# Patient Record
Sex: FEMALE | Race: WHITE | NOT HISPANIC OR LATINO | Employment: FULL TIME | URBAN - METROPOLITAN AREA
[De-identification: names, ages, dates, MRNs, and addresses within clinical notes are randomized per-mention and may not be internally consistent; named-entity substitution may affect disease eponyms.]

---

## 2017-02-15 ENCOUNTER — OFFICE VISIT (OUTPATIENT)
Dept: INTERNAL MEDICINE | Facility: CLINIC | Age: 47
End: 2017-02-15
Payer: OTHER GOVERNMENT

## 2017-02-15 VITALS
HEIGHT: 66 IN | BODY MASS INDEX: 31.53 KG/M2 | RESPIRATION RATE: 16 BRPM | TEMPERATURE: 98 F | SYSTOLIC BLOOD PRESSURE: 108 MMHG | DIASTOLIC BLOOD PRESSURE: 78 MMHG | HEART RATE: 72 BPM | WEIGHT: 196.19 LBS

## 2017-02-15 DIAGNOSIS — R05.9 COUGH: ICD-10-CM

## 2017-02-15 DIAGNOSIS — J01.40 ACUTE NON-RECURRENT PANSINUSITIS: Primary | ICD-10-CM

## 2017-02-15 PROCEDURE — 96372 THER/PROPH/DIAG INJ SC/IM: CPT | Mod: PBBFAC,PO

## 2017-02-15 PROCEDURE — 99214 OFFICE O/P EST MOD 30 MIN: CPT | Mod: S$PBB,,, | Performed by: INTERNAL MEDICINE

## 2017-02-15 PROCEDURE — 99999 PR PBB SHADOW E&M-EST. PATIENT-LVL III: CPT | Mod: PBBFAC,,, | Performed by: INTERNAL MEDICINE

## 2017-02-15 PROCEDURE — 99213 OFFICE O/P EST LOW 20 MIN: CPT | Mod: PBBFAC,PO | Performed by: INTERNAL MEDICINE

## 2017-02-15 RX ORDER — TRIAMCINOLONE ACETONIDE 40 MG/ML
40 INJECTION, SUSPENSION INTRA-ARTICULAR; INTRAMUSCULAR
Status: COMPLETED | OUTPATIENT
Start: 2017-02-15 | End: 2017-02-15

## 2017-02-15 RX ORDER — BENZONATATE 200 MG/1
200 CAPSULE ORAL 3 TIMES DAILY PRN
Qty: 30 CAPSULE | Refills: 0 | Status: SHIPPED | OUTPATIENT
Start: 2017-02-15 | End: 2017-02-25

## 2017-02-15 RX ORDER — CODEINE PHOSPHATE AND GUAIFENESIN 10; 100 MG/5ML; MG/5ML
5 SOLUTION ORAL NIGHTLY PRN
Qty: 118 ML | Refills: 0 | Status: SHIPPED | OUTPATIENT
Start: 2017-02-15 | End: 2017-02-25

## 2017-02-15 RX ADMIN — TRIAMCINOLONE ACETONIDE 40 MG: 40 INJECTION, SUSPENSION INTRA-ARTICULAR; INTRAMUSCULAR at 04:02

## 2017-02-15 NOTE — PROGRESS NOTES
Subjective:       Patient ID: Hillary Cotton is a 46 y.o. female who presents for Nasal Congestion (thick mucus and sinus pain at saturday pm.  pain at 3 ears,sinus and throat.  wants shot and zpac)      Sinus Problem   This is a new problem. The current episode started in the past 7 days (started 4 days ago). The problem is unchanged. There has been no fever. The pain is moderate. Associated symptoms include congestion, coughing, sinus pressure and a sore throat (improving). Pertinent negatives include no chills, diaphoresis, ear pain, headaches, hoarse voice, shortness of breath, sneezing or swollen glands. Past treatments include oral decongestants. The treatment provided mild relief.   Cough   This is a new problem. The current episode started in the past 7 days (started 4 days ago). The problem has been unchanged. The problem occurs every few minutes. The cough is non-productive. Associated symptoms include ear congestion, nasal congestion, postnasal drip, rhinorrhea and a sore throat (improving). Pertinent negatives include no chest pain, chills, ear pain, fever, headaches, hemoptysis, myalgias, rash, shortness of breath or sweats. Nothing aggravates the symptoms. She has tried OTC cough suppressant for the symptoms. The treatment provided mild relief. There is no history of asthma or pneumonia.        Review of Systems   Constitutional: Positive for fatigue. Negative for chills, diaphoresis and fever.   HENT: Positive for congestion, postnasal drip, rhinorrhea, sinus pressure and sore throat (improving). Negative for ear discharge, ear pain, hoarse voice, sneezing and trouble swallowing.    Eyes: Negative for visual disturbance.   Respiratory: Positive for cough. Negative for hemoptysis, chest tightness and shortness of breath.    Cardiovascular: Negative for chest pain and palpitations.   Gastrointestinal: Negative for abdominal pain, diarrhea, nausea and vomiting.   Musculoskeletal: Negative for  arthralgias and myalgias.   Skin: Negative for rash.   Neurological: Negative for weakness, light-headedness and headaches.   Hematological: Negative for adenopathy.       Objective:      Physical Exam   Constitutional: She is oriented to person, place, and time. Vital signs are normal. She appears well-developed and well-nourished. No distress.   HENT:   Head: Normocephalic and atraumatic.   Right Ear: Hearing, tympanic membrane, external ear and ear canal normal. Tympanic membrane is not erythematous and not bulging.   Left Ear: Hearing, tympanic membrane, external ear and ear canal normal. Tympanic membrane is not erythematous and not bulging.   Nose: Mucosal edema and rhinorrhea present. Right sinus exhibits maxillary sinus tenderness and frontal sinus tenderness. Left sinus exhibits maxillary sinus tenderness and frontal sinus tenderness.   Mouth/Throat: Uvula is midline and mucous membranes are normal. Posterior oropharyngeal erythema present. No oropharyngeal exudate.   Eyes: EOM and lids are normal.   Neck: Normal range of motion. Neck supple.   Cardiovascular: Normal rate, regular rhythm, normal heart sounds and intact distal pulses.    No murmur heard.  Pulmonary/Chest: Effort normal and breath sounds normal. No tachypnea and no bradypnea. She has no decreased breath sounds. She has no wheezes. She has no rhonchi.   Abdominal: Soft. Bowel sounds are normal. She exhibits no distension.   Musculoskeletal: She exhibits no edema.   Lymphadenopathy:     She has no cervical adenopathy.        Right: No supraclavicular adenopathy present.        Left: No supraclavicular adenopathy present.   Neurological: She is alert and oriented to person, place, and time. Coordination and gait normal.   Skin: Skin is warm, dry and intact. No rash noted. She is not diaphoretic.   Psychiatric: She has a normal mood and affect.   Vitals reviewed.      Assessment:       1. Acute non-recurrent pansinusitis    2. Cough        Plan:        -- kenalog 40mg IM  -- continue astelin nasal spray daily, zyrtec daily  -- Tessalon perles 200mg three times daily as needed for cough, Tussi-Organidin at bedtime as needed for cough but will cause drowsiness  -- could be viral, symptomatic management for now  -- call within next 2-3 days and may need additional treatment    Karlee Ro MD

## 2017-02-15 NOTE — MR AVS SNAPSHOT
[facility]   [facility]  [place] [address]  Phone: [phone]  Fax: [phone]                  Hillary Cotton   2/15/2017 4:00 PM   Office Visit    Description:  Female : 1970   Provider:  Karlee Ro MD   Department:  [facility]           Reason for Visit     Nasal Congestion           Diagnoses this Visit        Comments    Acute non-recurrent pansinusitis    -  Primary     Cough                To Do List           Goals (5 Years of Data)     None       These Medications        Disp Refills Start End    benzonatate (TESSALON) 200 MG capsule 30 capsule 0 2/15/2017 2017    Take 1 capsule (200 mg total) by mouth 3 (three) times daily as needed. - Oral    Pharmacy: [id number] - [place], [place] - [address] [address] [facility] Ph #: [phone]       guaifenesin-codeine 100-10 mg/5 ml (TUSSI-ORGANIDIN NR)  mg/5 mL syrup 118 mL 0 2/15/2017 2017    Take 5 mLs by mouth nightly as needed. - Oral    Pharmacy: [id number] - [place], [place] - [address] [address] [facility], [address] Ph #: [phone]         [facility] On Call     [facility] On Call Nurse Care Line -  Assistance  Registered nurses in the [facility] provide clinical advisement, health education, appointment booking, and other advisory services.  Call for this free service at [phone].             Medications           Message regarding Medications     Verify the changes and/or additions to your medication regime listed below are the same as discussed with your clinician today.  If any of these changes or additions are incorrect, please notify your healthcare provider.        START taking these NEW medications        Refills    benzonatate (TESSALON) 200 MG capsule 0    Sig: Take 1 capsule (200 mg total) by mouth 3 (three) times daily as needed.    Class: Normal    Route: Oral    guaifenesin-codeine 100-10 mg/5 ml (TUSSI-ORGANIDIN NR)   "mg/5 mL syrup 0    Sig: Take 5 mLs by mouth nightly as needed.    Class: Print    Route: Oral      These medications were administered today        Dose Freq    triamcinolone acetonide injection 40 mg 40 mg Clinic/HOD 1 time    Sig: Inject 1 mL (40 mg total) into the muscle one time.    Class: Normal    Route: Intramuscular           Verify that the below list of medications is an accurate representation of the medications you are currently taking.  If none reported, the list may be blank. If incorrect, please contact your healthcare provider. Carry this list with you in case of emergency.           Current Medications     azelastine (ASTELIN) 137 mcg (0.1 %) nasal spray 1 spray (137 mcg total) by Nasal route 2 (two) times daily.    cetirizine (ZYRTEC) 10 MG tablet Take 1 tablet (10 mg total) by mouth once daily.    glucosamine-chondroitin 500-400 mg tablet Take 1 tablet by mouth 3 (three) times daily.    levonorgestrel-ethinyl estradiol (AVIANE,ALESSE,LESSINA) 0.1-20 mg-mcg per tablet Take 1 tablet by mouth once daily.    albuterol 90 mcg/actuation inhaler Inhale 1-2 puffs into the lungs every 6 (six) hours as needed for Wheezing.    benzonatate (TESSALON) 200 MG capsule Take 1 capsule (200 mg total) by mouth 3 (three) times daily as needed.    guaifenesin-codeine 100-10 mg/5 ml (TUSSI-ORGANIDIN NR)  mg/5 mL syrup Take 5 mLs by mouth nightly as needed.           Clinical Reference Information           Your Vitals Were     BP Pulse Temp Resp Height Weight    108/78 (BP Location: Left arm, Patient Position: Sitting, BP Method: Manual) 72 97.8 °F (36.6 °C) (Oral) 16 5' 6" (1.676 m) 89 kg (196 lb 3.4 oz)    Last Period BMI             02/01/2017 (Approximate) 31.67 kg/m2         Blood Pressure          Most Recent Value    BP  108/78      Allergies as of 2/15/2017     No Known Allergies      Immunizations Administered on Date of Encounter - 2/15/2017     None      Language Assistance Services     ATTENTION: " Language assistance services are available, free of charge. Please call [phone].      ATENCIÓN: Si habla brenden, tiene a rodriguez disposición servicios gratuitos de asistencia lingüística. Llame al [phone].     CHÚ Ý: N?u b?n nói Ti?ng Vi?t, có các d?ch v? h? tr? ngôn ng? mi?n phí dành cho b?n. G?i s? [phone].         [facility] complies with applicable Federal civil rights laws and does not discriminate on the basis of race, color, national origin, age, disability, or sex.

## 2017-02-17 ENCOUNTER — PATIENT MESSAGE (OUTPATIENT)
Dept: INTERNAL MEDICINE | Facility: CLINIC | Age: 47
End: 2017-02-17

## 2017-02-17 RX ORDER — AZITHROMYCIN 250 MG/1
TABLET, FILM COATED ORAL
Qty: 6 TABLET | Refills: 0 | Status: SHIPPED | OUTPATIENT
Start: 2017-02-17 | End: 2017-02-22

## 2017-04-24 ENCOUNTER — OFFICE VISIT (OUTPATIENT)
Dept: FAMILY MEDICINE | Facility: CLINIC | Age: 47
End: 2017-04-24
Payer: OTHER GOVERNMENT

## 2017-04-24 VITALS
DIASTOLIC BLOOD PRESSURE: 80 MMHG | SYSTOLIC BLOOD PRESSURE: 120 MMHG | HEIGHT: 66 IN | HEART RATE: 68 BPM | WEIGHT: 194 LBS | BODY MASS INDEX: 31.18 KG/M2

## 2017-04-24 DIAGNOSIS — M77.8 TENDINITIS OF FOREARM: Primary | ICD-10-CM

## 2017-04-24 PROCEDURE — 99999 PR PBB SHADOW E&M-EST. PATIENT-LVL III: CPT | Mod: PBBFAC,,, | Performed by: FAMILY MEDICINE

## 2017-04-24 PROCEDURE — 99214 OFFICE O/P EST MOD 30 MIN: CPT | Mod: S$PBB,,, | Performed by: FAMILY MEDICINE

## 2017-04-24 PROCEDURE — 99213 OFFICE O/P EST LOW 20 MIN: CPT | Mod: PBBFAC,PO | Performed by: FAMILY MEDICINE

## 2017-04-24 RX ORDER — METHYLPREDNISOLONE 4 MG/1
TABLET ORAL
Qty: 21 TABLET | Refills: 0 | Status: SHIPPED | OUTPATIENT
Start: 2017-04-24 | End: 2017-05-15

## 2017-04-24 NOTE — MR AVS SNAPSHOT
[facility]   [place]  [place] [address]  Phone: [phone]  Fax: [phone]                  Hillary Cotton   2017 4:20 PM   Office Visit    Description:  Female : 1970   Provider:  Arnulfo Barboza MD   Department:  [facility]           Reason for Visit     Spasms           Diagnoses this Visit        Comments    Tendinitis of forearm    -  Primary            To Do List           Future Appointments        Provider Department Dept Phone    2017 2:15 PM Sushma Waite NP [facility] - Orthopedics [phone]      Goals (5 Years of Data)     None       These Medications        Disp Refills Start End    methylPREDNISolone (MEDROL DOSEPACK) 4 mg tablet 21 tablet 0 2017 5/15/2017    Take as directed    Pharmacy: [id number] - [place], [place] - [address] [address] Ph #: [phone]         [facility] On Call     [facility] Nurse Care Line -  Assistance  Unless otherwise directed by your provider, please contact [facility], our nurse care line that is available for  assistance.     Registered nurses in the [facility] provide: appointment scheduling, clinical advisement, health education, and other advisory services.  Call: [phone] (toll free)               Medications           Message regarding Medications     Verify the changes and/or additions to your medication regime listed below are the same as discussed with your clinician today.  If any of these changes or additions are incorrect, please notify your healthcare provider.        START taking these NEW medications        Refills    methylPREDNISolone (MEDROL DOSEPACK) 4 mg tablet 0    Sig: Take as directed    Class: Normal           Verify that the below list of medications is an accurate representation of the medications you are currently taking.  If none reported, the list may be blank. If incorrect, please contact your  "healthcare provider. Carry this list with you in case of emergency.           Current Medications     azelastine (ASTELIN) 137 mcg (0.1 %) nasal spray 1 spray (137 mcg total) by Nasal route 2 (two) times daily.    cetirizine (ZYRTEC) 10 MG tablet Take 1 tablet (10 mg total) by mouth once daily.    glucosamine-chondroitin 500-400 mg tablet Take 1 tablet by mouth 3 (three) times daily.    levonorgestrel-ethinyl estradiol (AVIANE,ALESSE,LESSINA) 0.1-20 mg-mcg per tablet Take 1 tablet by mouth once daily.    albuterol 90 mcg/actuation inhaler Inhale 1-2 puffs into the lungs every 6 (six) hours as needed for Wheezing.    methylPREDNISolone (MEDROL DOSEPACK) 4 mg tablet Take as directed           Clinical Reference Information           Your Vitals Were     BP Pulse Height Weight BMI    120/80 (BP Location: Left arm, Patient Position: Sitting, BP Method: Manual) 68 5' 6" (1.676 m) 88 kg (194 lb 0.1 oz) 31.31 kg/m2      Blood Pressure          Most Recent Value    BP  120/80      Allergies as of 4/24/2017     No Known Allergies      Immunizations Administered on Date of Encounter - 4/24/2017     None      Orders Placed During Today's Visit      Normal Orders This Visit    Ambulatory referral to Orthopedics     Future Labs/Procedures Expected by Expires    X-Ray Forearm Right  4/24/2017 4/24/2018      Language Assistance Services     ATTENTION: Language assistance services are available, free of charge. Please call [phone].      ATENCIÓN: Si habla brenden, tiene a rodriguez disposición servicios gratuitos de asistencia lingüística. Llame al [phone].     [facility] Ý: N?u b?n nói Ti?ng Vi?t, có các d?ch v? h? tr? ngôn ng? mi?n phí dành cho b?n. G?i s? [phone].         [facility] complies with applicable Federal civil rights laws and does not discriminate on the basis of race, color, national origin, age, disability, or sex.        "

## 2017-04-24 NOTE — PROGRESS NOTES
Subjective:       Patient ID: Hillary Cotton is a 46 y.o. female.    Chief Complaint: Spasms    HPI Comments: 46 years old female who came to the clinic with right forearm pain after doing physical activity.  Patient suspected possible tendon tear.  Patient with this problem for the last 2-3 weeks.  Patient was using ibuprofen with no significant improvement.    Spasms       Review of Systems   Constitutional: Negative.    HENT: Negative.    Eyes: Negative.    Respiratory: Negative.    Cardiovascular: Negative.    Gastrointestinal: Negative.    Genitourinary: Negative.    Musculoskeletal: Negative.    Skin: Negative.    Neurological: Negative.    Psychiatric/Behavioral: Negative.        Objective:      Physical Exam   Constitutional: She is oriented to person, place, and time. She appears well-developed and well-nourished. No distress.   HENT:   Head: Normocephalic and atraumatic.   Right Ear: External ear normal.   Left Ear: External ear normal.   Nose: Nose normal.   Mouth/Throat: Oropharynx is clear and moist. No oropharyngeal exudate.   Eyes: Conjunctivae and EOM are normal. Pupils are equal, round, and reactive to light. Right eye exhibits no discharge. Left eye exhibits no discharge. No scleral icterus.   Neck: Normal range of motion. Neck supple. No JVD present. No tracheal deviation present. No thyromegaly present.   Cardiovascular: Normal rate, regular rhythm, normal heart sounds and intact distal pulses.  Exam reveals no gallop and no friction rub.    No murmur heard.  Pulmonary/Chest: Effort normal and breath sounds normal. No stridor. No respiratory distress. She has no wheezes. She has no rales. She exhibits no tenderness.   Abdominal: Soft. Bowel sounds are normal. She exhibits no distension and no mass. There is no tenderness. There is no rebound and no guarding.   Musculoskeletal: Normal range of motion. She exhibits no edema.        Right forearm: She exhibits tenderness. She exhibits no  bony tenderness, no swelling, no edema, no deformity and no laceration.   Lymphadenopathy:     She has no cervical adenopathy.   Neurological: She is alert and oriented to person, place, and time. She has normal reflexes. No cranial nerve deficit. She exhibits normal muscle tone. Coordination normal.   Skin: Skin is warm and dry. No rash noted. She is not diaphoretic. No erythema. No pallor.   Psychiatric: She has a normal mood and affect. Her behavior is normal. Judgment and thought content normal.       Assessment:       1. Tendinitis of forearm        Plan:         Hillary was seen today for spasms.    Diagnoses and all orders for this visit:    Tendinitis of forearm  -     Ambulatory referral to Orthopedics  -     methylPREDNISolone (MEDROL DOSEPACK) 4 mg tablet; Take as directed  -     X-Ray Forearm Right; Future     possible MRI and physical therapy were discussed with the patient.

## 2017-04-25 ENCOUNTER — HOSPITAL ENCOUNTER (OUTPATIENT)
Dept: RADIOLOGY | Facility: HOSPITAL | Age: 47
Discharge: HOME | End: 2017-04-25
Attending: FAMILY MEDICINE
Payer: OTHER GOVERNMENT

## 2017-04-25 DIAGNOSIS — M77.8 TENDINITIS OF FOREARM: ICD-10-CM

## 2017-04-25 PROCEDURE — 73090 X-RAY EXAM OF FOREARM: CPT | Mod: 26,RT,, | Performed by: RADIOLOGY

## 2017-04-25 PROCEDURE — 73090 X-RAY EXAM OF FOREARM: CPT | Mod: TC,PO,RT

## 2017-04-28 ENCOUNTER — OFFICE VISIT (OUTPATIENT)
Dept: ORTHOPEDICS | Facility: CLINIC | Age: 47
End: 2017-04-28
Payer: OTHER GOVERNMENT

## 2017-04-28 VITALS — BODY MASS INDEX: 30.37 KG/M2 | WEIGHT: 193.5 LBS | HEIGHT: 67 IN

## 2017-04-28 DIAGNOSIS — M25.521 RIGHT ELBOW PAIN: Primary | ICD-10-CM

## 2017-04-28 DIAGNOSIS — M77.8 FOREARM TENDONITIS: ICD-10-CM

## 2017-04-28 DIAGNOSIS — M77.11 LATERAL EPICONDYLITIS OF RIGHT ELBOW: ICD-10-CM

## 2017-04-28 DIAGNOSIS — M79.631 RIGHT FOREARM PAIN: ICD-10-CM

## 2017-04-28 PROCEDURE — 99203 OFFICE O/P NEW LOW 30 MIN: CPT | Mod: S$PBB,,, | Performed by: NURSE PRACTITIONER

## 2017-04-28 PROCEDURE — 99999 PR PBB SHADOW E&M-EST. PATIENT-LVL III: CPT | Mod: PBBFAC,,, | Performed by: NURSE PRACTITIONER

## 2017-04-28 PROCEDURE — 99213 OFFICE O/P EST LOW 20 MIN: CPT | Mod: PBBFAC | Performed by: NURSE PRACTITIONER

## 2017-04-28 RX ORDER — MELOXICAM 15 MG/1
15 TABLET ORAL DAILY PRN
Qty: 20 TABLET | Refills: 0 | Status: SHIPPED | OUTPATIENT
Start: 2017-04-28 | End: 2017-05-10

## 2017-04-28 NOTE — PROGRESS NOTES
"Chief Complaint & History of Present Illness:  Hillary Cotton is a 46 y.o. year old female presenting with intermittent right elbow pain which started 1 month ago.  She is right hand dominant. She did use to play softball and that was her throwing arm. The pain started at the lateral elbow after lifting 40 pound bags of soil. No it has spread to the forearm and up the bicep to the shoulder. The pain is described as achy and sharp. It is aggravated by lifting, elevation, supination and pronation.  Associated symptoms include a firm "knot" at the lateral elbow, which has improved. Denies redness or erythema. Reports it hurts in the joint.  Previous treatments include avoidance of offending activity and OTC analgesics which have provided poor relief. She states that her primary care doctor told her that she was going to see orthopedics for an MRI. There is not a history of previous injury or surgery to the elbow. Denies numbness or tingling.     Review of patient's allergies indicates:  No Known Allergies      Current Outpatient Prescriptions   Medication Sig Dispense Refill    azelastine (ASTELIN) 137 mcg (0.1 %) nasal spray 1 spray (137 mcg total) by Nasal route 2 (two) times daily. 30 mL 5    cetirizine (ZYRTEC) 10 MG tablet Take 1 tablet (10 mg total) by mouth once daily. 10 tablet 0    glucosamine-chondroitin 500-400 mg tablet Take 1 tablet by mouth 3 (three) times daily.      levonorgestrel-ethinyl estradiol (AVIANE,ALESSE,LESSINA) 0.1-20 mg-mcg per tablet Take 1 tablet by mouth once daily. 28 tablet 11    methylPREDNISolone (MEDROL DOSEPACK) 4 mg tablet Take as directed 21 tablet 0    albuterol 90 mcg/actuation inhaler Inhale 1-2 puffs into the lungs every 6 (six) hours as needed for Wheezing. 18 g 0     No current facility-administered medications for this visit.        Past Medical History:   Diagnosis Date    Abnormal Pap smear     Allergy     Asthma     Sinusitis, chronic        Past " "Surgical History:   Procedure Laterality Date    SALIVARY GLAND SURGERY         Review of Systems:  Review of Systems   Constitution: Negative for chills, fever and malaise/fatigue.   Eyes: Negative for visual disturbance.   Cardiovascular: Negative for chest pain.   Hematologic/Lymphatic: Negative for bleeding problem. Does not bruise/bleed easily.   Skin: Negative for color change, flushing, poor wound healing and rash.   Musculoskeletal: Positive for arthritis and joint pain. Negative for falls, gout, joint swelling, muscle cramps, muscle weakness, myalgias and stiffness.        Arm pain   Neurological: Negative for dizziness, light-headedness, loss of balance, numbness and paresthesias.   Psychiatric/Behavioral: Negative for altered mental status.         OBJECTIVE:     PHYSICAL EXAM:  Height: 5' 7" (170.2 cm) Weight: 87.8 kg (193 lb 8 oz)  General    Nursing note reviewed.  Constitutional: She is oriented to person, place, and time. She appears well-developed and well-nourished. No distress.   HENT:   Head: Atraumatic.   Nose: Nose normal.   Eyes: Conjunctivae and EOM are normal. Right eye exhibits no discharge. Left eye exhibits no discharge.   Pulmonary/Chest: Effort normal. No respiratory distress.   Neurological: She is alert and oriented to person, place, and time.   Psychiatric: She has a normal mood and affect. Her behavior is normal. Judgment and thought content normal.             Right Hand/Wrist Exam     Inspection   Scars: Wrist - absent   Effusion: Wrist - absent   Bruising: Wrist - absent   Deformity: Wrist - deformity     Range of Motion   The patient has normal right hand/wrist ROM.    Other     Neuorologic Exam    Median Distribution: normal  Ulnar Distribution: normal  Radial Distribution: normal      Right Elbow Exam     Inspection   Scars: absent  Effusion: absent  Bruising: absent  Deformity: absent  Atrophy: absent    Pain   The patient exhibits pain of the extensor musculature and " lateral epicondyle    Range of Motion   The patient has normal right elbow ROM.    Tests Tinel's Sign (cubital tunnel): negative    Other   Sensation: normal    Comments:  I do not appreciate a knot or swelling to the elbow  Non tender to palpation           Muscle Strength   Right Upper Extremity   Wrist Extension: 5/5/5   Wrist Flexion: 5/5/5   Elbow Pronation:  5/5   Elbow Supination:  5/5   Elbow Extension: 5/5  Elbow Flexion: 5/5    Vascular Exam     Right Pulses      Radial:                    2+      Capillary Refill  Right Hand: normal capillary refill    Edema  Right Forearm: absent      RADIOGRAPHS:  Xray of the right forearm shows no fracture or dislocation.     ASSESSMENT/PLAN:     Plan:   Suspect Lateral Epicondylitis and Extensor Tendonitis of the forearm   - Recommend Mobic short term  - Start Physical therapy   - Pt states she is very concerned about a tear. I explained that even if there was a tear that I do not feel this would change the treatment plan of NSAIDs, rest, ice, and PT to include ultrasound and that I recommend conservative treatment  - Pt states she was told by PCP that she needed MRI and would like to obtain one because she does feel that she needs to know if there is a tear  - She wishes to wait on PT based on MRI results    RTC PRN, will call with MRI results    Instructed pt to call office if they have any future questions/concerns or to schedule apt. Patient will return to see me if symptoms worsen or fail to improve.

## 2017-04-28 NOTE — LETTER
April 28, 2017      Arnulfo Barboza MD  [address] [address]           [facility]  [address]  [place] [address]  Phone: [phone]          Patient: Hillary Cotton   MR Number: [id number]   YOB: 1970   Date of Visit: 4/28/2017       Dear Dr. Arnulfo Barboza:    Thank you for referring Hillary Cotton to me for evaluation. Attached you will find relevant portions of my assessment and plan of care.    If you have questions, please do not hesitate to call me. I look forward to following Hillary Cotton along with you.    Sincerely,    Sushma Waite, NP    Enclosure  CC:  No Recipients    If you would like to receive this communication electronically, please contact [email or web address] or [phone] to request more information on [employer] access.    For providers and/or their staff who would like to refer a patient to [facility], please contact us through our one-stop-shop provider referral line, [facility], at [phone].    If you feel you have received this communication in error or would no longer like to receive these types of communications, please e-mail [email or web address]

## 2017-05-08 ENCOUNTER — HOSPITAL ENCOUNTER (OUTPATIENT)
Dept: RADIOLOGY | Facility: HOSPITAL | Age: 47
Discharge: HOME | End: 2017-05-08
Attending: NURSE PRACTITIONER
Payer: OTHER GOVERNMENT

## 2017-05-08 DIAGNOSIS — M79.631 RIGHT FOREARM PAIN: ICD-10-CM

## 2017-05-08 DIAGNOSIS — M77.11 LATERAL EPICONDYLITIS OF RIGHT ELBOW: ICD-10-CM

## 2017-05-08 DIAGNOSIS — M25.521 RIGHT ELBOW PAIN: ICD-10-CM

## 2017-05-08 DIAGNOSIS — M77.8 FOREARM TENDONITIS: ICD-10-CM

## 2017-05-08 PROCEDURE — 73218 MRI UPPER EXTREMITY W/O DYE: CPT | Mod: 26,RT,, | Performed by: RADIOLOGY

## 2017-05-08 PROCEDURE — 73218 MRI UPPER EXTREMITY W/O DYE: CPT | Mod: TC,RT

## 2017-05-10 ENCOUNTER — TELEPHONE (OUTPATIENT)
Dept: ORTHOPEDICS | Facility: CLINIC | Age: 47
End: 2017-05-10

## 2017-05-10 DIAGNOSIS — M25.521 RIGHT ELBOW PAIN: Primary | ICD-10-CM

## 2017-05-10 DIAGNOSIS — M77.8 ELBOW TENDONITIS: ICD-10-CM

## 2017-05-10 DIAGNOSIS — M79.631 RIGHT FOREARM PAIN: ICD-10-CM

## 2017-05-10 DIAGNOSIS — M77.8 TRICEPS TENDONITIS: ICD-10-CM

## 2017-05-10 RX ORDER — DICLOFENAC SODIUM 75 MG/1
75 TABLET, DELAYED RELEASE ORAL 2 TIMES DAILY PRN
Qty: 30 TABLET | Refills: 0 | Status: SHIPPED | OUTPATIENT
Start: 2017-05-10 | End: 2017-06-07

## 2017-05-10 NOTE — TELEPHONE ENCOUNTER
Spoke with pt re: MRI results. I recommend PT, and NSAIDs PRN. She states she tried Mobic with no relief. I will send Diclofenac for her to try instead short term- she understands she cannot take both.

## 2017-05-16 ENCOUNTER — CLINICAL SUPPORT (OUTPATIENT)
Dept: REHABILITATION | Facility: HOSPITAL | Age: 47
End: 2017-05-16
Attending: NURSE PRACTITIONER
Payer: OTHER GOVERNMENT

## 2017-05-16 DIAGNOSIS — S46.311A STRAIN OF TRICEPS MUSCLE, RIGHT, INITIAL ENCOUNTER: Primary | ICD-10-CM

## 2017-05-16 PROCEDURE — 97014 ELECTRIC STIMULATION THERAPY: CPT | Mod: PN

## 2017-05-16 PROCEDURE — 97166 OT EVAL MOD COMPLEX 45 MIN: CPT | Mod: PN

## 2017-05-16 PROCEDURE — 97110 THERAPEUTIC EXERCISES: CPT | Mod: PN

## 2017-05-16 NOTE — PLAN OF CARE
"TIME RECORD    Date:  05/16/2017    Start Time:  2:15  Stop Time:  3:15    PROCEDURES:    TIMED  Procedure Min.   Estim 15   TE 10             UNTIMED  Procedure Min.   Mod eval 35         Total Timed Minutes:  25  Total Timed Units:  2  Total Untimed Units:  1  Charges Billed/# of units:  3 (1 eval, 1estim, 1 TE)    Visit #:  1    OCCUPATIONAL THERAPY INITIAL EVALUATION & PLAN OF TREATMENT    Subjective:   Patient Name: Hillary Cotton  Physician Name:  DEVON Rush  Primary Diagnosis:  R elbow pain  Treatment Diagnosis:  R elbow tricep and extensor mass tendonosis  Onset Date:  4/7/17  Eval Date: 5/16/2017   Certification Period:  5/16/2017  to 6/26/17  Past Medical History:   Past Medical History:   Diagnosis Date    Abnormal Pap smear     Allergy     Asthma     Sinusitis, chronic      Precautions:  Universal  Prior Therapy:  None  Signs of Abuse: no  Medications: Hillary Cotton has a current medication list which includes the following prescription(s): albuterol, azelastine, cetirizine, diclofenac, glucosamine-chondroitin, and levonorgestrel-ethinyl estradiol.  Nutrition:  wdwnf  Prior Level of Function: Independent  Social History:  Pt lives with spouse. Pt works as a  for [employer]  Place of Residence (steps/adaptations/DME):  Lives in house w/no steps  Functional Deficits Leading to Referral/Nature of Injury:  Pt reports injuring her R arm when lifting 40# bag of topsoil   Patient Therapy Goals:  "For this to not hurt and be able to use it again"  Hand dominance: Right  X-Rays/Tests: No fracture dislocation bone destruction seen.  MRi:  Mild tendinosis at the origin of the common duct or tendon, mild insertional tendinosis of the triceps, subcentimeter probable ganglion cyst along the volar aspect of the radial head.  Pain: 2 /10 at rest in R 6-7/10, primarily in proximal forearm but radiating more proximally at times to shoulder. Pt also has wrist pain " "at times.  Pt states it hurts more after she uses her arm rather than during use.    Patient is calm, pleasant & cooperative during OT evaluation.  Primary complaints are pain and weakness. Pt stated that most of her pain is after she uses her R hand ("soreness") and depends on the duration of use.    Objective:     Patient is R hand dominant & R hand involved.    Observations: Pt presented Mild bruising over dorsum of forearm    Range of Motion (in degrees):   Right AROM Left AROM   Elbow E/F WNL WNL   Forearm Sup/pron WNL WNL   Wrist E/F 60/66 62/58   Wrist RD/UD 15/32 17/32   Thumb R/P Abd 35/32 40/45   Stress position (elbow extension ) R  Wrist Ext. 22  L38  R wrist Flex 46 (pain over lateral elbow) L 50  Composite fist: WNL     and Pinch Strengths (in pounds):  Setting 2   Right Left   Elbow bent     1 25 35   Elbow straight     1 10 25        Lateral 5 8   Tripod 4 6.5   Tip 4 7     Comments: 1 trial due to pain      Circumferential Edema Measurements (in cm):     Right Left   Above Elbow (5 cm from EC) 29.5 30.2   Elbow Crease  26.5 27.5   Below Elbow (4 cm from EC) 26.5 26   Wrist crease 15.2 15   MCP's 18.3 18.3     Sensation: Patient denies numbness & tingling at this time    Fine motor coordination:  9 hole peg   Right Left   Seconds 31 35   Dropped during removal 0 0   Dropped during replacement 1 0     Endurance: good w/ light/moderate/heavy resistive ADL/IADL's using R hand    ADLs/Function: FOTO impairment score of 40% (see media for details).  Pt reports difficulty with any B/IADL requiring pinch (such as holding a cup of coffee, homemaking tasks)    Special Tests: Long finger resistance test positive.  Pain w/palpation to wrist/finger extensors and self stretch in stress position    Today's Treatment:  Initial evaluation completed f/b education on condition, POC and HEP, and IFC with ice for pain    Assessment:     This 46 y.o. female referred to Outpatient Occupational Therapy with diagnosis " of   Encounter Diagnosis   Name Primary?    Strain of triceps muscle, right, initial encounter Yes    presents with limitations as described in problem list. Patient can benefit from Occupational Therapy services for Iontophoresis, Ultrasound, moist heat, PROM, AROM, Therapeutic exercises, joint mobs, home exercise program provided with written instructions, ice, Ice massage and strengthening. The following goals were discussed with the patient and she is in agreement with them as to be addressed in the treatment plan.     Problem List:   Decreased function of Right UE, Decreased function of Left UE, Increased pain, Decreased strength, Inability to perform work/tasks, Difficulty sleeping, Inability to perform leisure activities and Inability to perform self care tasks    History Examination Decision Making Complexity Score   Occupational Profile:   Pt works as an  for [employer]    Medical and Therapy History:   asthma    Mod review of medical/therapy history performed including MRI results          Mod   Performance Deficits     Physical  Pt with:   decreased AROM, strength, coordination    of  of R wrist.  Wrist, elbow and forearm pain        Cognitive  Pt with no impairments      Psychosocial:    Pt unable to participate in leisure activities, affecting emotional health and ability to perform household tasks. It is also causing difficulty at work.    Mod Pt required no modifications to activities during initial evaluation, has multiple options of therapeutic intervention, including iontophoresis, Ultrasound, moist heat, PROM, AROM, Therapeutic exercises, joint mobs, home exercise program provied with written instructions, ice, Ice massage and strengthening.     Discussed goals and Pt in agreement with goals.    Mod Mod       Patient Education/Response:     Pt educated on anatomy/pathology of lateral epicondylitis, the treatment plan, SROM, MH/cryotherapy and splint recs.  Pt verbalized  understanding    Plans and Goals:     Rehab Potential: good    Short Term Goals to be met in 4 weeks:  1) Patient to be IND with HEP and modalities for pain management  2) Increase ROM 10 degrees in L wrist flexion stress position to increase functional hand use for basic and IADL's  3) Increase  strength 10 lbs. to grasp items for basic and IADL's  4) Increase pinch 1-3 psis for Basic and IADL's      Long Term Goals to be met in 8 weeks  1) Patient to be IND with HEP and modalities for pain management  2) Increase ROM 20 degrees in wrist flexion stress position to increase functional hand use for basic and IADL's  3) Increase  strength 20 lbs. to grasp items for basic and IADL's  4) Increase pinch 1-3 psis for [place] with basic and IADL's      Recommended Treatment Plan (2 times per week for 8 weeks): Therapeutic Exercise, Functional Activities, Patient Education, Home Exercise Program, ADL Training, Paraffin, Iontophoresis (With dexamethasone), Ultrasound/Phonophoresis, Edema Control, Electrical Stimulation/TENS/Interferential, Moist Heat/Ice, Fluidotherapy and Manual Therapy      Therapist's Name: BRONWYN Hutton/RASHEED     Date: 05/16/2017    I CERTIFY THE NEED FOR THESE SERVICES FURNISHED UNDER THIS PLAN OF TREATMENT AND WHILE UNDER MY CARE    Physician's comments: ________________________________________________________________________________________________________________________________________________      Physician's Name: ___________________________________

## 2017-05-19 ENCOUNTER — CLINICAL SUPPORT (OUTPATIENT)
Dept: REHABILITATION | Facility: HOSPITAL | Age: 47
End: 2017-05-19
Attending: NURSE PRACTITIONER
Payer: OTHER GOVERNMENT

## 2017-05-19 DIAGNOSIS — M79.601 PAIN OF RIGHT ARM: ICD-10-CM

## 2017-05-19 DIAGNOSIS — M25.639 STIFFNESS OF JOINT OF FOREARM: ICD-10-CM

## 2017-05-19 PROCEDURE — 97140 MANUAL THERAPY 1/> REGIONS: CPT | Mod: PN

## 2017-05-19 PROCEDURE — 97530 THERAPEUTIC ACTIVITIES: CPT | Mod: PN

## 2017-05-19 PROCEDURE — 97110 THERAPEUTIC EXERCISES: CPT | Mod: PN

## 2017-05-19 PROCEDURE — 97035 APP MDLTY 1+ULTRASOUND EA 15: CPT | Mod: PN

## 2017-05-19 NOTE — PROGRESS NOTES
"TIME RECORD    Date:  05/19/2017    Start Time:  1:00 pm  Stop Time: 2:53    PROCEDURES:    TIMED  Procedure Min.   TE 22   TA 8   U.S. 8   MT 15         UNTIMED  Procedure Min.   MH NC         Total Timed Minutes:  53  Total Timed Units:  4  Total Untimed Units:  0  Charges Billed/# of units:  4      Progress/Current Status    Subjective:     Patient ID: Hillary Cotton is a 46 y.o. female.  Diagnosis:   1. Pain of right arm     2. Stiffness of joint of forearm       Pain: 2 /10 at rest  Pt stated "Oooh I was so sore after last time".  Pt presents with less bruising over dorsal forearm    Objective:     TE: Pt received MH to extensor mass to increase tissue extensibility while performing gentle wrist flexion self stretch x10 min.  Pt completed 10 reps of SROM for wrist extensors in position II (elbow and fingers flexed and forearm pronated).    MT: MT: provided deep STM, including MFR, CFM, lymphatic drainage technique ncluding use of IASTM tools to R extensor mass. Kinesiotaping to inhibit wrist and digit extensors.  Space correction over extensor mass to decrease pain    US: Patient receives ultrasound  for pain control and decreased inflammation @ 100%  duty cycle, 1.0 Mhz, applied to R dorsal forearm, intensity = 3.3 w/cm2 for 10 minutes.    TA: Ice massage to volar forearm x8 min to decrease pain and edema.    Assessment:     Pts pain decreased to 1/10 after tx.  Pt with good understanding of HEP and education on kinesiotaping.  Lymphatic drainage improved over dorsal forearm as evidenced by decreased buising.  Pt able to perform all exercises in the pain free range with min v/c's.    Patient Education/Response:     Precautions with kinesiotaping and HEP    Plans and Goals:     Patient could benefit from continued OT intervention to work towards established goals      "

## 2017-05-22 ENCOUNTER — TELEPHONE (OUTPATIENT)
Dept: ORTHOPEDICS | Facility: CLINIC | Age: 47
End: 2017-05-22

## 2017-05-22 NOTE — TELEPHONE ENCOUNTER
Spoke with pt regarding getting an appt made with hand clinic. Pt states [facility] is too far. Pt was offered [facility] 6/19. Pt states that's a long time to wait. Pt call was transferred to Johanny Waite Np for further advice.

## 2017-05-22 NOTE — TELEPHONE ENCOUNTER
----- Message from Dianna Garcia sent at 5/22/2017 10:25 AM CDT -----  Contact: [email or web address]  Patient has questions about making an appointment for tomorrow patient states that  physical therapy is making it worst.

## 2017-05-26 ENCOUNTER — TELEPHONE (OUTPATIENT)
Dept: REHABILITATION | Facility: HOSPITAL | Age: 47
End: 2017-05-26

## 2017-05-26 NOTE — TELEPHONE ENCOUNTER
Pt reports twinges that are reminiscent to the TENS unit she had in the previous session, throughout the day, without the TENS unit in place. Pt reports having used her R hand in a crawfish boil and had immense pain following. Pt open to returning to therapy, despite the pain and reporting therapy made her pain worse. Pt will be seen by Parveen Roe, at 4 pm 5/30/17.

## 2017-05-30 ENCOUNTER — CLINICAL SUPPORT (OUTPATIENT)
Dept: REHABILITATION | Facility: HOSPITAL | Age: 47
End: 2017-05-30
Attending: NURSE PRACTITIONER
Payer: OTHER GOVERNMENT

## 2017-05-30 DIAGNOSIS — M79.601 PAIN OF RIGHT ARM: ICD-10-CM

## 2017-05-30 DIAGNOSIS — M25.639 STIFFNESS OF JOINT OF FOREARM: ICD-10-CM

## 2017-05-30 PROCEDURE — 97022 WHIRLPOOL THERAPY: CPT | Mod: PN

## 2017-05-30 PROCEDURE — 97140 MANUAL THERAPY 1/> REGIONS: CPT | Mod: PN

## 2017-05-30 PROCEDURE — 97110 THERAPEUTIC EXERCISES: CPT | Mod: PN

## 2017-05-30 PROCEDURE — 97035 APP MDLTY 1+ULTRASOUND EA 15: CPT | Mod: PN

## 2017-05-30 NOTE — PROGRESS NOTES
TIME RECORD    Date:  05/30/2017    Start Time:  405  Stop Time:  505    Visit# 3 of 17 through 09/03/17    PROCEDURES:    TIMED  Procedure Min.   US 15   MT 25   TE 10             UNTIMED  Procedure Min.   Fluidotherapy 10         Total Timed Minutes:  50  Total Timed Units:  4  Total Untimed Units:  1  Charges Billed/# of units:  5  F, 1 US, 2 MT, 1 TE      Progress/Current Status  Physician Name:  DEVON Rush  Primary Diagnosis:  R elbow pain  Treatment Diagnosis:  R elbow tricep and extensor mass tendonosis  Onset Date:  4/7/17  Eval Date: 5/16/2017   Certification Period:  5/16/2017  to 6/26/17    Subjective:     Patient ID: Hillary Cotton is a 46 y.o. female.  Diagnosis:   1. Pain of right arm     2. Stiffness of joint of forearm       Pain:  6-7/10 (Hartley-Baker Scale) w/ certain activity  Pt reports having an increase in pain since her last 2 OT treatments.  She is concerned her condition is worsening.     Objective:     Objective measures taken today as follows: (compared to initial eval measures)  AROM Right   Elbow E / F -30 / !42   Wrist E/F 49 (-11) / 54 (-12)   Wrist RD/UD 15/32   Thumb R/P Abd 35/32     Observations:  Pt is TTP at 6-7/10 over mid forearm to elbow & lat epi to post triceps insert w/ palpation.  Minimal to moderate localized edema is noted over R mid forearm to elbow.   A firm palpable mass, that is TTP 5/10, is noted over her R radial head  Neurological:  Patient w/ positive for numbness & tingling in radial nerve pattern.  Exquisite pain at mid forearm (point of superficial radial nerve)    Patient seen by Occupational Therapy today w/ treatment as follows:  Fluidotherapy: To R elbow-hand for 10 min, continuous air, 100 deg, air speed 100 to decrease pain, edema & scar tissue and increased tissue extensibility.  U/S:  3.0 MHz, .6 W/cm2, 20% pulsed, R lat elbow-mid forearm for 15 min to decrease pain & edema, increase circulation & stimulate healing   MT: provided  superficial STM, including MFR, CFM, lymphatic drainage technique & scar management, including use of IASTM and cupping tools to R elbow-hand.   TE per log  AROM    Elbow 3 ways 10 reps each hand position   Forearm sup/pron 10 reps       Forearm stretches (elbow at 90 deg) 3 reps for 3 stretches, 10 sec hold     KT:  Jellyfish taping pattern over dorsal aspect of mid forearm for acute pain & edema w/space correction & radial nerve decompression;  Y stip applied to R lat epi w/ tails over forearm ext wad muscles.     Patient training/education: Patient was provided w/ information on potential differential diagnosis & disease pathology, based on her current & presenting symptoms. Recommendations were made for her to continue w/ limiting use of her RUE, avoiding lifting activities and provacative movement patterns. Also recommended her to attend her appt scheduled w/ Dr Hester for further diagnostics and course of treatment needed to address her condition.  Updated HEP, including forearm stretches was provided for the patient today.     Assessment:     Patient was noted to multiple points of pain in RUE (elbow-forearm).  She appears to be in an acute flare of her condition and caution is recommended in therapy.  Patient may benefit from modalities for mgmt of acute tendinosis and lat epicondylitis pain & edema.  Gentle manual therapy and elbow-forearm AROM/stretches are also recommended.  Continue OT per poc & focus on reducing pain and edema in R elbow/forearm. No strengthening or repetitive activity is recommend at this time.     Patient Education/Response:     Precautions with kinesiotaping and HEP    Plans and Goals:     Cont conservative aspect of OT poc

## 2017-05-31 ENCOUNTER — PATIENT MESSAGE (OUTPATIENT)
Dept: FAMILY MEDICINE | Facility: CLINIC | Age: 47
End: 2017-05-31

## 2017-05-31 RX ORDER — TRAMADOL HYDROCHLORIDE 50 MG/1
50 TABLET ORAL EVERY 8 HOURS PRN
Qty: 30 TABLET | Status: SHIPPED | OUTPATIENT
Start: 2017-05-31 | End: 2017-06-07 | Stop reason: SDUPTHER

## 2017-05-31 RX ORDER — MELOXICAM 15 MG/1
15 TABLET ORAL DAILY
Qty: 30 TABLET | Refills: 1 | Status: SHIPPED | OUTPATIENT
Start: 2017-05-31 | End: 2017-06-07

## 2017-06-07 ENCOUNTER — OFFICE VISIT (OUTPATIENT)
Dept: ORTHOPEDICS | Facility: CLINIC | Age: 47
End: 2017-06-07
Attending: ORTHOPAEDIC SURGERY
Payer: OTHER GOVERNMENT

## 2017-06-07 VITALS — BODY MASS INDEX: 30.29 KG/M2 | WEIGHT: 193 LBS | HEIGHT: 67 IN

## 2017-06-07 DIAGNOSIS — M79.621 PAIN OF RIGHT UPPER ARM: Primary | ICD-10-CM

## 2017-06-07 PROCEDURE — 99999 PR PBB SHADOW E&M-EST. PATIENT-LVL II: CPT | Mod: PBBFAC,,, | Performed by: ORTHOPAEDIC SURGERY

## 2017-06-07 PROCEDURE — 99212 OFFICE O/P EST SF 10 MIN: CPT | Mod: PBBFAC | Performed by: ORTHOPAEDIC SURGERY

## 2017-06-07 PROCEDURE — 99203 OFFICE O/P NEW LOW 30 MIN: CPT | Mod: S$PBB,,, | Performed by: ORTHOPAEDIC SURGERY

## 2017-06-07 RX ORDER — TRAMADOL HYDROCHLORIDE 50 MG/1
50 TABLET ORAL EVERY 8 HOURS PRN
Qty: 30 TABLET | Status: SHIPPED | OUTPATIENT
Start: 2017-06-07 | End: 2017-06-17

## 2017-06-07 RX ORDER — CELECOXIB 200 MG/1
200 CAPSULE ORAL DAILY
Qty: 30 CAPSULE | Refills: 3 | Status: SHIPPED | OUTPATIENT
Start: 2017-06-07 | End: 2018-01-04

## 2017-06-07 NOTE — PROGRESS NOTES
INITIAL VISIT HISTORY:  A 46-year-old female presents for evaluation of right   elbow and arm pain of about two months' duration.  She believes her symptoms   were brought on by doing some heavy and repetitive lifting about that same time.    No specific trauma reported.  No fall on the arm reported.    She has been in therapy for the past few weeks.  Initially, she did not feel   like it was helping, maybe even made it worse, but now she feels like something   starting to get better and she thinks it may be related to the therapy.  She has   been on Mobic without much improvement and tramadol from time to time.  There   is no numbness reported, but she does have pain, which radiates into the dorsal   aspect of the right hand and occasionally into the right index finger dorsally.    She recently had an MRI scan, which showed evidence of some tendinosis of the   extensor tendon attachment laterally over the lateral epicondylar area and some   mild triceps involvement.  An unrelated cyst was identified in the radial head.    PAST MEDICAL HISTORY:  Significant for allergies, asthma and sinusitis.    PAST SURGICAL HISTORY:  Includes salivary gland surgery.    FAMILY HISTORY:  Positive for diabetes and hypertension.    SOCIAL HISTORY:  The patient is a former smoker, not currently.  No alcohol   reported.    REVIEW OF SYSTEMS:  Negative fever, chills, rashes.    CURRENT MEDICATIONS:  Reviewed on chart.    ALLERGIES:  None.    PHYSICAL EXAMINATION:  GENERAL:  Well-developed, well-nourished female in no acute distress, alert and   oriented x3.  MUSCULOSKELETAL:  Examination of upper extremities significant for the right   elbow, demonstrating tenderness laterally over the lateral epicondylar area, but   also tenderness a little bit more distal over the radial tunnel.  No swelling   of the arm.  Full range of motion of elbow, no instability.  Tinel sign negative   over the ulnar nerve and median nerve at the wrist.  Range  of motion in wrist   and fingers full.   strength decreased.  Sensation intact.  Some pain with   resisted wrist extension.    IMAGING:  X-rays reviewed, demonstrating no abnormalities.  MRI as noted above.    IMPRESSION:  1.  Lateral epicondylitis, right elbow.  2.  Possible radial tunnel syndrome.    PLAN:  I explained the nature of problem to the patient.  She does have   migratory symptoms, which seem to radiate up and down the arm, so I have ordered   some anti-inflammatory cream that she can use topically for the areas involved.    She has a wrist brace which does not fit well, so I have given her a new wrist   brace for the right wrist, which I would like her to wear during the day.    Mobic does not seem to be helping, so we will switch her over to Celebrex.  She   can continue with tramadol as needed and I would like her to continue therapy.    Follow up in one month.      CARMENCITA  dd: 06/07/2017 16:11:46 (CDT)  td: 06/08/2017 14:44:11 (CDT)  Doc ID   #[id number]  Job ID #[id number]    CC:

## 2017-06-07 NOTE — LETTER
June 7, 2017        Arnulfo Barboza MD  [address] [facility] [address]             [facility]  [address], [address]  [facility] [address]  Phone: [phone]   Patient: Hillary Cotton   MR Number: [id number]   YOB: 1970   Date of Visit: 6/7/2017       Dear Dr. Barboza:    Thank you for referring Hillary Cotton to me for evaluation. Below are the relevant portions of my assessment and plan of care.            If you have questions, please do not hesitate to call me. I look forward to following Hillary along with you.    Sincerely,      Pavan Hester Jr., MD           CC  No Recipients

## 2017-06-08 ENCOUNTER — TELEPHONE (OUTPATIENT)
Dept: REHABILITATION | Facility: HOSPITAL | Age: 47
End: 2017-06-08

## 2017-06-08 NOTE — TELEPHONE ENCOUNTER
Called pt regarding no show to appointment yesterday at 4 pm. Left message regarding upcoming appointment 6/14/14 at 4 pm. Provided clinic call back number.

## 2017-06-14 ENCOUNTER — CLINICAL SUPPORT (OUTPATIENT)
Dept: REHABILITATION | Facility: HOSPITAL | Age: 47
End: 2017-06-14
Attending: NURSE PRACTITIONER
Payer: OTHER GOVERNMENT

## 2017-06-14 DIAGNOSIS — M79.601 PAIN OF RIGHT ARM: ICD-10-CM

## 2017-06-14 DIAGNOSIS — M25.639 STIFFNESS OF JOINT OF FOREARM: ICD-10-CM

## 2017-06-14 PROCEDURE — 97035 APP MDLTY 1+ULTRASOUND EA 15: CPT | Mod: PN

## 2017-06-14 PROCEDURE — 97140 MANUAL THERAPY 1/> REGIONS: CPT | Mod: PN

## 2017-06-14 PROCEDURE — 97110 THERAPEUTIC EXERCISES: CPT | Mod: PN

## 2017-06-14 PROCEDURE — 97022 WHIRLPOOL THERAPY: CPT | Mod: PN

## 2017-06-14 NOTE — PROGRESS NOTES
TIME RECORD    Date:  06/14/2017    Start Time:  400  Stop Time:  510    Visit# 4 of 17 through 09/03/17    PROCEDURES:    TIMED  Procedure Min.   US 15   MT 30   TE 15             UNTIMED  Procedure Min.   Fluidotherapy 15         Total Timed Minutes:  50  Total Timed Units:  4  Total Untimed Units:  1  Charges Billed/# of units:  5  F, 1 US, 2 MT, 1TE      Progress/Current Status  Physician Name:  Sushma WaiteDEVON  Primary Diagnosis:  R elbow pain  Treatment Diagnosis:  R elbow tricep and extensor mass tendonosis  Onset Date:  4/7/17  Eval Date: 5/16/2017   Certification Period:  5/16/2017  to 6/26/17    Subjective:     Pain:  3-4/10 (Hartley-Baker Scale) during therapy today  Pt reports completing a RTD appt.  She feels KT and last treatment session helped & is having less pain, overall, but symptoms have not resolved.      Objective:     Patient seen by Occupational Therapy today w/ treatment as follows:  Fluidotherapy: To R elbow-hand for 15 min, continuous air, 100 deg, air speed 100 to decrease pain, edema & scar tissue and increased tissue extensibility.  U/S:  3.0 MHz, .8 W/cm2, 50% pulsed, R lat elbow-mid forearm for 15 min to decrease pain & edema, increase circulation & stimulate healing   MT: provided extensive  STM, including MFR, CFM, lymphatic drainage technique & scar management, including use of IASTM and cupping tools to R elbow-hand.   TE per log  AROM    Elbow 3 ways 10 reps each hand position   Forearm sup/pron 10 reps       Forearm stretches (elbow at 90 deg) 3 reps for 3 stretches, 10 sec hold     KT:  Tennis elbow taping pattern w/ space correction over radial nerve for decompression;  Y stip applied for delt & I strip to biceps for inhibition taping of R upper arm.     Patient training/education: Further training was provided to patient regarding joint protection principles & energy conservation techniques in ADL/IADL's.  Patient verbalized understanding.     Assessment:     Patient tolerated  treatment fairy well today.  She is noted to have a significant amount of myofascial tightness in R upper arm, but some improvement is noted in R elbow/forearm, compared to last treatment session.  Recommend to continue OT per poc & progress, as tolerated. Patient may benefit from a custom, functional orthosis to help reduce s/s RSI/CTD's while engaging in repetitive work tasks.     Patient Education/Response:     HEP-forearm stretched, Joint protection & energy conservation in ADLIADL's    Plans and Goals:     Cont OT poc

## 2017-06-16 ENCOUNTER — CLINICAL SUPPORT (OUTPATIENT)
Dept: REHABILITATION | Facility: HOSPITAL | Age: 47
End: 2017-06-16
Attending: NURSE PRACTITIONER
Payer: OTHER GOVERNMENT

## 2017-06-16 ENCOUNTER — OFFICE VISIT (OUTPATIENT)
Dept: FAMILY MEDICINE | Facility: CLINIC | Age: 47
End: 2017-06-16
Payer: OTHER GOVERNMENT

## 2017-06-16 VITALS
HEIGHT: 66 IN | DIASTOLIC BLOOD PRESSURE: 70 MMHG | WEIGHT: 194 LBS | SYSTOLIC BLOOD PRESSURE: 120 MMHG | BODY MASS INDEX: 31.18 KG/M2 | HEART RATE: 72 BPM

## 2017-06-16 DIAGNOSIS — M25.639 STIFFNESS OF JOINT OF FOREARM: ICD-10-CM

## 2017-06-16 DIAGNOSIS — G56.31 RADIAL TUNNEL SYNDROME, RIGHT: ICD-10-CM

## 2017-06-16 DIAGNOSIS — M79.601 PAIN OF RIGHT ARM: ICD-10-CM

## 2017-06-16 DIAGNOSIS — M77.8 TENDINITIS OF FOREARM: Primary | ICD-10-CM

## 2017-06-16 PROCEDURE — 99213 OFFICE O/P EST LOW 20 MIN: CPT | Mod: PBBFAC,PO | Performed by: FAMILY MEDICINE

## 2017-06-16 PROCEDURE — 97022 WHIRLPOOL THERAPY: CPT | Mod: PN

## 2017-06-16 PROCEDURE — 97110 THERAPEUTIC EXERCISES: CPT | Mod: PN

## 2017-06-16 PROCEDURE — 99999 PR PBB SHADOW E&M-EST. PATIENT-LVL III: CPT | Mod: PBBFAC,,, | Performed by: FAMILY MEDICINE

## 2017-06-16 PROCEDURE — 99214 OFFICE O/P EST MOD 30 MIN: CPT | Mod: S$PBB,,, | Performed by: FAMILY MEDICINE

## 2017-06-16 PROCEDURE — 97140 MANUAL THERAPY 1/> REGIONS: CPT | Mod: PN

## 2017-06-16 NOTE — PROGRESS NOTES
TIME RECORD    Date:  06/16/2017    Start Time:  2:20pm  Stop Time:  3:05pm  Pt left for doctors appointment and returned for kinesiotaping.  Start Time: 5:15pm  Stop Time:  5:30pm    Visit#  5 of 17 through 09/03/17  FOTO last administered: 6/16/17    PROCEDURES:    TIMED  Procedure Min.   US 10 (NC)   MT 15   TE 15             UNTIMED  Procedure Min.   Fluidotherapy 15         Total Timed Minutes:  30  Total Timed Units:  2  Total Untimed Units:  1  Charges Billed/# of units:  3 (F, 1 MT, 1 TE)       OCCUPATIONAL THERAPY PROGRESS NOTE    Physician Name:  DEVON Rush  Primary Diagnosis:  R elbow pain  Treatment Diagnosis:  R elbow tricep and extensor mass tendonosis  Onset Date:  4/7/17  Eval Date: 5/16/2017   Certification Period:  5/16/2017  to 6/26/17    Subjective:     Pain:  4/10 during MT to triceps tendon  Pt stated: The kinesiotape helped that other time you did it. My arm back here really hurts.    Objective:     Patient seen by Occupational Therapy today w/ treatment as follows:  Fluidotherapy: To R elbow-hand for 15 min, continuous air, 100 deg, air speed 100 to decrease pain, edema & scar tissue and increased tissue extensibility.  U/S:  1.0 MHz, 1.0 W/cm2, 50% pulsed, R lat elbow-mid forearm for 10 min to decrease pain & edema, increase circulation & stimulate healing   MT: provided extensive  STM, including MFR, CFM, lymphatic drainage technique & scar management, including use of IASTM and cupping tools to R elbow-hand.   TE per log  AROM    Elbow 3 ways 10 reps each hand position   Forearm sup/pron 10 reps       Forearm stretches (elbow at 90 deg) 3 reps for 3 stretches, 10 sec hold     KT:  Tennis elbow taping pattern w/ space correction over radial nerve for decompression;  Y stip applied for delt & I strip to biceps for inhibition taping of R upper arm.     Patient training/education: Further training was provided to patient regarding joint protection principles & energy conservation  techniques in ADL/IADL's.  Patient verbalized understanding.     Assessment:     Pt with good tolerance of therapy today. Pt continues with tightness and significant pain throughout multiple sites on RUE. Pt with some improvement in tightness following fluidotherapy, US, and MT with mild cupping to triceps region. Pt motivated. Pt would benefit from continued skilled OT to address limitations.    Patient Education/Response:     HEP-forearm stretched, Joint protection & energy conservation in ADL/IADL's    Plans and Goals:     Cont OT poc

## 2017-06-16 NOTE — PROGRESS NOTES
Subjective:       Patient ID: Hillary Cotton is a 46 y.o. female.    Chief Complaint: Hand Pain (right hand  )    46 years old female came to the clinic with right arm stiffness and chronic pain associated with numbness and tingling for the last several months..  Patient with possible radical tunnel syndrome.  Patient is not able to work right now.  Patient requests referral for the specialist.      Hand Pain        Review of Systems   Musculoskeletal: Positive for arthralgias.       Objective:      Physical Exam   Constitutional: She is oriented to person, place, and time. She appears well-developed and well-nourished. No distress.   HENT:   Head: Normocephalic and atraumatic.   Right Ear: External ear normal.   Left Ear: External ear normal.   Nose: Nose normal.   Mouth/Throat: Oropharynx is clear and moist. No oropharyngeal exudate.   Eyes: Conjunctivae and EOM are normal. Pupils are equal, round, and reactive to light. Right eye exhibits no discharge. Left eye exhibits no discharge. No scleral icterus.   Neck: Normal range of motion. Neck supple. No JVD present. No tracheal deviation present. No thyromegaly present.   Cardiovascular: Normal rate, regular rhythm, normal heart sounds and intact distal pulses.  Exam reveals no gallop and no friction rub.    No murmur heard.  Pulmonary/Chest: Effort normal and breath sounds normal. No stridor. No respiratory distress. She has no wheezes. She has no rales. She exhibits no tenderness.   Abdominal: Soft. Bowel sounds are normal. She exhibits no distension and no mass. There is no tenderness. There is no rebound and no guarding.   Musculoskeletal: She exhibits no edema.        Right elbow: She exhibits decreased range of motion. Tenderness found. Lateral epicondyle tenderness noted.        Right wrist: She exhibits decreased range of motion and tenderness.   Lymphadenopathy:     She has no cervical adenopathy.   Neurological: She is alert and oriented to  person, place, and time. She has normal reflexes. No cranial nerve deficit. She exhibits normal muscle tone. Coordination normal.   Skin: Skin is warm and dry. No rash noted. She is not diaphoretic. No erythema. No pallor.   Psychiatric: She has a normal mood and affect. Her behavior is normal. Judgment and thought content normal.       Assessment:       1. Tendinitis of forearm    2. Stiffness of joint of forearm    3. Radial tunnel syndrome, right        Plan:         Hillary was seen today for hand pain.    Diagnoses and all orders for this visit:    Tendinitis of forearm    Stiffness of joint of forearm    Radial tunnel syndrome, right     hand specialist referral.  Medical certification for a couple of weeks.

## 2017-06-23 ENCOUNTER — CLINICAL SUPPORT (OUTPATIENT)
Dept: REHABILITATION | Facility: HOSPITAL | Age: 47
End: 2017-06-23
Attending: NURSE PRACTITIONER
Payer: OTHER GOVERNMENT

## 2017-06-23 DIAGNOSIS — M79.601 PAIN OF RIGHT ARM: ICD-10-CM

## 2017-06-23 DIAGNOSIS — M25.639 STIFFNESS OF JOINT OF FOREARM: ICD-10-CM

## 2017-06-23 PROCEDURE — 97035 APP MDLTY 1+ULTRASOUND EA 15: CPT | Mod: PN

## 2017-06-23 PROCEDURE — 97110 THERAPEUTIC EXERCISES: CPT | Mod: PN

## 2017-06-23 PROCEDURE — 97140 MANUAL THERAPY 1/> REGIONS: CPT | Mod: PN

## 2017-06-23 PROCEDURE — 97022 WHIRLPOOL THERAPY: CPT | Mod: PN

## 2017-06-23 NOTE — PROGRESS NOTES
TIME RECORD    Date:  06/23/2017    Start Time:  110  Stop Time:  210      Visit#  6 of 17 through 09/03/17  FOTO last administered: 6/16/17 (visit 5)    PROCEDURES:    TIMED  Procedure Min.   US 10    MT 20   TE 15             UNTIMED  Procedure Min.   Fluidotherapy 15         Total Timed Minutes:  45  Total Timed Units:  3  Total Untimed Units:  1  Charges Billed/# of units:  4   (F, 1US, 1 MT, 1 TE)       OCCUPATIONAL THERAPY PROGRESS NOTE    Physician Name:  DEVON Rush  Primary Diagnosis:  R elbow pain  Treatment Diagnosis:  R elbow tricep and extensor mass tendonosis  Onset Date:  4/7/17  Eval Date: 5/16/2017   Certification Period:  5/16/2017  to 6/26/17    Subjective:     Pain:  3-4/10  Pt feels condition has slightly improves since initial evaluation,. However, continues to have a great amount of pain, especially when engaging in any repetitive or resistive activity w/ RUE. .    Objective:     Patient seen by Occupational Therapy today w/ treatment as follows:  Fluidotherapy: To R elbow-hand for 15 min, continuous air, 100 deg, air speed 100 to decrease pain, edema & scar tissue and increased tissue extensibility.  U/S:  1.0 MHz, 1.0 W/cm2, 50% pulsed, R lat elbow-mid forearm for 10 min to decrease pain & edema, increase circulation & stimulate healing   MT: provided extensive  STM, including MFR, CFM, lymphatic drainage technique & scar management, including use of IASTM and cupping tools to R elbow-hand.   TE per log  AROM    Elbow 3 ways 10 reps each hand position   Forearm sup/pron 10 reps       Forearm stretches (elbow at 90 deg) 3 reps for 3 stretches, 10 sec hold     KT:  Tennis elbow taping pattern w/ space correction over radial nerve for decompression;  Y stip applied for delt & I strip to biceps for inhibition taping of R upper arm.     Patient training/education: Continued encouragement in incorporating joint protection principles & energy conservation techniques in ADL/IADL's.  Patient  verbalized understanding.     Assessment:     Pt with less cedrick-fascial tightness and and edema in R elbow/forearm, since initial evaluation.  Patient is making slow steady progress, however, condition may improve w/ a brief period of immobilization of R forearm/wrist w/ a custom orthosis.  Radial nerve pattern pain continues, but has improved slightly as well. Recommend to continue OT per poc & progress, as tolerated. Patient may benefit from more UE nerve gliding/decompression ex.     Patient Education/Response:     HEP-forearm stretched, Joint protection & energy conservation in ADL/IADL's    Plans and Goals:     Cont OT poc

## 2017-06-30 ENCOUNTER — CLINICAL SUPPORT (OUTPATIENT)
Dept: REHABILITATION | Facility: HOSPITAL | Age: 47
End: 2017-06-30
Attending: NURSE PRACTITIONER
Payer: OTHER GOVERNMENT

## 2017-06-30 DIAGNOSIS — M25.639 STIFFNESS OF JOINT OF FOREARM: ICD-10-CM

## 2017-06-30 DIAGNOSIS — M79.601 PAIN OF RIGHT ARM: ICD-10-CM

## 2017-06-30 PROCEDURE — 97140 MANUAL THERAPY 1/> REGIONS: CPT | Mod: PN

## 2017-06-30 PROCEDURE — 97022 WHIRLPOOL THERAPY: CPT | Mod: PN

## 2017-06-30 PROCEDURE — 97110 THERAPEUTIC EXERCISES: CPT | Mod: PN

## 2017-06-30 NOTE — PROGRESS NOTES
"TIME RECORD    Date:  06/30/2017    Transfer of care this date to MELVIN Tan, MATTHEW/L    TIME RECORD    Date:  06/30/2017    Start Time:  205  Stop Time:  305      Visit#  7 of 17 through 09/03/17  FOTO last administered: 6/16/17 (5th visit)    PROCEDURES:    TIMED  Procedure Min.   US ---   MT 30   TE 15             UNTIMED  Procedure Min.   Fluidotherapy 15         Total Timed Minutes:  45  Total Timed Units:  3  Total Untimed Units:  1  Charges Billed/# of units:  4   F, 2 MT, 1 TE      OCCUPATIONAL THERAPY PROGRESS NOTE    Physician Name:  DEVON Rush  Primary Diagnosis:  R elbow pain  Treatment Diagnosis:  R elbow tricep and extensor mass tendonosis  Onset Date:  4/7/17  Eval Date: 5/16/2017   Certification Period:  5/16/2017  to 6/26/17    Subjective:     Pain:  2-3/10 during MT to triceps tendon  Pt states "I felt something pop in my shoulder the other day. It didn't really hurt, but I was worried about it"   She reports being able to use her RUE more, but continues to have increased pain w/ repetitive activity.  Patient is scheduled for an appt for a second opinion w/ Dr Banks on 07/18/17.     Objective:     Patient seen by Occupational Therapy today w/ treatment as follows:  Fluidotherapy: To R elbow-hand for 15 min, continuous air, 100 deg, air speed 100 to decrease pain, edema & scar tissue and increased tissue extensibility.  MT: provided extensive  STM, including MFR, CFM, lymphatic drainage technique & scar management, including use of IASTM and cupping tools to R upper arm/elbowforearm/wrist.   TE per log: (No resistive activity at this time)  UE Nerve Decompression Sequences (1-13) 2 trials   Radial Nerve glides 5 reps   Nirschl's-4 positions 10 reps each       KT:  Jellyfish pattern to Radial aspect of R forearm over radial nerve. Y strip for posterior aspect of R upper arm to elbow along radial nerve path.   Further instruct was provided for HEP to include " nerve decompression & gliides, see attached for details.    Assessment:     Patient is making good progress towards decreased pain, edema and fibrotic tissue in RUE.  She continues to have areas of localized edema (over forearm intersection & triceps tendon insert), but has improved w/ each visit of OT.  Tonio-fascial tightness & spasms persist, but has improved, as well.  Patient may benefit from a custom (longer) forearm-based WHFO than what she currently has to address radial nerve symptoms. Recommend to continued skilled OT to address limitations.  Also recommend pt follow-up w/ orthopedic MD regarding symptoms in R shld    Patient Education/Response:     HEP-forearm stretched, Joint protection & energy conservation in ADL/IADL's    Plans and Goals:     Cont OT poc

## 2017-07-03 ENCOUNTER — OFFICE VISIT (OUTPATIENT)
Dept: FAMILY MEDICINE | Facility: CLINIC | Age: 47
End: 2017-07-03
Payer: OTHER GOVERNMENT

## 2017-07-03 VITALS
SYSTOLIC BLOOD PRESSURE: 100 MMHG | BODY MASS INDEX: 30.47 KG/M2 | HEIGHT: 66 IN | DIASTOLIC BLOOD PRESSURE: 60 MMHG | HEART RATE: 73 BPM | WEIGHT: 189.63 LBS

## 2017-07-03 DIAGNOSIS — M77.8 TENDINITIS OF FOREARM: Primary | ICD-10-CM

## 2017-07-03 DIAGNOSIS — Z02.79 ISSUE OF MEDICAL CERTIFICATE: ICD-10-CM

## 2017-07-03 DIAGNOSIS — G56.31 RADIAL TUNNEL SYNDROME, RIGHT: ICD-10-CM

## 2017-07-03 PROCEDURE — 99213 OFFICE O/P EST LOW 20 MIN: CPT | Mod: PBBFAC,PO | Performed by: FAMILY MEDICINE

## 2017-07-03 PROCEDURE — 99214 OFFICE O/P EST MOD 30 MIN: CPT | Mod: S$PBB,,, | Performed by: FAMILY MEDICINE

## 2017-07-03 PROCEDURE — 99999 PR PBB SHADOW E&M-EST. PATIENT-LVL III: CPT | Mod: PBBFAC,,, | Performed by: FAMILY MEDICINE

## 2017-07-03 NOTE — PROGRESS NOTES
Subjective:       Patient ID: Hillary Cotton is a 46 y.o. female.    Chief Complaint: Follow-up and Arm Pain    46 years old female came to the clinic for medical certification.  Patient with severe arm and forearm tendinosis associated with radial tunnel syndrome.  Patient with a second opinion with other specialist.  Patient currently on physical therapy with partial improvement of the symptoms.      Arm Pain        Review of Systems   Constitutional: Negative.    HENT: Negative.    Eyes: Negative.    Respiratory: Negative.    Cardiovascular: Negative.    Gastrointestinal: Negative.    Genitourinary: Negative.    Musculoskeletal: Positive for arthralgias.   Skin: Negative.    Neurological: Negative.    Psychiatric/Behavioral: Negative.        Objective:      Physical Exam   Constitutional: She is oriented to person, place, and time. She appears well-developed and well-nourished. No distress.   HENT:   Head: Normocephalic and atraumatic.   Right Ear: External ear normal.   Left Ear: External ear normal.   Nose: Nose normal.   Mouth/Throat: Oropharynx is clear and moist. No oropharyngeal exudate.   Eyes: Conjunctivae and EOM are normal. Pupils are equal, round, and reactive to light. Right eye exhibits no discharge. Left eye exhibits no discharge. No scleral icterus.   Neck: Normal range of motion. Neck supple. No JVD present. No tracheal deviation present. No thyromegaly present.   Cardiovascular: Normal rate, regular rhythm, normal heart sounds and intact distal pulses.  Exam reveals no gallop and no friction rub.    No murmur heard.  Pulmonary/Chest: Effort normal and breath sounds normal. No stridor. No respiratory distress. She has no wheezes. She has no rales. She exhibits no tenderness.   Abdominal: Soft. Bowel sounds are normal. She exhibits no distension and no mass. There is no tenderness. There is no rebound and no guarding.   Musculoskeletal: Normal range of motion. She exhibits no edema.         Right elbow: Tenderness found.        Right wrist: She exhibits tenderness.   Lymphadenopathy:     She has no cervical adenopathy.   Neurological: She is alert and oriented to person, place, and time. She has normal reflexes. No cranial nerve deficit. She exhibits normal muscle tone. Coordination normal.   Skin: Skin is warm and dry. No rash noted. She is not diaphoretic. No erythema. No pallor.   Psychiatric: She has a normal mood and affect. Her behavior is normal. Judgment and thought content normal.       Assessment:       1. Tendinitis of forearm    2. Radial tunnel syndrome, right    3. Issue of medical certificate        Plan:         Hillary was seen today for follow-up and arm pain.    Diagnoses and all orders for this visit:    Tendinitis of forearm    Radial tunnel syndrome, right    Issue of medical certificate     hand specialist referral for second opinion.  Medical certification until 7/19/17.

## 2017-07-13 ENCOUNTER — CLINICAL SUPPORT (OUTPATIENT)
Dept: REHABILITATION | Facility: HOSPITAL | Age: 47
End: 2017-07-13
Attending: NURSE PRACTITIONER
Payer: OTHER GOVERNMENT

## 2017-07-13 DIAGNOSIS — M79.601 PAIN OF RIGHT ARM: ICD-10-CM

## 2017-07-13 DIAGNOSIS — M25.639 STIFFNESS OF JOINT OF FOREARM: ICD-10-CM

## 2017-07-13 PROCEDURE — 97110 THERAPEUTIC EXERCISES: CPT | Mod: PN

## 2017-07-13 PROCEDURE — 97035 APP MDLTY 1+ULTRASOUND EA 15: CPT | Mod: PN

## 2017-07-13 PROCEDURE — 97140 MANUAL THERAPY 1/> REGIONS: CPT | Mod: PN

## 2017-07-13 PROCEDURE — 97018 PARAFFIN BATH THERAPY: CPT | Mod: PN

## 2017-07-13 NOTE — PROGRESS NOTES
"TIME RECORD    Date:  07/13/2017    Start Time:  305  Stop Time:  415      Visit#  8 of 17 through 09/03/17  FOTO last administered: 6/16/17 (5th visit)    PROCEDURES:    TIMED  Procedure Min.   US 10   MT 30   TE 20             UNTIMED  Procedure Min.   Paraffin w/ MHP 10         Total Timed Minutes:  60  Total Timed Units:  4  Total Untimed Units:  1  Charges Billed/# of units:  5  P, 1US, 2 MT, 1 TE      OCCUPATIONAL THERAPY PROGRESS NOTE    Physician Name:  Sushma DEVON Waite  Primary Diagnosis:  R elbow pain  Treatment Diagnosis:  R elbow tricep and extensor mass tendonosis  Onset Date:  4/7/17  Eval Date: 5/16/2017   Certification Period:  07/13/17  to 09/13/17    Subjective:     Pain:  2-3/10 during MT to triceps tendon  Patient continues to report pain in various areas of RUE.  She states "The pain has gotten better in some areas of my arm, but is moving to other areas"   Patient reports having an appt w/ Dr Banks for a second opinion of her current RUE condition & medical mgmt    Objective:     Patient seen by Occupational Therapy today w/ treatment as follows:  Objective measures taken today, See Updated POC for detials    Paraffin w/ MHP to R mid forearm/wrist/hand for 10 min, pre-tx to decrease pain & increase tissue extensibility  MT: provided extensive  STM, including MFR, CFM, lymphatic drainage technique & scar management, including use of IASTM and cupping tools to R upper arm/elbowforearm/wrist.   TE per log: (No resistive activity at this time)  UE Nerve Decompression Sequences (1-13) 3 trials   Radial Nerve glides 5 reps   Nirschl's-4 positions --       KT:  Jellyfish pattern to Radial aspect of R forearm over radial nerve. Y strip for delt inhibition & I strip to biceps for inhibition.   Further encouragement was provided for HEP to include nerve decompression & gliides, see attached for details.    Assessment:     See Updated POC    Patient Education/Response:     HEP-forearm stretched, " Joint protection & energy conservation in ADL/IADL's    Plans and Goals:     See Updated poc

## 2017-07-14 NOTE — PLAN OF CARE
"  OCCUPATIONAL THERAPY UPDATED PLAN OF TREATMENT    Patient name: Hillary Cotton  Physician Name:  DEVON Rush  Primary Diagnosis:  R elbow pain  Treatment Diagnosis:  R elbow tricep and extensor mass tendonosis  Onset Date:  4/7/17  Eval Date: 7/13/2017   Certification Period:  7/13/17  to 09/13/17  Precautions:  [place]  Visits from SOC:  8  Functional Level Prior to SOC:  Independent    Subjective:    Pain: R elbow/forearm:2 /10 at best;  6-7/10 at worst; Pain is described as sharp/shooting at times & aching at other times  Patient feels condition has improved slightly, but continues to have pain in various areas of RUE that has not resolved.  Her complaints continue to be pain and weakness. Pt stated that most of her pain is after she uses her R hand ("soreness") and depends on the duration of use.    Objective:     Patient is R hand dominant & RUEinvolved.    Observations:     Range of Motion (in degrees):   Right AROM   Elbow E/F WNL   Forearm Sup/pron WNL   Wrist E/F 65 (+5) / 76 (+10)   Wrist RD/UD 21 (+6) / 36 (+4)   Thumb R/P Abd 50 (+15) / 50 (+18)      and Pinch Strengths (in pounds):  Setting 2   Right Left   Elbow bent     1 28 (+3) 35   Elbow straight     1 7 (-3) 25        Lateral 5 (=) 8   Tripod 4 (=) 6.5   Tip 3 (-1) 7     Circumferential Edema Measurements (in cm):   Right Left   Elbow Crease  27.5 (+1.0) 27.5   Below Elbow (4 cm from EC) 26.7 (+.2) 26     Sensation: Patient denies numbness & tingling at this time    Fine motor coordination:  Minimal impaired using R hand in FMC tasks in ADL/IADL's    Endurance: good w/ light resistive ADL/IADL's using R hand    ADLs/Function: FOTO impairment score of 65% (see media for details).    Special Tests: Patient is TTP over R forearm Radial Tunnel.  Minimal edema is noted throughout RUE.  Positive Cozen's [employer] Mill's test for R Lat epi    Updated Assessment:  Patient has made some progress towards goals set for decreased pain, edema " and scar tissue and increased ROM, strength & coordination, since initial evaluation, however, continues to have deficits affecting ADL/IADL's.  Patient has good potential for further improvement in condition w/continued OT to address remaining deficits.  Also recommend pt to follow-up w/ MD for medical mgmt  Current status as follows:  Short Term Goals   1) Patient to be IND with HEP and modalities for pain management-part met  2) Increase ROM 10 degrees in L wrist flexion stress position to increase functional hand use for basic and IADL's-part met  3) Increase  strength 10 lbs. to grasp items for basic and IADL's-part met  4) Increase pinch 1-3 psis for Basic and IADL's-not met      Long Term Goals to be met in 8 weeks  1) Patient to be IND with HEP and modalities for pain management-ongoing  2) Increase ROM 20 degrees in wrist flexion stress position to increase functional hand use for basic and IADL's-ongoing  3) Increase  strength 20 lbs. to grasp items for basic and IADL's-ongoing  4) Increase pinch 1-3 psis for [place] with basic and IADL's-ongoing    Previous Short Term Goals Status: 3/4 STG's part met  New Short Term Goals (to be achieved by end of cert. Period):  Continue w/ above un met STG's    Long Term Goal Status: Continue per initial plan of care  Reasons for Recertification of Therapy:  Pt continues to have deficits in RUE that affect functioning in ADL/IADL's    Recommended Treatment Plan: 2-3x/wk for Therapeutic Exercise, Functional Activities, Patient Education, Home Exercise Program, ADL Training, Ultrasound/Phonophoresis, Edema Control/Fluidotherapy, Electrical Stimulation/TENS/Interferential, Moist Heat/Ice/Paraffin, Manual Therapy and Other - Orthotic, as needed    Other recommendations:  Patient may benefit from a custom orthosis for R FWHO to provide immobilization and address R :at epicondylitis    Therapist's Name: Parveen Roe, CHERELLE       Date: 07/13/2017     I CERTIFY  THE NEED FOR THESE SERVICES FURNISHED UNDER THIS PLAN OF TREATMENT AND WHILE UNDER MY CARE    Physician's comments: ________________________________________________________________________________________________________________________________________________      Physician's Name (print): ___________________________________    Physician's Signature: _______________________________________________    Date: ________________________

## 2017-07-17 ENCOUNTER — CLINICAL SUPPORT (OUTPATIENT)
Dept: REHABILITATION | Facility: HOSPITAL | Age: 47
End: 2017-07-17
Attending: NURSE PRACTITIONER
Payer: OTHER GOVERNMENT

## 2017-07-17 DIAGNOSIS — M79.601 PAIN OF RIGHT ARM: ICD-10-CM

## 2017-07-17 DIAGNOSIS — M25.639 STIFFNESS OF JOINT OF FOREARM: ICD-10-CM

## 2017-07-17 PROCEDURE — 97018 PARAFFIN BATH THERAPY: CPT | Mod: PN

## 2017-07-17 PROCEDURE — 97110 THERAPEUTIC EXERCISES: CPT | Mod: PN

## 2017-07-17 PROCEDURE — 97140 MANUAL THERAPY 1/> REGIONS: CPT | Mod: PN

## 2017-07-17 NOTE — PROGRESS NOTES
"TIME RECORD    Date:  07/17/2017    Start Time:  140  Stop Time:  240      Visit#  9 of 17 through 09/03/17  FOTO last administered: 7/13/17 (5th visit)    PROCEDURES:    TIMED  Procedure Min.   Sup US 10 N/C   MT 30   TE 10             UNTIMED  Procedure Min.   Paraffin w/ MHP 10         Total Timed Minutes:  40  Total Timed Units:  3  Total Untimed Units:  1  Charges Billed/# of units:  4  P,2 MT, 1 TE      OCCUPATIONAL THERAPY PROGRESS NOTE    Physician Name:  DEVON Rush  Primary Diagnosis:  R elbow pain  Treatment Diagnosis:  R elbow tricep and extensor mass tendonosis  Onset Date:  4/7/17  Eval Date: 5/16/2017   Certification Period:  07/13/17  to 09/13/17    Subjective:     Pain:  1/10 prior to session 4/10 during MT   "I'm still very tender at my triceps."    Objective:     Patient seen by Occupational Therapy today w/ treatment as follows:    Paraffin w/ MHP to R mid forearm/wrist/hand for 10 min, pre-tx to decrease pain & increase tissue extensibility  MT: provided extensive  STM, including MFR, CFM, lymphatic drainage technique & scar management, including use of IASTM to R upper arm/elbowforearm/wrist. U/S:  1.0 MHz, 1.0 W/cm2, 50% pulsed, R lat elbow-mid forearm for 10 min to decrease pain & edema, increase circulation & stimulate healing   TE per log: (No resistive activity at this time)  UE Nerve Decompression Sequences (1-13) 3 trials   Radial Nerve glides 5 reps   Nirschl's-4 positions 10x   Wrist flex/ext stretch 3/30"   Wrist 3 ways elbow @90 10x   dexerciser 3min   KT:  Space correction over radial tunnel. Patient instructed on purpose, wear, care, precautions to monitor and removal of KT. Patient verbalized understanding of all instructions provided.     Assessment:     Pt continues to have significant tenderness and fibrotic tissue throughout RUE, most significant at triceps. Responded well to ASTYM treatment. Able to perform therex in a pain free ROM. Continue to hold off on resistive " secondary to unable to complete pain free. Pt progressing fairly. She would continue to benefit from skilled OT to address remaining pain and strength and ROM deficits.     Patient Education/Response:     HEP-forearm stretched, Joint protection & energy conservation in ADL/IADL's    Plans and Goals:     Cont with OT POC

## 2017-07-18 ENCOUNTER — TELEPHONE (OUTPATIENT)
Dept: NEUROLOGY | Facility: CLINIC | Age: 47
End: 2017-07-18

## 2017-07-18 ENCOUNTER — OFFICE VISIT (OUTPATIENT)
Dept: ORTHOPEDICS | Facility: CLINIC | Age: 47
End: 2017-07-18
Payer: OTHER GOVERNMENT

## 2017-07-18 ENCOUNTER — PATIENT MESSAGE (OUTPATIENT)
Dept: FAMILY MEDICINE | Facility: CLINIC | Age: 47
End: 2017-07-18

## 2017-07-18 VITALS
BODY MASS INDEX: 30.47 KG/M2 | SYSTOLIC BLOOD PRESSURE: 128 MMHG | RESPIRATION RATE: 18 BRPM | DIASTOLIC BLOOD PRESSURE: 79 MMHG | WEIGHT: 189.63 LBS | HEART RATE: 86 BPM | HEIGHT: 66 IN

## 2017-07-18 DIAGNOSIS — M79.2 RADICULAR PAIN IN RIGHT ARM: Primary | ICD-10-CM

## 2017-07-18 PROCEDURE — 99213 OFFICE O/P EST LOW 20 MIN: CPT | Mod: PBBFAC | Performed by: ORTHOPAEDIC SURGERY

## 2017-07-18 PROCEDURE — 99999 PR PBB SHADOW E&M-EST. PATIENT-LVL III: CPT | Mod: PBBFAC,,, | Performed by: ORTHOPAEDIC SURGERY

## 2017-07-18 PROCEDURE — 99213 OFFICE O/P EST LOW 20 MIN: CPT | Mod: S$PBB,,, | Performed by: ORTHOPAEDIC SURGERY

## 2017-07-18 RX ORDER — CYCLOBENZAPRINE HCL 5 MG
5 TABLET ORAL 3 TIMES DAILY PRN
Qty: 20 TABLET | Refills: 0 | Status: SHIPPED | OUTPATIENT
Start: 2017-07-18 | End: 2017-07-28

## 2017-07-18 RX ORDER — CYCLOBENZAPRINE HCL 5 MG
5 TABLET ORAL 3 TIMES DAILY PRN
Qty: 20 TABLET | Refills: 0 | Status: SHIPPED | OUTPATIENT
Start: 2017-07-18 | End: 2017-07-18 | Stop reason: CLARIF

## 2017-07-18 NOTE — LETTER
July 18, 2017      Arnulfo Barboza MD  [address] [facility] [address]           [facility]  [address], [address]  [facility] [address]  Phone: [phone]          Patient: Hillary Cotton   MR Number: [id number]   YOB: 1970   Date of Visit: 7/18/2017       Dear Dr. Arnulfo Barboza:    Thank you for referring Hillary Cotton to me for evaluation. Attached you will find relevant portions of my assessment and plan of care.    If you have questions, please do not hesitate to call me. I look forward to following Hillary Cotton along with you.    Sincerely,    Yesica Banks MD    Enclosure  CC:  No Recipients    If you would like to receive this communication electronically, please contact [email or web address] or [phone] to request more information on [employer] Link access.    For providers and/or their staff who would like to refer a patient to [facility], please contact us through our one-stop-shop provider referral line, [facility], at [phone].    If you feel you have received this communication in error or would no longer like to receive these types of communications, please e-mail [email or web address]

## 2017-07-18 NOTE — TELEPHONE ENCOUNTER
I am sorry but we are taking care of  patients as we I wish I could have put her in sooner but if there is a cancellation I will let her know       Eduardo QUINTERO

## 2017-07-18 NOTE — TELEPHONE ENCOUNTER
----- Message from Sheyla Huston LPN sent at 7/18/2017 12:40 PM CDT -----  Can you get this pt in soon with Dr Dyer?  ----- Message -----  From: Sangeetha Edmond  Sent: 7/18/2017  12:26 PM  To: Sheyla Huston LPN    Please let patient know we are unable to even schedule anymore patients at this time. We are passed the release date available to schedule on Comp[employer] (November). Will add her to the wait list and when an appt. Becomes available I will be happy to schedule her.        ----- Message -----  From: Sheyla Huston LPN  Sent: 7/18/2017  12:23 PM  To: [facility] Staff    Pt would like to do her EMG soon. Please call pt to schedule.     Thanks,   Sheyla Bryson there I have 9/6/17/at 2:40 I have held it please give me a call at ext [phone] if that is a good date for this patient      Thanks Eduardo QUINTERO

## 2017-07-19 ENCOUNTER — CLINICAL SUPPORT (OUTPATIENT)
Dept: REHABILITATION | Facility: HOSPITAL | Age: 47
End: 2017-07-19
Attending: NURSE PRACTITIONER
Payer: OTHER GOVERNMENT

## 2017-07-19 DIAGNOSIS — M25.639 STIFFNESS OF JOINT OF FOREARM: ICD-10-CM

## 2017-07-19 DIAGNOSIS — M79.601 PAIN OF RIGHT ARM: ICD-10-CM

## 2017-07-19 PROCEDURE — 97140 MANUAL THERAPY 1/> REGIONS: CPT | Mod: PN

## 2017-07-19 PROCEDURE — 97022 WHIRLPOOL THERAPY: CPT | Mod: PN

## 2017-07-19 PROCEDURE — 97035 APP MDLTY 1+ULTRASOUND EA 15: CPT | Mod: PN

## 2017-07-19 PROCEDURE — 97110 THERAPEUTIC EXERCISES: CPT | Mod: PN

## 2017-07-19 PROCEDURE — 97018 PARAFFIN BATH THERAPY: CPT | Mod: PN

## 2017-07-19 NOTE — PROGRESS NOTES
SERVICE:  Orthopedics.    ATTENDING SURGEON:  Yesica Banks M.D.    CHIEF COMPLAINT:  Right elbow, forearm and arm tendinosis, also complains of   right shoulder pain.    HISTORY OF PRESENT ILLNESS:  Ms. Cotton is a 46-year-old female, right hand   dominant, who states that she has had a several months' history of this right   elbow, forearm and arm pain.  She saw another hand surgeon in the past.  She   wrote for therapy as well as Celebrex.  She has been in therapy.  She states the   pain is 4/10.  Currently, she is off for months for short-term disability.  Her   primary care wrote for this.  She states she is off work and trying to rest it,   but it is very difficult.  For work, she is on the computer a lot.  On   questioning about whether or not the therapy is helping, she states it may or   may not help.  She is not sure they are really working on the muscle bumps in   her arm and she states her muscle will jump.  She has no rheumatologic issues.    Per the patient, she was on Mobic and was switched to Celebrex by the other   surgeon.  She states not working has helped, but again she is having difficulty   using her right arm.  She does have some radiating and burning sensation that   goes into her arm as well as the pain.    PAST MEDICAL HISTORY, SOCIAL HISTORY, SURGICAL HISTORY, REVIEW OF SYSTEMS:  Per   electronic medical record.    PHYSICAL EXAMINATION:  VITAL SIGNS:  Please see computer entry.  GENERAL:  Alert and oriented x3, well developed, well nourished, in no apparent   distress, friendly individual, well groomed, appears stated age.  MUSCULOSKELETAL:  Gait is normal and nonantalgic.  EXTREMITIES:  On examination of her right arm, she has it in a brace, which she   takes off.  She has tape marks all throughout her arm.  She states that was from   her Kinesio tape it, which she keeps wrapped around her arm.  She states again   that she has difficulty with anyone touching her arm.  On  examination of the   arm, the skin is clean, dry and intact besides the spots where the sticky tape   was placed.  Median, radial, ulnar, anterior interosseous, posterior   interosseous, motor, sensation to light touch intact.  Brisk capillary refill.    She has positive tenderness to palpation over the lateral epicondyle, positive   provocative examination for lateral epicondylitis.  Negative for medial   epicondylitis.  Negative for Tinel's across the cubital tunnel.  She has got   full range of motion, but again it is very painful with most of the examination   of the hand, but more pronounced with provocative examination of the lateral   epicondylitis.  On palpating her triceps, it is not painful and palpating the   bumps in her arm, I really cannot palpate them.    MRI was reviewed and shows lateral epicondylitis as well as triceps tendinosis.    PLAN:  For this patient, at this time, I would like to get an EMG due to the   burning sensation that the patient is having.  We can start Flexeril for her   muscle spasm.  She is taking tramadol that was given to her by another doctor as   well as her Celebrex.  I would like her evaluated by the Sports Medicine for   possible PRP injections.  We did discuss surgical treatment as well and why we   do not recommend steroid injections.  The patient understands.  She will return   to clinic after the nerve test.  Again, I would like a consultation to see a PRP   as available to this patient.      LES/HU  dd: 07/18/2017 14:27:29 (CDT)  td: 07/19/2017 00:49:58 (CDT)  Doc ID   #[id number]  Job ID #[id number]    CC:

## 2017-07-21 ENCOUNTER — PATIENT MESSAGE (OUTPATIENT)
Dept: SPORTS MEDICINE | Facility: CLINIC | Age: 47
End: 2017-07-21

## 2017-07-21 ENCOUNTER — OFFICE VISIT (OUTPATIENT)
Dept: FAMILY MEDICINE | Facility: CLINIC | Age: 47
End: 2017-07-21
Payer: OTHER GOVERNMENT

## 2017-07-21 VITALS
HEIGHT: 66 IN | BODY MASS INDEX: 31.18 KG/M2 | DIASTOLIC BLOOD PRESSURE: 70 MMHG | OXYGEN SATURATION: 97 % | SYSTOLIC BLOOD PRESSURE: 102 MMHG | WEIGHT: 194 LBS | HEART RATE: 92 BPM

## 2017-07-21 DIAGNOSIS — Z12.31 ENCOUNTER FOR SCREENING MAMMOGRAM FOR MALIGNANT NEOPLASM OF BREAST: ICD-10-CM

## 2017-07-21 DIAGNOSIS — G56.31 RADIAL TUNNEL SYNDROME, RIGHT: Primary | ICD-10-CM

## 2017-07-21 DIAGNOSIS — M77.8 TENDINITIS OF FOREARM: ICD-10-CM

## 2017-07-21 PROCEDURE — 99214 OFFICE O/P EST MOD 30 MIN: CPT | Mod: S$PBB,,, | Performed by: FAMILY MEDICINE

## 2017-07-21 PROCEDURE — 99999 PR PBB SHADOW E&M-EST. PATIENT-LVL III: CPT | Mod: PBBFAC,,, | Performed by: FAMILY MEDICINE

## 2017-07-21 PROCEDURE — 99213 OFFICE O/P EST LOW 20 MIN: CPT | Mod: PBBFAC,PO | Performed by: FAMILY MEDICINE

## 2017-07-21 RX ORDER — TRAMADOL HYDROCHLORIDE 50 MG/1
50 TABLET ORAL EVERY 12 HOURS PRN
Qty: 60 TABLET | Refills: 0 | Status: SHIPPED | OUTPATIENT
Start: 2017-07-21 | End: 2017-07-31

## 2017-07-21 NOTE — PROGRESS NOTES
Subjective:       Patient ID: Hillary Cotton is a 46 y.o. female.    Chief Complaint: Follow-up and Arm Injury (right forearm)    46 years old female who came to the clinic for follow-up of radial tunnel syndrome associated with severe tendinitis over the elbow.  The pain is 6 out of 10 of intensity on and off aggravated with activity and better with rest.  Patient currently on physical therapy.  She is still not able to use properly the right arm.  Patient probably is not able to work for now.      Arm Injury        Review of Systems   Constitutional: Negative.    HENT: Negative.    Eyes: Negative.    Respiratory: Negative.    Cardiovascular: Negative.    Gastrointestinal: Negative.    Genitourinary: Negative.    Musculoskeletal: Positive for arthralgias and myalgias.   Skin: Negative.    Neurological: Negative.    Psychiatric/Behavioral: Negative.        Objective:      Physical Exam   Constitutional: She is oriented to person, place, and time. She appears well-developed and well-nourished. No distress.   HENT:   Head: Normocephalic and atraumatic.   Right Ear: External ear normal.   Left Ear: External ear normal.   Nose: Nose normal.   Mouth/Throat: Oropharynx is clear and moist. No oropharyngeal exudate.   Eyes: Conjunctivae and EOM are normal. Pupils are equal, round, and reactive to light. Right eye exhibits no discharge. Left eye exhibits no discharge. No scleral icterus.   Neck: Normal range of motion. Neck supple. No JVD present. No tracheal deviation present. No thyromegaly present.   Cardiovascular: Normal rate, regular rhythm, normal heart sounds and intact distal pulses.  Exam reveals no gallop and no friction rub.    No murmur heard.  Pulmonary/Chest: Effort normal and breath sounds normal. No stridor. No respiratory distress. She has no wheezes. She has no rales. She exhibits no tenderness.   Abdominal: Soft. Bowel sounds are normal. She exhibits no distension and no mass. There is no  tenderness. There is no rebound and no guarding.   Musculoskeletal: She exhibits no edema.        Right elbow: She exhibits decreased range of motion. Tenderness found. Lateral epicondyle tenderness noted.        Right wrist: She exhibits decreased range of motion and tenderness.   Lymphadenopathy:     She has no cervical adenopathy.   Neurological: She is alert and oriented to person, place, and time. She has normal reflexes. No cranial nerve deficit. She exhibits normal muscle tone. Coordination normal.   Skin: Skin is warm and dry. No rash noted. She is not diaphoretic. No erythema. No pallor.   Psychiatric: She has a normal mood and affect. Her behavior is normal. Judgment and thought content normal.       Assessment:       1. Radial tunnel syndrome, right    2. Tendinitis of forearm    3. Encounter for screening mammogram for malignant neoplasm of breast        Plan:         Hillary was seen today for follow-up and arm injury.    Diagnoses and all orders for this visit:    Radial tunnel syndrome, right  -     tramadol (ULTRAM) 50 mg tablet; Take 1 tablet (50 mg total) by mouth every 12 (twelve) hours as needed.    Tendinitis of forearm  -     tramadol (ULTRAM) 50 mg tablet; Take 1 tablet (50 mg total) by mouth every 12 (twelve) hours as needed.    Encounter for screening mammogram for malignant neoplasm of breast  -     Mammo Digital Screening Bilat with CAD; Future

## 2017-07-21 NOTE — PATIENT INSTRUCTIONS
Radial Nerve Palsy  The radial nerve runs down the length of the arm. It controls movement of the triceps muscle at the back of the upper arm. It also controls movement and feeling in the wrist and hand. Radial nerve palsy is caused by damage to the radial nerve. Symptoms of wrist drop include:  · Weakness of the wrist and fingers  · Inability to straighten the wrist or fingers  · Numbness or tingling of the hand  Injury to the radial nerve may be caused by:  · Direct blow to the nerve  · Cut or other wound that affects the nerve  · Fracture of the arm or elbow   · Unrelieved pressure on the radial nerve (from things such as sleeping with the arm trapped under the body)  · Injury that results in swelling around the nerve  · Use of crutches resulting in compression of on the nerve  · Long-term constriction of the wrist (for example, from a tight watch or bracelet)  · Repetitive twisting motions of the forearm  In some cases, no cause can be found.  The treatment depends on the cause of the nerve damage. In some cases, the problem will go away on its once pressure to the nerve is relieved. Splinting the wrist to limit movement may help with healing. If the problem is due to trauma or injury, physical therapy may be prescribed. Corticosteroids injections into the area may reduce swelling and pressure on the nerve. Surgery to repair the nerve may be needed for chronic symptoms that do not respond to simpler treatments.  Home care  · Rest the wrist and arm until normal feeling and strength return.  · If you were given a splint or sling, wear it as directed.  · Avoid positions leaning on your elbows.  · If medicines were prescribed for pain or nerve sensations, take them as directed.  Follow-up care  Follow up with your healthcare provider or as advised by our staff.  When to seek medical advice  Call your healthcare provider right away if you have any of the following:  · Increasing arm swelling or pain  · Numbness or  weakness of the arm  · Symptoms spreading to other parts of the body  · Slurred speech, confusion  · Trouble speaking, walking, or seeing  Date Last Reviewed: 9/25/2015  © 5355-7176 [employer]. [address] [place] [address], [place] [address]. All rights reserved. This information is not intended as a substitute for professional medical care. Always follow your healthcare professional's instructions.

## 2017-07-24 ENCOUNTER — CLINICAL SUPPORT (OUTPATIENT)
Dept: REHABILITATION | Facility: HOSPITAL | Age: 47
End: 2017-07-24
Attending: NURSE PRACTITIONER
Payer: OTHER GOVERNMENT

## 2017-07-24 DIAGNOSIS — M25.639 STIFFNESS OF JOINT OF FOREARM: ICD-10-CM

## 2017-07-24 DIAGNOSIS — M79.601 PAIN OF RIGHT ARM: ICD-10-CM

## 2017-07-24 PROCEDURE — 97022 WHIRLPOOL THERAPY: CPT | Mod: PN

## 2017-07-24 PROCEDURE — 97110 THERAPEUTIC EXERCISES: CPT | Mod: PN

## 2017-07-24 PROCEDURE — 97140 MANUAL THERAPY 1/> REGIONS: CPT | Mod: PN

## 2017-07-24 NOTE — PROGRESS NOTES
"TIME RECORD    Date:  07/24/2017    Start Time:  5:05pm  Stop Time:  6:30pm      Visit#  10 of 17 through 09/03/17  FOTO last administered: 7/13/17 (5th visit)    PROCEDURES:    TIMED  Procedure Min.   US --   MT 50   TE 20             UNTIMED  Procedure Min.   Paraffin w/ MHP --   Fluidotherapy 15     Total Timed Minutes:  70  Total Timed Units:  5  Total Untimed Units:  1  Charges Billed/# of units:  6 (F, 4 MT, 1 TE)6      OCCUPATIONAL THERAPY PROGRESS NOTE    Physician Name:  DEVON Rush  Primary Diagnosis:  R elbow pain  Treatment Diagnosis:  R elbow tricep and extensor mass tendonosis  Onset Date:  4/7/17  Eval Date: 5/16/2017   Certification Period:  07/13/17  to 09/13/17    Subjective:     Pain:  1/10 prior to session 4/10 during MT   "I have a messed up arm!"    Objective:     Patient seen by Occupational Therapy today w/ treatment as follows:    Paraffin w/ MHP to R mid forearm/wrist/hand for 10 min, pre-tx to decrease pain & increase tissue extensibility  MT: provided extensive  STM, including MFR, CFM, lymphatic drainage technique & scar management, including use of IASTM to R upper arm/elbowforearm/wrist.   U/S:  1.0 MHz, 1.0 W/cm2, 50% pulsed, R lat elbow-mid forearm for 10 min to decrease pain & edema, increase circulation & stimulate healing (not this date)   Not this date  TE per log: (No resistive activity at this time)  UE Nerve Decompression Sequences (1-13) 3 trials   Radial Nerve glides 5 reps   Nirschl's-4 positions 10x   Wrist flex/ext stretch 3/30"   Wrist 3 ways elbow @90 10x   dexerciser 3min     KT:  Wheel-shaped space correction over radial tunnel, space corrector over sensitive point of extensor bundle, y-shaped taping with space corrector at deltoid insertion, and trigger point taping over trigger point on R upper traps - to address trigger points, pain, decompression, and lymphatic drainage. Patient instructed on purpose, wear, care, precautions to monitor and removal of KT. " Patient verbalized understanding of all instructions provided.     Assessment:     Pt with good tolerance of therapy today. Continues with severe tightness and tenderness to MT at R forearm extensor muscle bundle. Pt tolerated MT well this date. With pulling away from MT at extensor bundle, but this improved by end of session. Pt with note of trigger points throughout entire RUE, but this responded well to IASTM tools. Pt would benefit from continued OT to address RUE pain.    Patient Education/Response:     HEP-forearm stretched, Joint protection & energy conservation in ADL/IADL's    Plans and Goals:     Cont with OT POC    Short Term Goals   1) Patient to be IND with HEP and modalities for pain management  2) Increase ROM 10 degrees in L wrist flexion stress position to increase functional hand use for basic and IADL's  3) Increase  strength 10 lbs. to grasp items for basic and IADL's  4) Increase pinch 1-3 psis for Basic and IADL's      Long Term Goals to be met in 8 weeks  1) Patient to be IND with HEP and modalities for pain management  2) Increase ROM 20 degrees in wrist flexion stress position to increase functional hand use for basic and IADL's  3) Increase  strength 20 lbs. to grasp items for basic and IADL's  4) Increase pinch 1-3 psis for [place] with basic and IADL's

## 2017-07-26 ENCOUNTER — OFFICE VISIT (OUTPATIENT)
Dept: SPORTS MEDICINE | Facility: CLINIC | Age: 47
End: 2017-07-26
Payer: OTHER GOVERNMENT

## 2017-07-26 ENCOUNTER — HOSPITAL ENCOUNTER (OUTPATIENT)
Dept: RADIOLOGY | Facility: HOSPITAL | Age: 47
Discharge: HOME | End: 2017-07-26
Attending: FAMILY MEDICINE
Payer: OTHER GOVERNMENT

## 2017-07-26 VITALS — TEMPERATURE: 98 F | BODY MASS INDEX: 31.18 KG/M2 | WEIGHT: 194 LBS | HEIGHT: 66 IN

## 2017-07-26 DIAGNOSIS — M25.521 RIGHT ELBOW PAIN: Primary | ICD-10-CM

## 2017-07-26 DIAGNOSIS — M25.521 RIGHT ELBOW PAIN: ICD-10-CM

## 2017-07-26 PROCEDURE — 73080 X-RAY EXAM OF ELBOW: CPT | Mod: 26,RT,, | Performed by: RADIOLOGY

## 2017-07-26 PROCEDURE — 73080 X-RAY EXAM OF ELBOW: CPT | Mod: TC,PO,RT

## 2017-07-26 PROCEDURE — 99213 OFFICE O/P EST LOW 20 MIN: CPT | Mod: PBBFAC,25,PO | Performed by: FAMILY MEDICINE

## 2017-07-26 PROCEDURE — 99213 OFFICE O/P EST LOW 20 MIN: CPT | Mod: S$PBB,,, | Performed by: FAMILY MEDICINE

## 2017-07-26 PROCEDURE — 99999 PR PBB SHADOW E&M-EST. PATIENT-LVL III: CPT | Mod: PBBFAC,,, | Performed by: FAMILY MEDICINE

## 2017-07-26 NOTE — LETTER
July 26, 2017      Yesica Banks MD  [address]  [address]  [facility] [address]           [facility]  [address] [address]  Phone: [phone]          Patient: Hillary Cotton   MR Number: [id number]   YOB: 1970   Date of Visit: 7/26/2017       Dear Dr. Yesica Banks:    Thank you for referring Hillary Cotton to me for evaluation. Attached you will find relevant portions of my assessment and plan of care.    If you have questions, please do not hesitate to call me. I look forward to following Hillary Cotton along with you.    Sincerely,    Jayme Cool MD    Enclosure  CC:  No Recipients    If you would like to receive this communication electronically, please contact [email or web address] or [phone] to request more information on [employer] access.    For providers and/or their staff who would like to refer a patient to [facility], please contact us through our one-stop-shop provider referral line, [facility], at [phone].    If you feel you have received this communication in error or would no longer like to receive these types of communications, please e-mail [email or web address]

## 2017-07-26 NOTE — PROGRESS NOTES
Hillary Cotton, a 46 y.o. female, is here for evaluation of RIGHT elbow pain.     This patient visit is a consult from the following provider: Yesica Banks MD  Today's office visit notes will be made available to the consulting/refering provider via the mail, a fax, and/or an in basket message through the electronic medical record    New patient.     HISTORY OF PRESENT ILLNESS  Hand dominance: right    Location: lateral elbow, right  Onset: insidious, April 2017    Palliative:    Relative rest   Oral analgesics (tramadol)   Kinesiotape   Wrist splint   fPT/OT, [facility],   Provocative:   ADLs   Gripping, grasping, wrist extension  Prior:    R - Radial tunnel syndrome, tendinitis of forearm    She has not returned to work since 06/19/17  Progression: worsening discomfort  Quality:   Sharp (forearm)  Pulsating, throbbing, ache (elbow)  Pulling   Radiation: Yes, R UE  Severity: per nursing documentation  Timing: intermittent w/ use  Trauma:    17.04: picked up a 40lb bag of top soil --> immediate pain --> pain has worsened     Review of systems (ROS):  A 10+ review of systems was performed with pertinent positives and negatives noted above in the history of present illness. Other systems were negative unless otherwise specified.      PHYSICAL EXAMINATION  General: Patient appears alert and oriented x 3.  Mood is pleasant.  Observation of ears, eyes and nose reveal no gross abnormalities. HEENT: NCAT, sclera nonicteric  Lungs: Respirations are equal and unlabored.  Well nourished, in no acute distress and ambulates with a non-antalgic gait with no assistive devices.    Skin: Skin intact bilaterally. Sensation intact bilaterally. Compartments soft. No evidence of edema, infection, or induration.     RIGHT ELBOW EXAMINATION    Observation/Inspection  Swelling  none    Deformity  none  Discoloration  none     Scars   none    Atrophy  none    Tenderness / Crepitus (T/C):          T / C        Medial  epicondyle   - / -    Med. (Ulnar) collateral ligament  - / -    Flexor pronator Musculature   - / -   Biceps tendon    - / -   Head of radius    - / -    Lateral epicondyle   + / -    Extensor Musculature   + / -   Brachioradialis   - / -   Triceps tendon   + / -   Triceps muscle   + / -   Olecranon    - / -   Olecranon bursa   - / -   Cubital fossa    - / -   Anterior jointline   - / -   Radial tunnel    -/ -             ROM: ('*' = with pain)    Right Elbow  AROM (PROM)     Extension   0 deg  (5 deg)   Flexion   145 deg (145 deg)   Pronation  90 deg  (90 deg)  Supination   80 deg  (80 deg)     Left Elbow  AROM (PROM)   Extension   0 deg  (5 deg)  Flexion   145 deg (145 deg)   Pronation  90 deg  (90 deg) *  Supination   80 deg  (80 deg) *     Right Wrist  AROM (PROM)   Extension   80 deg (85 deg)*  Flexion   80 deg (85 deg) *  Ulnar Deviation   35 deg (40 deg)  Radial Deviation 35 deg (40 deg)     Left Wrist   AROM (PROM)  Extension   80 deg (85 deg)  Flexion   80 deg (85 deg)   Ulnar Deviation   35 deg (40 deg)  Radial Deviation 35 deg (40 deg)     Strength: ('*' = with pain)    Elbow Flexion   5/5  Elbow Extension  5/5  Wrist Flexion   5/5*  Wrist Extension  5/5*      5/5  Intrinsics   5/5  EPL (Ext. pollicis longus) 5/5  Pinch Mechanism  5/5    Elbow Examination:  See above noted areas of tenderness.   Test for Ligamentous Instability - UCL normal  Test for Ligamentous Instability - LUCL normal  PLRI       -  Tinel's (Percussion) Test - Cubital  -  Tennis Elbow Test    -  Golfer's Elbow Test    -  Radial Capitellar Grind Test   -  Valgus/Extension Overload Test  -  Resisted Long Finger Extension Pain -  Moving Valgus    -  Forearm pain with resisted supination -  Yeargeson' s (elbow pain)   -  Hook test     -    Wrist Examination:  See above noted areas of tenderness.   Finkelstein's Test    -  Tinel's Test - Carpal Tunnel   -  Phalen's Test     -  Median Nerve Compression Test  -  Ulnar-sided Compression  Test  -  LT Ballottment Test    -  Snuff box tenderness    -  Cope's Test     -  LT Instability     -  Hook of [place] Tenderness   -     Extremity Neuro-vascular Testing: Sensation grossly intact to light touch all dermatomal regions. DTR 2+ Biceps, Triceps, BR and Negative Thao's sign. Grossly intact motor function at Elbow, Wrist and Hand. Distal pulses radial and ulnar 2+, brisk cap refill, symmetric.    Other Findings:    ASSESSMENT & PLAN   Assessment:   #1 elbow pain, likely lateral epicondylitis, right  No evidence of osseous pathology  No evidence of vascular pathology    Imaging studies reviewed:   X-ray elbow, right 17.07  X-ray radius/ulna, right 17.04  MRI elbow, right 17.05    Plan:    Focused on orthobiologics discussion, leon PRP    We discussed options including:  #1 watchful waiting  #2 continue physical/occupational therapy aimed at:   RoM elbow   Eccentric strengthening common extensor musculature   Stretching common extensor tendon  #3 injection therapy:   CSI, origin of common extensor tendon of forearm     Right,      Discussed the likely transient therapeutic effects of CSIs in the     treatment of epicondylitis   orthobiologics    PRPI     Right,   #4 consider perc ten     The patient chooses #2    Pain management required: handout given  Bracing required: per OT  Physical therapy required: fOT, continue as above   Activity (e.g. sports, work) restrictions: as tolerated   school/vocation:   (typing/document prep/phone calls, [employer])    Follow up per pt  Should symptoms worsen or fail to resolve, consider:  Revisiting the above options

## 2017-07-28 ENCOUNTER — CLINICAL SUPPORT (OUTPATIENT)
Dept: REHABILITATION | Facility: HOSPITAL | Age: 47
End: 2017-07-28
Attending: NURSE PRACTITIONER
Payer: OTHER GOVERNMENT

## 2017-07-28 DIAGNOSIS — M25.639 STIFFNESS OF JOINT OF FOREARM: ICD-10-CM

## 2017-07-28 DIAGNOSIS — M79.601 PAIN OF RIGHT ARM: ICD-10-CM

## 2017-07-28 PROCEDURE — 97022 WHIRLPOOL THERAPY: CPT | Mod: PN

## 2017-07-28 PROCEDURE — 97035 APP MDLTY 1+ULTRASOUND EA 15: CPT | Mod: PN

## 2017-07-28 PROCEDURE — 97140 MANUAL THERAPY 1/> REGIONS: CPT | Mod: PN

## 2017-07-28 PROCEDURE — 97110 THERAPEUTIC EXERCISES: CPT | Mod: PN

## 2017-07-28 NOTE — PROGRESS NOTES
"TIME RECORD    Date:  07/28/2017    Start Time:  12:05pm  Stop Time:  1:10pm    Visit#  12 of 17 through 09/03/17  FOTO last administered: 7/13/17 (5th visit)    PROCEDURES:    TIMED  Procedure Min.   US 10   MT 30   TE 15             UNTIMED  Procedure Min.   Paraffin w/ MHP --   Fluidotherapy 15     Total Timed Minutes:  55  Total Timed Units:  4  Total Untimed Units:  1  Charges Billed/# of units:  5  F, 1US, 2 MT, 1 TE      OCCUPATIONAL THERAPY PROGRESS NOTE    Physician Name:  DEVON Rush  Primary Diagnosis:  R elbow pain  Treatment Diagnosis:  R elbow tricep and extensor mass tendonosis  Onset Date:  4/7/17  Eval Date: 5/16/2017   Certification Period:  07/13/17  to 09/13/17    Subjective:     Pain:  1/10 prior to session 4/10 during MT   Patient reports seeing Jayme Cool MD for R elbow pain.  She states "We are going to see if my insurance will cover the PRP. If it doesn't I guess my only option is surgery".  Patient continues to report significant pain throughout her RUE, upper shld & neck, but has vocalized continuing to engage in resistive activity w/ RUE (social events, recreational activities, etc)    Objective:     Patient seen by Occupational Therapy today w/ treatment as follows:  Paraffin w/ MHP to R mid forearm/wrist/hand for 10 min, pre-tx to decrease pain & increase tissue extensibility  MT: provided extensive  STM, including MFR, CFM, lymphatic drainage technique & scar management, including use of IASTM to R upper arm/elbowforearm/wrist.   U/S:  1.0 MHz, 1.0 W/cm2, 50% pulsed, R lat elbow-mid forearm for 10 min to decrease pain & edema, increase circulation & stimulate healing  TE per log: (No resistive activity at this time)  UE Nerve Decompression Sequences (1-13) 3 trials   Radial Nerve glides 5 reps   Nirschl's-4 positions --   Forearm/Wrist stretches 3 reps, 30 sec hold, 3 positions   Wrist 3 ways elbow @90 10x   dexerciser 3min     Patient strongly encouraged to limit " activity w/ her RUE & instructed to wear R custom (rigid) wrist orthosis at all times during the day.  She verbalized agreement & understanding.     KT:  Lat epi taping pattern applied to R elbow/forearm w/ Y strip space correction at R lat epi.  I strip space correction at radial tunnel and I strip for biceps inhibition.     Assessment:     Pt has made some progress towards improved soft tissue integrity, however, she continues to engage in RSI's that counteract on therapeutic gains.  Recommend pt to continue w/ immobilization & activity restriction at this time, inclsding orthotic program. No resistive activity is recommended    Patient Education/Response:     HEP-forearm stretched, Joint protection & energy conservation in ADL/IADL's    Plans and Goals:     Cont with OT POC    Short Term Goals   1) Patient to be IND with HEP and modalities for pain management  2) Increase ROM 10 degrees in L wrist flexion stress position to increase functional hand use for basic and IADL's  3) Increase  strength 10 lbs. to grasp items for basic and IADL's  4) Increase pinch 1-3 psis for Basic and IADL's      Long Term Goals to be met in 8 weeks  1) Patient to be IND with HEP and modalities for pain management  2) Increase ROM 20 degrees in wrist flexion stress position to increase functional hand use for basic and IADL's  3) Increase  strength 20 lbs. to grasp items for basic and IADL's  4) Increase pinch 1-3 psis for [place] with basic and IADL's

## 2017-07-29 NOTE — PROGRESS NOTES
"TIME RECORD    Date:  07/19/17    Start Time:  1005  Stop Time:  1110      Visit#  10 of 17 through 09/03/17  FOTO last administered: 7/13/17 (5th visit)    PROCEDURES:    TIMED  Procedure Min.   US 15   MT 25   TE 10             UNTIMED  Procedure Min.   Fluidotherapy 15         Total Timed Minutes:  50  Total Timed Units:  4  Total Untimed Units:  1  Charges Billed/# of units:  5  F,1US, 2 MT, 1 TE      OCCUPATIONAL THERAPY PROGRESS NOTE    Physician Name:  Sushma ReyesDEVON foster  Primary Diagnosis:  R elbow pain  Treatment Diagnosis:  R elbow tricep and extensor mass tendonosis  Onset Date:  4/7/17  Eval Date: 5/16/2017   Certification Period:  07/13/17  to 09/13/17    Subjective:     Pain:  1/10 prior to session 4/10 during MT   Pt reports completing a MD visit w/ Dr Banks yesterday.  She is anxious for improvement in her RUE and is confident Dr Banks is going to help her condition.     Objective:     Patient seen by Occupational Therapy today w/ treatment as follows:    Paraffin w/ MHP to R mid forearm/wrist/hand for 10 min, pre-tx to decrease pain & increase tissue extensibility  MT: provided extensive  STM, including MFR, CFM, lymphatic drainage technique & scar management, including use of IASTM to R upper arm/elbowforearm/wrist. U/S:  1.0 MHz, 1.0 W/cm2, 50% pulsed, R lat elbow-mid forearm for 10 min to decrease pain & edema, increase circulation & stimulate healing   TE per log: (No resistive activity at this time)  UE Nerve Decompression Sequences (1-13) 3 trials   Radial Nerve glides 5 reps   Nirschl's-4 positions --   Wrist flex/ext stretch 3/30"   Wrist 3 ways elbow @90 10x   dexerciser --   KT:  Lat epi taping pattern to R forearm w/ space correction over R lat epi & radial nerve tunnel.      Assessment:     Fibrotic tissue continues to decrease w/ each OT treatment, however, remains a problem throughout RUE. Recommend to continue OT per poc & progress, as tolerated. No resistive activity until " Nirschl's are pain free. Recommend pt to continue w/ custom orthosis    Patient Education/Response:     HEP-forearm stretched, Joint protection & energy conservation in ADL/IADL's; custom wrist cock-up at all times during the day    Plans and Goals:     Cont with OT POC

## 2017-08-01 ENCOUNTER — HOSPITAL ENCOUNTER (OUTPATIENT)
Dept: RADIOLOGY | Facility: HOSPITAL | Age: 47
Discharge: HOME | End: 2017-08-01
Attending: FAMILY MEDICINE
Payer: OTHER GOVERNMENT

## 2017-08-01 VITALS — BODY MASS INDEX: 31.18 KG/M2 | HEIGHT: 66 IN | WEIGHT: 194 LBS

## 2017-08-01 DIAGNOSIS — Z12.31 ENCOUNTER FOR SCREENING MAMMOGRAM FOR MALIGNANT NEOPLASM OF BREAST: ICD-10-CM

## 2017-08-01 PROCEDURE — 77067 SCR MAMMO BI INCL CAD: CPT | Mod: TC

## 2017-08-02 ENCOUNTER — TELEPHONE (OUTPATIENT)
Dept: FAMILY MEDICINE | Facility: CLINIC | Age: 47
End: 2017-08-02

## 2017-08-02 ENCOUNTER — CLINICAL SUPPORT (OUTPATIENT)
Dept: REHABILITATION | Facility: HOSPITAL | Age: 47
End: 2017-08-02
Attending: NURSE PRACTITIONER
Payer: OTHER GOVERNMENT

## 2017-08-02 DIAGNOSIS — M25.519 SHOULDER PAIN, UNSPECIFIED CHRONICITY, UNSPECIFIED LATERALITY: Primary | ICD-10-CM

## 2017-08-02 DIAGNOSIS — M25.639 STIFFNESS OF JOINT OF FOREARM: ICD-10-CM

## 2017-08-02 DIAGNOSIS — M79.601 PAIN OF RIGHT ARM: ICD-10-CM

## 2017-08-02 DIAGNOSIS — M54.2 NECK PAIN: ICD-10-CM

## 2017-08-02 PROCEDURE — 97022 WHIRLPOOL THERAPY: CPT | Mod: PN

## 2017-08-02 PROCEDURE — 97110 THERAPEUTIC EXERCISES: CPT | Mod: PN

## 2017-08-02 PROCEDURE — 97140 MANUAL THERAPY 1/> REGIONS: CPT | Mod: PN

## 2017-08-02 NOTE — PROGRESS NOTES
"TIME RECORD    Date:  08/02/2017    Start Time:  12:05pm  Stop Time:  1:10pm    Visit#  13 of 17 through 09/03/17  FOTO last administered: 7/13/17 (5th visit)    PROCEDURES:    TIMED  Procedure Min.    US 10 (NC)   MT 15   TE 10             UNTIMED  Procedure Min.   Paraffin w/ MHP --   Fluidotherapy 15     Total Timed Minutes:  25  Total Timed Units:  2  Total Untimed Units:  1  Charges Billed/# of units:  3 (F, 1 MT, 1 TE)      OCCUPATIONAL THERAPY PROGRESS NOTE    Physician Name:  DEVON Rush  Primary Diagnosis:  R elbow pain  Treatment Diagnosis:  R elbow tricep and extensor mass tendonosis  Onset Date:  4/7/17  Eval Date: 5/16/2017   Certification Period:  07/13/17  to 09/13/17    Subjective:     Pain:  1/10 prior to session 4/10 during MT   Pt reports decreased pain since x-rays that flaired her up. Patient reports compliance with ice and stretching HEP.   "I just wish I could get some relief."    Objective:     Patient seen by Occupational Therapy today w/ treatment as follows:  Fluidotherapy: To RUE  for 15 min, continuous air, 110 deg, air speed 100 to decrease pain, edema & scar tissue and increased tissue extensibility.  MT: provided extensive  STM, including MFR, CFM, lymphatic drainage technique & scar management, including use of IASTM to R upper arm/elbowforearm/wrist.   Ultrasound: Patient received ultrasound  for pain control and decreased inflammation @ 50%duty cycle, 1.0 Mhz, applied to R lateral elbow & forearm, intensity = 1.0 w/cm2 for 10 minutes.    TE per log: (No resistive activity at this time)  Exercises Date: 8/2/2017    UE Nerve Decompression Sequences (1-13) 3 trials   Radial Nerve glides 5 reps   Nirschl's-4 positions --   Forearm/Wrist stretches 3 reps, 30 sec hold, 3 positions   Wrist 3 ways elbow @90 10x   dexterciser 3min     Patient strongly encouraged to limit activity w/ her RUE & instructed to wear R custom (rigid) wrist orthosis at all times during the day.  She " verbalized agreement & understanding.     Kinesiotaping: pinwheel & space corrector applied to radial nerve at dorsal forearm & dorsal wrist (respectively) for decompression & lymphatic drainage. Patient instructed on purpose, wear, care, precautions to monitor and removal of KT. Patient verbalized understanding of all instructions provided.     TA (concurrent with MT): education regarding potential benefit of researching anti-inflammatory diet/meal options. Pt verbalized understanding.    Assessment:     Pt reports continued pain and tenderness in entire RUE. She has been resting is recently and finding some relief from the flair up that occurred following the x-rays in the past 1-2 weeks. There is noted increased swelling at the dorsal wrist. Pt with report of improvement following therapy this date. Continues with sensitivity and tenderness to pressure at lateral extensor bundle. Pt would benefit from continued skilled OT to address limitations. Pt reporting pain in lateral/anterior neck (potentially sternocleidomastoid or scalenes) and would benefit from referral for PT eval & treat for neck & shoulder.    Patient Education/Response:     HEP-forearm stretched, Joint protection & energy conservation in ADL/IADL's    Plans and Goals:     Cont with OT POC    Short Term Goals   1) Patient to be IND with HEP and modalities for pain management  2) Increase ROM 10 degrees in L wrist flexion stress position to increase functional hand use for basic and IADL's  3) Increase  strength 10 lbs. to grasp items for basic and IADL's  4) Increase pinch 1-3 psis for Basic and IADL's      Long Term Goals to be met in 8 weeks  1) Patient to be IND with HEP and modalities for pain management  2) Increase ROM 20 degrees in wrist flexion stress position to increase functional hand use for basic and IADL's  3) Increase  strength 20 lbs. to grasp items for basic and IADL's  4) Increase pinch 1-3 psis for [place] with  basic and IADL's

## 2017-08-02 NOTE — TELEPHONE ENCOUNTER
----- Message from Janay Husain OT sent at 8/2/2017  2:05 PM CDT -----  Daljit Alvarez    I am the occupational therapist working with Ms Cotton for her R arm. She has had increased pain that she's now experiencing in her R shoulder & neck.     She would benefit from a referral for a Physical Therapy evaluation and treatment.  Would you put in an order for PT specifically, for her R shoulder and neck?     Thanks!    Janay Husain OTR/RASHEED

## 2017-08-04 ENCOUNTER — CLINICAL SUPPORT (OUTPATIENT)
Dept: REHABILITATION | Facility: HOSPITAL | Age: 47
End: 2017-08-04
Attending: NURSE PRACTITIONER
Payer: OTHER GOVERNMENT

## 2017-08-04 DIAGNOSIS — M79.601 PAIN OF RIGHT ARM: ICD-10-CM

## 2017-08-04 DIAGNOSIS — M25.639 STIFFNESS OF JOINT OF FOREARM: ICD-10-CM

## 2017-08-04 PROCEDURE — 97140 MANUAL THERAPY 1/> REGIONS: CPT | Mod: PN

## 2017-08-04 PROCEDURE — 97022 WHIRLPOOL THERAPY: CPT | Mod: PN

## 2017-08-04 PROCEDURE — 97035 APP MDLTY 1+ULTRASOUND EA 15: CPT | Mod: PN

## 2017-08-04 NOTE — PROGRESS NOTES
"TIME RECORD    Date:  08/04/2017    Start Time: 1210  Stop Time:  100    Visit#  14 of 17 through 09/03/17  FOTO last administered: 7/13/17 (5th visit)    PROCEDURES:    TIMED  Procedure Min.    US 10    MT 25   TE 0             UNTIMED  Procedure Min.   Paraffin w/ MHP --   Fluidotherapy 15     Total Timed Minutes:  35  Total Timed Units:  2  Total Untimed Units:  1  Charges Billed/# of units:  3 (F, 1 MT, 1 US)      OCCUPATIONAL THERAPY PROGRESS NOTE    Physician Name:  DEVON Rush  Primary Diagnosis:  R elbow pain  Treatment Diagnosis:  R elbow tricep and extensor mass tendonosis  Onset Date:  4/7/17  Eval Date: 5/16/2017   Certification Period:  07/13/17  to 09/13/17    Subjective:     Pain:  1/10 prior to session 4/10 during MT   "It is so sore when I extend my elbow."  Pt reports she has an order for PT neck and shld eval.    Objective:     Patient seen by Occupational Therapy today w/ treatment as follows:  Fluidotherapy: To RUE  for 15 min, continuous air, 110 deg, air speed 100 to decrease pain, edema & scar tissue and increased tissue extensibility.  MT: provided extensive  STM, including MFR, CFM, lymphatic drainage technique & scar management, including use of IASTM to R upper arm/elbowforearm/wrist.   Ultrasound: Patient received ultrasound  for pain control and decreased inflammation @ 50%duty cycle, 1.0 Mhz, applied to R lateral elbow & forearm, intensity = 1.0 w/cm2 for 10 minutes.    TE per log: (No resistive activity at this time 2/2 increased pain with attempt)  Exercises Date: 8/4/2017    UE Nerve Decompression Sequences (1-13) 3 trials   Radial Nerve glides 5 reps   Nirschl's-4 positions --   Forearm/Wrist stretches 3 reps, 30 sec hold, 3 positions   Wrist 3 ways elbow @90 10x   dexterciser 3min     Patient strongly encouraged to limit activity w/ her RUE & instructed to wear R custom (rigid) wrist orthosis at all times during the day.  She verbalized agreement & understanding. " (continued)    Kinesiotaping: pinwheel & space corrector applied to radial nerve at dorsal forearm & dorsal wrist (respectively) for decompression & lymphatic drainage. Patient instructed on purpose, wear, care, precautions to monitor and removal of KT. Patient verbalized understanding of all instructions provided. (not today)        Assessment:     Pt reports pain and tenderness in entire RUE, however, reports has improved some since last session. She continues with sensitivity and tenderness to pressure at lateral extensor bundle and upper arm. She reported an increase in pain with elbow extension when attempting to perform nerve glides therefore held off at this time. Pt would benefit from continued skilled OT to address limitations.     Patient Education/Response:     HEP-forearm stretched, Joint protection & energy conservation in ADL/IADL's    Plans and Goals:     Cont with OT POC    Short Term Goals   1) Patient to be IND with HEP and modalities for pain management  2) Increase ROM 10 degrees in L wrist flexion stress position to increase functional hand use for basic and IADL's  3) Increase  strength 10 lbs. to grasp items for basic and IADL's  4) Increase pinch 1-3 psis for Basic and IADL's      Long Term Goals to be met in 8 weeks  1) Patient to be IND with HEP and modalities for pain management  2) Increase ROM 20 degrees in wrist flexion stress position to increase functional hand use for basic and IADL's  3) Increase  strength 20 lbs. to grasp items for basic and IADL's  4) Increase pinch 1-3 psis for [place] with basic and IADL's

## 2017-08-09 ENCOUNTER — CLINICAL SUPPORT (OUTPATIENT)
Dept: REHABILITATION | Facility: HOSPITAL | Age: 47
End: 2017-08-09
Attending: NURSE PRACTITIONER
Payer: OTHER GOVERNMENT

## 2017-08-09 DIAGNOSIS — M25.639 STIFFNESS OF JOINT OF FOREARM: ICD-10-CM

## 2017-08-09 DIAGNOSIS — M79.601 PAIN OF RIGHT ARM: ICD-10-CM

## 2017-08-09 PROCEDURE — 97022 WHIRLPOOL THERAPY: CPT | Mod: PN

## 2017-08-09 PROCEDURE — 97140 MANUAL THERAPY 1/> REGIONS: CPT | Mod: PN

## 2017-08-09 PROCEDURE — 97035 APP MDLTY 1+ULTRASOUND EA 15: CPT | Mod: PN

## 2017-08-09 NOTE — PROGRESS NOTES
"TIME RECORD    Date:  08/09/2017    Start Time: 3:00pm  Stop Time:  3:55pm    Visit#  15 of 17 through 09/03/17  FOTO last administered: 8/9/17 (15th visit)    PROCEDURES:    TIMED  Procedure Min.    US 10   MT 25   TE 5   TA 10 (concurrent with MT)         UNTIMED  Procedure Min.   Paraffin w/ MHP --   Fluidotherapy 15     Total Timed Minutes:  40  Total Timed Units:  3  Total Untimed Units:  1  Charges Billed/# of units:  4 (F, 1 US, 2 MT)      OCCUPATIONAL THERAPY PROGRESS NOTE    Physician Name:  DEVON Rush  Primary Diagnosis:  R elbow pain  Treatment Diagnosis:  R elbow tricep and extensor mass tendonosis  Onset Date:  4/7/17  Eval Date: 5/16/2017   Certification Period:  07/13/17  to 09/13/17    Subjective:     Pain:  1/10 prior to session 4/10 during MT   "I just want the nerve conduction study so I can know what's going on and they can fix it."    Objective:     Patient seen by Occupational Therapy today w/ treatment as follows:  Fluidotherapy: To RUE  for 15 min, continuous air, 115 deg, air speed 100 to decrease pain, edema & scar tissue and increased tissue extensibility.  Ultrasound: Patient received ultrasound  for pain control and decreased inflammation @ 50%duty cycle, 1.0 Mhz, applied to R lateral elbow & forearm, intensity = 1.0 w/cm2 for 10 minutes.  MT: provided extensive  STM, including MFR, CFM, lymphatic drainage technique & scar management, including use of IASTM to R upper arm/elbowforearm/wrist.     TE per log: (No resistive activity at this time 2/2 increased pain with attempt) not today  Exercises Date: 8/9/2017    UE Nerve Decompression Sequences (1-13) 3 trials   Radial Nerve glides 5 reps   Nirschl's-4 positions --   Forearm/Wrist stretches 3 reps, 30 sec hold, 3 positions   Wrist 3 ways elbow @90 10x   dexterciser 3min     Kinesiotaping: button-hole decompression taping applied to wrist for space correction & lymphatic drainage. Patient instructed on purpose, wear, care, " precautions to monitor and removal of KT. Patient verbalized understanding of all instructions provided.     FOTO subjective score of 63% this date.     TA: education regarding looking into anti-inflammatory diet provided, including keeping track of food/beverages high in sodium, sugar, caffeine, or alcohol to see if any trigger inflammation. Pt verbalized understanding.    Assessment:     Pt continues with pain and tightness in entire RUE. Pt reports she is using ice packs frequently to address pain in RUE. Pt reports she has decreased the amount of time during the day that she is wearing her resting wrist splint. Pt tolerated MT well, continues with tenderness throughout MT. Pt with good response to gentle cupping along tight forearm extensors & flexors. Pt progressing slowly. Pt would benefit from continued skilled OT to address limitations.      Patient Education/Response:     HEP-forearm stretched, Joint protection & energy conservation in ADL/IADL's    Plans and Goals:     Cont with OT POC    Short Term Goals   1) Patient to be IND with HEP and modalities for pain management  2) Increase ROM 10 degrees in L wrist flexion stress position to increase functional hand use for basic and IADL's  3) Increase  strength 10 lbs. to grasp items for basic and IADL's  4) Increase pinch 1-3 psis for Basic and IADL's      Long Term Goals to be met in 8 weeks  1) Patient to be IND with HEP and modalities for pain management  2) Increase ROM 20 degrees in wrist flexion stress position to increase functional hand use for basic and IADL's  3) Increase  strength 20 lbs. to grasp items for basic and IADL's  4) Increase pinch 1-3 psis for [place] with basic and IADL's

## 2017-08-11 ENCOUNTER — CLINICAL SUPPORT (OUTPATIENT)
Dept: REHABILITATION | Facility: HOSPITAL | Age: 47
End: 2017-08-11
Attending: NURSE PRACTITIONER
Payer: OTHER GOVERNMENT

## 2017-08-11 DIAGNOSIS — M25.639 STIFFNESS OF JOINT OF FOREARM: ICD-10-CM

## 2017-08-11 DIAGNOSIS — M79.601 PAIN OF RIGHT ARM: ICD-10-CM

## 2017-08-11 PROCEDURE — 97022 WHIRLPOOL THERAPY: CPT | Mod: PN

## 2017-08-11 PROCEDURE — 97140 MANUAL THERAPY 1/> REGIONS: CPT | Mod: PN

## 2017-08-11 PROCEDURE — 97035 APP MDLTY 1+ULTRASOUND EA 15: CPT | Mod: PN

## 2017-08-11 NOTE — PROGRESS NOTES
"TIME RECORD    Date:  08/11/2017    Start Time: 1210  Stop Time:  110    Visit#  16 of 17 through 09/03/17  FOTO last administered: 8/9/17 (15th visit)    PROCEDURES:    TIMED  Procedure Min.    US 10   MT 25   TE 0   TA 10          UNTIMED  Procedure Min.   Paraffin w/ MHP --   Fluidotherapy 15     Total Timed Minutes:  45  Total Timed Units:  3  Total Untimed Units:  1  Charges Billed/# of units:  4 (F, 1 US, 2 MT)      OCCUPATIONAL THERAPY PROGRESS NOTE    Physician Name:  DEVON Rush  Primary Diagnosis:  R elbow pain  Treatment Diagnosis:  R elbow tricep and extensor mass tendonosis  Onset Date:  4/7/17  Eval Date: 5/16/2017   Certification Period:  07/13/17  to 09/13/17    Subjective:     Pain:  5/10 prior to session 6/10 during MT   "I am really just hurting today."    Objective:     Patient seen by Occupational Therapy today w/ treatment as follows:  Fluidotherapy: To RUE  for 15 min, continuous air, 115 deg, air speed 100 to decrease pain, edema & scar tissue and increased tissue extensibility.  Ultrasound: Patient received ultrasound  for pain control and decreased inflammation @ 50%duty cycle, 1.0 Mhz, applied to R lateral elbow & forearm, intensity = 1.0 w/cm2 for 10 minutes.  MT: provided extensive  STM, including MFR, CFM, lymphatic drainage technique & scar management, including use of IASTM to R upper arm/elbowforearm/wrist.     TE per log: (No resistive activity at this time 2/2 increased pain with attempt) not today  Exercises Date: 8/11/2017    UE Nerve Decompression Sequences (1-13) 3 trials   Radial Nerve glides 5 reps   Nirschl's-4 positions --   Forearm/Wrist stretches 3 reps, 30 sec hold, 3 positions   Wrist 3 ways elbow @90 10x   dexterciser 3min     Kinesiotaping: I strip applied at R wrist and up forearm for lymphatic drainage with space corrector over sensitive point of extensor bundle, y-shaped taping with space corrector at deltoid insertion, and trigger point taping over trigger " point on R upper traps - to address trigger points, pain, decompression, and lymphatic drainage.  Patient instructed on purpose, wear, care, precautions to monitor and removal of KT. Patient verbalized understanding of all instructions provided.         Assessment:     Pt continues with pain and tightness in entire RUE. She continues with tenderness throughout MT, however, reports pain relief after tx provided. Min edema noted on dorsal aspect of R wrist which extends to mid forearm.  Pt progressing slowly. Pt would benefit from continued skilled OT to address limitations. She is awaiting her PT evaluation for neck and shoulder.       Patient Education/Response:     HEP-forearm stretched, Joint protection & energy conservation in ADL/IADL's    Plans and Goals:     Cont with OT POC    Short Term Goals   1) Patient to be IND with HEP and modalities for pain management  2) Increase ROM 10 degrees in L wrist flexion stress position to increase functional hand use for basic and IADL's  3) Increase  strength 10 lbs. to grasp items for basic and IADL's  4) Increase pinch 1-3 psis for Basic and IADL's      Long Term Goals to be met in 8 weeks  1) Patient to be IND with HEP and modalities for pain management  2) Increase ROM 20 degrees in wrist flexion stress position to increase functional hand use for basic and IADL's  3) Increase  strength 20 lbs. to grasp items for basic and IADL's  4) Increase pinch 1-3 psis for [place] with basic and IADL's

## 2017-08-16 ENCOUNTER — TELEPHONE (OUTPATIENT)
Dept: REHABILITATION | Facility: HOSPITAL | Age: 47
End: 2017-08-16

## 2017-08-16 ENCOUNTER — CLINICAL SUPPORT (OUTPATIENT)
Dept: REHABILITATION | Facility: HOSPITAL | Age: 47
End: 2017-08-16
Attending: NURSE PRACTITIONER
Payer: OTHER GOVERNMENT

## 2017-08-16 DIAGNOSIS — M25.639 STIFFNESS OF JOINT OF FOREARM: ICD-10-CM

## 2017-08-16 DIAGNOSIS — M79.601 PAIN OF RIGHT ARM: ICD-10-CM

## 2017-08-16 PROCEDURE — 97110 THERAPEUTIC EXERCISES: CPT | Mod: PN

## 2017-08-16 PROCEDURE — 97140 MANUAL THERAPY 1/> REGIONS: CPT | Mod: PN

## 2017-08-16 PROCEDURE — 97022 WHIRLPOOL THERAPY: CPT | Mod: PN

## 2017-08-16 NOTE — PROGRESS NOTES
"TIME RECORD    Date:  08/16/2017    Start Time: 2:05pm  Stop Time:  3:00pm    Visit#  17 of 17 through 09/03/17  FOTO last administered: 8/9/17 (15th visit)    PROCEDURES:    TIMED  Procedure Min.    US --   MT 20   TE 20   TA --         UNTIMED  Procedure Min.   Paraffin w/ MHP --   Fluidotherapy 15     Total Timed Minutes:  40  Total Timed Units:  3  Total Untimed Units:  1  Charges Billed/# of units:  4 (F, 1 MT, 2 TE)      OCCUPATIONAL THERAPY PROGRESS NOTE    Physician Name:  DEVON Rush  Primary Diagnosis:  R elbow pain  Treatment Diagnosis:  R elbow tricep and extensor mass tendonosis  Onset Date:  4/7/17  Eval Date: 5/16/2017   Certification Period:  07/13/17  to 09/13/17    Subjective:     Pain:  5/10 prior to session 6/10 during MT   "My shoulder feels so much better today. I have almost no pain in it."    Objective:     Patient seen by Occupational Therapy today w/ treatment as follows:  Fluidotherapy: To RUE  for 15 min, continuous air, 115 deg, air speed 100 to decrease pain, edema & scar tissue and increased tissue extensibility.  Ultrasound: Patient received ultrasound  for pain control and decreased inflammation @ 50%duty cycle, 1.0 Mhz, applied to R lateral elbow & forearm, intensity = 1.0 w/cm2 for 10 minutes. Not today  MT: provided extensive  STM, including MFR, CFM, lymphatic drainage technique & scar management, including use of IASTM to R upper arm/elbowforearm/wrist.     TE per log: (No resistive activity at this time 2/2 increased pain with attempt) not today  Exercises Date: 8/16/2017    UE Nerve Decompression Sequences (1-13) 3 trials   Radial Nerve glides 5 reps   Nirschl's-4 positions --   Forearm/Wrist stretches 3 reps, 30 sec hold, 3 positions   Wrist 3 ways elbow @90 10x   dexterciser 3min     Kinesiotaping: button hole horizotnal taping applied around wrist, y strip tapings over distal deltoid as well as forearm extensor bundle with space corrector, to promote lymphatic " drainage, stabilization, and decompression  Patient instructed on purpose, wear, care, precautions to monitor and removal of KT. Patient verbalized understanding of all instructions provided.        Range of Motion (in degrees):    Right AROM   Elbow E/F WNL   Forearm Sup/pron WNL   Wrist E/F 55(-10) / 55(-21)   Wrist RD/UD 21 (+6) / 36 (+4)   Thumb R/P Abd 45(-5)/55(-5)     Comments: Pt reports highly increased pain in wrist during extension & flexion compared to previous measurements.      and Pinch Strengths (in pounds):  Setting 2    Right Left   Elbow bent 23 (-5) 35   Elbow straight 12 (+5) 25           Lateral 5 (=) 8   Tripod 4 (=) 6.5   Tip 4 (+1) 7    Comments:  Pain with outstretched arm  strength measure    Circumferential Edema Measurements (in cm):    Right Left   Elbow Crease  27.4 (-0.1) 27.5   Below Elbow (4 cm from EC) 26.5  (-0.2) 26         Assessment:     Pt participated well in therapy. Reported improvements in R shoulder and decreased pain. Pt continues with trigger points in triceps & distal deltoid. Tolerated MT fairly well this date. Pt would benefit from continued skilled OT to address limitations.    Patient Education/Response:     HEP-forearm stretched, Joint protection & energy conservation in ADL/IADL's    Plans and Goals:     Cont with OT POC    Short Term Goals   1) Patient to be IND with HEP and modalities for pain management  2) Increase ROM 10 degrees in L wrist flexion stress position to increase functional hand use for basic and IADL's  3) Increase  strength 10 lbs. to grasp items for basic and IADL's  4) Increase pinch 1-3 psis for Basic and IADL's      Long Term Goals to be met in 8 weeks  1) Patient to be IND with HEP and modalities for pain management  2) Increase ROM 20 degrees in wrist flexion stress position to increase functional hand use for basic and IADL's  3) Increase  strength 20 lbs. to grasp items for basic and IADL's  4) Increase pinch 1-3 psis  for [place] with basic and IADL's

## 2017-08-23 ENCOUNTER — CLINICAL SUPPORT (OUTPATIENT)
Dept: REHABILITATION | Facility: HOSPITAL | Age: 47
End: 2017-08-23
Attending: NURSE PRACTITIONER
Payer: OTHER GOVERNMENT

## 2017-08-23 DIAGNOSIS — M25.639 STIFFNESS OF JOINT OF FOREARM: ICD-10-CM

## 2017-08-23 DIAGNOSIS — M79.601 PAIN OF RIGHT ARM: ICD-10-CM

## 2017-08-23 PROCEDURE — 97140 MANUAL THERAPY 1/> REGIONS: CPT | Mod: PN

## 2017-08-23 PROCEDURE — 97035 APP MDLTY 1+ULTRASOUND EA 15: CPT | Mod: PN

## 2017-08-23 PROCEDURE — 97022 WHIRLPOOL THERAPY: CPT | Mod: PN

## 2017-08-23 NOTE — PROGRESS NOTES
"TIME RECORD    Date:  08/23/2017    Start Time: 2:55pm  Stop Time:  4:00pm    Visit#  1 of 21 (18 total)  FOTO last administered: 8/9/17 (15th visit)    PROCEDURES:    TIMED  Procedure Min.    US 10   MT 25   TE 5   TA --         UNTIMED  Procedure Min.   Fluidotherapy 15   Cold pack 10 (NC)     Total Timed Minutes:  40  Total Timed Units:  3  Total Untimed Units:  1  Charges Billed/# of units:  4 (F, 1 US, 2 MT)      OCCUPATIONAL THERAPY PROGRESS NOTE    Physician Name:  DEVON Rush  Primary Diagnosis:  R elbow pain  Treatment Diagnosis:  R elbow tricep and extensor mass tendonosis  Onset Date:  4/7/17  Eval Date: 5/16/2017   Certification Period:  07/13/17  to 09/13/17    Subjective:     Pain:  5/10 prior to session 3/10 during MT   "I reached for something with my R hand, and my wrist did a weird movement and I had a really intense twinge. It was so bad, I had to hold onto the covers out of pain!"    Objective:     Patient seen by Occupational Therapy today w/ treatment as follows:  Fluidotherapy: To RUE  for 15 min, continuous air, 115 deg, air speed 100 to decrease pain, edema & scar tissue and increased tissue extensibility.  Ultrasound: Patient received ultrasound  for pain control and decreased inflammation @ 50%duty cycle, 1.0 Mhz, applied to R lateral elbow & forearm, intensity = 1.0 w/cm2 for 10 minutes. Not today  MT: provided extensive  STM, including MFR, CFM, lymphatic drainage technique & scar management, including use of IASTM to R upper arm/elbowforearm/wrist.     TE per log: (No resistive activity at this time 2/2 increased pain with attempt) not today  Exercises Date: 8/23/2017    UE Nerve Decompression Sequences (1-13) 3 trials   Radial Nerve glides 5 reps   Nirschl's-4 positions --   Forearm/Wrist stretches 3 reps, 30 sec hold, 3 positions   Wrist 3 ways elbow @90 10x   dexterciser 3min     Kinesiotaping: button hole horizotnal taping applied around wrist to promote lymphatic drainage, " stabilization, and decompression  Patient instructed on purpose, wear, care, precautions to monitor and removal of KT. Patient verbalized understanding of all instructions provided.     CP applied x 10 minutes at end of session for pain relief.    Assessment:     Pt reported increased pain this date/recently. Pt reporting pain is now radiating around her R wrist. Pt with difficulty with TE due to pain and tenderness in wrist and forearm. Pt reports improvement in pain following application of ice packs to entire RUE. Pt would benefit from f/u with physician to assess underlying causes of pain. Pt would benefit from continued skilled OT to address limitations.    Patient Education/Response:     HEP-forearm stretched, Joint protection & energy conservation in ADL/IADL's    Plans and Goals:     Cont with OT POC    Short Term Goals   1) Patient to be IND with HEP and modalities for pain management  2) Increase ROM 10 degrees in L wrist flexion stress position to increase functional hand use for basic and IADL's  3) Increase  strength 10 lbs. to grasp items for basic and IADL's  4) Increase pinch 1-3 psis for Basic and IADL's      Long Term Goals to be met in 8 weeks  1) Patient to be IND with HEP and modalities for pain management  2) Increase ROM 20 degrees in wrist flexion stress position to increase functional hand use for basic and IADL's  3) Increase  strength 20 lbs. to grasp items for basic and IADL's  4) Increase pinch 1-3 psis for [place] with basic and IADL's

## 2017-08-25 ENCOUNTER — CLINICAL SUPPORT (OUTPATIENT)
Dept: REHABILITATION | Facility: HOSPITAL | Age: 47
End: 2017-08-25
Attending: NURSE PRACTITIONER
Payer: OTHER GOVERNMENT

## 2017-08-25 DIAGNOSIS — M25.639 STIFFNESS OF JOINT OF FOREARM: ICD-10-CM

## 2017-08-25 DIAGNOSIS — M79.601 PAIN OF RIGHT ARM: ICD-10-CM

## 2017-08-25 PROCEDURE — 97035 APP MDLTY 1+ULTRASOUND EA 15: CPT | Mod: PN

## 2017-08-25 PROCEDURE — 97140 MANUAL THERAPY 1/> REGIONS: CPT | Mod: PN

## 2017-08-25 PROCEDURE — 97022 WHIRLPOOL THERAPY: CPT | Mod: PN

## 2017-08-25 NOTE — PROGRESS NOTES
TIME RECORD    Date:  08/25/2017    Start Time: 1210  Stop Time:  105    Visit# 2 of 21 (18 total)  FOTO last administered: 8/9/17 (15th visit)    PROCEDURES:    TIMED  Procedure Min.    US 10   MT 30   TE    TA --         UNTIMED  Procedure Min.   Fluidotherapy 15         Total Timed Minutes:  40  Total Timed Units:  3  Total Untimed Units:  1  Charges Billed/# of units:  4 (F, 1 US, 2 MT)      OCCUPATIONAL THERAPY PROGRESS NOTE    Physician Name:  DEVON Rush  Primary Diagnosis:  R elbow pain  Treatment Diagnosis:  R elbow tricep and extensor mass tendonosis  Onset Date:  4/7/17  Eval Date: 5/16/2017   Certification Period:  07/13/17  to 09/13/17    Subjective:     Pain:  5/10 prior to session 3/10 during MT   Pt continues to reports pain, numbness, and twinges radiating down arm into palm.     Objective:     Patient seen by Occupational Therapy today w/ treatment as follows:  Fluidotherapy: To RUE  for 15 min, continuous air, 115 deg, air speed 100 to decrease pain, edema & scar tissue and increased tissue extensibility.  Ultrasound: Patient received ultrasound  for pain control and decreased inflammation @ 50%duty cycle, 1.0 Mhz, applied to R lateral elbow & forearm, intensity = 1.0 w/cm2 for 10 minutes.   MT: provided extensive  STM, including MFR, CFM, lymphatic drainage technique & scar management, including use of IASTM to R upper arm/elbowforearm/wrist.     TE per log: (No resistive activity at this time 2/2 increased pain with attempt) not today  Exercises Date: 8/25/2017    UE Nerve Decompression Sequences (1-13) 3 trials   Radial Nerve glides 5 reps   Nirschl's-4 positions --   Forearm/Wrist stretches 3 reps, 30 sec hold, 3 positions   Wrist 3 ways elbow @90 10x   dexterciser 3min     Kinesiotaping: y-shaped taping with space corrector at deltoid insertion, and trigger point taping over trigger point on R upper traps - to address trigger points, pain, decompression, and lymphatic drainage.  Patient  instructed on purpose, wear, care, precautions to monitor and removal of KT. Patient verbalized understanding of all instructions provided.     Ice massage x 5 minutes at end of session for pain relief to upper arm, and forearm extensor bundle.     Assessment:     Pt reports she feels like her arm is getting worse with pain radiating from neck down through hand. She has minimal swelling throughout upper arm to forearm and fibrotic tissue.  She has a shoulder evaluation scheduled for Monday.  Pt would benefit from f/u with physician to assess underlying causes of pain. Pt would benefit from continued skilled OT to address limitations.    Patient Education/Response:     HEP-forearm stretched, Joint protection & energy conservation in ADL/IADL's    Plans and Goals:     Cont with OT POC    Short Term Goals   1) Patient to be IND with HEP and modalities for pain management  2) Increase ROM 10 degrees in L wrist flexion stress position to increase functional hand use for basic and IADL's  3) Increase  strength 10 lbs. to grasp items for basic and IADL's  4) Increase pinch 1-3 psis for Basic and IADL's      Long Term Goals to be met in 8 weeks  1) Patient to be IND with HEP and modalities for pain management  2) Increase ROM 20 degrees in wrist flexion stress position to increase functional hand use for basic and IADL's  3) Increase  strength 20 lbs. to grasp items for basic and IADL's  4) Increase pinch 1-3 psis for [place] with basic and IADL's

## 2017-08-28 ENCOUNTER — CLINICAL SUPPORT (OUTPATIENT)
Dept: REHABILITATION | Facility: HOSPITAL | Age: 47
End: 2017-08-28
Attending: NURSE PRACTITIONER
Payer: OTHER GOVERNMENT

## 2017-08-28 DIAGNOSIS — R29.898 WEAKNESS OF SHOULDER: ICD-10-CM

## 2017-08-28 DIAGNOSIS — R52 PAIN AGGRAVATED BY ACTIVITIES OF DAILY LIVING: ICD-10-CM

## 2017-08-28 DIAGNOSIS — R29.898 DECREASED RANGE OF MOTION OF NECK: ICD-10-CM

## 2017-08-28 DIAGNOSIS — R29.3 POOR POSTURE: ICD-10-CM

## 2017-08-28 PROCEDURE — 97162 PT EVAL MOD COMPLEX 30 MIN: CPT

## 2017-08-28 NOTE — PLAN OF CARE
TIME RECORD    Date: 08/29/2017    Start Time:  3:00  Stop Time:  4:00    PROCEDURES:    TIMED  Procedure Min.                         UNTIMED  Procedure Min.   PT eval 60         Total Timed Minutes:  0  Total Timed Units:  0  Total Untimed Units:  1  Charges Billed/# of units:  1    OUTPATIENT PHYSICAL THERAPY   PATIENT EVALUATION  Onset Date: April 2017  Primary Diagnosis:   1. Decreased range of motion of neck     2. Poor posture     3. Weakness of shoulder     4. Pain aggravated by activities of daily living       Treatment Diagnosis: decreased ROM, weakness, poor posture, pec tightness  Past Medical History:   Diagnosis Date    Abnormal Pap smear     Allergy     Asthma     Sinusitis, chronic      Precautions: Standard  Prior Therapy: OT in [place] since May, no PT previously.  Medications: Hillary Cotton has a current medication list which includes the following prescription(s): albuterol, azelastine, celecoxib, cetirizine, glucosamine-chondroitin, and levonorgestrel-ethinyl estradiol.  Nutrition:  Overweight  History of Present Illness: exacerbation of neck and shoulder pain since injury in April 2017  Prior Level of Function: Independent  Social History: Lives with spouse  Place of Residence (Steps/Adaptations): Single story, currently off work  Functional Deficits Leading to Referral/Nature of Injury: Difficulty with driving, bathing, reaching, lifting, performing household duties, recreational activities  Patient Therapy Goals: To help ease neck and shoulder pain    Subjective     Hillary Cotton states on April 8th picked up 40 lb bag of top soil out of her car and felt a pull in R elbow, since that time she has had trouble with her R elbow and wrist. She has been seeing OT for her elbow and wrist and they feel she may have a radial nerve entrapment. 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 Previously she did not use a  headset with the phone, but her office has ordered one for her when she returns. She reports she is now having issues in neck and R shoulder with pain and pulling, if she keeps her R arm supported it is pretty ok. Anytime she uses her R arm she gets increased pain down her arm to her elbow and wrist. When she does use her R arm she gets really stiff and sore under her arm and a sharper pain in her upper trap into her neck. She started taking a muscle relaxer and it kind of decreases the pain. She reports increased pain in neck with driving and pulling in her arm if she doesn't rest it down on the seat next to her, when trying to bathe she has increased pain in her arm with movements across her body. She also has complaints of creaking and popping in her R shoulder with reaching across body. She is unable to sleep on her side or her back due to complaints of increased pain in her arm, so she sleeps on her stomach with her R arm elevated next to her head. She denies any pain in her neck with reading, working on computer, or when sitting in her recliner. She has pain in her R arm more in the triceps area when working on her computer at home, trying to do arts and crafts for her [employer] club, and is unable to perform her preferred exercise activities. Prior to April she was walking every every night for 30 minutes to a hour while using hand weights.      Pain:  Location: neck  and shoulder   Description: Aching, sharp, throbbing, stiff  Activities Which Increase Pain: Night Time, Lifting and sitting with arm unsupported  Activities Which Decrease Pain: pain medication, ice and rest  Pain Scale: 2/10 at best 4/10 now  6/10 at worst    Objective     Posture: forward shoulder and head,   Palpation: No TTP in cervical spine, TTP in R UT into R lateral deltoid and anterior deltoid  Sensation: light touch  DTRs: intact  Range of Motion/Strength: Shoulder AROM WFL but w/ complaints of pain at all end ranges  Cervical AROM:  Pain/Dysfunction with Movement:   Flexion 40*    Extension 30*    Right side bending 30* Pain L side   Left side bending 20* Pulling in back   Right rotation 70*    Left rotation 80*        U/E MMT Right Left Pain/Dysfunction with Movement   Shld Flexion 4-/5 4/5    Shld Extension 5/5 5/5    Shld Abduction 4-/5 3+/5 Pain L lateral shld   Shld IR 5/5 5/5    Shld ER 5/5 5/5    Elbow Flexion 5/5 5/5    Elbow Extension 5/5 5/5    Rhomboids 3/5 3/5    Lower trap 3/5 3/5      Flexibility: L AC joint 16.5 cm from table, R AC joint 17 cm from table  Gait: Without AD  Analysis: Assistance none, decreased arm swing R UE  Bed Mobility:Independent  Transfers: Independent  Special Tests: Unable to assess R shoulder for special tests due to complaints of pain in elbow with positioning for tests, cervical downslide and upslide WNL with no complaints of pain  Other: N/A  Functional Limitation Reports: G codes  Tool: FOTO Neck SURVEY  Category: Carrying ()  Limitation: 43%  Current: CK = at least 40% but < 60% impaired, limited or restricted  Goal: CI = at least 1% but < 20% impaired, limited or restricted  Tool: FOTO Shoulder SURVEY  Category: Carrying ()  Limitation: 53%  Current: CK = at least 40% but < 60% impaired, limited or restricted  Goal: CI = at least 1% but < 20% impaired, limited or restricted    Treatment: Patient was educated on the plan of care and treatment options. She is in agreement with the plan of care.     Assessment       Initial Assessment (Pertinent finding, problem list and factors affecting outcome): Mrs. Cotton is a 46 year old female, who presents to the clinic with complaints of neck and right shoulder pain, the shoulder pain is worse than the neck. She demonstrates decreased L cervical side bending and R cervical rotation that limits her ability to drive safely. She is able to demonstrate R shoulder AROM WFL but with complaints of pain with all movements into her R elbow/triceps area.  This limits her ability to perform bathing, her usual household duties, and recreational activities without an increase in symptoms. She also demonstrated a decrease in UE strength that contributes to her poor posture and difficulty performing her usual ADLs. In standing and sitting she has a forward head and shoulder posture with a decrease in cervical lordosis, her posture limits her ability to move through her full ROM in her cervical spine and B shoulders. She has increased tenderness to palpation over R upper trap, R cervical paraspinals, R lateral deltoid, and R anterior deltoid. Her cervical PIVM are WNL with no complaints of pain. She was unable to tolerate positioning for special tests for the shoulder due to complaints of pain into her axilla and down to her R elbow with all attempts at starting positions. Her current score on FOTO Neck Survey and FOTO Shoulder Survey place her in the 40%<60% impaired, limited, or restricted category. She would benefit from physical therapy to improve her strength, ROM, flexibility, and functional mobility in order to return to [facility].  History  Co-morbidities and personal factors that may impact the plan of care Examination  Body Structures and Functions, activity limitations and participation restrictions that may impact the plan of care Clinical Presentation   Decision Making/ Complexity Score   Co-morbidities:   Pain R upper arm              Personal Factors:   None Body Regions:B shoulders, cervical spine    Body Systems: Musculoskeletal - decreased ROM, weakness, tendenress to palpation R cervical spine, R UT, R lateral and anterior deltoid; Neuromuscular - poor posture, increased reliance on L UE for ADLs; Cardiovascular - N/A; Integumentary - N/A       Activity limitations/Participation Restrictions: Difficulty with bathing, performing usual household duties, recreational activities, and driving     evolving with changing clinical  characteristics                       Moderate complexity    FOTO Neck 57, 40%<60%  FOTO Shoulder 43, 40%<60%         Rehab Potiential: fair  Barriers to Rehab: None  Short Term Goals (4 Weeks):   1. This patient will be independent with a basic HEP.  2. This patient will increase cervical AROM to WNL and pain free in order to drive safely.  3. This patient will increase B UE strength by 1 grade in order to be able to bathe with no increase in symptoms.   4. This patient will have a pain rating of 4/10 at worst with ADLs.  5. Patient able to score greater than or equal to 70 on the FOTO Neck Survey placing patient in 20%<40% impaired, limited, or restricted category demonstrating overall decreased neck pain with functional activities  6. Patient will be able to achieve greater than or equal to 65 on the FOTO Shoulder Survey placing patient in 20%<40% impaired, limited, or restricted category demonstrating overall improved functional ability with upper extremity.   Long Term Goals (8 Weeks):   1. This patient will be independent with an updated HEP.  2. This patient will have B UE strength of 5/5 in order to be able to perform her usual ADLs  with no increase in symptoms.   3. This patient will have a pain rating of 2/10 at worst with ADLs.  5. Patient able to score greater than or equal to 85 on the FOTO Neck Survey placing patient in 1%<20% impaired, limited, or restricted category demonstrating overall decreased neck pain with functional activities  6. Patient will be able to achieve greater than or equal to 85 on the FOTO Shoulder Survey placing patient in 1%<20% impaired, limited, or restricted category demonstrating overall improved functional ability with upper extremity.       Plan     Certification Period: 08/28/17 to 10/28/17  Recommended Treatment Plan: 2 times per week for 8 weeks: Cervical/Lumbar Traction, Group Therapy, Manual Therapy, Moist Heat/ Ice, Patient Education and Therapeutic Exercise  Other  Recommendations: modalities prn, IASTM prn, kinesiotape prn, Functional Dry Needling prn       Therapist: DWAYNE Laboy THE NEED FOR THESE SERVICES FURNISHED UNDER THIS PLAN OF TREATMENT AND WHILE UNDER MY CARE    Physician's comments: ________________________________________________________________________________________________________________________________________________      Physician's Name: ___________________________________

## 2017-08-29 PROBLEM — R29.3 POOR POSTURE: Status: ACTIVE | Noted: 2017-08-29

## 2017-08-29 PROBLEM — R29.898 DECREASED RANGE OF MOTION OF NECK: Status: ACTIVE | Noted: 2017-08-29

## 2017-08-29 PROBLEM — R52 PAIN AGGRAVATED BY ACTIVITIES OF DAILY LIVING: Status: ACTIVE | Noted: 2017-08-29

## 2017-08-29 PROBLEM — R29.898 WEAKNESS OF SHOULDER: Status: ACTIVE | Noted: 2017-08-29

## 2017-08-31 ENCOUNTER — CLINICAL SUPPORT (OUTPATIENT)
Dept: REHABILITATION | Facility: HOSPITAL | Age: 47
End: 2017-08-31
Attending: NURSE PRACTITIONER
Payer: OTHER GOVERNMENT

## 2017-08-31 DIAGNOSIS — R29.898 WEAKNESS OF SHOULDER: ICD-10-CM

## 2017-08-31 DIAGNOSIS — R29.3 POOR POSTURE: ICD-10-CM

## 2017-08-31 DIAGNOSIS — R29.898 DECREASED RANGE OF MOTION OF NECK: ICD-10-CM

## 2017-08-31 DIAGNOSIS — R52 PAIN AGGRAVATED BY ACTIVITIES OF DAILY LIVING: ICD-10-CM

## 2017-08-31 PROCEDURE — 97110 THERAPEUTIC EXERCISES: CPT

## 2017-08-31 PROCEDURE — 97140 MANUAL THERAPY 1/> REGIONS: CPT

## 2017-09-01 ENCOUNTER — CLINICAL SUPPORT (OUTPATIENT)
Dept: REHABILITATION | Facility: HOSPITAL | Age: 47
End: 2017-09-01
Attending: NURSE PRACTITIONER
Payer: OTHER GOVERNMENT

## 2017-09-01 DIAGNOSIS — M79.601 PAIN OF RIGHT ARM: ICD-10-CM

## 2017-09-01 DIAGNOSIS — M25.639 STIFFNESS OF JOINT OF FOREARM: ICD-10-CM

## 2017-09-01 PROCEDURE — 97035 APP MDLTY 1+ULTRASOUND EA 15: CPT | Mod: PN

## 2017-09-01 PROCEDURE — 97022 WHIRLPOOL THERAPY: CPT | Mod: PN

## 2017-09-01 PROCEDURE — 97140 MANUAL THERAPY 1/> REGIONS: CPT | Mod: PN

## 2017-09-01 NOTE — PROGRESS NOTES
"TIME RECORD    Date:  09/01/2017    Start Time: 1205  Stop Time:  100    Visit# 3 of 21 (18 total)  FOTO last administered: 8/9/17 (15th visit)    PROCEDURES:    TIMED  Procedure Min.    US 10   MT 30   TE    TA --         UNTIMED  Procedure Min.   Fluidotherapy 15         Total Timed Minutes:  40  Total Timed Units:  3  Total Untimed Units:  1  Charges Billed/# of units:  4 (F, 1 US, 2 MT)      OCCUPATIONAL THERAPY PROGRESS NOTE    Physician Name:  DEVON Rush  Primary Diagnosis:  R elbow pain  Treatment Diagnosis:  R elbow tricep and extensor mass tendonosis  Onset Date:  4/7/17  Eval Date: 5/16/2017   Certification Period:  07/13/17  to 09/13/17    Subjective:     Pain:  4/10  "I started PT and she did a lot of massage in my shoulder and I actually feel a little better."      Objective:     Patient seen by Occupational Therapy today w/ treatment as follows:  Fluidotherapy: To RUE  for 15 min, continuous air, 115 deg, air speed 100 to decrease pain, edema & scar tissue and increased tissue extensibility.  Ultrasound: Patient received ultrasound  for pain control and decreased inflammation @ 50%duty cycle, 1.0 Mhz, applied to R lateral elbow & forearm, intensity = 1.0 w/cm2 for 10 minutes.   MT: provided extensive  STM, including MFR, CFM, lymphatic drainage technique & scar management, including use of IASTM to R upper arm/elbowforearm/wrist.     TE per log: (No resistive activity at this time 2/2 increased pain with attempt) not today  Exercises Date: 9/1/2017    UE Nerve Decompression Sequences (1-13) 3 trials   Radial Nerve glides 5 reps   Nirschl's-4 positions --   Forearm/Wrist stretches 3 reps, 30 sec hold, 3 positions   Wrist 3 ways elbow @90 10x   dexterciser 3min     Kinesiotaping: I strip wrist>forearm with I strip at wrist with slight stretch and at extensor bulk with slight stretch.  Patient instructed on purpose, wear, care, precautions to monitor and removal of KT. Patient verbalized " understanding of all instructions provided.         Assessment:     Pt reports decreased pain in R UE following PT treatment at [facility].  She continues with minimal swelling throughout upper arm to forearm and fibrotic tissue, however, she feels like it is improving.  Pt would benefit from continued skilled OT to address limitations.    Patient Education/Response:     HEP-forearm stretched, Joint protection & energy conservation in ADL/IADL's    Plans and Goals:     Cont with OT POC    Short Term Goals   1) Patient to be IND with HEP and modalities for pain management  2) Increase ROM 10 degrees in L wrist flexion stress position to increase functional hand use for basic and IADL's  3) Increase  strength 10 lbs. to grasp items for basic and IADL's  4) Increase pinch 1-3 psis for Basic and IADL's      Long Term Goals to be met in 8 weeks  1) Patient to be IND with HEP and modalities for pain management  2) Increase ROM 20 degrees in wrist flexion stress position to increase functional hand use for basic and IADL's  3) Increase  strength 20 lbs. to grasp items for basic and IADL's  4) Increase pinch 1-3 psis for [place] with basic and IADL's

## 2017-09-05 ENCOUNTER — CLINICAL SUPPORT (OUTPATIENT)
Dept: REHABILITATION | Facility: HOSPITAL | Age: 47
End: 2017-09-05
Attending: NURSE PRACTITIONER
Payer: OTHER GOVERNMENT

## 2017-09-05 DIAGNOSIS — R29.898 WEAKNESS OF SHOULDER: ICD-10-CM

## 2017-09-05 DIAGNOSIS — R52 PAIN AGGRAVATED BY ACTIVITIES OF DAILY LIVING: ICD-10-CM

## 2017-09-05 DIAGNOSIS — R29.898 DECREASED RANGE OF MOTION OF NECK: ICD-10-CM

## 2017-09-05 DIAGNOSIS — R29.3 POOR POSTURE: ICD-10-CM

## 2017-09-05 PROCEDURE — 97140 MANUAL THERAPY 1/> REGIONS: CPT

## 2017-09-05 PROCEDURE — 97110 THERAPEUTIC EXERCISES: CPT

## 2017-09-05 NOTE — PROGRESS NOTES
"TIME RECORD    Date:  09/05/2017    Start Time:  3:00  Stop Time:  4:00    PROCEDURES:    TIMED  Procedure Min.   TE 15   TE sup 25NC   MT 10             UNTIMED  Procedure Min.   CP 10NC         Total Timed Minutes:  25  Total Timed Units:  2  Total Untimed Units:  0  Charges Billed/# of units:  2      Progress/Current Status    Subjective:     Patient ID: Hillary Cotton is a 46 y.o. female.  Diagnosis:   1. Decreased range of motion of neck     2. Poor posture     3. Weakness of shoulder     4. Pain aggravated by activities of daily living       Pain: 2-3 /10  Patient reports doing good with her HEP and having some pain in her upper trap, but she did do a lot over the weekend.     Objective:     Patient was educated and performed therapeutic exercises as per log, along with today's progressions and new exercises supervised by PT x 25 minutes(including pulleys 3 minutes each) and 1:1 with PT x 15 minutes to imrpove her strength, flexibility, and ROM. STM/MFR was performed in sitting x 10 minutes to B upper traps and L lateral deltoid into biceps and triceps by PT, 5 minutes was IASTM. She received cold packs to B shoulders in sitting x 10 minutes at the end of the session.   Date 09/05/17 08/31/17   Visit 3/16 2/16   POC exp 10/28/17 10/28/17   FOTO 3/5 2/5             CP 10' 10'   MT 10' 10'   Sh flex - wand 1 x 15 1 x 10   Sh abd - wand 1 x 15 1 x 10   Sh ER - wand  1 x 15 1 x 10   scap squeeze 1 x 10 --   Wall walk -- --   pulleys 3' Next?   Levator stretch 5 x 5"    UT stretch 5 x 5" 5 x 5"   scap protraction 1 x 10 x 1#      Snow angles  Butterflies  Chicken wings Towel roll  1 x 15  1 x 10  -- Towel roll  1 x 10        Seen by AMARILIS OLMOS        Assessment:     She continues to require frequent cues to keep her exercises in a pain free range. She was able to perform all of today's progressions and new exercises with no increase in symptoms prior to leaving the clinic.     Patient Education/Response: "     Patient was instructed to continue to perform her HEP twice a day to her tolerance. She verbalized understanding instructions.     Plans and Goals:     Continue with the plan of care and progress as the patient tolerates.     Short Term Goals (4 Weeks):   1. This patient will be independent with a basic HEP.  2. This patient will increase cervical AROM to WNL and pain free in order to drive safely.  3. This patient will increase B UE strength by 1 grade in order to be able to bathe with no increase in symptoms.   4. This patient will have a pain rating of 4/10 at worst with ADLs.  5. Patient able to score greater than or equal to 70 on the FOTO Neck Survey placing patient in 20%<40% impaired, limited, or restricted category demonstrating overall decreased neck pain with functional activities  6. Patient will be able to achieve greater than or equal to 65 on the FOTO Shoulder Survey placing patient in 20%<40% impaired, limited, or restricted category demonstrating overall improved functional ability with upper extremity.   Long Term Goals (8 Weeks):   1. This patient will be independent with an updated HEP.  2. This patient will have B UE strength of 5/5 in order to be able to perform her usual ADLs  with no increase in symptoms.   3. This patient will have a pain rating of 2/10 at worst with ADLs.  5. Patient able to score greater than or equal to 85 on the FOTO Neck Survey placing patient in 1%<20% impaired, limited, or restricted category demonstrating overall decreased neck pain with functional activities  6. Patient will be able to achieve greater than or equal to 85 on the FOTO Shoulder Survey placing patient in 1%<20% impaired, limited, or restricted category demonstrating overall improved functional ability with upper extremity.

## 2017-09-06 ENCOUNTER — PATIENT MESSAGE (OUTPATIENT)
Dept: ORTHOPEDICS | Facility: CLINIC | Age: 47
End: 2017-09-06

## 2017-09-06 ENCOUNTER — PROCEDURE VISIT (OUTPATIENT)
Dept: NEUROLOGY | Facility: CLINIC | Age: 47
End: 2017-09-06
Payer: OTHER GOVERNMENT

## 2017-09-06 DIAGNOSIS — M79.2 RADICULAR PAIN IN RIGHT ARM: ICD-10-CM

## 2017-09-06 PROCEDURE — 95911 NRV CNDJ TEST 9-10 STUDIES: CPT | Mod: 26,S$PBB,, | Performed by: NEUROMUSCULOSKELETAL MEDICINE & OMM

## 2017-09-06 PROCEDURE — 95886 MUSC TEST DONE W/N TEST COMP: CPT | Mod: 26,S$PBB,, | Performed by: NEUROMUSCULOSKELETAL MEDICINE & OMM

## 2017-09-06 PROCEDURE — 95911 NRV CNDJ TEST 9-10 STUDIES: CPT | Mod: PBBFAC,PO | Performed by: NEUROMUSCULOSKELETAL MEDICINE & OMM

## 2017-09-06 PROCEDURE — 95886 MUSC TEST DONE W/N TEST COMP: CPT | Mod: PBBFAC,PO | Performed by: NEUROMUSCULOSKELETAL MEDICINE & OMM

## 2017-09-08 ENCOUNTER — CLINICAL SUPPORT (OUTPATIENT)
Dept: REHABILITATION | Facility: HOSPITAL | Age: 47
End: 2017-09-08
Attending: NURSE PRACTITIONER
Payer: OTHER GOVERNMENT

## 2017-09-08 DIAGNOSIS — M25.639 STIFFNESS OF JOINT OF FOREARM: ICD-10-CM

## 2017-09-08 DIAGNOSIS — M79.601 PAIN OF RIGHT ARM: ICD-10-CM

## 2017-09-08 PROCEDURE — 97110 THERAPEUTIC EXERCISES: CPT | Mod: PN

## 2017-09-08 PROCEDURE — 97140 MANUAL THERAPY 1/> REGIONS: CPT | Mod: PN

## 2017-09-08 PROCEDURE — 97022 WHIRLPOOL THERAPY: CPT | Mod: PN

## 2017-09-08 NOTE — PROGRESS NOTES
"TIME RECORD    Date:  09/08/2017    Start Time: 3:01 pm  Stop Time: 4:03 pm    Visit# 4 of 21 (18 total)  FOTO last administered: 8/9/17 (15th visit)    PROCEDURES:    TIMED  Procedure Min.    US    MT 30   TE 17   TA --         UNTIMED  Procedure Min.   Fluidotherapy 15         Total Timed Minutes:  47  Total Timed Units:  3  Total Untimed Units:  1  Charges Billed/# of units:  4 (1F,1 TE, 2 MT)      OCCUPATIONAL THERAPY PROGRESS NOTE    Physician Name:  DEVON Rush  Primary Diagnosis:  R elbow pain  Treatment Diagnosis:  R elbow tricep and extensor mass tendonosis  Onset Date:  4/7/17  Eval Date: 5/16/2017   Certification Period:  07/13/17  to 09/13/17    Subjective:     Pain:  3/10  " I feel like its getting better and I am having improvements with the tennis elbow the only thing bothering me is my wrist and shoulder now."      Objective:     Patient seen by Occupational Therapy today w/ treatment as follows:  Fluidotherapy: To RUE  for 15 min, continuous air, 115 deg, air speed 100 to decrease pain, edema & scar tissue and increased tissue extensibility.  MT: provided extensive  STM, including MFR, CFM, lymphatic drainage technique, including use of manual tools to R upper arm/elbowforearm/wrist to promote circulation while decreasing stiffness of muscle tissue.     TE per log: (No resistive activity at this time 2/2 increased pain with attempt) not today  Exercises Date: 9/8/2017    UE Nerve Decompression Sequences (1-13) 3 trials not today    Radial Nerve glides 5 reps not today   Nirschl's-4 positions  30 secs   Forearm/Wrist stretches 3 reps, 30 sec hold, 3 positions   Wrist 3 ways elbow @90 10x   dexterciser 3min states a lot better today she could not complete last session per pt report.     Kinesiotaping: Pt taped for middle deltoid support with slight stretch provided with space corrector over deltoid insertion. Pt also taped for tennis elbow with strips on radial/ulnar side of forearm crossing from " olecranon process with space corrector over dorsal aspect of forearm. Pt educated of wear and care schedule as well as purpose. Pt verbalized understanding.     Assessment:     Pt reports decreased pain in R UE following PT treatment at [facility].  She continues with minimal swelling throughout upper arm to forearm and fibrotic tissue, however, she feels like it is improving.  Pt would benefit from continued skilled OT to address limitations.    Patient Education/Response:     HEP-forearm stretched, Joint protection & energy conservation in ADL/IADL's    Plans and Goals:     Cont with OT POC    Short Term Goals   1) Patient to be IND with HEP and modalities for pain management  2) Increase ROM 10 degrees in L wrist flexion stress position to increase functional hand use for basic and IADL's  3) Increase  strength 10 lbs. to grasp items for basic and IADL's  4) Increase pinch 1-3 psis for Basic and IADL's      Long Term Goals to be met in 8 weeks  1) Patient to be IND with HEP and modalities for pain management  2) Increase ROM 20 degrees in wrist flexion stress position to increase functional hand use for basic and IADL's  3) Increase  strength 20 lbs. to grasp items for basic and IADL's  4) Increase pinch 1-3 psis for [place] with basic and IADL's

## 2017-09-11 ENCOUNTER — CLINICAL SUPPORT (OUTPATIENT)
Dept: REHABILITATION | Facility: HOSPITAL | Age: 47
End: 2017-09-11
Attending: NURSE PRACTITIONER
Payer: OTHER GOVERNMENT

## 2017-09-11 DIAGNOSIS — R52 PAIN AGGRAVATED BY ACTIVITIES OF DAILY LIVING: ICD-10-CM

## 2017-09-11 DIAGNOSIS — R29.3 POOR POSTURE: ICD-10-CM

## 2017-09-11 DIAGNOSIS — R29.898 DECREASED RANGE OF MOTION OF NECK: ICD-10-CM

## 2017-09-11 DIAGNOSIS — R29.898 WEAKNESS OF SHOULDER: ICD-10-CM

## 2017-09-11 PROCEDURE — 97110 THERAPEUTIC EXERCISES: CPT

## 2017-09-11 PROCEDURE — 97140 MANUAL THERAPY 1/> REGIONS: CPT

## 2017-09-11 NOTE — PROGRESS NOTES
"TIME RECORD    Date:  09/11/2017    Start Time:  3:00  Stop Time:  3:55    PROCEDURES:    TIMED  Procedure Min.   TE 25       MT 27   Pulley 3'         UNTIMED  Procedure Min.             Total Timed Minutes:  52  Total Timed Units:  4  Total Untimed Units:  0  Charges Billed/# of units:  4      Progress/Current Status    Subjective:     Patient ID: Hillary Cotton is a 46 y.o. female.  Diagnosis:   1. Decreased range of motion of neck     2. Poor posture     3. Weakness of shoulder     4. Pain aggravated by activities of daily living       Pain: 2 /10  She reports she was hurting and sore in shoulder after last visit, and feels her triceps are getting a little better. She has noticed more soreness in her shoulder and triceps when moving it more.     Objective:     Patient initiated session on pulleys x 3 minutes supervised by PT. Patient was educated and performed therapeutic exercises as per log, along with new exercise 1:1 with PT x 25 minutes to improve her strength, flexibility, and ROM. STM/MFR was performed in supine and prone x 27 minutes to B upper traps, middle traps, cervical paraspinals, R pec major, R triceps and biceps. She declined cold packs at the end of the session.     Date 09/11/17 09/05/17 08/31/17   Visit 4/16 3/16 2/16   POC exp 10/28/17 10/28/17 10/28/17   FOTO 4/5 3/5 2/5               CP held 10' 10'   MT  10' 10'   Sh flex - wand 1 x 15 1 x 15 1 x 10   Sh abd - wand 1 x 5 1 x 15 1 x 10   Sh ER - wand  oot 1 x 15 1 x 10   scap squeeze 1 x 10 1 x 10 --   Wall walk 1 x 10 -- --   pulleys 3' 3' Next?   Levator stretch 5 x 5" 5 x 5"    UT stretch 5 x 5" 5 x 5" 5 x 5"   scap protraction oot 1 x 10 x 1#      Snow angels  Butterflies  Chicken wings Towel roll  1 x 10  oot  -- Towel roll  1 x 15  1 x 10  -- Towel roll  1 x 10         Seen by AMARILIS OLMOS        Assessment:     She continues to require occasional cues to keep her exercises pain free and for posture with standing. She was able to " perform snow angels with no increase in symptoms and move through a greater range of motion after STM/MFR. She was able to perform all activities with no increase in symptoms prior to leaving the clinic.     Patient Education/Response:     Patient was instructed to continue to perform her HEP twice a day to her tolerance. She verbalized understanding instructions.     Plans and Goals:     Continue with the plan of care and progress as the patient tolerates.     Short Term Goals (4 Weeks):   1. This patient will be independent with a basic HEP.  2. This patient will increase cervical AROM to WNL and pain free in order to drive safely.  3. This patient will increase B UE strength by 1 grade in order to be able to bathe with no increase in symptoms.   4. This patient will have a pain rating of 4/10 at worst with ADLs.  5. Patient able to score greater than or equal to 70 on the FOTO Neck Survey placing patient in 20%<40% impaired, limited, or restricted category demonstrating overall decreased neck pain with functional activities  6. Patient will be able to achieve greater than or equal to 65 on the FOTO Shoulder Survey placing patient in 20%<40% impaired, limited, or restricted category demonstrating overall improved functional ability with upper extremity.   Long Term Goals (8 Weeks):   1. This patient will be independent with an updated HEP.  2. This patient will have B UE strength of 5/5 in order to be able to perform her usual ADLs  with no increase in symptoms.   3. This patient will have a pain rating of 2/10 at worst with ADLs.  5. Patient able to score greater than or equal to 85 on the FOTO Neck Survey placing patient in 1%<20% impaired, limited, or restricted category demonstrating overall decreased neck pain with functional activities  6. Patient will be able to achieve greater than or equal to 85 on the FOTO Shoulder Survey placing patient in 1%<20% impaired, limited, or restricted category demonstrating  overall improved functional ability with upper extremity.

## 2017-09-13 ENCOUNTER — CLINICAL SUPPORT (OUTPATIENT)
Dept: REHABILITATION | Facility: HOSPITAL | Age: 47
End: 2017-09-13
Attending: NURSE PRACTITIONER
Payer: OTHER GOVERNMENT

## 2017-09-13 DIAGNOSIS — M79.601 PAIN OF RIGHT ARM: ICD-10-CM

## 2017-09-13 DIAGNOSIS — M25.639 STIFFNESS OF JOINT OF FOREARM: ICD-10-CM

## 2017-09-13 PROCEDURE — 97110 THERAPEUTIC EXERCISES: CPT | Mod: PN

## 2017-09-13 PROCEDURE — 97140 MANUAL THERAPY 1/> REGIONS: CPT | Mod: PN

## 2017-09-13 NOTE — PROGRESS NOTES
"TIME RECORD    Date:  09/13/2017    Start Time: 2:50 pm  Stop Time:  3:45 pm    Visit# 5 of 21 (18 total)  FOTO last administered: 9/13/2017 (5th visit)    PROCEDURES:    TIMED  Procedure Min.    US    MT 30   TE 10   TA --         UNTIMED  Procedure Min.   Fluidotherapy 15         Total Timed Minutes:  40  Total Timed Units:  3  Total Untimed Units:  1  Charges Billed/# of units:  4 (1F,1 TE, 2 MT)      OCCUPATIONAL THERAPY PROGRESS NOTE    Physician Name:  DEVON Rush  Primary Diagnosis:  R elbow pain  Treatment Diagnosis:  R elbow tricep and extensor mass tendonosis  Onset Date:  4/7/17  Eval Date: 5/16/2017   Certification Period:  07/13/17  to 09/13/17    Subjective:     Pain:  3/10  " PT has really helped my shoulder but I did a few things this weekend and my forearm and wrist is just swollen and inflamed I feel like it should be better than this ."      Objective:     Patient seen by Occupational Therapy today w/ treatment as follows:  Fluidotherapy: To RUE  for 15 min, continuous air, 115 deg, air speed 100 to decrease pain, edema & scar tissue and increased tissue extensibility.  MT: provided extensive  STM, including MFR, CFM, lymphatic drainage technique, including use of manual tools to R upper arm/elbowforearm/wrist to promote circulation while decreasing stiffness of muscle tissue.     TE per log: (No resistive activity at this time 2/2 increased pain with attempt) not today  Exercises Date: 9/13/2017    UE Nerve Decompression Sequences (1-13) 3 trials not today    Radial Nerve glides 5 reps not today   Nirschl's-4 positions  30 secs not today   Forearm/Wrist stretches Provided by OT 3 reps, 30 sec hold, 3 positions   Wrist 3 ways elbow @90 10x not today   dexterciser 3 min states a lot better today she could not complete last session per pt report.     Kinesiotaping: Pt tape remained at middle deltoid support with slight stretch provided with space corrector over deltoid insertion. Pt also taped " for edema reduction over forearm with two strips with anchored above elbow joint on lateral/medial side with four separate strips interlacing over dorsum of forearm in order to reduce swelling and edema, as well as increase circulation.     Assessment:     Pt reports decreased pain in R UE following PT treatment at [facility].  She continues with minimal swelling throughout upper arm to forearm and fibrotic tissue, however, she feels like it is improving.  Pt would benefit from continued skilled OT to address limitations.    Patient Education/Response:     HEP-forearm stretched, Joint protection & energy conservation in ADL/IADL's    Plans and Goals:     Cont with OT POC    Short Term Goals   1) Patient to be IND with HEP and modalities for pain management  2) Increase ROM 10 degrees in L wrist flexion stress position to increase functional hand use for basic and IADL's  3) Increase  strength 10 lbs. to grasp items for basic and IADL's  4) Increase pinch 1-3 psis for Basic and IADL's      Long Term Goals to be met in 8 weeks  1) Patient to be IND with HEP and modalities for pain management  2) Increase ROM 20 degrees in wrist flexion stress position to increase functional hand use for basic and IADL's  3) Increase  strength 20 lbs. to grasp items for basic and IADL's  4) Increase pinch 1-3 psis for [place] with basic and IADL's

## 2017-09-14 ENCOUNTER — OFFICE VISIT (OUTPATIENT)
Dept: ORTHOPEDICS | Facility: CLINIC | Age: 47
End: 2017-09-14
Payer: OTHER GOVERNMENT

## 2017-09-14 VITALS
HEART RATE: 106 BPM | WEIGHT: 194 LBS | BODY MASS INDEX: 31.18 KG/M2 | HEIGHT: 66 IN | SYSTOLIC BLOOD PRESSURE: 107 MMHG | RESPIRATION RATE: 18 BRPM | DIASTOLIC BLOOD PRESSURE: 67 MMHG

## 2017-09-14 DIAGNOSIS — M77.11 LATERAL EPICONDYLITIS OF RIGHT ELBOW: Primary | ICD-10-CM

## 2017-09-14 PROCEDURE — 99999 PR PBB SHADOW E&M-EST. PATIENT-LVL III: CPT | Mod: PBBFAC,,, | Performed by: ORTHOPAEDIC SURGERY

## 2017-09-14 PROCEDURE — 3008F BODY MASS INDEX DOCD: CPT | Mod: ,,, | Performed by: ORTHOPAEDIC SURGERY

## 2017-09-14 PROCEDURE — 99214 OFFICE O/P EST MOD 30 MIN: CPT | Mod: S$PBB,,, | Performed by: ORTHOPAEDIC SURGERY

## 2017-09-14 PROCEDURE — 99213 OFFICE O/P EST LOW 20 MIN: CPT | Mod: PBBFAC | Performed by: ORTHOPAEDIC SURGERY

## 2017-09-14 RX ORDER — CYCLOBENZAPRINE HCL 5 MG
5 TABLET ORAL 3 TIMES DAILY PRN
Qty: 20 TABLET | Refills: 0 | Status: SHIPPED | OUTPATIENT
Start: 2017-09-14 | End: 2017-09-17 | Stop reason: SDUPTHER

## 2017-09-15 ENCOUNTER — TELEPHONE (OUTPATIENT)
Dept: REHABILITATION | Facility: HOSPITAL | Age: 47
End: 2017-09-15

## 2017-09-15 NOTE — PROGRESS NOTES
SERVICE:  Orthopedics.    ATTENDING SURGEON:  Yesica Banks M.D.    CHIEF COMPLAINT:  Right elbow, forearm and arm tendinosis, also complains of   right shoulder pain. Pt wants an MRI    HISTORY OF PRESENT ILLNESS: At last visit:  Ms. Cotton is a 46-year-old female, right hand   dominant, who states that she has had a several months' history of this right   elbow, forearm and arm pain.  She saw another hand surgeon in the past.  She   wrote for therapy as well as Celebrex.  She has been in therapy.  She states the   pain is 4/10.  Currently, she is off for months for short-term disability.  Her   primary care wrote for this.  She states she is off work and trying to rest it,   but it is very difficult.  For work, she is on the computer a lot.  On   questioning about whether or not the therapy is helping, she states it may or   may not help.  She is not sure they are really working on the muscle bumps in   her arm and she states her muscle will jump.  She has no rheumatologic issues.    Per the patient, she was on Mobic and was switched to Celebrex by the other   surgeon.  She states not working has helped, but again she is having difficulty   using her right arm.  She does have some radiating and burning sensation that   goes into her arm as well as the pain.  TOday: Then pain is better but still there , her shoulder hurts more. Her therpaist is working on her shoulder. She has a lot of pain in forearm as well.    PAST MEDICAL HISTORY, SOCIAL HISTORY, SURGICAL HISTORY, REVIEW OF SYSTEMS:  Per   electronic medical record.    PHYSICAL EXAMINATION:  VITAL SIGNS:  Please see computer entry.  GENERAL:  Alert and oriented x3, well developed, well nourished, in no apparent   distress, friendly individual, well groomed, appears stated age.  MUSCULOSKELETAL:  Gait is normal and nonantalgic.  EXTREMITIES:  On examination of her right arm, she has it in a brace, which she   takes off.  She has tape marks all  throughout her arm.  She states that was from   her Kinesio tape it, which she keeps wrapped around her arm.  She states again   that she has difficulty with anyone touching her arm.  On examination of the   arm, the skin is clean, dry and intact besides the spots where the sticky tape   was placed.  Median, radial, ulnar, anterior interosseous, posterior   interosseous, motor, sensation to light touch intact.  Brisk capillary refill.    She has positive tenderness to palpation over the lateral epicondyle, positive   provocative examination for lateral epicondylitis.  Negative for medial   epicondylitis.  Negative for Tinel's across the cubital tunnel.  She has got   full range of motion, but again it is very painful with most of the examination   of the hand, but more pronounced with provocative examination of the lateral   epicondylitis.  On palpating her triceps, it is not painful and palpating the   bumps in her arm, I really cannot palpate them.    MRI was reviewed and shows lateral epicondylitis as well as triceps tendinosis.  EMG; Normal    PLAN:    We will order MRI to re-evaluaet her lateral epicondylitis as well as forearm pain, I will also refer her to PMR for her overuse injury. At this time she is not a surgical candidate    LES/HU  dd: 07/18/2017 14:27:29 (CDT)  td: 07/19/2017 00:49:58 (CDT)  Doc ID   #[id number]  Job ID #[id number]    CC:

## 2017-09-18 ENCOUNTER — CLINICAL SUPPORT (OUTPATIENT)
Dept: REHABILITATION | Facility: HOSPITAL | Age: 47
End: 2017-09-18
Attending: NURSE PRACTITIONER
Payer: OTHER GOVERNMENT

## 2017-09-18 DIAGNOSIS — R29.898 DECREASED RANGE OF MOTION OF NECK: ICD-10-CM

## 2017-09-18 DIAGNOSIS — R29.3 POOR POSTURE: ICD-10-CM

## 2017-09-18 DIAGNOSIS — R29.898 WEAKNESS OF SHOULDER: ICD-10-CM

## 2017-09-18 DIAGNOSIS — R52 PAIN AGGRAVATED BY ACTIVITIES OF DAILY LIVING: ICD-10-CM

## 2017-09-18 PROCEDURE — 97140 MANUAL THERAPY 1/> REGIONS: CPT

## 2017-09-18 PROCEDURE — 97110 THERAPEUTIC EXERCISES: CPT

## 2017-09-18 RX ORDER — CYCLOBENZAPRINE HCL 5 MG
5 TABLET ORAL 3 TIMES DAILY PRN
Qty: 20 TABLET | Refills: 0 | Status: SHIPPED | OUTPATIENT
Start: 2017-09-18 | End: 2017-09-28

## 2017-09-18 NOTE — PROGRESS NOTES
"TIME RECORD    Date:  09/18/2017    Start Time:  3:00  Stop Time:  4:00    PROCEDURES:    TIMED  Procedure Min.   TE 35   MT 25                 UNTIMED  Procedure Min.             Total Timed Minutes:  60  Total Timed Units:  4  Total Untimed Units:  0  Charges Billed/# of units:  4      Progress/Current Status    Subjective:     Patient ID: Hillary Cotton is a 46 y.o. female.  Diagnosis:   1. Decreased range of motion of neck     2. Poor posture     3. Weakness of shoulder     4. Pain aggravated by activities of daily living       Pain: 1 /10  She reports her shoulder is better, not as sore as before as she did not have to sit with an ice pack on her shoulder this past weekend. She would like to work on her triceps as that is still sore. She has noticed that she is starting to feel it in her L arm from over using it.     Objective:     Patient was educated and performed therapeutic exercises as per log, along with new exercises 1:1 with PT x 35 minutes to improve her strength, flexibility, and ROM. STM/MFR was performed in supine and prone x 25 minutes to L upper traps, middle traps, infraspinatus, R pec major, and R latissimus. She declined cold packs at the end of the session.     Date 09/18/17 09/11/17 09/05/17 08/31/17   Visit 5/16 4/16 3/16 2/16   POC exp 10/28/17 10/28/17 10/28/17 10/28/17   FOTO 5/5 4/5 3/5 2/5                 CP held held 10' 10'   MT 25 20 10' 10'   Post capsule stretch 5 x 5"      Triceps stretch 5 x 5"      Sh flex - wand 1 x 15 1 x 15 1 x 15 1 x 10   Sh abd - wand 1 x 15 1 x 5 1 x 15 1 x 10   Sh ER - wand  oot oot 1 x 15 1 x 10   scap squeeze oot 1 x 10 1 x 10 --   Wall walk oot 1 x 10 -- --   pulleys oot 3' 3' Next?   Levator stretch 5 x 5" 5 x 5" 5 x 5"    UT stretch 5 x 5" 5 x 5" 5 x 5" 5 x 5"   scap protraction oot oot 1 x 10 x 1#      Snow angels  Butterflies  Chicken wings   oot  oot  -- Towel roll  1 x 10  oot  -- Towel roll  1 x 15  1 x 10  -- Towel roll  1 x 10   Rows " 1 x 10 RTB       Seen by AMARILIS OLMOS      FOTO Shoulder 55, 40%<60%  Assessment:     Patient continues to require cues for posture and scapula position with seated and standing exercises. She also required cues to keep her exercises in a pain free range. She was able to perform all of today's new exercises with no increase in symptoms prior to leaving the clinic. Her current score on FOTO Shoulder Survey places her in the 40%<60% impaired, limited, or restricted category.     Patient Education/Response:     Patient was instructed to continue to perform her HEP twice a day to her tolerance. She verbalized understanding instructions.     Plans and Goals:     Continue with the plan of care and progress as the patient tolerates.     Short Term Goals (4 Weeks):   1. This patient will be independent with a basic HEP.  2. This patient will increase cervical AROM to WNL and pain free in order to drive safely.  3. This patient will increase B UE strength by 1 grade in order to be able to bathe with no increase in symptoms.   4. This patient will have a pain rating of 4/10 at worst with ADLs.  5. Patient able to score greater than or equal to 70 on the FOTO Neck Survey placing patient in 20%<40% impaired, limited, or restricted category demonstrating overall decreased neck pain with functional activities  6. Patient will be able to achieve greater than or equal to 65 on the FOTO Shoulder Survey placing patient in 20%<40% impaired, limited, or restricted category demonstrating overall improved functional ability with upper extremity.   Long Term Goals (8 Weeks):   1. This patient will be independent with an updated HEP.  2. This patient will have B UE strength of 5/5 in order to be able to perform her usual ADLs  with no increase in symptoms.   3. This patient will have a pain rating of 2/10 at worst with ADLs.  5. Patient able to score greater than or equal to 85 on the FOTO Neck Survey placing patient in 1%<20% impaired,  limited, or restricted category demonstrating overall decreased neck pain with functional activities  6. Patient will be able to achieve greater than or equal to 85 on the FOTO Shoulder Survey placing patient in 1%<20% impaired, limited, or restricted category demonstrating overall improved functional ability with upper extremity.

## 2017-09-20 ENCOUNTER — CLINICAL SUPPORT (OUTPATIENT)
Dept: REHABILITATION | Facility: HOSPITAL | Age: 47
End: 2017-09-20
Attending: NURSE PRACTITIONER
Payer: OTHER GOVERNMENT

## 2017-09-20 DIAGNOSIS — M25.639 STIFFNESS OF JOINT OF FOREARM: ICD-10-CM

## 2017-09-20 DIAGNOSIS — M79.601 PAIN OF RIGHT ARM: ICD-10-CM

## 2017-09-20 PROCEDURE — 97022 WHIRLPOOL THERAPY: CPT | Mod: PN

## 2017-09-20 PROCEDURE — 97140 MANUAL THERAPY 1/> REGIONS: CPT | Mod: PN

## 2017-09-20 NOTE — PROGRESS NOTES
"TIME RECORD    Date:  09/20/2017    Start Time: 3:03 pm  Stop Time:  3:45 pm    Visit# 6 of 21 (21 total)  FOTO last administered: 9/13/2017 (5th visit) 59%    PROCEDURES:    TIMED  Procedure Min.    US    MT 30   TE    TA --         UNTIMED  Procedure Min.   Fluidotherapy 15         Total Timed Minutes:  30  Total Timed Units:  2  Total Untimed Units:  1  Charges Billed/# of units:  3 (1F, 2 MT)      OCCUPATIONAL THERAPY PROGRESS NOTE    Physician Name:  DEVON Rush  Primary Diagnosis:  R elbow pain  Treatment Diagnosis:  R elbow tricep and extensor mass tendonosis  Onset Date:  4/7/17  Eval Date: 5/16/2017   Certification Period:  07/13/17  to 09/13/17    Subjective:     Pain:  3/10  " PT has really helped my shoulder but I feel like my tricep and wrist are still bothering me ."      Objective:   Need measurements next visit:  Patient seen by Occupational Therapy today w/ treatment as follows:  Fluidotherapy: To RUE  for 15 min, continuous air, 115 deg, air speed 100 to decrease pain, edema & scar tissue and increased tissue extensibility.  MT: provided extensive  STM, including MFR, lymphatic drainage technique, including use of manual tools to R upper arm/elbow forearm/wrist to promote circulation while decreasing stiffness of muscle tissue. Pt with noted pain over triceps as well as dorsal aspect of wrist and forearm with palpation. Pt states she has relief of pain with manual techniques but that it does not last. Pt states she has felt better with with past few sessions along with PT for her neck and shoulder with no pain above the elbow today. Pt educated of continued conservative tx of wrist and forearm until next session attempting to rest her arm with MRI follow up tomorrow 9/21/17 per MD request. Pt agreed and will have follow up therapy session 9/22/17.     Assessment:     Pt reports decreased pain in R UE following PT treatment at [facility].  She continues with minimal swelling throughout upper arm to " forearm and fibrotic tissue, however, she feels like it is improving.  Pt would benefit from continued skilled OT to address limitations.    Patient Education/Response:     HEP-forearm stretched, Joint protection & energy conservation in ADL/IADL's    Plans and Goals:     Cont with OT POC    Short Term Goals   1) Patient to be IND with HEP and modalities for pain management  2) Increase ROM 10 degrees in L wrist flexion stress position to increase functional hand use for basic and IADL's  3) Increase  strength 10 lbs. to grasp items for basic and IADL's  4) Increase pinch 1-3 psis for Basic and IADL's      Long Term Goals to be met in 8 weeks  1) Patient to be IND with HEP and modalities for pain management  2) Increase ROM 20 degrees in wrist flexion stress position to increase functional hand use for basic and IADL's  3) Increase  strength 20 lbs. to grasp items for basic and IADL's  4) Increase pinch 1-3 psis for [place] with basic and IADL's

## 2017-09-21 ENCOUNTER — HOSPITAL ENCOUNTER (OUTPATIENT)
Dept: RADIOLOGY | Facility: HOSPITAL | Age: 47
Discharge: HOME | End: 2017-09-21
Attending: ORTHOPAEDIC SURGERY
Payer: OTHER GOVERNMENT

## 2017-09-21 ENCOUNTER — CLINICAL SUPPORT (OUTPATIENT)
Dept: REHABILITATION | Facility: HOSPITAL | Age: 47
End: 2017-09-21
Attending: NURSE PRACTITIONER
Payer: OTHER GOVERNMENT

## 2017-09-21 DIAGNOSIS — M77.11 LATERAL EPICONDYLITIS OF RIGHT ELBOW: ICD-10-CM

## 2017-09-21 DIAGNOSIS — R29.898 WEAKNESS OF SHOULDER: ICD-10-CM

## 2017-09-21 DIAGNOSIS — R52 PAIN AGGRAVATED BY ACTIVITIES OF DAILY LIVING: ICD-10-CM

## 2017-09-21 DIAGNOSIS — R29.898 DECREASED RANGE OF MOTION OF NECK: ICD-10-CM

## 2017-09-21 DIAGNOSIS — R29.3 POOR POSTURE: ICD-10-CM

## 2017-09-21 PROCEDURE — 97110 THERAPEUTIC EXERCISES: CPT

## 2017-09-21 PROCEDURE — 73218 MRI UPPER EXTREMITY W/O DYE: CPT | Mod: TC,PO,RT

## 2017-09-21 NOTE — PROGRESS NOTES
TIME RECORD    Date:  09/21/2017    Start Time:  3:00  Stop Time:  3:50    PROCEDURES:    TIMED  Procedure Min.   TE 35   MT 15NC                 UNTIMED  Procedure Min.             Total Timed Minutes:  35  Total Timed Units:  2  Total Untimed Units:  0  Charges Billed/# of units:  2      Progress/Current Status    Subjective:     Patient ID: Hillary Cotton is a 46 y.o. female.  Diagnosis:   1. Decreased range of motion of neck     2. Poor posture     3. Weakness of shoulder     4. Pain aggravated by activities of daily living       Pain: 2-3 /10  She reports some increased pain today after having to be in an awkward position for her MRI earlier today. Since beginning PT she has not had to use the ice packs to her shoulder.    Objective:     Patient was educated and performed therapeutic exercises as per log, along with new exercises 1:1 with PT x 35 minutes to improve her strength, flexibility, and ROM. She declined cold packs at the end of the session.     Dry needling with trigger point/manual therapy techniques was performed x 15 minutes. Dry needling performed to R spinal accessory homeostatic neuro-trigger points, dorsal scapular homeostatic neuro-trigger point, R suprascapular homeostatic neuro-trigger point, R lateral antebrachial homeostatic neuro-trigger point, R deep radial homeostatic neuro-trigger point, R C5 and C6 segments.  All needles were removed and changes in signs and symptoms were noted as no adverse reactions detected at this time. Dry needling was performed to decrease inflammation, increase circulation, decrease pain and restore homeostasis. Dry needling consent form was reviewed with the patient addressing all questions and concerns and signed by patient. Copy of the consent form was provided to patient and copy of consent form scanned to patient [facility] chart. NO CHARGE    Date 09/21/17 09/18/17 09/11/17 09/05/17 08/31/17   Visit 6/16 5/16 4/16 3/16 2/16   POC exp 10/28/17 10/28/17  "10/28/17 10/28/17 10/28/17   FOTO -- 5/5 4/5 3/5 2/5                   CP held held held 10' 10'   MT 15'NC 25 20 10' 10'   Post capsule stretch 5 x 5" 5 x 5"      Triceps stretch 5 x 5" 5 x 5"      Sh flex - wand oot 1 x 15 1 x 15 1 x 15 1 x 10   Sh abd - wand oot 1 x 15 1 x 5 1 x 15 1 x 10   Sh ER - wand  oot oot oot 1 x 15 1 x 10   scap squeeze 1 x 10 oot 1 x 10 1 x 10 --   Wall walk oot oot 1 x 10 -- --   pulleys oot oot 3' 3' Next?   Levator stretch 5 x 5" 5 x 5" 5 x 5" 5 x 5"    UT stretch 5 x 5" 5 x 5" 5 x 5" 5 x 5" 5 x 5"   scap protraction oot oot oot 1 x 10 x 1#      Snow angels  Butterflies  Chicken wings Towel roll  1 x 10  1 x 10  --   oot  oot  -- Towel roll  1 x 10  oot  -- Towel roll  1 x 15  1 x 10  -- Towel roll  1 x 10   Rows 1 x 10 RTB 1 x 10 RTB       Seen by AMARILIS OLMOS      Assessment:     Patient was able to perform all of today's activities with no increase in symptoms prior to leaving the clinic. She required occasional cues to keep her stretches in a pain free range and for posture with seated exercises. She displayed no adverse affects from dry needling techniques.     Patient Education/Response:     Patient was instructed to continue to perform her HEP twice a day to her tolerance. She verbalized understanding instructions.     Plans and Goals:     Continue with the plan of care and progress as the patient tolerates.     Short Term Goals (4 Weeks):   1. This patient will be independent with a basic HEP.  2. This patient will increase cervical AROM to WNL and pain free in order to drive safely.  3. This patient will increase B UE strength by 1 grade in order to be able to bathe with no increase in symptoms.   4. This patient will have a pain rating of 4/10 at worst with ADLs.  5. Patient able to score greater than or equal to 70 on the FOTO Neck Survey placing patient in 20%<40% impaired, limited, or restricted category demonstrating overall decreased neck pain with functional " activities  6. Patient will be able to achieve greater than or equal to 65 on the FOTO Shoulder Survey placing patient in 20%<40% impaired, limited, or restricted category demonstrating overall improved functional ability with upper extremity.   Long Term Goals (8 Weeks):   1. This patient will be independent with an updated HEP.  2. This patient will have B UE strength of 5/5 in order to be able to perform her usual ADLs  with no increase in symptoms.   3. This patient will have a pain rating of 2/10 at worst with ADLs.  5. Patient able to score greater than or equal to 85 on the FOTO Neck Survey placing patient in 1%<20% impaired, limited, or restricted category demonstrating overall decreased neck pain with functional activities  6. Patient will be able to achieve greater than or equal to 85 on the FOTO Shoulder Survey placing patient in 1%<20% impaired, limited, or restricted category demonstrating overall improved functional ability with upper extremity.

## 2017-09-22 ENCOUNTER — CLINICAL SUPPORT (OUTPATIENT)
Dept: REHABILITATION | Facility: HOSPITAL | Age: 47
End: 2017-09-22
Attending: NURSE PRACTITIONER
Payer: OTHER GOVERNMENT

## 2017-09-22 DIAGNOSIS — M79.601 PAIN OF RIGHT ARM: ICD-10-CM

## 2017-09-22 DIAGNOSIS — M25.639 STIFFNESS OF JOINT OF FOREARM: ICD-10-CM

## 2017-09-22 PROCEDURE — 97022 WHIRLPOOL THERAPY: CPT | Mod: PN

## 2017-09-22 PROCEDURE — 97110 THERAPEUTIC EXERCISES: CPT | Mod: PN

## 2017-09-22 PROCEDURE — 97140 MANUAL THERAPY 1/> REGIONS: CPT | Mod: PN

## 2017-09-22 NOTE — PROGRESS NOTES
"TIME RECORD    Date:  09/22/2017    Start Time: 1230  Stop Time:  115  [phone] Reassessed by CHANELLE Catherine, CHERELLER/L, LOTTIE (see note for details)    Visit# 7 of 21 (21 total)  FOTO last administered: 9/13/2017 (5th visit) 59%    PROCEDURES:    TIMED  Procedure Min.    US 0   MT 15   TE 15   TA 0         UNTIMED  Procedure Min.   Fluidotherapy 15         Total Timed Minutes:  30  Total Timed Units:  2  Total Untimed Units:  1  Charges Billed/# of units:  3 (1F, 1MT, 1TE)      OCCUPATIONAL THERAPY PROGRESS NOTE    Physician Name:  DEVON Rush  Primary Diagnosis:  R elbow pain  Treatment Diagnosis:  R elbow tricep and extensor mass tendonosis  Onset Date:  4/7/17  Eval Date: 5/16/2017   Certification Period:  07/13/17  to 09/13/17    Subjective:     Pain:  3/10  "I really hurt in the back of my arm and my forearm."  Pt also reports that current MRI revealed no change from prior MRI.      Objective:   Need measurements next visit:  Patient seen by Occupational Therapy today w/ treatment as follows:  Fluidotherapy: To RUE  for 15 min, continuous air, 115 deg, air speed 100 to decrease pain, edema & scar tissue and increased tissue extensibility.  MT: provided extensive  STM, including MFR, lymphatic drainage technique, including use of manual tools to R upper arm/elbow forearm/wrist to promote circulation while decreasing stiffness of muscle tissue.   TE:   Nirschl exercises 2 x 5 -10 sec hold   AROM wrist f/e/rd/ud X 10 reps ea   AROM sup/pro X 10 reps ea   dexterciser X 3 min   Isometrics for supination X 10 reps, 3 sec hold           Assessment:     Pt tolerated tx well with no complaints of increased pain. See CHANELLE Catherine, OTR/L, CHT note for details on reassessment and continued POC.    Patient Education/Response:     HEP-forearm stretched, Joint protection & energy conservation in ADL/IADL's    Plans and Goals:     Cont with OT POC    Short Term Goals   1) Patient to be IND with HEP and modalities for " pain management  2) Increase ROM 10 degrees in L wrist flexion stress position to increase functional hand use for basic and IADL's  3) Increase  strength 10 lbs. to grasp items for basic and IADL's  4) Increase pinch 1-3 psis for Basic and IADL's      Long Term Goals to be met in 8 weeks  1) Patient to be IND with HEP and modalities for pain management  2) Increase ROM 20 degrees in wrist flexion stress position to increase functional hand use for basic and IADL's  3) Increase  strength 20 lbs. to grasp items for basic and IADL's  4) Increase pinch 1-3 psis for [place] with basic and IADL's

## 2017-09-22 NOTE — PROGRESS NOTES
OBJECTIVE MEASUREMENTS TAKEN THIS DATE    Range of Motion (in degrees):   8/16/17 9/22/17     Right AROM R AROM (alt therapist)   Elbow E/F WNL WNL   Forearm Sup/pron WNL WNL   Wrist E/F 55(-10) / 55(-21) 68/50   Wrist RD/UD 21 (+6) / 36 (+4) 18/30   Thumb R/P Abd 45(-5)/55(-5) 40/40     Comments: Pt reports pain with wrist flexion this date     and Pinch Strengths (in pounds):  Setting 2   8/16/17 8/16/17 9/22/17     Right Left R   Elbow bent 23 (-5) 35 29 (+6)   Elbow straight 12 (+5) 25 11 (-1)            Lateral 5 (=) 8 5(=)   Tripod 4 (=) 6.5 5 (+1)   Tip 4 (+1) 7 4 (=)    Comments:  Pain with outstretched arm  strength measure    Pt reports pain about brachioradialis muscle belly but no pain with MMT reported. Palpation over lateral epicondyle negative for pain. Palpation at level of radial tunnel negative for pain. Tinels negative over radial tunnel.     PLAN: Discussed with pt OT POC. Pt. Reports it does feel like it is getting better, she can now wash her hair and do other daily ADLs with less pain. She is reporting pain at the end of the day or if she does too much. Educated pt that this is likely due to decreased strength and endurance and we would need to start working on these in therapy in order to progress.   Pt. Reports she wears the wrist cock up orthosis a night and during the day. Pt. To wean from orthosis at this time in order to improve wrist ROM.  She reports she feels it is healing. She is really feeling benefit from PT but she thinks she may need to transfer care to our clinic soon as she is planning on going back to work and she works in [employer].   Discussed several options for continued OT.  1) D/C from OT and discuss with PT adding treatment of FA to her POC. 2) Decrease OT Frequency to 1xwk 3) Continue with appointments already scheduled, f/u with MD at RTD visit on 09/28/17 and discuss with MD RE: continue of therapy and f/u with OT at 9/29/17 visit and determine frequency (1 vs  2xweek) on that date after RTD. Pt. Would like to continue with scheduled appointments, discuss with PT transfer to [place], see MD, and f/u at 9/29/17 OT visit to determine frequency.  Pt. Verbalized understanding of all.     Samantha Vazquez, OTD, OTR/L, CHT

## 2017-09-28 ENCOUNTER — OFFICE VISIT (OUTPATIENT)
Dept: ORTHOPEDICS | Facility: CLINIC | Age: 47
End: 2017-09-28
Payer: OTHER GOVERNMENT

## 2017-09-28 DIAGNOSIS — M77.11 LATERAL EPICONDYLITIS OF RIGHT ELBOW: Primary | ICD-10-CM

## 2017-09-28 PROCEDURE — 99213 OFFICE O/P EST LOW 20 MIN: CPT | Mod: S$PBB,,, | Performed by: ORTHOPAEDIC SURGERY

## 2017-09-28 PROCEDURE — 3008F BODY MASS INDEX DOCD: CPT | Mod: ,,, | Performed by: ORTHOPAEDIC SURGERY

## 2017-09-29 NOTE — PROGRESS NOTES
CHIEF COMPLAINT:  MRI results of right elbow.    HISTORY OF PRESENT ILLNESS:  Ms. Cotton is a 46-year-old female.  She has   been diagnosed with lateral epicondylitis.  She had an MRI months ago that   diagnosed lateral epicondylitis.  She had therapy and some conservative   treatment.  At her last visit, she requested a reimaging to make sure there is   nothing else wrong.  Today, she is here for those MRI results.  She states,   really the pain has gotten better since she was originally seen; she does not   have the excruciating, radiating pain that goes into her forearm; it is really   just a little achy on her elbow.  She is concerned that when she returns back to   work that the pain will increase and that she will develop the significant pain   that she had before.  She wants to understand about steroid injections, as   well.    The MRI was reviewed and shows that it is unchanged since the last MRI, that she   continues to have lateral epicondylitis.    PLAN:  After much discussion with the patient, I did explain to the patient why   I choose not to do steroid injections or when I do, do steroid injections.  The   patient seems to understand this.  We did talk about work duty.  She wants to   know about surgery versus work.  I did say that this was her decision.  Whenever   she is ready for, we will proceed with.  She does want to return to work.  She   is not sure if she can do full duty and again, I did suggest that that is her   decision on what she would like to do.  In reference to surgery, we did discuss   surgery, but we also talked about the postoperative protocol that will be   required.  The patient is not ready for surgery.  She will call us for followup   whenever she is ready.      LES/HU  dd: 09/28/2017 15:16:43 (CDT)  td: 09/29/2017 11:33:02 (CDT)  Doc ID   #[id number]  Job ID #[id number]    CC:

## 2017-10-04 ENCOUNTER — OFFICE VISIT (OUTPATIENT)
Dept: FAMILY MEDICINE | Facility: CLINIC | Age: 47
End: 2017-10-04
Payer: OTHER GOVERNMENT

## 2017-10-04 VITALS
HEART RATE: 84 BPM | SYSTOLIC BLOOD PRESSURE: 110 MMHG | BODY MASS INDEX: 30.7 KG/M2 | DIASTOLIC BLOOD PRESSURE: 60 MMHG | OXYGEN SATURATION: 97 % | HEIGHT: 66 IN | WEIGHT: 191 LBS

## 2017-10-04 DIAGNOSIS — M77.11 LATERAL EPICONDYLITIS OF RIGHT ELBOW: ICD-10-CM

## 2017-10-04 DIAGNOSIS — Z23 NEED FOR INFLUENZA VACCINATION: ICD-10-CM

## 2017-10-04 DIAGNOSIS — G56.31 RADIAL TUNNEL SYNDROME, RIGHT: ICD-10-CM

## 2017-10-04 DIAGNOSIS — M77.8 TENDINITIS OF FOREARM: Primary | ICD-10-CM

## 2017-10-04 PROCEDURE — 90688 IIV4 VACCINE SPLT 0.5 ML IM: CPT | Mod: PBBFAC,PO

## 2017-10-04 PROCEDURE — 99999 PR PBB SHADOW E&M-EST. PATIENT-LVL II: CPT | Mod: PBBFAC,,, | Performed by: FAMILY MEDICINE

## 2017-10-04 PROCEDURE — 99214 OFFICE O/P EST MOD 30 MIN: CPT | Mod: S$PBB,,, | Performed by: FAMILY MEDICINE

## 2017-10-04 PROCEDURE — 99212 OFFICE O/P EST SF 10 MIN: CPT | Mod: PBBFAC,PO | Performed by: FAMILY MEDICINE

## 2017-10-04 NOTE — PATIENT INSTRUCTIONS
Tendonitis  A tendon is the thick fibrous cord that joins muscle to bone and allows joints to move. When a tendon becomes inflamed, it is called tendonitis. This can occur from overuse, injury, or infection. This usually involves the shoulders, forearm, wrist, hands and foot. Symptoms include pain, swelling and tenderness to the touch. Moving the joint increases the pain.  It takes 4 to 6 weeks for tendonitis to heal. It is treated by preventing motion of the tendon with a splint or brace and the use of anti-inflammatory medicine.  Home care  · Some people find relief with ice packs. These can be crushed or cubed ice in a plastic bag or a bag of frozen vegetables wrapped in a thin towel. Other people get better relief with heat. This can include a hot shower, hot bath, or a moist towel warmed in a microwave. Try each and use the method that feels best, for 15 to 20 minutes several times a day.  · Rest the inflamed joint and protect it from movement.  · You may use over-the-counter ibuprofen or naproxen to treat pain and inflammation, unless another medicine was prescribed. If you can't take these medicines, acetaminophen may help with the pain, but does not treat inflammation. If you have chronic liver or kidney disease or ever had a stomach ulcer or gastrointestinal bleeding, talk with your doctor before using these medicines.  · As your symptoms improve, begin gradual motion at the involved joint.  Follow-up care  Follow up with your healthcare provider if you are not improving after 5 days of treatment.  When to seek medical advice  Call your healthcare provider right away if any of these occur:  · Redness over the painful area  · Increasing pain or swelling at the joint  · Fever (1 degree above your normal temperature) lasting 24 to 48 hours Or, whatever your healthcare provider told you to report based on your condition  Date Last Reviewed: 11/21/2015  © 5367-1696 [employer]. [address] [place] [address]  Road, [place] [address]. All rights reserved. This information is not intended as a substitute for professional medical care. Always follow your healthcare professional's instructions.

## 2017-10-04 NOTE — PROGRESS NOTES
Subjective:       Patient ID: Hillary Cotton is a 46 y.o. female.    Chief Complaint: No chief complaint on file.    46 years old female came to the clinic with right elbow pain follow-up.  Patient previously diagnosed with lateral epicondylitis associated with radial tunnel syndrome and tendinitis.  Patient is doing better is planning to go back to work.  Patient requests a medical certification to go back to work.      Review of Systems   Constitutional: Negative.    HENT: Negative.    Eyes: Negative.    Respiratory: Negative.    Cardiovascular: Negative.    Gastrointestinal: Negative.    Genitourinary: Negative.    Musculoskeletal: Positive for arthralgias.   Skin: Negative.    Neurological: Negative.    Psychiatric/Behavioral: Negative.        Objective:      Physical Exam   Constitutional: She is oriented to person, place, and time. She appears well-developed and well-nourished. No distress.   HENT:   Head: Normocephalic and atraumatic.   Right Ear: External ear normal.   Left Ear: External ear normal.   Nose: Nose normal.   Mouth/Throat: Oropharynx is clear and moist. No oropharyngeal exudate.   Eyes: Conjunctivae and EOM are normal. Pupils are equal, round, and reactive to light. Right eye exhibits no discharge. Left eye exhibits no discharge. No scleral icterus.   Neck: Normal range of motion. Neck supple. No JVD present. No tracheal deviation present. No thyromegaly present.   Cardiovascular: Normal rate, regular rhythm, normal heart sounds and intact distal pulses.  Exam reveals no gallop and no friction rub.    No murmur heard.  Pulmonary/Chest: Effort normal and breath sounds normal. No stridor. No respiratory distress. She has no wheezes. She has no rales. She exhibits no tenderness.   Abdominal: Soft. Bowel sounds are normal. She exhibits no distension and no mass. There is no tenderness. There is no rebound and no guarding.   Musculoskeletal: Normal range of motion. She exhibits no edema.         Right forearm: She exhibits tenderness and swelling.   Lymphadenopathy:     She has no cervical adenopathy.   Neurological: She is alert and oriented to person, place, and time. She has normal reflexes. No cranial nerve deficit. She exhibits normal muscle tone. Coordination normal.   Skin: Skin is warm and dry. No rash noted. She is not diaphoretic. No erythema. No pallor.   Psychiatric: She has a normal mood and affect. Her behavior is normal. Judgment and thought content normal.       Assessment:       1. Tendinitis of forearm    2. Radial tunnel syndrome, right    3. Lateral epicondylitis of right elbow    4. Need for influenza vaccination        Plan:         Diagnoses and all orders for this visit:    Tendinitis of forearm    Radial tunnel syndrome, right    Lateral epicondylitis of right elbow    Need for influenza vaccination  -     Influenza - Quadrivalent (3 years & older)     medical certification was done.

## 2017-11-01 RX ORDER — CYCLOBENZAPRINE HCL 5 MG
TABLET ORAL
Qty: 20 TABLET | Refills: 0 | OUTPATIENT
Start: 2017-11-01

## 2018-01-04 ENCOUNTER — OFFICE VISIT (OUTPATIENT)
Dept: FAMILY MEDICINE | Facility: CLINIC | Age: 48
End: 2018-01-04
Payer: OTHER GOVERNMENT

## 2018-01-04 VITALS
HEIGHT: 66 IN | WEIGHT: 191.38 LBS | TEMPERATURE: 98 F | DIASTOLIC BLOOD PRESSURE: 70 MMHG | BODY MASS INDEX: 30.76 KG/M2 | HEART RATE: 98 BPM | SYSTOLIC BLOOD PRESSURE: 120 MMHG | OXYGEN SATURATION: 97 %

## 2018-01-04 DIAGNOSIS — J30.2 CHRONIC SEASONAL ALLERGIC RHINITIS, UNSPECIFIED TRIGGER: ICD-10-CM

## 2018-01-04 DIAGNOSIS — R68.89 FLU-LIKE SYMPTOMS: ICD-10-CM

## 2018-01-04 DIAGNOSIS — J01.01 ACUTE RECURRENT MAXILLARY SINUSITIS: Primary | ICD-10-CM

## 2018-01-04 LAB
CTP QC/QA: YES
FLUAV AG NPH QL: NEGATIVE
FLUBV AG NPH QL: NEGATIVE

## 2018-01-04 PROCEDURE — 99214 OFFICE O/P EST MOD 30 MIN: CPT | Mod: S$PBB,,, | Performed by: FAMILY MEDICINE

## 2018-01-04 PROCEDURE — 99999 PR PBB SHADOW E&M-EST. PATIENT-LVL III: CPT | Mod: PBBFAC,,, | Performed by: FAMILY MEDICINE

## 2018-01-04 PROCEDURE — 99213 OFFICE O/P EST LOW 20 MIN: CPT | Mod: PBBFAC,PO | Performed by: FAMILY MEDICINE

## 2018-01-04 PROCEDURE — 87804 INFLUENZA ASSAY W/OPTIC: CPT | Mod: 59,PBBFAC,PO | Performed by: FAMILY MEDICINE

## 2018-01-04 RX ORDER — CETIRIZINE HYDROCHLORIDE 10 MG/1
10 TABLET ORAL DAILY
Qty: 90 TABLET | Refills: 2 | Status: SHIPPED | OUTPATIENT
Start: 2018-01-04 | End: 2019-11-25 | Stop reason: SDUPTHER

## 2018-01-04 RX ORDER — FLUTICASONE PROPIONATE 50 MCG
1 SPRAY, SUSPENSION (ML) NASAL 2 TIMES DAILY
Qty: 1 BOTTLE | Refills: 11 | Status: SHIPPED | OUTPATIENT
Start: 2018-01-04 | End: 2018-02-03

## 2018-01-04 RX ORDER — OXYMETAZOLINE HCL 0.05 %
1 SPRAY, NON-AEROSOL (ML) NASAL 2 TIMES DAILY
Qty: 30 ML | Refills: 0 | Status: SHIPPED | OUTPATIENT
Start: 2018-01-04 | End: 2018-01-07

## 2018-01-04 RX ORDER — AZITHROMYCIN 250 MG/1
TABLET, FILM COATED ORAL
Qty: 6 TABLET | Refills: 0 | Status: SHIPPED | OUTPATIENT
Start: 2018-01-04 | End: 2018-01-09

## 2018-01-04 NOTE — PATIENT INSTRUCTIONS
Discussed diagnosis and treatment of sinusitis  Suggested brief course of Afrin for 3 days total  Flonase BID  Nasal saline spray for congestion.  z alize   Tylenol 500 mg at least three times per day  Follow up as needed.      Acute Bacterial Rhinosinusitis (ABRS)    Acute bacterial rhinosinusitis (ABRS) is an infection of your nasal cavity and sinuses. Its caused by bacteria. Acute means that youve had symptoms for less than 12 weeks.  Understanding your sinuses  The nasal cavity is the large air-filled space behind your nose. The sinuses are a group of spaces formed by the bones of your face. They connect with your nasal cavity. ABRS causes the tissue lining these spaces to become inflamed. Mucus may not drain normally. This leads to facial pain and other symptoms.  What causes ABRS?  ABRS most often follows an upper respiratory infection caused by a virus. Bacteria then infect the lining of your nasal cavity and sinuses. But you can also get ABRS if you have:  · Nasal allergies  · Long-term nasal swelling and congestion not caused by allergies  · Blockage in the nose  Symptoms of ABRS  The symptoms of ABRS may be different for each person, and can include:  · Nasal congestion  · Runny nose  · Fluid draining from the nose down the throat (postnasal drip)  · Headache  · Cough  · Pain in the sinuses  · Thick, colored fluid from the nose (mucus)  · Fever  Diagnosing ABRS  ABRS may be diagnosed if youve had an upper respiratory infection like a cold and cough for longer than 10 to 14 days. Your health care provider will ask about your symptoms and your medical history. The provider will check your vital signs, including your temperature. Youll have a physical exam. The health care provider will check your ears, nose, and throat. You likely wont need any tests. If ABRS comes back, you may have a culture or other tests.  Treatment for ABRS  Treatment may include:  · Antibiotic medicine. This is for symptoms that  last for at least 10 to 14 days.  · Nasal corticosteroid medicine. Drops or spray used in the nose can lessen swelling and congestion.  · Over-the-counter pain medicine. This is to lessen sinus pain and pressure.  · Nasal decongestant medicine. Spray or drops may help to lessen congestion. Do not use them for more than a few days.  · Salt wash (saline irrigation). This can help to loosen mucus.  Possible complications of ABRS  ABRS may come back or become long-term (chronic).  In rare cases, ABRS may cause complications such as:   · Inflamed tissue around the brain and spinal cord (meningitis)  · Inflamed tissue around the eyes (orbital cellulitis)  · Inflamed bones around the sinuses (osteitis)  These problems may need to be treated in a hospital with intravenous (IV) antibiotic medicine or surgery.  When to call the health care provider  Call your health care provider if you have any of the following:  · Symptoms that dont get better, or get worse  · Symptoms that dont get better after 3 to 5 days on antibiotics  · Trouble seeing  · Swelling around your eyes  · Confusion or trouble staying awake   Date Last Reviewed: 3/3/2015  © 4280-4396 [employer]. [address] [place] [address], [place] [address]. All rights reserved. This information is not intended as a substitute for professional medical care. Always follow your healthcare professional's instructions.

## 2018-01-04 NOTE — PROGRESS NOTES
Subjective:       Patient ID: Hillary Cotton is a 47 y.o. female.    Chief Complaint: Influenza (sore throat,chills body aches)    Hillary is a 47 y.o. female who presents today with sore throat, chills, and myalgias. This started initially around Saturday and then worsened acutely on Tuesday. She was also feeling drained since around matthew time. Sick contacts at home over the holidays. No car rides or long plane rides.  is not sick. She has similar symptoms every year.       Influenza   This is a new problem. The current episode started in the past 7 days. The problem occurs daily. The problem has been gradually worsening. Associated symptoms include chills, congestion, coughing, fatigue, headaches, myalgias and a sore throat. Pertinent negatives include no diaphoresis, fever, numbness, rash, visual change or vomiting. Nothing aggravates the symptoms. Treatments tried: sudafed. The treatment provided mild relief.   URI    This is a new problem. The current episode started in the past 7 days. The problem has been gradually worsening. There has been no fever. Associated symptoms include congestion, coughing, headaches, sinus pain and a sore throat. Pertinent negatives include no diarrhea, dysuria, joint pain, joint swelling, rash or vomiting. She has tried nothing for the symptoms. The treatment provided mild relief.     Review of Systems   Constitutional: Positive for chills and fatigue. Negative for diaphoresis and fever.   HENT: Positive for congestion, sinus pain and sore throat.    Respiratory: Positive for cough.    Gastrointestinal: Negative for diarrhea and vomiting.   Genitourinary: Negative for dysuria.   Musculoskeletal: Positive for myalgias. Negative for joint pain.   Skin: Negative for rash.   Neurological: Positive for headaches. Negative for numbness.       Objective:     Vitals:    01/04/18 1446   BP: 120/70   Pulse: 98   Temp: 98.1 °F (36.7 °C)        Physical Exam    Constitutional: She is oriented to person, place, and time. She appears well-developed and well-nourished.   HENT:   Head: Normocephalic and atraumatic.   TM: left obscured by wax. Right appears normal  Severe nasal congestion BL  Pharyngeal erythema noted  No adenopathy noted  Able to touch chin to chest   Eyes: Conjunctivae are normal. Pupils are equal, round, and reactive to light.   Neck: Normal range of motion. Neck supple.   Cardiovascular: Normal rate and regular rhythm.    Pulmonary/Chest: Effort normal and breath sounds normal.   Abdominal: Soft.   Musculoskeletal: She exhibits no edema.   Neurological: She is alert and oriented to person, place, and time.   Psychiatric: She has a normal mood and affect. Her behavior is normal. Judgment and thought content normal.   Nursing note and vitals reviewed.      Assessment:       1. Acute recurrent maxillary sinusitis    2. Flu-like symptoms    3. Chronic seasonal allergic rhinitis, unspecified trigger        Plan:         Acute recurrent maxillary sinusitis  Discussed diagnosis and treatment of sinusitis  Suggested brief course of Afrin for 3 days total  Flonase BID  Nasal saline spray for congestion.  z antonia   Tylenol 500 mg at least three times per day  Follow up as needed.  Flu negative  -     azithromycin (Z-ANTONIA) 250 MG tablet; Take 2 tablets by mouth on day 1; Take 1 tablet by mouth on days 2-5  Dispense: 6 tablet; Refill: 0  -     fluticasone (FLONASE) 50 mcg/actuation nasal spray; 1 spray (50 mcg total) by Each Nare route 2 (two) times daily.  Dispense: 1 Bottle; Refill: 11  -     oxymetazoline (AFRIN, OXYMETAZOLINE,) 0.05 % nasal spray; 1 spray by Nasal route 2 (two) times daily.  Dispense: 30 mL; Refill: 0    Flu-like symptoms  -     POCT Influenza A/B    Chronic seasonal allergic rhinitis, unspecified trigger  -     cetirizine (ZYRTEC) 10 MG tablet; Take 1 tablet (10 mg total) by mouth once daily.  Dispense: 90 tablet; Refill: 2      Warning signs  discussed, patient to call with any further issues or worsening of symptoms.

## 2018-07-25 ENCOUNTER — OFFICE VISIT (OUTPATIENT)
Dept: INTERNAL MEDICINE | Facility: CLINIC | Age: 48
End: 2018-07-25
Payer: OTHER GOVERNMENT

## 2018-07-25 VITALS
HEIGHT: 66 IN | SYSTOLIC BLOOD PRESSURE: 118 MMHG | TEMPERATURE: 98 F | DIASTOLIC BLOOD PRESSURE: 68 MMHG | OXYGEN SATURATION: 96 % | BODY MASS INDEX: 31.39 KG/M2 | HEART RATE: 90 BPM | WEIGHT: 195.31 LBS

## 2018-07-25 DIAGNOSIS — S91.301A OPEN WOUND OF RIGHT FOOT, INITIAL ENCOUNTER: Primary | ICD-10-CM

## 2018-07-25 PROCEDURE — 99213 OFFICE O/P EST LOW 20 MIN: CPT | Mod: S$PBB,,, | Performed by: INTERNAL MEDICINE

## 2018-07-25 PROCEDURE — 99213 OFFICE O/P EST LOW 20 MIN: CPT | Mod: PBBFAC,PO | Performed by: INTERNAL MEDICINE

## 2018-07-25 PROCEDURE — 99999 PR PBB SHADOW E&M-EST. PATIENT-LVL III: CPT | Mod: PBBFAC,,, | Performed by: INTERNAL MEDICINE

## 2018-07-25 RX ORDER — MELOXICAM 7.5 MG/1
7.5 TABLET ORAL DAILY PRN
Qty: 30 TABLET | Refills: 0 | Status: SHIPPED | OUTPATIENT
Start: 2018-07-25 | End: 2019-03-12

## 2018-07-25 RX ORDER — SULFAMETHOXAZOLE AND TRIMETHOPRIM 400; 80 MG/1; MG/1
1 TABLET ORAL 2 TIMES DAILY
Qty: 10 TABLET | Refills: 0 | Status: SHIPPED | OUTPATIENT
Start: 2018-07-25 | End: 2018-07-30

## 2018-07-25 RX ORDER — MUPIROCIN 20 MG/G
OINTMENT TOPICAL 3 TIMES DAILY
Qty: 1 TUBE | Refills: 0 | Status: SHIPPED | OUTPATIENT
Start: 2018-07-25 | End: 2018-10-03

## 2018-07-25 NOTE — PROGRESS NOTES
Subjective:       Patient ID: Hillary Cotton is a 47 y.o. female.    Chief Complaint: Foot Pain (Right )    HPI Mrs. Cotton is a 47 year old female who presents with a chief complaint of foot pain.  The patient accidentally went over her right foot with a water  on Friday evening.  This resulted in a deep cut on the top of the right foot.  She initially had some bleeding.  However her bleeding has since stopped.  However she has a burning type pain in the foot.  Pain is worsened by walking.  Her pain is usually worse after work.  Her pain is relieved by elevating her foot and placing an ice pack on it.  She has been taking Motrin as well which has provided some relief.    Review of Systems   Skin: Positive for wound.       Objective:      Physical Exam   Constitutional: She is oriented to person, place, and time. She appears well-developed and well-nourished. No distress.   HENT:   Head: Normocephalic and atraumatic.   Eyes: Conjunctivae and EOM are normal.   Musculoskeletal: She exhibits no edema or deformity.   Neurological: She is alert and oriented to person, place, and time.   Skin: Skin is warm and dry. She is not diaphoretic.        Linear wound on dorsal aspect of right foot. Starting to heal and form scab. Small amount of surrounding erythema and edema. Pain on palpation near site.   Psychiatric: She has a normal mood and affect. Her behavior is normal. Judgment and thought content normal.   Vitals reviewed.      Assessment:       1. Open wound of right foot, initial encounter        Plan:     #1 open wound of right foot  Keep site clean and dry.  Apply mupirocin ointment TID  Bactrim 400 mg/80 mg, 1 tablet by mouth twice a day ×5 days  Mobic 7.5 mg by mouth daily as needed for pain relief  Instructed patient to stay well-hydrated while taking these medications.  Instructed patient to return to clinic for signs of infection such as progression of erythema or fever.  Patient  understands.    RTC PRN

## 2018-07-25 NOTE — PATIENT INSTRUCTIONS
Mupirocin skin cream or ointment  What is this medicine?  MUPIROCIN (myoo PEER oh sin) is an antibiotic. It is used on the skin to treat skin infections.  How should I use this medicine?  This medicine is for external use only. Follow the directions on the prescription label. Wash your hands before and after use. Before applying, wash the affected area with mild soap and water and pat dry. Apply a small amount to the affected area and rub gently. You can cover the area with a gauze dressing. Do not get this medicine in your eyes. If you do, rinse out with plenty of cool tap water. Do not use your medicine more often than directed. Finish the full course of medicine prescribed by your doctor or health care professional even if you think your condition is better. Do not use over large areas of burnt skin.  Talk to your pediatrician regarding the use of this medicine in children. Special care may be needed.  What side effects may I notice from receiving this medicine?  Side effects that you should report to your doctor or health care professional as soon as possible:  · skin rash, redness, continued swelling, burning, itching, stinging, or pain  Side effects that usually do not require medical attention (report to your doctor or health care professional if they continue or are bothersome):  · dry skin, itching  What may interact with this medicine?  Interactions are not expected. Do not use any other skin products on the affected area without telling your doctor or health care professional.  What if I miss a dose?  If you miss a dose, take it as soon as you can. If it is almost time for your next dose, take only that dose. Do not take double or extra doses.  Where should I keep my medicine?  Keep out of the reach of children.  Store at room temperature between 20 and 25 degrees C (68 and 77 degrees F). Throw away any unused medicine after the expiration date.  What should I tell my health care provider before I take  this medicine?  They need to know if you have any of these conditions:  · an unusual or allergic reaction to mupirocin, polyethylene glycol (PEG), or other topical antibiotic medicine  · pregnant or trying to get pregnant  · breast-feeding  What should I watch for while using this medicine?  Tell your doctor or health care professional if your skin condition does not begin to improve within 3 to 5 days.  NOTE:This sheet is a summary. It may not cover all possible information. If you have questions about this medicine, talk to your doctor, pharmacist, or health care provider. Copyright© 2017 [employer]        Sulfamethoxazole; Trimethoprim, SMX-TMP tablets  What is this medicine?  SULFAMETHOXAZOLE; TRIMETHOPRIM or SMX-TMP (suhl fuh meth OK duane zohl; trye METH oh prim) is a combination of a sulfonamide antibiotic and a second antibiotic, trimethoprim. It is used to treat or prevent certain kinds of bacterial infections. It will not work for colds, flu, or other viral infections.  How should I use this medicine?  Take this medicine by mouth with a full glass of water. Follow the directions on the prescription label. Take your medicine at regular intervals. Do not take it more often than directed. Do not skip doses or stop your medicine early.  Talk to your pediatrician regarding the use of this medicine in children. Special care may be needed. This medicine has been used in children as young as 2 months of age.  What side effects may I notice from receiving this medicine?  Side effects that you should report to your doctor or health care professional as soon as possible:  · allergic reactions like skin rash or hives, swelling of the face, lips, or tongue  · breathing problems  · fever or chills, sore throat  · irregular heartbeat, chest pain  · joint or muscle pain  · pain or difficulty passing urine  · red pinpoint spots on skin  · redness, blistering, peeling or loosening of the skin, including inside the  mouth  · unusual bleeding or bruising  · unusually weak or tired  · yellowing of the eyes or skin  Side effects that usually do not require medical attention (report to your doctor or health care professional if they continue or are bothersome):  · diarrhea  · dizziness  · headache  · loss of appetite  · nausea, vomiting  · nervousness  What may interact with this medicine?  Do not take this medicine with any of the following medications:  · aminobenzoate potassium  · dofetilide  · metronidazole  This medicine may also interact with the following medications:  · ACE inhibitors like benazepril, enalapril, lisinopril, and ramipril  · birth control pills  · cyclosporine  · digoxin  · diuretics  · indomethacin  · medicines for diabetes  · methenamine  · methotrexate  · phenytoin  · potassium supplements  · pyrimethamine  · sulfinpyrazone  · tricyclic antidepressants  · warfarin  What if I miss a dose?  If you miss a dose, take it as soon as you can. If it is almost time for your next dose, take only that dose. Do not take double or extra doses.  Where should I keep my medicine?  Keep out of the reach of children.  Store at room temperature between 20 to 25 degrees C (68 to 77 degrees F). Protect from light. Throw away any unused medicine after the expiration date.  What should I tell my health care provider before I take this medicine?  They need to know if you have any of these conditions:  · anemia  · asthma  · being treated with anticonvulsants  · if you frequently drink alcohol containing drinks  · kidney disease  · liver disease  · low level of folic acid or glucose-6-phosphate dehydrogenase  · poor nutrition or malabsorption  · porphyria  · severe allergies  · thyroid disorder  · an unusual or allergic reaction to sulfamethoxazole, trimethoprim, sulfa drugs, other medicines, foods, dyes, or preservatives  · pregnant or trying to get pregnant  · breast-feeding  What should I watch for while using this  medicine?  Tell your doctor or health care professional if your symptoms do not improve. Drink several glasses of water a day to reduce the risk of kidney problems.  Do not treat diarrhea with over the counter products. Contact your doctor if you have diarrhea that lasts more than 2 days or if it is severe and watery.  This medicine can make you more sensitive to the sun. Keep out of the sun. If you cannot avoid being in the sun, wear protective clothing and use a sunscreen. Do not use sun lamps or tanning beds/booths.  NOTE:This sheet is a summary. It may not cover all possible information. If you have questions about this medicine, talk to your doctor, pharmacist, or health care provider. Copyright© 2017 [employer]        Meloxicam tablets  What is this medicine?  MELOXICAM (brigida OX i cam) is a non-steroidal anti-inflammatory drug (NSAID). It is used to reduce swelling and to treat pain. It may be used for osteoarthritis, rheumatoid arthritis, or juvenile rheumatoid arthritis.  How should I use this medicine?  Take this medicine by mouth with a full glass of water. Follow the directions on the prescription label. You can take it with or without food. If it upsets your stomach, take it with food. Take your medicine at regular intervals. Do not take it more often than directed. Do not stop taking except on your doctor's advice.  A special MedGuide will be given to you by the pharmacist with each prescription and refill. Be sure to read this information carefully each time.  Talk to your pediatrician regarding the use of this medicine in children. While this drug may be prescribed for selected conditions, precautions do apply.  Patients over 65 years old may have a stronger reaction and need a smaller dose.  What side effects may I notice from receiving this medicine?  Side effects that you should report to your doctor or health care professional as soon as possible:  · allergic reactions like skin rash, itching or  hives, swelling of the face, lips, or tongue  · nausea, vomiting  · signs and symptoms of a blood clot such as breathing problems; changes in vision; chest pain; severe, sudden headache; pain, swelling, warmth in the leg; trouble speaking; sudden numbness or weakness of the face, arm, or leg  · signs and symptoms of bleeding such as bloody or black, tarry stools; red or dark-brown urine; spitting up blood or brown material that looks like coffee grounds; red spots on the skin; unusual bruising or bleeding from the eye, gums, or nose  · signs and symptoms of liver injury like dark yellow or brown urine; general ill feeling or flu-like symptoms; light-colored stools; loss of appetite; nausea; right upper belly pain; unusually weak or tired; yellowing of the eyes or skin  · signs and symptoms of stroke like changes in vision; confusion; trouble speaking or understanding; severe headaches; sudden numbness or weakness of the face, arm, or leg; trouble walking; dizziness; loss of balance or coordination  Side effects that usually do not require medical attention (report these to your doctor or health care professional if they continue or are bothersome):  · constipation  · diarrhea  · gas  What may interact with this medicine?  Do not take this medicine with any of the following medications:  · cidofovir  · ketorolac  This medicine may also interact with the following medications:  · aspirin and aspirin-like medicines  · certain medicines for blood pressure, heart disease, irregular heart beat  · certain medicines for depression, anxiety, or psychotic disturbances  · certain medicines that treat or prevent blood clots like warfarin, enoxaparin, dalteparin, apixaban, dabigatran, rivaroxaban  · cyclosporine  · digoxin  · diuretics  · methotrexate  · other NSAIDs, medicines for pain and inflammation, like ibuprofen and naproxen  · pemetrexed  What if I miss a dose?  If you miss a dose, take it as soon as you can. If it is  almost time for your next dose, take only that dose. Do not take double or extra doses.  Where should I keep my medicine?  Keep out of the reach of children.  Store at room temperature between 15 and 30 degrees C (59 and 86 degrees F). Throw away any unused medicine after the expiration date.  What should I tell my health care provider before I take this medicine?  They need to know if you have any of these conditions:  · bleeding disorders  · cigarette smoker  · coronary artery bypass graft (CABG) surgery within the past 2 weeks  · drink more than 3 alcohol-containing drinks per day  · heart disease  · high blood pressure  · history of stomach bleeding  · kidney disease  · liver disease  · lung or breathing disease, like asthma  · stomach or intestine problems  · an unusual or allergic reaction to meloxicam, aspirin, other NSAIDs, other medicines, foods, dyes, or preservatives  · pregnant or trying to get pregnant  · breast-feeding  What should I watch for while using this medicine?  Tell your doctor or healthcare professional if your symptoms do not start to get better or if they get worse.  Do not take other medicines that contain aspirin, ibuprofen, or naproxen with this medicine. Side effects such as stomach upset, nausea, or ulcers may be more likely to occur. Many medicines available without a prescription should not be taken with this medicine.  This medicine can cause ulcers and bleeding in the stomach and intestines at any time during treatment. This can happen with no warning and may cause death. There is increased risk with taking this medicine for a long time. Smoking, drinking alcohol, older age, and poor health can also increase risks. Call your doctor right away if you have stomach pain or blood in your vomit or stool.  This medicine does not prevent heart attack or stroke. In fact, this medicine may increase the chance of a heart attack or stroke. The chance may increase with longer use of this  medicine and in people who have heart disease. If you take aspirin to prevent heart attack or stroke, talk with your doctor or health care professional.  NOTE:This sheet is a summary. It may not cover all possible information. If you have questions about this medicine, talk to your doctor, pharmacist, or health care provider. Copyright© 2017 [employer]

## 2018-09-19 ENCOUNTER — TELEPHONE (OUTPATIENT)
Dept: OBSTETRICS AND GYNECOLOGY | Facility: CLINIC | Age: 48
End: 2018-09-19

## 2018-09-19 ENCOUNTER — PATIENT MESSAGE (OUTPATIENT)
Dept: FAMILY MEDICINE | Facility: CLINIC | Age: 48
End: 2018-09-19

## 2018-09-19 ENCOUNTER — PATIENT MESSAGE (OUTPATIENT)
Dept: OBSTETRICS AND GYNECOLOGY | Facility: CLINIC | Age: 48
End: 2018-09-19

## 2018-09-19 NOTE — TELEPHONE ENCOUNTER
Would you like for pt. To see Gastro and PCP first , or would you like to see her berfore those to dr. Before I offer an appt.  Please advice   ..............................          Dr Mejia,     I would like to have Lab Work done regarding Hormones and Pre-Menopause.   I've been having some serious stomach issues that I believe are associated with Hormones.     My symptoms:   Severe Nausea, Vomiting, Indigestion, Acid/Bile reflux   Constipation, Diarrhea, Sharp Gas Pains, Gas, Bloating   Exhausted/ Tired, Overall not feeling well   Internal Heat/ Sweating, Irritability, Emotional     I do believe that this is associated with Hormones as these symptoms seem to be getting worse with time.   They typically happen around Ovulation and Menses.   Can we please schedule Labs & a Follow-Up appointment?     Also, I sent a message to my Primary Care Dr. Alvarez requesting an Annual Check-Up with Labs to make sure there is nothing else going on.     Thank you,   Hillary Cotton   [phone]

## 2018-10-03 ENCOUNTER — LAB VISIT (OUTPATIENT)
Dept: LAB | Facility: HOSPITAL | Age: 48
End: 2018-10-03
Attending: FAMILY MEDICINE
Payer: OTHER GOVERNMENT

## 2018-10-03 ENCOUNTER — OFFICE VISIT (OUTPATIENT)
Dept: FAMILY MEDICINE | Facility: CLINIC | Age: 48
End: 2018-10-03
Payer: OTHER GOVERNMENT

## 2018-10-03 VITALS
BODY MASS INDEX: 31.11 KG/M2 | OXYGEN SATURATION: 98 % | HEART RATE: 83 BPM | HEIGHT: 66 IN | WEIGHT: 193.56 LBS | SYSTOLIC BLOOD PRESSURE: 108 MMHG | DIASTOLIC BLOOD PRESSURE: 80 MMHG

## 2018-10-03 DIAGNOSIS — R53.82 CHRONIC FATIGUE: ICD-10-CM

## 2018-10-03 DIAGNOSIS — N95.1 PERIMENOPAUSAL SYMPTOMS: ICD-10-CM

## 2018-10-03 DIAGNOSIS — Z12.31 ENCOUNTER FOR SCREENING MAMMOGRAM FOR MALIGNANT NEOPLASM OF BREAST: ICD-10-CM

## 2018-10-03 DIAGNOSIS — Z23 NEED FOR INFLUENZA VACCINATION: ICD-10-CM

## 2018-10-03 DIAGNOSIS — R11.0 NAUSEA: ICD-10-CM

## 2018-10-03 DIAGNOSIS — F41.9 ANXIETY: Primary | ICD-10-CM

## 2018-10-03 DIAGNOSIS — K58.2 IRRITABLE BOWEL SYNDROME WITH BOTH CONSTIPATION AND DIARRHEA: ICD-10-CM

## 2018-10-03 LAB
ALBUMIN SERPL BCP-MCNC: 3.7 G/DL
ALP SERPL-CCNC: 68 U/L
ALT SERPL W/O P-5'-P-CCNC: 14 U/L
AMYLASE SERPL-CCNC: 57 U/L
ANION GAP SERPL CALC-SCNC: 8 MMOL/L
AST SERPL-CCNC: 18 U/L
BASOPHILS # BLD AUTO: 0.11 K/UL
BASOPHILS NFR BLD: 1 %
BILIRUB SERPL-MCNC: 0.2 MG/DL
BILIRUB UR QL STRIP: NEGATIVE
BUN SERPL-MCNC: 9 MG/DL
CALCIUM SERPL-MCNC: 9.6 MG/DL
CHLORIDE SERPL-SCNC: 103 MMOL/L
CLARITY UR REFRACT.AUTO: CLEAR
CO2 SERPL-SCNC: 26 MMOL/L
COLOR UR AUTO: YELLOW
CREAT SERPL-MCNC: 1 MG/DL
DIFFERENTIAL METHOD: ABNORMAL
EOSINOPHIL # BLD AUTO: 1.9 K/UL
EOSINOPHIL NFR BLD: 17.4 %
ERYTHROCYTE [DISTWIDTH] IN BLOOD BY AUTOMATED COUNT: 13 %
EST. GFR  (AFRICAN AMERICAN): >60 ML/MIN/1.73 M^2
EST. GFR  (NON AFRICAN AMERICAN): >60 ML/MIN/1.73 M^2
GLUCOSE SERPL-MCNC: 100 MG/DL
GLUCOSE UR QL STRIP: NEGATIVE
HCT VFR BLD AUTO: 44.2 %
HGB BLD-MCNC: 13.9 G/DL
HGB UR QL STRIP: NEGATIVE
IMM GRANULOCYTES # BLD AUTO: 0.02 K/UL
IMM GRANULOCYTES NFR BLD AUTO: 0.2 %
KETONES UR QL STRIP: NEGATIVE
LEUKOCYTE ESTERASE UR QL STRIP: NEGATIVE
LIPASE SERPL-CCNC: 45 U/L
LYMPHOCYTES # BLD AUTO: 3.3 K/UL
LYMPHOCYTES NFR BLD: 30.7 %
MCH RBC QN AUTO: 29.6 PG
MCHC RBC AUTO-ENTMCNC: 31.4 G/DL
MCV RBC AUTO: 94 FL
MONOCYTES # BLD AUTO: 1 K/UL
MONOCYTES NFR BLD: 9.4 %
NEUTROPHILS # BLD AUTO: 4.4 K/UL
NEUTROPHILS NFR BLD: 41.3 %
NITRITE UR QL STRIP: NEGATIVE
NRBC BLD-RTO: 0 /100 WBC
PH UR STRIP: 6 [PH] (ref 5–8)
PLATELET # BLD AUTO: 353 K/UL
PMV BLD AUTO: 11.2 FL
POTASSIUM SERPL-SCNC: 4.2 MMOL/L
PROT SERPL-MCNC: 7.4 G/DL
PROT UR QL STRIP: NEGATIVE
RBC # BLD AUTO: 4.7 M/UL
SODIUM SERPL-SCNC: 137 MMOL/L
SP GR UR STRIP: 1.01 (ref 1–1.03)
TSH SERPL DL<=0.005 MIU/L-ACNC: 1.66 UIU/ML
URN SPEC COLLECT METH UR: NORMAL
UROBILINOGEN UR STRIP-ACNC: NEGATIVE EU/DL
WBC # BLD AUTO: 10.65 K/UL

## 2018-10-03 PROCEDURE — 84443 ASSAY THYROID STIM HORMONE: CPT

## 2018-10-03 PROCEDURE — 80053 COMPREHEN METABOLIC PANEL: CPT

## 2018-10-03 PROCEDURE — 82150 ASSAY OF AMYLASE: CPT

## 2018-10-03 PROCEDURE — 99214 OFFICE O/P EST MOD 30 MIN: CPT | Mod: PBBFAC,PO,25 | Performed by: FAMILY MEDICINE

## 2018-10-03 PROCEDURE — 36415 COLL VENOUS BLD VENIPUNCTURE: CPT | Mod: PO

## 2018-10-03 PROCEDURE — 83690 ASSAY OF LIPASE: CPT

## 2018-10-03 PROCEDURE — 81003 URINALYSIS AUTO W/O SCOPE: CPT

## 2018-10-03 PROCEDURE — 99214 OFFICE O/P EST MOD 30 MIN: CPT | Mod: S$PBB,,, | Performed by: FAMILY MEDICINE

## 2018-10-03 PROCEDURE — 85025 COMPLETE CBC W/AUTO DIFF WBC: CPT

## 2018-10-03 PROCEDURE — 99999 PR PBB SHADOW E&M-EST. PATIENT-LVL IV: CPT | Mod: PBBFAC,,, | Performed by: FAMILY MEDICINE

## 2018-10-03 PROCEDURE — 90686 IIV4 VACC NO PRSV 0.5 ML IM: CPT | Mod: PBBFAC,PO

## 2018-10-03 RX ORDER — PAROXETINE 10 MG/1
10 TABLET, FILM COATED ORAL EVERY MORNING
Qty: 30 TABLET | Refills: 11 | Status: SHIPPED | OUTPATIENT
Start: 2018-10-03 | End: 2019-06-19

## 2018-10-03 RX ORDER — HYDROXYZINE PAMOATE 50 MG/1
50 CAPSULE ORAL 2 TIMES DAILY PRN
Qty: 30 CAPSULE | Refills: 0 | Status: SHIPPED | OUTPATIENT
Start: 2018-10-03 | End: 2018-10-14 | Stop reason: SDUPTHER

## 2018-10-03 RX ORDER — PANTOPRAZOLE SODIUM 40 MG/1
40 TABLET, DELAYED RELEASE ORAL
Qty: 30 TABLET | Refills: 0 | Status: SHIPPED | OUTPATIENT
Start: 2018-10-03 | End: 2018-10-30 | Stop reason: SDUPTHER

## 2018-10-03 NOTE — PROGRESS NOTES
Subjective:       Patient ID: Hillary Cotton is a 47 y.o. female.    Chief Complaint: Abdominal Pain (x 1 month); Nausea (x 1 month); Emesis (x 1 month); and Diarrhea (x 1 month)    47 years old female came to the clinic with nausea associated with alternated diarrhea and constipation for the last month.  Patient with significant anxiety associated with menopausal symptoms.  Patient requests a medicine to raise her mood.  No suicidal or homicidal ideations.  Patient feels tired most of the time..  She is due for her mammogram.  Patient with follow-up with OBGYN next week.      Review of Systems   Constitutional: Negative.  Negative for fever.   HENT: Negative.    Eyes: Negative.    Respiratory: Negative.    Cardiovascular: Negative.    Gastrointestinal: Positive for constipation, diarrhea and nausea.   Genitourinary: Negative.    Musculoskeletal: Negative.    Skin: Negative.    Neurological: Negative.    Psychiatric/Behavioral: The patient is nervous/anxious.        Objective:      Physical Exam   Constitutional: She is oriented to person, place, and time. She appears well-developed and well-nourished. No distress.   HENT:   Head: Normocephalic and atraumatic.   Right Ear: External ear normal.   Left Ear: External ear normal.   Nose: Nose normal.   Mouth/Throat: Oropharynx is clear and moist. No oropharyngeal exudate.   Eyes: Conjunctivae and EOM are normal. Pupils are equal, round, and reactive to light. Right eye exhibits no discharge. Left eye exhibits no discharge. No scleral icterus.   Neck: Normal range of motion. Neck supple. No JVD present. No tracheal deviation present. No thyromegaly present.   Cardiovascular: Normal rate, regular rhythm, normal heart sounds and intact distal pulses. Exam reveals no gallop and no friction rub.   No murmur heard.  Pulmonary/Chest: Effort normal and breath sounds normal. No stridor. No respiratory distress. She has no wheezes. She has no rales. She exhibits no  tenderness.   Abdominal: Soft. Bowel sounds are normal. She exhibits no distension and no mass. There is no tenderness. There is no rebound and no guarding.   Musculoskeletal: Normal range of motion. She exhibits no edema or tenderness.   Lymphadenopathy:     She has no cervical adenopathy.   Neurological: She is alert and oriented to person, place, and time. She has normal reflexes. No cranial nerve deficit. She exhibits normal muscle tone. Coordination normal.   Skin: Skin is warm and dry. No rash noted. She is not diaphoretic. No erythema. No pallor.   Psychiatric: Her behavior is normal. Judgment and thought content normal. Her mood appears anxious. Her affect is not angry, not blunt, not labile and not inappropriate. She does not exhibit a depressed mood.       Assessment:       1. Anxiety    2. Perimenopausal symptoms    3. Irritable bowel syndrome with both constipation and diarrhea    4. Chronic fatigue    5. Need for influenza vaccination    6. Nausea    7. Encounter for screening mammogram for malignant neoplasm of breast        Plan:         Hillary was seen today for abdominal pain, nausea, emesis and diarrhea.    Diagnoses and all orders for this visit:    Anxiety  -     paroxetine (PAXIL) 10 MG tablet; Take 1 tablet (10 mg total) by mouth every morning.  -     hydrOXYzine pamoate (VISTARIL) 50 MG Cap; Take 1 capsule (50 mg total) by mouth 2 (two) times daily as needed (anxiety).    Perimenopausal symptoms    Irritable bowel syndrome with both constipation and diarrhea  -     Ambulatory referral to Gastroenterology    Chronic fatigue  -     TSH; Future  -     Comprehensive metabolic panel; Future  -     CBC auto differential; Future    Need for influenza vaccination  -     Influenza - Quadrivalent (3 years & older) (PF)    Nausea  -     Ambulatory referral to Gastroenterology  -     pantoprazole (PROTONIX) 40 MG tablet; Take 1 tablet (40 mg total) by mouth before breakfast.  -     Comprehensive  metabolic panel; Future  -     CBC auto differential; Future  -     Amylase; Future  -     Lipase; Future  -     Urinalysis    Encounter for screening mammogram for malignant neoplasm of breast  -     Mammo Digital Screening Bilat with CAD; Future     Follow-up with OBGYN.

## 2018-10-04 ENCOUNTER — PATIENT MESSAGE (OUTPATIENT)
Dept: FAMILY MEDICINE | Facility: CLINIC | Age: 48
End: 2018-10-04

## 2018-10-12 ENCOUNTER — OFFICE VISIT (OUTPATIENT)
Dept: OBSTETRICS AND GYNECOLOGY | Facility: CLINIC | Age: 48
End: 2018-10-12
Payer: OTHER GOVERNMENT

## 2018-10-12 VITALS
BODY MASS INDEX: 31.29 KG/M2 | SYSTOLIC BLOOD PRESSURE: 110 MMHG | DIASTOLIC BLOOD PRESSURE: 72 MMHG | HEIGHT: 66 IN | WEIGHT: 194.69 LBS

## 2018-10-12 DIAGNOSIS — Z01.419 ENCOUNTER FOR GYNECOLOGICAL EXAMINATION WITHOUT ABNORMAL FINDING: Primary | ICD-10-CM

## 2018-10-12 DIAGNOSIS — N94.6 DYSMENORRHEA: ICD-10-CM

## 2018-10-12 DIAGNOSIS — N94.3 PMS (PREMENSTRUAL SYNDROME): ICD-10-CM

## 2018-10-12 PROCEDURE — 99999 PR PBB SHADOW E&M-EST. PATIENT-LVL III: CPT | Mod: PBBFAC,,, | Performed by: OBSTETRICS & GYNECOLOGY

## 2018-10-12 PROCEDURE — 99213 OFFICE O/P EST LOW 20 MIN: CPT | Mod: PBBFAC,PO | Performed by: OBSTETRICS & GYNECOLOGY

## 2018-10-12 PROCEDURE — 99396 PREV VISIT EST AGE 40-64: CPT | Mod: S$PBB,,, | Performed by: OBSTETRICS & GYNECOLOGY

## 2018-10-12 RX ORDER — IBUPROFEN 800 MG/1
800 TABLET ORAL EVERY 8 HOURS PRN
Qty: 30 TABLET | Refills: 4 | Status: SHIPPED | OUTPATIENT
Start: 2018-10-12 | End: 2019-03-12 | Stop reason: SDUPTHER

## 2018-10-12 RX ORDER — NORETHINDRONE ACETATE AND ETHINYL ESTRADIOL 1MG-20(21)
1 KIT ORAL DAILY
Qty: 30 TABLET | Refills: 11 | Status: SHIPPED | OUTPATIENT
Start: 2018-10-12 | End: 2019-11-26

## 2018-10-12 NOTE — PROGRESS NOTES
"46 yo  female who presents for routine gyn visit.  Reports cycles come q month. Now only lasts for 2 days.  Reports that usually one week before her cycle - the patient gets bad PMS including GI symptoms like nausea. Reports that she also gets hansen and irritated.    The patient today is worried that her hormones are all off  Reports that her PMS symptoms are getting worse.  Reports having an entire month of GI symptoms and irritation.    She was provided with rx for paxil by her PCP - but has not started this medication.  She has an appointment with GI Oct 30.  Patient has tried alesse in the past x 3 months and reports that her symptoms did not improve.  She is willing to try another ocP to see if she would have a better outcome.    ROS:  GENERAL: positive weight gain  Feeling irritated.   SKIN: Denies rash or lesions.   HEAD: Denies head injury or headache.   CHEST: Denies chest pain or shortness of breath.   CARDIOVASCULAR: Denies palpitations or left sided chest pain.   ABDOMEN: No abdominal pain, constipation, diarrhea, nausea, vomiting or rectal bleeding.   URINARY: No frequency, dysuria, hematuria, or burning on urination.  REPRODUCTIVE: See HPI.   BREASTS: denies pain, lumps, or nipple discharge.   HEMATOLOGIC: No easy bruisability or excessive bleeding.   MUSCULOSKELETAL: Denies joint pain or swelling.   NEUROLOGIC: Denies syncope or weakness.   PSYCHIATRIC: Denies depression, anxiety or mood swings.         PE:   Vitals: /72   Ht 5' 6" (1.676 m)   Wt 88.3 kg (194 lb 10.7 oz)   LMP 10/04/2018   BMI 31.42 kg/m²   APPEARANCE: Well nourished, well developed, in no acute distress.  BREASTS: Symmetrical, no skin changes or visible lesions. No palpable masses, nipple discharge or adenopathy bilaterally.  PELVIC: Normal external female genitalia without lesions. Normal hair distribution. Adequate perineal body, normal urethral meatus. Vagina moist and well rugated without lesions or discharge. " "Cervix pink and without lesions. No significant cystocele or rectocele. Bimanual exam showed uterus normal size, shape, position, mobile and nontender. Adnexa without masses or tenderness. Urethra and bladder normal.  EXTREMITIES: No clubbing cyanosis or edema.      AP  Routine gyn  -s/p normal breast exam: mammogram scheduled  -s/p normal pelvic exam:   -Pap uptodate  -STD testing: declined  -PMS: the patient and I had a long discussion about medical management of her symptoms, including SSRI and oral contraceptive pills. The patient was encouraged to start the paxil provided by her PCP.  She was made aware that her current rx is paxil 10mg PO daily while the recommendation is usually paxil 20-30mg PO daily to treat PMS symptoms.  Another option would be OCPs and we discussed using a different brand. I made the patient aware that "checking her hormone levels" would not help in the management of her symptoms. The patient desires to start the paxil and try the OCP.  rx for junel sent to pharmacy.      Patient will contact office regarding how her symptoms are at her convenience.    vamsi mejia MD  -contraception:  -educated on gardasil vaccine  -colonoscopy:   -DEXA:     VAMSI Mejia MD      "

## 2018-10-14 DIAGNOSIS — F41.9 ANXIETY: ICD-10-CM

## 2018-10-15 RX ORDER — HYDROXYZINE PAMOATE 50 MG/1
CAPSULE ORAL
Qty: 30 CAPSULE | Refills: 0 | Status: SHIPPED | OUTPATIENT
Start: 2018-10-15 | End: 2019-06-19

## 2018-10-30 ENCOUNTER — OFFICE VISIT (OUTPATIENT)
Dept: GASTROENTEROLOGY | Facility: CLINIC | Age: 48
End: 2018-10-30
Payer: OTHER GOVERNMENT

## 2018-10-30 VITALS
HEIGHT: 66 IN | SYSTOLIC BLOOD PRESSURE: 115 MMHG | BODY MASS INDEX: 31.22 KG/M2 | DIASTOLIC BLOOD PRESSURE: 72 MMHG | WEIGHT: 194.25 LBS

## 2018-10-30 DIAGNOSIS — R10.13 DYSPEPSIA: ICD-10-CM

## 2018-10-30 DIAGNOSIS — R12 HEARTBURN: ICD-10-CM

## 2018-10-30 DIAGNOSIS — R14.0 ABDOMINAL BLOATING: Primary | ICD-10-CM

## 2018-10-30 DIAGNOSIS — R19.8 ALTERNATING CONSTIPATION AND DIARRHEA: ICD-10-CM

## 2018-10-30 DIAGNOSIS — R11.0 NAUSEA: ICD-10-CM

## 2018-10-30 PROCEDURE — 99204 OFFICE O/P NEW MOD 45 MIN: CPT | Mod: S$PBB,,, | Performed by: NURSE PRACTITIONER

## 2018-10-30 PROCEDURE — 99213 OFFICE O/P EST LOW 20 MIN: CPT | Mod: PBBFAC,PO | Performed by: NURSE PRACTITIONER

## 2018-10-30 PROCEDURE — 99999 PR PBB SHADOW E&M-EST. PATIENT-LVL III: CPT | Mod: PBBFAC,,, | Performed by: NURSE PRACTITIONER

## 2018-10-30 RX ORDER — PANTOPRAZOLE SODIUM 40 MG/1
40 TABLET, DELAYED RELEASE ORAL
Qty: 30 TABLET | Refills: 0 | Status: SHIPPED | OUTPATIENT
Start: 2018-10-30 | End: 2018-12-01 | Stop reason: SDUPTHER

## 2018-10-30 NOTE — PROGRESS NOTES
"Subjective:       Patient ID: Hillary Cotton is a 47 y.o. female.    Chief Complaint: Constipation; Bloated; and Nausea    HPI Reports chronic complaints of constipation and bloating that typically occurs the week before menstrual period.  However over the last 2 months, has had more persistent complaints that did not resolve when menstruation began as normal.  She has seen her gynecologist and will be started on oral contraceptives next month to attempt to regulate "hormones".  She denies blood with bowel movements.  At times will have diarrhea.  Her bowel pattern apart from around her period is a bowel movement every day or every other day.  Over the last weeks has added a probiotic and feels that her bowel habit is returning to normal.  She notes over the last 1-2 months she has also started having nausea, heartburn, indigestion and reflux.    Denies abdominal pain apart from episodic gas pains, which she reports can be severe.    Recently prescribed pantoprazole but has not used this.  She has recently added TUMS with improvement.  She reports no complaints over the last 4 days.    Denies any GI family history.    Recent labs unremarkable.    Review of Systems   Constitutional: Negative.  Negative for activity change, appetite change, fatigue, fever and unexpected weight change.   HENT: Negative for trouble swallowing.    Respiratory: Negative for shortness of breath.    Cardiovascular: Negative for chest pain.   Gastrointestinal: Positive for abdominal distention, abdominal pain, constipation, diarrhea and nausea.   Genitourinary: Negative.    Musculoskeletal: Negative.    Skin: Negative.    Neurological: Negative.    Psychiatric/Behavioral: Negative.        Objective:      Physical Exam   Constitutional: She is oriented to person, place, and time. She appears well-developed and well-nourished. No distress.   HENT:   Head: Normocephalic.   Eyes: No scleral icterus.   Neck: Neck supple. "   Cardiovascular: Normal rate.   Pulmonary/Chest: Effort normal. No respiratory distress.   Abdominal: Soft. Bowel sounds are normal. She exhibits no distension and no mass. There is no tenderness. There is no guarding.   Musculoskeletal: Normal range of motion.   Neurological: She is alert and oriented to person, place, and time.   Skin: Skin is warm and dry. She is not diaphoretic.   Psychiatric: She has a normal mood and affect. Her behavior is normal. Judgment and thought content normal.   Vitals reviewed.      Assessment:       1. Abdominal bloating    2. Alternating constipation and diarrhea    3. Heartburn    4. Dyspepsia        Plan:     Hillary was seen today for constipation, bloated and nausea.    Diagnoses and all orders for this visit:    Abdominal bloating    Alternating constipation and diarrhea    Heartburn    Dyspepsia       Discussed dietary modification.    She reports she is currently symptom free.  We have discussed US and/or EGD for her persistent nausea, indigestion, reflux complaints if they should return.    Continue probiotic.    Follow up in 2 months or sooner if needed.

## 2018-10-30 NOTE — LETTER
October 30, 2018      Arnulfo Barboza MD  [address] [facility]  [place] [id number]           [facility]  [address] [place] [address]  [place] [address]  Phone: [phone]          Patient: Hillary Cotton   MR Number: [id number]   YOB: 1970   Date of Visit: 10/30/2018       Dear Dr. Arnulfo Barboza:    Thank you for referring Hillary Cotton to me for evaluation. Attached you will find relevant portions of my assessment and plan of care.    If you have questions, please do not hesitate to call me. I look forward to following Hillary Cotton along with you.    Sincerely,    Sherin Domínguez, CELINA    Enclosure  CC:  No Recipients    If you would like to receive this communication electronically, please contact [email or web address] or [phone] to request more information on [employer] Link access.    For providers and/or their staff who would like to refer a patient to [facility], please contact us through our one-stop-shop provider referral line, [facility] , at [phone].    If you feel you have received this communication in error or would no longer like to receive these types of communications, please e-mail [email or web address]

## 2018-12-01 DIAGNOSIS — R11.0 NAUSEA: ICD-10-CM

## 2018-12-03 RX ORDER — PANTOPRAZOLE SODIUM 40 MG/1
40 TABLET, DELAYED RELEASE ORAL
Qty: 30 TABLET | Refills: 0 | Status: SHIPPED | OUTPATIENT
Start: 2018-12-03 | End: 2018-12-29 | Stop reason: SDUPTHER

## 2018-12-29 DIAGNOSIS — R11.0 NAUSEA: ICD-10-CM

## 2018-12-31 RX ORDER — PANTOPRAZOLE SODIUM 40 MG/1
40 TABLET, DELAYED RELEASE ORAL DAILY PRN
Qty: 30 TABLET | Refills: 0 | Status: SHIPPED | OUTPATIENT
Start: 2018-12-31 | End: 2019-06-19

## 2019-03-12 ENCOUNTER — TELEPHONE (OUTPATIENT)
Dept: INTERNAL MEDICINE | Facility: CLINIC | Age: 49
End: 2019-03-12

## 2019-03-12 ENCOUNTER — OFFICE VISIT (OUTPATIENT)
Dept: INTERNAL MEDICINE | Facility: CLINIC | Age: 49
End: 2019-03-12
Payer: OTHER GOVERNMENT

## 2019-03-12 VITALS
WEIGHT: 195.56 LBS | HEART RATE: 100 BPM | DIASTOLIC BLOOD PRESSURE: 80 MMHG | TEMPERATURE: 98 F | OXYGEN SATURATION: 97 % | HEIGHT: 66 IN | SYSTOLIC BLOOD PRESSURE: 124 MMHG | BODY MASS INDEX: 31.43 KG/M2

## 2019-03-12 DIAGNOSIS — J06.9 UPPER RESPIRATORY TRACT INFECTION, UNSPECIFIED TYPE: ICD-10-CM

## 2019-03-12 DIAGNOSIS — E66.9 OBESITY (BMI 30-39.9): ICD-10-CM

## 2019-03-12 DIAGNOSIS — N94.6 DYSMENORRHEA: ICD-10-CM

## 2019-03-12 DIAGNOSIS — M17.0 PRIMARY OSTEOARTHRITIS OF BOTH KNEES: ICD-10-CM

## 2019-03-12 DIAGNOSIS — Z00.00 ROUTINE GENERAL MEDICAL EXAMINATION AT A HEALTH CARE FACILITY: Primary | ICD-10-CM

## 2019-03-12 PROCEDURE — 99396 PREV VISIT EST AGE 40-64: CPT | Mod: S$PBB,,, | Performed by: INTERNAL MEDICINE

## 2019-03-12 PROCEDURE — 99999 PR PBB SHADOW E&M-EST. PATIENT-LVL III: ICD-10-PCS | Mod: PBBFAC,,, | Performed by: INTERNAL MEDICINE

## 2019-03-12 PROCEDURE — 90471 IMMUNIZATION ADMIN: CPT | Mod: PBBFAC,PN

## 2019-03-12 PROCEDURE — 99999 PR PBB SHADOW E&M-EST. PATIENT-LVL III: CPT | Mod: PBBFAC,,, | Performed by: INTERNAL MEDICINE

## 2019-03-12 PROCEDURE — 99213 OFFICE O/P EST LOW 20 MIN: CPT | Mod: PBBFAC,PN | Performed by: INTERNAL MEDICINE

## 2019-03-12 PROCEDURE — 99396 PR PREVENTIVE VISIT,EST,40-64: ICD-10-PCS | Mod: S$PBB,,, | Performed by: INTERNAL MEDICINE

## 2019-03-12 RX ORDER — METHYLPREDNISOLONE 4 MG/1
TABLET ORAL
Qty: 1 PACKAGE | Refills: 0 | Status: SHIPPED | OUTPATIENT
Start: 2019-03-12 | End: 2019-06-19

## 2019-03-12 RX ORDER — PROMETHAZINE HYDROCHLORIDE 25 MG/1
25 SUPPOSITORY RECTAL EVERY 6 HOURS PRN
Qty: 12 SUPPOSITORY | Refills: 1 | Status: SHIPPED | OUTPATIENT
Start: 2019-03-12 | End: 2019-06-19

## 2019-03-12 RX ORDER — IBUPROFEN 800 MG/1
800 TABLET ORAL EVERY 8 HOURS PRN
Qty: 180 TABLET | Refills: 4 | Status: SHIPPED | OUTPATIENT
Start: 2019-03-12 | End: 2019-06-19

## 2019-03-12 NOTE — PROGRESS NOTES
Subjective:       Patient ID: Hillary Cotton is a 48 y.o. female.    Chief Complaint: Cough; Sore Throat; Sinus Problem; and Fatigue    HPI  Pt with 2 days of facial pain and pressure, R ear pain, coryza, nonprod cough, chills, fatigue.  No SOB.  Having some knee pain.  Review of Systems   All other systems reviewed and are negative.      Objective:      Physical Exam   Constitutional: She appears well-developed. No distress.   obese   HENT:   Head: Normocephalic.   Mouth/Throat: Oropharynx is clear and moist.   TMs OK  Sinuses nontender   Eyes: EOM are normal.   Neck: Normal range of motion. No tracheal deviation present.   Cardiovascular: Normal rate, regular rhythm, normal heart sounds and intact distal pulses.   Pulmonary/Chest: Effort normal and breath sounds normal. No respiratory distress.   Abdominal: She exhibits no distension.   Musculoskeletal: Normal range of motion. She exhibits no edema.   Neurological: She is alert. No cranial nerve deficit. She exhibits normal muscle tone. Coordination normal.   Skin: Skin is warm and dry. No rash noted. She is not diaphoretic. No erythema.   Psychiatric: She has a normal mood and affect. Her behavior is normal.   Vitals reviewed.      Assessment:       1. Routine general medical examination at a health care facility    2. Upper respiratory tract infection, unspecified type    3. Obesity (BMI 30-39.9)    4. Primary osteoarthritis of both knees    5. Dysmenorrhea        Plan:       Hillary was seen today for cough, sore throat, sinus problem and fatigue.    Diagnoses and all orders for this visit:    Routine general medical examination at a health care facility  -     Tdap Vaccine    Upper respiratory tract infection, unspecified type  -     methylPREDNISolone (MEDROL DOSEPACK) 4 mg tablet; use as directed  -     promethazine (PHENERGAN) 25 MG suppository; Place 1 suppository (25 mg total) rectally every 6 (six) hours as needed for Nausea.    Obesity (BMI  30-39.9)   Urged weight loss    Primary osteoarthritis of both knees  Advil prn    Dysmenorrhea  -     ibuprofen (ADVIL,MOTRIN) 800 MG tablet; Take 1 tablet (800 mg total) by mouth every 8 (eight) hours as needed for Pain.      Follow-up if symptoms worsen or fail to improve.

## 2019-06-19 ENCOUNTER — OFFICE VISIT (OUTPATIENT)
Dept: URGENT CARE | Facility: CLINIC | Age: 49
End: 2019-06-19
Payer: OTHER GOVERNMENT

## 2019-06-19 VITALS
DIASTOLIC BLOOD PRESSURE: 76 MMHG | TEMPERATURE: 98 F | OXYGEN SATURATION: 99 % | BODY MASS INDEX: 31.02 KG/M2 | RESPIRATION RATE: 20 BRPM | SYSTOLIC BLOOD PRESSURE: 126 MMHG | WEIGHT: 193 LBS | HEIGHT: 66 IN | HEART RATE: 78 BPM

## 2019-06-19 DIAGNOSIS — L23.7 POISON IVY: ICD-10-CM

## 2019-06-19 DIAGNOSIS — R21 RASH: Primary | ICD-10-CM

## 2019-06-19 PROCEDURE — 99214 PR OFFICE/OUTPT VISIT, EST, LEVL IV, 30-39 MIN: ICD-10-PCS | Mod: 25,S$GLB,, | Performed by: FAMILY MEDICINE

## 2019-06-19 PROCEDURE — 96372 THER/PROPH/DIAG INJ SC/IM: CPT | Mod: S$GLB,,, | Performed by: FAMILY MEDICINE

## 2019-06-19 PROCEDURE — 99214 OFFICE O/P EST MOD 30 MIN: CPT | Mod: 25,S$GLB,, | Performed by: FAMILY MEDICINE

## 2019-06-19 PROCEDURE — 96372 PR INJECTION,THERAP/PROPH/DIAG2ST, IM OR SUBCUT: ICD-10-PCS | Mod: S$GLB,,, | Performed by: FAMILY MEDICINE

## 2019-06-19 RX ORDER — BETAMETHASONE SODIUM PHOSPHATE AND BETAMETHASONE ACETATE 3; 3 MG/ML; MG/ML
6 INJECTION, SUSPENSION INTRA-ARTICULAR; INTRALESIONAL; INTRAMUSCULAR; SOFT TISSUE
Status: COMPLETED | OUTPATIENT
Start: 2019-06-19 | End: 2019-06-19

## 2019-06-19 RX ORDER — PREDNISONE 10 MG/1
TABLET ORAL
Qty: 30 TABLET | Refills: 0 | Status: SHIPPED | OUTPATIENT
Start: 2019-06-19 | End: 2019-07-17

## 2019-06-19 RX ORDER — TRIAMCINOLONE ACETONIDE 1 MG/G
CREAM TOPICAL 2 TIMES DAILY
Qty: 80 G | Refills: 1 | Status: SHIPPED | OUTPATIENT
Start: 2019-06-19 | End: 2019-11-26

## 2019-06-19 RX ADMIN — BETAMETHASONE SODIUM PHOSPHATE AND BETAMETHASONE ACETATE 6 MG: 3; 3 INJECTION, SUSPENSION INTRA-ARTICULAR; INTRALESIONAL; INTRAMUSCULAR; SOFT TISSUE at 12:06

## 2019-06-19 NOTE — PATIENT INSTRUCTIONS
Poison Ivy Rash  You have a rash and itching. This is a delayed reaction to the oils of the poison ivy plant. You likely came in contact with it during the 3 days before your symptoms began. Your skin will become red and itchy. Small blisters may appear. These can break and leak a clear yellow fluid. This fluid is not contagious. The reaction usually starts to go away after 1 to 2 weeks. But it may take 4 to 6 weeks to fully clear.    Home care  Follow these guidelines when caring for yourself at home:  · The plant oils still on your skin or clothes can be spread to other places on your body. They can also be passed on to other people and cause a similar reaction. Thats why its important to wash all of the plant oils off your skin and any clothes that may have been exposed. Wash all clothes that you were wearing. Use hot water with ordinary laundry detergent.  · Don't use over-the-counter creams that have neomycin or bacitracin. These may make the rash worse.  · Avoid anything that heats up your skin. This includes hot showers or baths, or direct sunlight. These can make itching worse.  · Put a cold compress on areas that are leaking (weeping), or on blistered areas. Do this for 30 minutes 3 times a day. To make a cold compress, dip a wash cloth in a mixture of 1 pint of cold water and 1 packet of astringent or oatmeal bath powder. Keep the solution in the refrigerator for future use.  · If large areas of skin are affected, take a lukewarm bath. Add colloidal oatmeal, or 1 cup of cornstarch or baking soda to the water.  · For a rash in a smaller area, use hydrocortisone cream for redness and irritation. But dont use this if another medicine was prescribed. For severe itching, put an ice pack on the area. To make an ice pack, put ice cubes in a plastic bag that seals at the top. Wrap the bag in a clean, thin towel or cloth. Never put ice or an ice pack directly on the skin. Over-the-counter products that have  calamine lotion may also be helpful.  · You can also use an oral antihistamine medicine with diphenhydramine for itching, unless another medicine was prescribed. This medicine may make you sleepy. So use lower doses during the daytime and higher doses at bedtime. Dont use medicine that has diphenhydramine if you have glaucoma. Also dont use it if you are a man who has trouble urinating because of an enlarged prostate. Antihistamines with loratidine cause less drowsiness. They are a good choice for daytime use.  · For severe cases, your provider may prescribe oral steroid medicines. Always take these exactly as prescribed.  Follow-up care  Follow up with your healthcare provider, or as directed. Call your provider if your rash gets worse or you are not starting to get better after 1 week of treatment.  When to seek medical advice  Call your healthcare provider right away if any of these occur:  · Spreading facial rash with swollen mouth or eyelids  · Rash that spreads to the groin and causes swelling of the penis, scrotum, or vaginal area  · Trouble urinating because of swelling in the genital area  Also call your provider if you have signs of infection in the areas of broken blisters:  · Spreading redness  · Pus or fluid draining from the blisters  · Yellow-brown crusts form over the open blisters  · Fever of 1 degree, or higher, above your normal temperature, or as directed by your provider  Call 911  Call 911 if you have severe swelling on your face, eyelids, mouth, throat, or tongue.  Date Last Reviewed: 8/1/2016  © 1114-1036 [employer]. [address] [place] [address], [place] [address]. All rights reserved. This information is not intended as a substitute for professional medical care. Always follow your healthcare professional's instructions.

## 2019-06-19 NOTE — PROGRESS NOTES
"Subjective:       Patient ID: Hillary Cotton is a 48 y.o. female.    Vitals:  height is 5' 6" (1.676 m) and weight is 87.5 kg (193 lb). Her oral temperature is 98.4 °F (36.9 °C). Her blood pressure is 126/76 and her pulse is 78. Her respiration is 20 and oxygen saturation is 99%.     Chief Complaint: Rash    Rash on neck and face for 2 days after doing yard work.itching and burning feeling no pain, rash is spreading to arms and legs    Rash   This is a new problem. The current episode started in the past 7 days. The problem has been rapidly worsening since onset. The affected locations include the neck and face. The rash is characterized by itchiness, dryness, pain and redness. It is unknown if there was an exposure to a precipitant. Associated symptoms include fatigue. Pertinent negatives include no cough, fever or sore throat. Past treatments include anti-itch cream and antihistamine. The treatment provided no relief. Her past medical history is significant for asthma. There is no history of allergies.       Constitution: Positive for fatigue. Negative for chills and fever.   HENT: Negative for facial swelling and sore throat.    Neck: Negative for painful lymph nodes.   Eyes: Negative for eye itching and eyelid swelling.   Respiratory: Negative for cough.    Musculoskeletal: Negative for joint pain and joint swelling.   Skin: Positive for rash. Negative for color change, pale, wound, abrasion, laceration, lesion, skin thickening/induration, puncture wound, erythema, bruising, abscess, avulsion and hives.   Allergic/Immunologic: Positive for itching. Negative for environmental allergies, immunocompromised state and hives.   Hematologic/Lymphatic: Negative for swollen lymph nodes.       Objective:      Physical Exam   Constitutional: She is oriented to person, place, and time. She appears well-developed and well-nourished. She is cooperative.  Non-toxic appearance. She does not appear ill.   HENT:   Head: " Normocephalic and atraumatic.   Right Ear: Hearing, tympanic membrane, external ear and ear canal normal.   Left Ear: Hearing, tympanic membrane, external ear and ear canal normal.   Nose: Nose normal. No mucosal edema, rhinorrhea or nasal deformity. No epistaxis. Right sinus exhibits no maxillary sinus tenderness and no frontal sinus tenderness. Left sinus exhibits no maxillary sinus tenderness and no frontal sinus tenderness.   Mouth/Throat: Uvula is midline, oropharynx is clear and moist and mucous membranes are normal. No trismus in the jaw. Normal dentition. No uvula swelling. No posterior oropharyngeal erythema.   Eyes: Conjunctivae and lids are normal. Right eye exhibits no discharge. Left eye exhibits no discharge. No scleral icterus.   Sclera clear bilat   Neck: Trachea normal, normal range of motion, full passive range of motion without pain and phonation normal. Neck supple.   Cardiovascular: Normal rate, regular rhythm, normal heart sounds, intact distal pulses and normal pulses. Exam reveals no gallop and no friction rub.   No murmur heard.  Pulmonary/Chest: Effort normal and breath sounds normal. No stridor. No respiratory distress. She has no wheezes. She has no rales.   Abdominal: Soft. Normal appearance and bowel sounds are normal. She exhibits no distension, no pulsatile midline mass and no mass. There is no tenderness.   Musculoskeletal: Normal range of motion. She exhibits no edema or deformity.   Lymphadenopathy:     She has no cervical adenopathy.   Neurological: She is alert and oriented to person, place, and time. She exhibits normal muscle tone. Coordination normal.   Skin: Skin is warm, dry and intact. She is not diaphoretic. No erythema. No pallor.   Clustered vesicular rash on right side of neck, no rash on left side  , also few papular lesions on forearm and legs   Psychiatric: She has a normal mood and affect. Her speech is normal and behavior is normal. Judgment and thought content  normal. Cognition and memory are normal.   Nursing note and vitals reviewed.      Assessment:       1. Rash    2. Poison ivy        Plan:         Rash  -     betamethasone acetate-betamethasone sodium phosphate injection 6 mg  -     triamcinolone acetonide 0.1% (KENALOG) 0.1 % cream; Apply topically 2 (two) times daily. for 10 days  Dispense: 80 g; Refill: 1  -     predniSONE (DELTASONE) 10 MG tablet; Take 2 po bid x 3 days, one bid x 3 days, then one daily till finish  Dispense: 30 tablet; Refill: 0    Poison ivy  -     triamcinolone acetonide 0.1% (KENALOG) 0.1 % cream; Apply topically 2 (two) times daily. for 10 days  Dispense: 80 g; Refill: 1        Cont zyrtec daily

## 2019-07-15 ENCOUNTER — TELEPHONE (OUTPATIENT)
Dept: FAMILY MEDICINE | Facility: CLINIC | Age: 49
End: 2019-07-15

## 2019-07-15 DIAGNOSIS — R21 RASH: Primary | ICD-10-CM

## 2019-07-17 ENCOUNTER — LAB VISIT (OUTPATIENT)
Dept: LAB | Facility: HOSPITAL | Age: 49
End: 2019-07-17
Attending: FAMILY MEDICINE
Payer: OTHER GOVERNMENT

## 2019-07-17 ENCOUNTER — OFFICE VISIT (OUTPATIENT)
Dept: FAMILY MEDICINE | Facility: CLINIC | Age: 49
End: 2019-07-17
Payer: OTHER GOVERNMENT

## 2019-07-17 VITALS
HEART RATE: 96 BPM | OXYGEN SATURATION: 96 % | SYSTOLIC BLOOD PRESSURE: 112 MMHG | BODY MASS INDEX: 31.99 KG/M2 | HEIGHT: 66 IN | DIASTOLIC BLOOD PRESSURE: 60 MMHG | WEIGHT: 199.06 LBS

## 2019-07-17 DIAGNOSIS — T78.40XD ALLERGIC REACTION, SUBSEQUENT ENCOUNTER: ICD-10-CM

## 2019-07-17 DIAGNOSIS — N95.1 PERIMENOPAUSE: ICD-10-CM

## 2019-07-17 DIAGNOSIS — E16.2 HYPOGLYCEMIA: ICD-10-CM

## 2019-07-17 DIAGNOSIS — L23.7 POISON IVY: Primary | ICD-10-CM

## 2019-07-17 DIAGNOSIS — R07.89 ATYPICAL CHEST PAIN: ICD-10-CM

## 2019-07-17 DIAGNOSIS — Z82.49 FAMILY HISTORY OF CHRONIC ISCHEMIC HEART DISEASE: ICD-10-CM

## 2019-07-17 PROCEDURE — 83002 ASSAY OF GONADOTROPIN (LH): CPT

## 2019-07-17 PROCEDURE — 83036 HEMOGLOBIN GLYCOSYLATED A1C: CPT

## 2019-07-17 PROCEDURE — 99214 OFFICE O/P EST MOD 30 MIN: CPT | Mod: PBBFAC,PO | Performed by: FAMILY MEDICINE

## 2019-07-17 PROCEDURE — 83001 ASSAY OF GONADOTROPIN (FSH): CPT

## 2019-07-17 PROCEDURE — 99214 OFFICE O/P EST MOD 30 MIN: CPT | Mod: S$PBB,,, | Performed by: FAMILY MEDICINE

## 2019-07-17 PROCEDURE — 99999 PR PBB SHADOW E&M-EST. PATIENT-LVL IV: CPT | Mod: PBBFAC,,, | Performed by: FAMILY MEDICINE

## 2019-07-17 PROCEDURE — 83525 ASSAY OF INSULIN: CPT

## 2019-07-17 PROCEDURE — 82670 ASSAY OF TOTAL ESTRADIOL: CPT

## 2019-07-17 PROCEDURE — 99214 PR OFFICE/OUTPT VISIT, EST, LEVL IV, 30-39 MIN: ICD-10-PCS | Mod: S$PBB,,, | Performed by: FAMILY MEDICINE

## 2019-07-17 PROCEDURE — 99999 PR PBB SHADOW E&M-EST. PATIENT-LVL IV: ICD-10-PCS | Mod: PBBFAC,,, | Performed by: FAMILY MEDICINE

## 2019-07-17 RX ORDER — HYDROXYZINE HYDROCHLORIDE 25 MG/1
TABLET, FILM COATED ORAL
COMMUNITY
Start: 2019-06-30 | End: 2019-08-22

## 2019-07-17 NOTE — PROGRESS NOTES
Subjective:       Patient ID: Hillary Cotton is a 48 y.o. female.    Chief Complaint: Rash (on back of left leg x 1 month)    48 years old female came to the clinic after being exposed to poison ivy patient developed rash over her neck associated with itching.  Patient also with allergy reaction all over her body.  Patient was treated with 2 rounds of steroids.  She is currently on Zyrtec and hydroxyzine.  Patient with a BMI of 32 currently trying to lose weight.  Patient with family history of ischemic heart disease. She reports episodic chest pain. No palpitation, orthopnea or PND.  Patient with hot flashes sometimes associated with mood changes.  Patient wants to be checked for menopausal syndrome.    Review of Systems   Constitutional: Negative.    HENT: Negative.    Eyes: Negative.    Respiratory: Negative.    Cardiovascular: Positive for chest pain.   Gastrointestinal: Negative.    Genitourinary: Negative.    Musculoskeletal: Negative.    Skin: Positive for rash.   Neurological: Negative.    Psychiatric/Behavioral: Negative.        Objective:      Physical Exam   Constitutional: She is oriented to person, place, and time. She appears well-developed and well-nourished. No distress.   HENT:   Head: Normocephalic and atraumatic.   Right Ear: External ear normal.   Left Ear: External ear normal.   Nose: Nose normal.   Mouth/Throat: Oropharynx is clear and moist. No oropharyngeal exudate.   Eyes: Pupils are equal, round, and reactive to light. Conjunctivae and EOM are normal. Right eye exhibits no discharge. Left eye exhibits no discharge. No scleral icterus.   Neck: Normal range of motion. Neck supple. No JVD present. No tracheal deviation present. No thyromegaly present.   Cardiovascular: Normal rate, regular rhythm, normal heart sounds and intact distal pulses. Exam reveals no gallop and no friction rub.   No murmur heard.  Pulmonary/Chest: Effort normal and breath sounds normal. No stridor. No  respiratory distress. She has no wheezes. She has no rales. She exhibits no tenderness.   Abdominal: Soft. Bowel sounds are normal. She exhibits no distension and no mass. There is no tenderness. There is no rebound and no guarding.   Musculoskeletal: Normal range of motion. She exhibits no edema or tenderness.   Lymphadenopathy:     She has no cervical adenopathy.   Neurological: She is alert and oriented to person, place, and time. She has normal reflexes. No cranial nerve deficit. She exhibits normal muscle tone. Coordination normal.   Skin: Skin is warm and dry. No rash noted. She is not diaphoretic. No erythema. No pallor.   Psychiatric: She has a normal mood and affect. Her behavior is normal. Judgment and thought content normal.       Assessment:       1. Poison ivy    2. Allergic reaction, subsequent encounter    3. Hypoglycemia    4. Perimenopause    5. Family history of chronic ischemic heart disease    6. Atypical chest pain        Plan:         Hillary was seen today for rash.    Diagnoses and all orders for this visit:    Poison ivy  -     Ambulatory referral to Allergy    Allergic reaction, subsequent encounter  -     Ambulatory referral to Allergy    Hypoglycemia  -     INSULIN, RANDOM; Future  -     Hemoglobin A1c; Future    Perimenopause  -     Follicle stimulating hormone; Future  -     Luteinizing hormone; Future  -     Estradiol; Future    Family history of chronic ischemic heart disease  -     Echocardiogram stress test with CFD; Future    Atypical chest pain  -     Echocardiogram stress test with CFD; Future

## 2019-07-18 ENCOUNTER — TELEPHONE (OUTPATIENT)
Dept: ADMINISTRATIVE | Facility: OTHER | Age: 49
End: 2019-07-18

## 2019-07-18 LAB
ESTIMATED AVG GLUCOSE: 114 MG/DL (ref 68–131)
ESTRADIOL SERPL-MCNC: 48 PG/ML
FSH SERPL-ACNC: 1.3 MIU/ML
HBA1C MFR BLD HPLC: 5.6 % (ref 4–5.6)
INSULIN COLLECTION INTERVAL: ABNORMAL
INSULIN SERPL-ACNC: 95.6 UU/ML
LH SERPL-ACNC: 0.7 MIU/ML

## 2019-07-26 ENCOUNTER — HOSPITAL ENCOUNTER (OUTPATIENT)
Dept: CARDIOLOGY | Facility: HOSPITAL | Age: 49
Discharge: HOME | End: 2019-07-26
Attending: FAMILY MEDICINE
Payer: OTHER GOVERNMENT

## 2019-07-26 DIAGNOSIS — Z82.49 FAMILY HISTORY OF CHRONIC ISCHEMIC HEART DISEASE: ICD-10-CM

## 2019-07-26 DIAGNOSIS — R07.89 ATYPICAL CHEST PAIN: ICD-10-CM

## 2019-07-26 LAB
AORTIC ROOT ANNULUS: 2.44 CM
AORTIC VALVE CUSP SEPERATION: 1.56 CM
AV INDEX (PROSTH): 0.95
AV MEAN GRADIENT: 4 MMHG
AV PEAK GRADIENT: 6 MMHG
AV VALVE AREA: 2.69 CM2
AV VELOCITY RATIO: 0.91
CV ECHO LV RWT: 0.36 CM
CV STRESS BASE HR: 94 BPM
DIASTOLIC BLOOD PRESSURE: 69 MMHG
DOP CALC AO PEAK VEL: 1.26 M/S
DOP CALC AO VTI: 24.21 CM
DOP CALC LVOT AREA: 2.8 CM2
DOP CALC LVOT DIAMETER: 1.9 CM
DOP CALC LVOT PEAK VEL: 1.15 M/S
DOP CALC LVOT STROKE VOLUME: 65.15 CM3
DOP CALCLVOT PEAK VEL VTI: 22.99 CM
E WAVE DECELERATION TIME: 118.6 MSEC
E/A RATIO: 0.96
ECHO LV POSTERIOR WALL: 0.77 CM (ref 0.6–1.1)
FRACTIONAL SHORTENING: 28 % (ref 28–44)
INTERVENTRICULAR SEPTUM: 0.86 CM (ref 0.6–1.1)
IVRT: 0.07 MSEC
LA MAJOR: 4.46 CM
LA MINOR: 4.36 CM
LA WIDTH: 2.39 CM
LEFT ATRIUM SIZE: 3.32 CM
LEFT ATRIUM VOLUME: 29.74 CM3
LEFT INTERNAL DIMENSION IN SYSTOLE: 3.09 CM (ref 2.1–4)
LEFT VENTRICLE DIASTOLIC VOLUME: 82.29 ML
LEFT VENTRICLE SYSTOLIC VOLUME: 37.64 ML
LEFT VENTRICULAR INTERNAL DIMENSION IN DIASTOLE: 4.28 CM (ref 3.5–6)
LEFT VENTRICULAR MASS: 107.11 G
MV PEAK A VEL: 0.79 M/S
MV PEAK E VEL: 0.76 M/S
OHS CV CPX 1 MINUTE RECOVERY HEART RATE: 144 BPM
OHS CV CPX 85 PERCENT MAX PREDICTED HEART RATE MALE: 139
OHS CV CPX ESTIMATED METS: 8
OHS CV CPX MAX PREDICTED HEART RATE: 164
OHS CV CPX PATIENT IS FEMALE: 1
OHS CV CPX PATIENT IS MALE: 0
OHS CV CPX PEAK DIASTOLIC BLOOD PRESSURE: 67 MMHG
OHS CV CPX PEAK HEAR RATE: 184 BPM
OHS CV CPX PEAK RATE PRESSURE PRODUCT: NORMAL
OHS CV CPX PEAK SYSTOLIC BLOOD PRESSURE: 174 MMHG
OHS CV CPX PERCENT MAX PREDICTED HEART RATE ACHIEVED: 112
OHS CV CPX RATE PRESSURE PRODUCT PRESENTING: 9400
PISA TR MAX VEL: 2.27 M/S
PV PEAK VELOCITY: 1.12 CM/S
RA MAJOR: 4.32 CM
RA PRESSURE: 3 MMHG
RIGHT VENTRICULAR END-DIASTOLIC DIMENSION: 2.15 CM
STRESS ECHO POST EXERCISE DUR MIN: 6 MINUTES
STRESS ECHO POST EXERCISE DUR SEC: 32 SECONDS
SYSTOLIC BLOOD PRESSURE: 100 MMHG
TR MAX PG: 21 MMHG
TV REST PULMONARY ARTERY PRESSURE: 24 MMHG

## 2019-07-26 PROCEDURE — 93325 ECHOCARDIOGRAM STRESS TEST WITH COLOR FLOW DOPPLER (CUPID ONLY): ICD-10-PCS | Mod: 26,,, | Performed by: INTERNAL MEDICINE

## 2019-07-26 PROCEDURE — 93320 DOPPLER ECHO COMPLETE: CPT | Mod: 26,,, | Performed by: INTERNAL MEDICINE

## 2019-07-26 PROCEDURE — 93351 ECHOCARDIOGRAM STRESS TEST WITH COLOR FLOW DOPPLER (CUPID ONLY): ICD-10-PCS | Mod: 26,,, | Performed by: INTERNAL MEDICINE

## 2019-07-26 PROCEDURE — 93325 DOPPLER ECHO COLOR FLOW MAPG: CPT

## 2019-07-26 PROCEDURE — 93351 STRESS TTE COMPLETE: CPT | Mod: 26,,, | Performed by: INTERNAL MEDICINE

## 2019-07-26 PROCEDURE — 93325 DOPPLER ECHO COLOR FLOW MAPG: CPT | Mod: 26,,, | Performed by: INTERNAL MEDICINE

## 2019-07-26 PROCEDURE — 93320 ECHOCARDIOGRAM STRESS TEST WITH COLOR FLOW DOPPLER (CUPID ONLY): ICD-10-PCS | Mod: 26,,, | Performed by: INTERNAL MEDICINE

## 2019-08-09 DIAGNOSIS — Z12.39 BREAST CANCER SCREENING: ICD-10-CM

## 2019-08-21 ENCOUNTER — TELEPHONE (OUTPATIENT)
Dept: ALLERGY | Facility: CLINIC | Age: 49
End: 2019-08-21

## 2019-08-21 NOTE — TELEPHONE ENCOUNTER
Informed patient that we do not test for poison ivy. She would need to see dermatology. Patient stated that she thinks she is allergic to prednisone and wants testing for that as well as a full allergy test. Explained to patient the first visit is a consultation and allergy tests will only be ordered if warranted bc insurances do not always cover testing. Pt verbalized understanding and stated that she will see us in the morning.

## 2019-08-21 NOTE — TELEPHONE ENCOUNTER
----- Message from Chiquita García sent at 8/21/2019  9:59 AM CDT -----  Contact: patient  Please call above patient at [phone] has question about appointment waiting on a call from the nurse thanks

## 2019-08-22 ENCOUNTER — OFFICE VISIT (OUTPATIENT)
Dept: ALLERGY | Facility: CLINIC | Age: 49
End: 2019-08-22
Attending: ALLERGY & IMMUNOLOGY
Payer: OTHER GOVERNMENT

## 2019-08-22 VITALS — HEIGHT: 66 IN | BODY MASS INDEX: 32.56 KG/M2 | WEIGHT: 202.63 LBS

## 2019-08-22 DIAGNOSIS — J31.0 CHRONIC RHINITIS: ICD-10-CM

## 2019-08-22 DIAGNOSIS — Z88.9 H/O DRUG ALLERGY: Primary | ICD-10-CM

## 2019-08-22 DIAGNOSIS — J32.9 RECURRENT SINUSITIS: ICD-10-CM

## 2019-08-22 PROCEDURE — 99244 PR OFFICE CONSULTATION,LEVEL IV: ICD-10-PCS | Mod: S$PBB,,, | Performed by: ALLERGY & IMMUNOLOGY

## 2019-08-22 PROCEDURE — 99213 OFFICE O/P EST LOW 20 MIN: CPT | Mod: PBBFAC | Performed by: ALLERGY & IMMUNOLOGY

## 2019-08-22 PROCEDURE — 99999 PR PBB SHADOW E&M-EST. PATIENT-LVL III: CPT | Mod: PBBFAC,,, | Performed by: ALLERGY & IMMUNOLOGY

## 2019-08-22 PROCEDURE — 99244 OFF/OP CNSLTJ NEW/EST MOD 40: CPT | Mod: S$PBB,,, | Performed by: ALLERGY & IMMUNOLOGY

## 2019-08-22 PROCEDURE — 99999 PR PBB SHADOW E&M-EST. PATIENT-LVL III: ICD-10-PCS | Mod: PBBFAC,,, | Performed by: ALLERGY & IMMUNOLOGY

## 2019-08-22 NOTE — LETTER
August 22, 2019      Arnulfo Barboza MD  [address] [facility]  [place] [address]           [facility]  [address]  [place] [address]  Phone: [phone]  Fax: [phone]          Patient: Hillary Cotton   MR Number: [id number]   YOB: 1970   Date of Visit: 8/22/2019       Dear Dr. Arnulfo Barboza:    Thank you for referring Hillary Cotton to me for evaluation. Attached you will find relevant portions of my assessment and plan of care.    If you have questions, please do not hesitate to call me. I look forward to following Hillary Cotton along with you.    Sincerely,    James Lr MD    Enclosure  CC:  No Recipients    If you would like to receive this communication electronically, please contact [email or web address] or [phone] to request more information on [email or web address] Link access.    For providers and/or their staff who would like to refer a patient to [facility], please contact us through our one-stop-shop provider referral line, [facility], at [phone].    If you feel you have received this communication in error or would no longer like to receive these types of communications, please e-mail [email or web address]

## 2019-08-22 NOTE — PROGRESS NOTES
Subjective:       Patient ID: Hillary Cotton is a 48 y.o. female.    Chief Complaint:  Other (patient stated that on 2 separate occassions she broke out with Prednisone tablets); Nasal Congestion; and Sinus Problem (frequent sinus infections or uri)      HPI    Pt presents w concern of possible prednisone allergy. Also concerns of allergic rhinitis, recurrent sinusitis.    She has had 2 episodes of rash though possibly associated with taking oral prednisone. The most recent episode occurred in June of this year. She had poison ivy dermatitis on her R neck, arm and face. Was treated w IM betamethasone followed by po prednisone. She tolerated IM betamethasone w/o obvious problem. About 6 days into her prolonged prednisone course, as she started the decreased dose of 10 mg daily, she started w rash different from initial poison ivy dermatitis. New rash was present on torso. Not urticarial. Did c/o generalized itching. No assoc resp distress. At subsequent urgent care visit prednisone was discontinued and medrol dose pack was rx'd. She seemed to tolerate additional IM steroids, po medrol. With these txt's rash subsided. During this time she also tolerated topical triamcinolone without any adverse reaction--no localized worsening of rash at areas of contact.    She also recalls an episode in 2009 of suspected rash assoc w prednisone pills. She was rx'd po  prednisone for knee swelling. A few days into the course she recalls developing generalized rash. There was minimal pruritus assoc w rash. No associated resp distress. Rash resolved soon after discontinuation of prednisone.    Tolerates intranasal fluticsone w/o problem  Receives IM steroids (she's unsure which in particular) about once per year at urgent cares for sinusitis sx's.  She has tolerated all previous IM steroids.    She's also concerned about recurrent sinus infections since teen years. Reports at least 1-2 per year. Also w freq nasal congestion,  sinus pressure, assoc HA, rhinorrhea, PND. Sx's worse in winter. Has distant hx of wheeze, resolved since she stopped smoking 4 years ago.  Strong odors, cats aggravate rhinitis sx's      Environmental History: Pets in the home: dogs (1).  [place]: hardwood floors  Tobacco Smoke in Home: no    Past Medical History:   Diagnosis Date    Abnormal Pap smear     Allergy     Asthma     Sinusitis, chronic        Family History   Problem Relation Age of Onset    Diabetes Mother     Hypertension Mother     Asthma Mother     Sinus disease Mother     Allergies Father     Diabetes Maternal Grandmother     Hypertension Maternal Grandmother     Sinus disease Sister     Sinus disease Brother     Sinus disease Sister          Review of Systems   Constitutional: Negative for activity change, fatigue and fever.   HENT: Positive for congestion. Negative for postnasal drip, rhinorrhea, sinus pressure and sneezing.    Eyes: Negative for discharge, redness and itching.   Respiratory: Negative for cough, shortness of breath and wheezing.    Cardiovascular: Negative for chest pain.   Gastrointestinal: Negative for diarrhea, nausea and vomiting.   Genitourinary: Negative for dysuria.   Musculoskeletal: Negative for arthralgias and joint swelling.   Skin: Negative for rash.   Neurological: Negative for headaches.   Hematological: Does not bruise/bleed easily.   Psychiatric/Behavioral: Negative for behavioral problems and sleep disturbance.        Objective:   Physical Exam   Constitutional: She is oriented to person, place, and time. She appears well-developed and well-nourished. No distress.   HENT:   Head: Normocephalic.   Right Ear: Tympanic membrane and external ear normal.   Left Ear: Tympanic membrane and external ear normal.   Nose: No mucosal edema, rhinorrhea, sinus tenderness or septal deviation. Right sinus exhibits no maxillary sinus tenderness and no frontal sinus tenderness. Left sinus exhibits no maxillary sinus  tenderness and no frontal sinus tenderness.   Mouth/Throat: Uvula is midline, oropharynx is clear and moist and mucous membranes are normal. No uvula swelling.   Eyes: Conjunctivae are normal. Right eye exhibits no discharge. Left eye exhibits no discharge.   Neck: Normal range of motion. Neck supple.   Cardiovascular: Normal rate and regular rhythm.   Pulmonary/Chest: Effort normal and breath sounds normal. No respiratory distress. She has no wheezes.   Abdominal: Soft. Bowel sounds are normal. There is no tenderness.   Musculoskeletal: Normal range of motion. She exhibits no edema or tenderness.   Lymphadenopathy:     She has no cervical adenopathy.   Neurological: She is alert and oriented to person, place, and time.   Skin: Skin is warm. No rash noted. No erythema.   Psychiatric: She has a normal mood and affect. Her behavior is normal. Judgment and thought content normal.   Nursing note and vitals reviewed.        Assessment:       1. H/O drug allergy --possible prednisone allergy. Hx delayed onset rash on 2 occasions   2. Chronic rhinitis         Plan:       Hillary was seen today for other, nasal congestion and sinus problem.    Diagnoses and all orders for this visit:    H/O drug allergy   Given hx delayed onset rash on 2 occasions, will list prednisone on allergy card. Given hx of delayed onset sx without any resp distress, though, suspect risk of anaphylaxis or severe IgE mediated allergy is low.    Has tolerated multiple other steroids--po, IM, intranasal, topical--without problem. No need at present to avoid steroids other than prednisone      Chronic rhinitis / recurrent sinusitis  -     S.pneumoniae IgG Serotypes; Future  -     Immunoglobulins (IgG, IgA, IgM) Quantitative; Future  -     IgE; Future  -     Cat epithelium IgE; Future  -     Dog dander IgE; Future  -     D. farinae IgE; Future  -     D. pteronyssinus IgE; Future  -     Aspergillus fumagatus IgE; Future  -     Allergen-Alternaria  Alternata; Future  -     Plantain, English IgE; Future  -     Marsh elder, rough IgE; Future  -     Ragweed, short, common IgE; Future  -     Allergen, Pecan Tree IgE; Future  -     Allergen-[place]; Future  -     [place], white IgE; Future  -     Maurilio IgE; Future  -     Bahia grass IgE; Future  -     Cockroach, American IgE; Future  Low threshold routine flonase  Prn zyrtec  Fu pending results

## 2019-08-30 ENCOUNTER — TELEPHONE (OUTPATIENT)
Dept: ALLERGY | Facility: CLINIC | Age: 49
End: 2019-08-30

## 2019-08-30 NOTE — TELEPHONE ENCOUNTER
----- Message from James Lr MD sent at 8/29/2019  7:58 AM CDT -----  Please call to let know that evaluation of pt's immune system showed that pt may benefit from an extra vaccine, Pneumovax, to decrease frequency of sinus infections. Please schedule follow up appointment to review labs and discuss Pneumovax vaccine.

## 2019-09-11 ENCOUNTER — OFFICE VISIT (OUTPATIENT)
Dept: ALLERGY | Facility: CLINIC | Age: 49
End: 2019-09-11
Payer: OTHER GOVERNMENT

## 2019-09-11 VITALS
SYSTOLIC BLOOD PRESSURE: 124 MMHG | WEIGHT: 204.13 LBS | DIASTOLIC BLOOD PRESSURE: 86 MMHG | BODY MASS INDEX: 32.81 KG/M2 | HEIGHT: 66 IN

## 2019-09-11 DIAGNOSIS — R76.0 ABNORMAL ANTIBODY TITER: Primary | ICD-10-CM

## 2019-09-11 DIAGNOSIS — J32.9 RECURRENT SINUSITIS: ICD-10-CM

## 2019-09-11 DIAGNOSIS — J31.0 CHRONIC RHINITIS: ICD-10-CM

## 2019-09-11 PROCEDURE — 99213 OFFICE O/P EST LOW 20 MIN: CPT | Mod: PBBFAC | Performed by: ALLERGY & IMMUNOLOGY

## 2019-09-11 PROCEDURE — 99214 OFFICE O/P EST MOD 30 MIN: CPT | Mod: S$PBB,,, | Performed by: ALLERGY & IMMUNOLOGY

## 2019-09-11 PROCEDURE — 99999 PR PBB SHADOW E&M-EST. PATIENT-LVL III: ICD-10-PCS | Mod: PBBFAC,,, | Performed by: ALLERGY & IMMUNOLOGY

## 2019-09-11 PROCEDURE — 99999 PR PBB SHADOW E&M-EST. PATIENT-LVL III: CPT | Mod: PBBFAC,,, | Performed by: ALLERGY & IMMUNOLOGY

## 2019-09-11 PROCEDURE — 90471 IMMUNIZATION ADMIN: CPT | Mod: PBBFAC

## 2019-09-11 PROCEDURE — 99214 PR OFFICE/OUTPT VISIT, EST, LEVL IV, 30-39 MIN: ICD-10-PCS | Mod: S$PBB,,, | Performed by: ALLERGY & IMMUNOLOGY

## 2019-09-11 NOTE — PROGRESS NOTES
Subjective:       Patient ID: Hillary Cotton is a 48 y.o. female.    Chief Complaint:  Follow-up (discuss vaccine)      Follow-up   Pertinent negatives include no arthralgias, chest pain, congestion, coughing, fatigue, fever, headaches, joint swelling, nausea, rash or vomiting.     Pt seen 8/22 w concern of prednisone allergy. Tolerates multiple other steroids.  At that time pt also had concern of recurrent sinusitis, chronic rhinitis. Doing well today on flonase, zyrtec.  Initial hx reviewed.    Environmental History: Pets in the home: dogs (1).  [place]: hardwood floors  Tobacco Smoke in Home: no    Past Medical History:   Diagnosis Date    Abnormal Pap smear     Allergy     Asthma     Sinusitis, chronic        Family History   Problem Relation Age of Onset    Diabetes Mother     Hypertension Mother     Asthma Mother     Sinus disease Mother     Allergies Father     Diabetes Maternal Grandmother     Hypertension Maternal Grandmother     Sinus disease Sister     Sinus disease Brother     Sinus disease Sister          Review of Systems   Constitutional: Negative for activity change, fatigue and fever.   HENT: Negative for congestion, postnasal drip, rhinorrhea, sinus pressure and sneezing.    Eyes: Negative for discharge, redness and itching.   Respiratory: Negative for cough, shortness of breath and wheezing.    Cardiovascular: Negative for chest pain.   Gastrointestinal: Negative for diarrhea, nausea and vomiting.   Genitourinary: Negative for dysuria.   Musculoskeletal: Negative for arthralgias and joint swelling.   Skin: Negative for rash.   Neurological: Negative for headaches.   Hematological: Does not bruise/bleed easily.   Psychiatric/Behavioral: Negative for behavioral problems and sleep disturbance.        Objective:   Physical Exam   Constitutional: She is oriented to person, place, and time. She appears well-developed and well-nourished. No distress.   HENT:   Head: Normocephalic.    Right Ear: Tympanic membrane and external ear normal.   Left Ear: Tympanic membrane and external ear normal.   Nose: No mucosal edema, rhinorrhea, sinus tenderness or septal deviation. Right sinus exhibits no maxillary sinus tenderness and no frontal sinus tenderness. Left sinus exhibits no maxillary sinus tenderness and no frontal sinus tenderness.   Mouth/Throat: Uvula is midline, oropharynx is clear and moist and mucous membranes are normal. No uvula swelling.   Eyes: Conjunctivae are normal. Right eye exhibits no discharge. Left eye exhibits no discharge.   Neck: Normal range of motion. Neck supple.   Cardiovascular: Normal rate and regular rhythm.   Pulmonary/Chest: Effort normal and breath sounds normal. No respiratory distress. She has no wheezes.   Abdominal: Soft. Bowel sounds are normal. There is no tenderness.   Musculoskeletal: Normal range of motion. She exhibits no edema or tenderness.   Lymphadenopathy:     She has no cervical adenopathy.   Neurological: She is alert and oriented to person, place, and time.   Skin: Skin is warm. No rash noted. No erythema.   Psychiatric: She has a normal mood and affect. Her behavior is normal. Judgment and thought content normal.   Nursing note and vitals reviewed.    Results for GABE LARA (MRN [id number]) as of 9/11/2019 18:00   Ref. Range 8/22/2019 10:20 8/22/2019 10:20   A. fumigatus Class Unknown CLASS 0    Altern. alternata Class Unknown CLASS 0    Alternaria alternata Latest Ref Range: <0.35 kU/L <0.35    Aspergillus Fumigatus IgE Latest Ref Range: <0.35 kU/L <0.35    Bahia Class Unknown CLASS 0    BAHIA GRASS Latest Ref Range: <0.35 kU/L <0.35    Cat Dander Latest Ref Range: <0.35 kU/L <0.35    Cat Epithelium Class Unknown CLASS 0    [place] Class Unknown CLASS 0    [place] IgE Latest Ref Range: <0.35 kU/L <0.35    Cockroach, IgE Latest Ref Range: <0.35 kU/L CLASS 0 <0.35   D. farinae Latest Ref Range: <0.35 kU/L <0.35    D. farinae Class Unknown  CLASS 0    D. pteronyssinus Class Unknown CLASS 0    Dog Dander Class Unknown CLASS 0    Dog Dander, IgE Latest Ref Range: <0.35 kU/L <0.35    English Plantain Class Unknown CLASS 0    Marshelder Class Unknown CLASS 0    Marshelder IgE Latest Ref Range: <0.35 kU/L <0.35    Mite Dust Pteronyssinus IgE Latest Ref Range: <0.35 kU/L <0.35    Oak, Class Unknown CLASS 0    Pecan [place] Tree Latest Ref Range: <0.35 kU/L <0.35    Pecan, Class Unknown CLASS 0    Plantain Latest Ref Range: <0.35 kU/L <0.35    Ragweed, Short, Class Unknown CLASS 0    Ragweed, Short, IgE Latest Ref Range: <0.35 kU/L <0.35    Maurilio Grass Latest Ref Range: <0.35 kU/L <0.35    Maurilio Grass Class Unknown CLASS 0    [place](Quercus alba) IgE Latest Ref Range: <0.35 kU/L <0.35    IgG - Serum Latest Ref Range: 650 - 1600 mg/dL 976    IgM Latest Ref Range: 50 - 300 mg/dL 161    IgA Latest Ref Range: 40 - 350 mg/dL 177    IgE Latest Ref Range: 0 - 100 IU/mL <35    '    Low pneumococcal titers      Assessment:       1. Recurrent sinusitis   2. Chronic rhinitis    3. abnl antibody titer     Plan:       Challenge with 23-valent pneumococcal polysaccharide vaccine, Pneumovax. Repeat pneumococcal titers in 4-6 weeks. Follow up in clinic if poor response. Counseled that if good response to Pneumovax is demonstrated, expect decreased frequency and severity of mucosal infections, and can then follow up prn. Follow up leon if recurrence of frequent mucosal infections.      Flonase, zyrtec

## 2019-10-18 ENCOUNTER — LAB VISIT (OUTPATIENT)
Dept: LAB | Facility: HOSPITAL | Age: 49
End: 2019-10-18
Attending: ALLERGY & IMMUNOLOGY
Payer: OTHER GOVERNMENT

## 2019-10-18 DIAGNOSIS — R76.0 ABNORMAL ANTIBODY TITER: ICD-10-CM

## 2019-10-18 PROCEDURE — 86317 IMMUNOASSAY INFECTIOUS AGENT: CPT | Mod: 59

## 2019-10-18 PROCEDURE — 36415 COLL VENOUS BLD VENIPUNCTURE: CPT | Mod: PO

## 2019-10-23 ENCOUNTER — OFFICE VISIT (OUTPATIENT)
Dept: FAMILY MEDICINE | Facility: CLINIC | Age: 49
End: 2019-10-23
Payer: OTHER GOVERNMENT

## 2019-10-23 ENCOUNTER — HOSPITAL ENCOUNTER (OUTPATIENT)
Dept: RADIOLOGY | Facility: HOSPITAL | Age: 49
Discharge: HOME | End: 2019-10-23
Attending: FAMILY MEDICINE
Payer: OTHER GOVERNMENT

## 2019-10-23 VITALS
SYSTOLIC BLOOD PRESSURE: 110 MMHG | DIASTOLIC BLOOD PRESSURE: 74 MMHG | HEART RATE: 83 BPM | WEIGHT: 203.06 LBS | HEIGHT: 66 IN | OXYGEN SATURATION: 98 % | BODY MASS INDEX: 32.64 KG/M2

## 2019-10-23 DIAGNOSIS — M79.89 LEFT LEG SWELLING: Primary | ICD-10-CM

## 2019-10-23 DIAGNOSIS — M25.572 CHRONIC PAIN OF LEFT ANKLE: ICD-10-CM

## 2019-10-23 DIAGNOSIS — G89.29 CHRONIC PAIN OF LEFT ANKLE: ICD-10-CM

## 2019-10-23 PROCEDURE — 99214 OFFICE O/P EST MOD 30 MIN: CPT | Mod: S$PBB,,, | Performed by: FAMILY MEDICINE

## 2019-10-23 PROCEDURE — 99999 PR PBB SHADOW E&M-EST. PATIENT-LVL III: ICD-10-PCS | Mod: PBBFAC,,, | Performed by: FAMILY MEDICINE

## 2019-10-23 PROCEDURE — 99213 OFFICE O/P EST LOW 20 MIN: CPT | Mod: PBBFAC,PO | Performed by: FAMILY MEDICINE

## 2019-10-23 PROCEDURE — 73610 X-RAY EXAM OF ANKLE: CPT | Mod: TC,FY,PO,LT

## 2019-10-23 PROCEDURE — 99214 PR OFFICE/OUTPT VISIT, EST, LEVL IV, 30-39 MIN: ICD-10-PCS | Mod: S$PBB,,, | Performed by: FAMILY MEDICINE

## 2019-10-23 PROCEDURE — 73610 XR ANKLE COMPLETE 3 VIEW LEFT: ICD-10-PCS | Mod: 26,LT,, | Performed by: RADIOLOGY

## 2019-10-23 PROCEDURE — 99999 PR PBB SHADOW E&M-EST. PATIENT-LVL III: CPT | Mod: PBBFAC,,, | Performed by: FAMILY MEDICINE

## 2019-10-23 PROCEDURE — 73610 X-RAY EXAM OF ANKLE: CPT | Mod: 26,LT,, | Performed by: RADIOLOGY

## 2019-10-23 RX ORDER — CELECOXIB 200 MG/1
200 CAPSULE ORAL 2 TIMES DAILY
Qty: 60 CAPSULE | Refills: 0 | Status: SHIPPED | OUTPATIENT
Start: 2019-10-23 | End: 2019-11-22

## 2019-10-23 NOTE — PROGRESS NOTES
Subjective:       Patient ID: Hillary Cotton is a 48 y.o. female.    Chief Complaint: Foot Swelling (left ankle)    48 years old female to the clinic with left ankle swelling for last 2 months.  Patient attributes the swelling secondary to arthritis of the left knee.  The pain is 2/10 of intensity on and off aggravated with activity and better with rest.  Patient was using tramadol with partial improvement the symptoms.  Patient using compression around the ankle.    Review of Systems   Constitutional: Negative.    HENT: Negative.    Eyes: Negative.    Respiratory: Negative.    Cardiovascular: Positive for leg swelling. Negative for chest pain.   Gastrointestinal: Negative.    Genitourinary: Negative.    Musculoskeletal: Positive for arthralgias and joint swelling.   Skin: Negative.    Neurological: Negative.    Psychiatric/Behavioral: Negative.        Objective:      Physical Exam   Constitutional: She is oriented to person, place, and time. She appears well-developed and well-nourished. No distress.   HENT:   Head: Normocephalic and atraumatic.   Right Ear: External ear normal.   Left Ear: External ear normal.   Nose: Nose normal.   Mouth/Throat: Oropharynx is clear and moist. No oropharyngeal exudate.   Eyes: Pupils are equal, round, and reactive to light. Conjunctivae and EOM are normal. Right eye exhibits no discharge. Left eye exhibits no discharge. No scleral icterus.   Neck: Normal range of motion. Neck supple. No JVD present. No tracheal deviation present. No thyromegaly present.   Cardiovascular: Normal rate, regular rhythm, normal heart sounds and intact distal pulses. Exam reveals no gallop and no friction rub.   No murmur heard.  Pulmonary/Chest: Effort normal and breath sounds normal. No stridor. No respiratory distress. She has no wheezes. She has no rales. She exhibits no tenderness.   Abdominal: Soft. Bowel sounds are normal. She exhibits no distension and no mass. There is no tenderness.  There is no rebound and no guarding.   Musculoskeletal: Normal range of motion. She exhibits no edema.        Left ankle: She exhibits swelling. Tenderness. Lateral malleolus tenderness found. No medial malleolus tenderness found.   Lymphadenopathy:     She has no cervical adenopathy.   Neurological: She is alert and oriented to person, place, and time. She has normal reflexes. No cranial nerve deficit. She exhibits normal muscle tone. Coordination normal.   Skin: Skin is warm and dry. No rash noted. She is not diaphoretic. No erythema. No pallor.   Psychiatric: She has a normal mood and affect. Her behavior is normal. Judgment and thought content normal.       Assessment:       1. Left leg swelling    2. Chronic pain of left ankle        Plan:         Hillary was seen today for foot swelling.    Diagnoses and all orders for this visit:    Left leg swelling  -     Uric acid; Future    Chronic pain of left ankle  -     Sedimentation rate; Future  -     C-reactive protein; Future  -     Uric acid; Future  -     Rheumatoid factor; Future  -     PRASANTH Screen w/Reflex; Future  -     celecoxib (CELEBREX) 200 MG capsule; Take 1 capsule (200 mg total) by mouth 2 (two) times daily.  -     Ambulatory referral to Podiatry  -     X-Ray Ankle Complete Left; Future

## 2019-10-25 LAB
DEPRECATED S PNEUM 1 IGG SER-MCNC: 27.4 MCG/ML
DEPRECATED S PNEUM12 IGG SER-MCNC: 0.4 MCG/ML
DEPRECATED S PNEUM14 IGG SER-MCNC: 104.1 MCG/ML
DEPRECATED S PNEUM19 IGG SER-MCNC: 3.1 MCG/ML
DEPRECATED S PNEUM23 IGG SER-MCNC: 40.9 MCG/ML
DEPRECATED S PNEUM3 IGG SER-MCNC: 8.7 MCG/ML
DEPRECATED S PNEUM4 IGG SER-MCNC: 1.6 MCG/ML
DEPRECATED S PNEUM5 IGG SER-MCNC: 32 MCG/ML
DEPRECATED S PNEUM8 IGG SER-MCNC: 12.5 MCG/ML
DEPRECATED S PNEUM9 IGG SER-MCNC: >41 MCG/ML
S PNEUM DA 18C IGG SER-MCNC: 17.2 MCG/ML
S PNEUM DA 6B IGG SER-MCNC: 3.8 MCG/ML
S PNEUM DA 7F IGG SER-MCNC: 5 MCG/ML
S PNEUM DA 9V IGG SER-MCNC: 14.8 MCG/ML

## 2019-10-29 ENCOUNTER — PATIENT OUTREACH (OUTPATIENT)
Dept: ADMINISTRATIVE | Facility: OTHER | Age: 49
End: 2019-10-29

## 2019-10-31 ENCOUNTER — OFFICE VISIT (OUTPATIENT)
Dept: PODIATRY | Facility: CLINIC | Age: 49
End: 2019-10-31
Payer: OTHER GOVERNMENT

## 2019-10-31 VITALS
DIASTOLIC BLOOD PRESSURE: 79 MMHG | HEIGHT: 66 IN | HEART RATE: 82 BPM | BODY MASS INDEX: 32.85 KG/M2 | RESPIRATION RATE: 16 BRPM | WEIGHT: 204.38 LBS | SYSTOLIC BLOOD PRESSURE: 127 MMHG

## 2019-10-31 DIAGNOSIS — M25.572 LEFT ANKLE PAIN, UNSPECIFIED CHRONICITY: Primary | ICD-10-CM

## 2019-10-31 DIAGNOSIS — M79.89 MASS OF SOFT TISSUE OF ANKLE: ICD-10-CM

## 2019-10-31 DIAGNOSIS — M24.573 EQUINUS CONTRACTURE OF ANKLE: ICD-10-CM

## 2019-10-31 PROCEDURE — 99203 OFFICE O/P NEW LOW 30 MIN: CPT | Mod: S$PBB,,, | Performed by: PODIATRIST

## 2019-10-31 PROCEDURE — 99999 PR PBB SHADOW E&M-EST. PATIENT-LVL III: ICD-10-PCS | Mod: PBBFAC,,, | Performed by: PODIATRIST

## 2019-10-31 PROCEDURE — 99999 PR PBB SHADOW E&M-EST. PATIENT-LVL III: CPT | Mod: PBBFAC,,, | Performed by: PODIATRIST

## 2019-10-31 PROCEDURE — 99203 PR OFFICE/OUTPT VISIT, NEW, LEVL III, 30-44 MIN: ICD-10-PCS | Mod: S$PBB,,, | Performed by: PODIATRIST

## 2019-10-31 PROCEDURE — 99213 OFFICE O/P EST LOW 20 MIN: CPT | Mod: PBBFAC,PN | Performed by: PODIATRIST

## 2019-10-31 NOTE — LETTER
October 31, 2019      Arnulfo Barboza MD  [address]  [place] [address]           [facility]  [address]  [place] [address]  Phone: [phone]  Fax: [phone]          Patient: Hillary Cotton   MR Number: [id number]   YOB: 1970   Date of Visit: 10/31/2019       Dear Dr. Arnulfo Barboza:    Thank you for referring Hillary Cotton to me for evaluation. Attached you will find relevant portions of my assessment and plan of care.    If you have questions, please do not hesitate to call me. I look forward to following Hillary Cotton along with you.    Sincerely,    Melina Coredro, [place]REE    Enclosure  CC:  No Recipients    If you would like to receive this communication electronically, please contact [email or web address] or [phone] to request more information on [employer] access.    For providers and/or their staff who would like to refer a patient to [facility], please contact us through our one-stop-shop provider referral line, [facility], at [phone].    If you feel you have received this communication in error or would no longer like to receive these types of communications, please e-mail [email or web address]

## 2019-10-31 NOTE — PROGRESS NOTES
Subjective:      Patient ID: Hillary Cotton is a 48 y.o. female.    Chief Complaint: Ankle Pain (twisted left ankle a few weeks ago coming out of shower. ); Fluid buidlup (left ankle. ); Foot Swelling (left ankle); and Flu Vaccine    48 y.o. female presenting with left ankle pain. She points lateral aspect of the ankle along the lateral collateral ligament and sinus tarsi area.  Complains of ankle swelling of the left.  Swelling has been present for 3 months and does not go way.  Patient is ambulating with ankle brace which helps with the symptoms dramatically.  Patient is a tells me tennis shoes with a cushion helps with the symptoms.  Patient also points lateral aspect of the hindfoot over the sinus tarsi area for pain.  Denies any trauma.  Complains of pain after prolonged walking and standing. Taking Celebrex with some help with the symptoms.  Patient tells me osteoarthritis runs her family but not rheumatoid arthritis.  She was tested for rheumatoid arthritis in the past which was negative. Pain level 3/10.       Review of Systems   Constitution: Negative for chills, decreased appetite, fever and malaise/fatigue.   HENT: Negative for congestion, ear discharge and sore throat.    Eyes: Negative for discharge and pain.   Cardiovascular: Negative for chest pain, claudication and leg swelling.   Respiratory: Negative for cough and shortness of breath.    Skin: Negative for color change, nail changes and rash.   Musculoskeletal: Positive for joint pain, joint swelling and stiffness. Negative for arthritis and muscle weakness.        Left ankle pain   Gastrointestinal: Negative for bloating, abdominal pain, diarrhea, nausea and vomiting.   Genitourinary: Negative for flank pain and hematuria.   Neurological: Negative for headaches, numbness and weakness.   Psychiatric/Behavioral: Negative for altered mental status.             Past Medical History:   Diagnosis Date    Abnormal Pap smear     Allergy      Asthma     Sinusitis, chronic        Past Surgical History:   Procedure Laterality Date    SALIVARY GLAND SURGERY      TONSILLECTOMY         Family History   Problem Relation Age of Onset    Diabetes Mother     Hypertension Mother     Asthma Mother     Sinus disease Mother     Allergies Father     Diabetes Maternal Grandmother     Hypertension Maternal Grandmother     Sinus disease Sister     Sinus disease Brother     Sinus disease Sister        Social History     Socioeconomic History    Marital status:      Spouse name: Not on file    Number of children: Not on file    Years of education: Not on file    Highest education level: Not on file   Occupational History     Employer: [employer]   Social Needs    Financial resource strain: Not on file    Food insecurity:     Worry: Not on file     Inability: Not on file    Transportation needs:     Medical: Not on file     Non-medical: Not on file   Tobacco Use    Smoking status: Former Smoker     Packs/day: 0.50     Types: Cigarettes     Last attempt to quit: 10/1/2015     Years since quittin.0    Smokeless tobacco: Never Used   Substance and Sexual Activity    Alcohol use: No    Drug use: No    Sexual activity: Yes     Partners: Male   Lifestyle    Physical activity:     Days per week: Not on file     Minutes per session: Not on file    Stress: Not on file   Relationships    Social connections:     Talks on phone: Not on file     Gets together: Not on file     Attends Christianity service: Not on file     Active member of club or organization: Not on file     Attends meetings of clubs or organizations: Not on file     Relationship status: Not on file   Other Topics Concern    Not on file   Social History Narrative    Not on file       Current Outpatient Medications   Medication Sig Dispense Refill    albuterol 90 mcg/actuation inhaler Inhale 1-2 puffs into the lungs every 6 (six) hours as needed for Wheezing. 18 g 0    celecoxib  "(CELEBREX) 200 MG capsule Take 1 capsule (200 mg total) by mouth 2 (two) times daily. 60 capsule 0    cetirizine (ZYRTEC) 10 MG tablet Take 1 tablet (10 mg total) by mouth once daily. 90 tablet 2    norethindrone-ethinyl estradiol (JUNEL FE 1/20) 1 mg-20 mcg (21)/75 mg (7) per tablet Take 1 tablet by mouth once daily. (Patient not taking: Reported on 10/23/2019) 30 tablet 11    triamcinolone acetonide 0.1% (KENALOG) 0.1 % cream Apply topically 2 (two) times daily. for 10 days (Patient not taking: Reported on 10/23/2019) 80 g 1     No current facility-administered medications for this visit.        Review of patient's allergies indicates:   Allergen Reactions    Prednisone Rash     Hx of delayed onset rash on 2 occasion. Tolerates medrol, IM steroids, topical, and intranasal steroids w/o problem       Vitals:    10/31/19 1308   BP: 127/79   Pulse: 82   Resp: 16   Weight: 92.7 kg (204 lb 6.4 oz)   Height: 5' 6" (1.676 m)   PainSc: 0-No pain       Objective:      Physical Exam   Constitutional: She is oriented to person, place, and time. She appears well-developed and well-nourished. No distress.   HENT:   Nose: Nose normal.   Eyes: Conjunctivae are normal.   Neck: Normal range of motion.   Pulmonary/Chest: Effort normal. She exhibits no tenderness.   Abdominal: There is no tenderness.   Neurological: She is alert and oriented to person, place, and time.   Psychiatric: She has a normal mood and affect. Her behavior is normal.       Vascular: Distal DP/PT pulses palpable 2/4. CRT < 3 sec to tips of toes. No vericosities noted to LEs. Hair growth present LE, warm to touch LE,   L ankle: mild edema lateral ankle.     Dermatologic: No open lesions, lacerations or wounds. Interdigital spaces clean, dry and intact. No erythema, rubor, calor noted LE  L ankle: soft tissue (mostly likely lipoma) anterior aspect of distal tip of lateral malleolus.     Musculoskeletal: MMT 5/5 in DF/PF/Inv/Ev resistance with no reproduction " of pain in any direction. Passive range of motion of ankle and pedal joints is painless. No calf tenderness LE, Compartments soft/compressible. ROM of ankles with < 10 deg DF is noted b/l  L ankle: diffuse tenderness lateral ankle especially over PTFL and diffuse ankle pain anteriorly.      Neurological: Light touch, proprioception, and sharp/dull sensation are all intact. Protective threshold with the [place]-Wienstein monofilament is intact. Vibratory sensation intact.           Assessment:       Encounter Diagnoses   Name Primary?    Left ankle pain, unspecified chronicity - Left Foot Yes    Mass of soft tissue of ankle - Left Foot     Equinus contracture of ankle - Left Foot          Plan:       Hillary was seen today for ankle pain, fluid buidlup, foot swelling and flu vaccine.    Diagnoses and all orders for this visit:    Left ankle pain, unspecified chronicity - Left Foot  -     MRI Ankle Without Contrast Left; Future    Mass of soft tissue of ankle - Left Foot    Equinus contracture of ankle - Left Foot      I counseled the patient on her conditions, their implications and medical management.    48 y.o. female with left ankle pain.    -x-ray reviewed with the patient. Exostosis (bone spur) lateral aspect of the talus.  Differential diagnosis are ankle arthritis, lateral ankle instability, lipoma.   -Rx.  Left ankle MRI without contrast   -c/w Celebrex  -The nature of the condition, options for management, as well as potential risks and complications were discussed in detail with patient. Patient was amenable to my recommendations and left my office fully informed and will follow up as instructed or sooner if necessary.    -f/u 1 week       Note dictated with voice recognition software, please excuse any grammatical errors.

## 2019-11-01 ENCOUNTER — TELEPHONE (OUTPATIENT)
Dept: PODIATRY | Facility: CLINIC | Age: 49
End: 2019-11-01

## 2019-11-01 NOTE — TELEPHONE ENCOUNTER
----- Message from Alina Capone sent at 11/1/2019 10:32 AM CDT -----  Contact: Mr Hawk   /  Suzanne [phone]  Type:  Mr Hawk states need to speak with nurse to have patient  urgent referral for patient to have MRI approved today to have MRI done for Monday if called for vocal approval today       Name of Caller:Mr Hawk  When is the first available appointment?  Symptoms:  Best Call Back Number:  Additional Information:  Fax number is [phone]

## 2019-11-07 ENCOUNTER — HOSPITAL ENCOUNTER (OUTPATIENT)
Dept: RADIOLOGY | Facility: HOSPITAL | Age: 49
Discharge: HOME | End: 2019-11-07
Attending: PODIATRIST
Payer: OTHER GOVERNMENT

## 2019-11-07 DIAGNOSIS — M25.572 LEFT ANKLE PAIN, UNSPECIFIED CHRONICITY: ICD-10-CM

## 2019-11-07 PROCEDURE — 73721 MRI JNT OF LWR EXTRE W/O DYE: CPT | Mod: TC,PO,LT

## 2019-11-11 ENCOUNTER — PATIENT OUTREACH (OUTPATIENT)
Dept: ADMINISTRATIVE | Facility: OTHER | Age: 49
End: 2019-11-11

## 2019-11-11 ENCOUNTER — OFFICE VISIT (OUTPATIENT)
Dept: PODIATRY | Facility: CLINIC | Age: 49
End: 2019-11-11
Payer: OTHER GOVERNMENT

## 2019-11-11 VITALS
OXYGEN SATURATION: 97 % | HEIGHT: 66 IN | BODY MASS INDEX: 32.73 KG/M2 | WEIGHT: 203.69 LBS | SYSTOLIC BLOOD PRESSURE: 122 MMHG | HEART RATE: 78 BPM | DIASTOLIC BLOOD PRESSURE: 79 MMHG

## 2019-11-11 DIAGNOSIS — M76.72 PERONEAL TENDONITIS OF LEFT LOWER LEG: Primary | ICD-10-CM

## 2019-11-11 DIAGNOSIS — M25.472 EDEMA OF LEFT ANKLE: ICD-10-CM

## 2019-11-11 DIAGNOSIS — M19.079 ANKLE ARTHRITIS: ICD-10-CM

## 2019-11-11 PROCEDURE — 20605 DRAIN/INJ JOINT/BURSA W/O US: CPT | Mod: PBBFAC,PN | Performed by: PODIATRIST

## 2019-11-11 PROCEDURE — 99999 PR PBB SHADOW E&M-EST. PATIENT-LVL IV: ICD-10-PCS | Mod: PBBFAC,,, | Performed by: PODIATRIST

## 2019-11-11 PROCEDURE — 99499 UNLISTED E&M SERVICE: CPT | Mod: S$PBB,,, | Performed by: PODIATRIST

## 2019-11-11 PROCEDURE — 20605 INTERMEDIATE JOINT ASPIRATION/INJECTION: ICD-10-PCS | Mod: S$PBB,LT,, | Performed by: PODIATRIST

## 2019-11-11 PROCEDURE — 99999 PR PBB SHADOW E&M-EST. PATIENT-LVL IV: CPT | Mod: PBBFAC,,, | Performed by: PODIATRIST

## 2019-11-11 PROCEDURE — 99499 NO LOS: ICD-10-PCS | Mod: S$PBB,,, | Performed by: PODIATRIST

## 2019-11-11 PROCEDURE — 99214 OFFICE O/P EST MOD 30 MIN: CPT | Mod: PBBFAC,PN,25 | Performed by: PODIATRIST

## 2019-11-11 RX ORDER — DEXAMETHASONE SODIUM PHOSPHATE 4 MG/ML
4 INJECTION, SOLUTION INTRA-ARTICULAR; INTRALESIONAL; INTRAMUSCULAR; INTRAVENOUS; SOFT TISSUE
Status: DISCONTINUED | OUTPATIENT
Start: 2019-11-11 | End: 2019-11-11 | Stop reason: HOSPADM

## 2019-11-11 RX ORDER — DEXAMETHASONE SODIUM PHOSPHATE 4 MG/ML
4 INJECTION, SOLUTION INTRA-ARTICULAR; INTRALESIONAL; INTRAMUSCULAR; INTRAVENOUS; SOFT TISSUE
Status: COMPLETED | OUTPATIENT
Start: 2019-11-11 | End: 2019-11-11

## 2019-11-11 RX ADMIN — DEXAMETHASONE SODIUM PHOSPHATE 4 MG: 4 INJECTION, SOLUTION INTRA-ARTICULAR; INTRALESIONAL; INTRAMUSCULAR; INTRAVENOUS; SOFT TISSUE at 01:11

## 2019-11-11 RX ADMIN — DEXAMETHASONE SODIUM PHOSPHATE 4 MG: 4 INJECTION INTRA-ARTICULAR; INTRALESIONAL; INTRAMUSCULAR; INTRAVENOUS; SOFT TISSUE at 03:11

## 2019-11-11 NOTE — PROCEDURES
Intermediate Joint Aspiration/Injection  Date/Time: 11/11/2019 1:45 PM  Performed by: Melina Cordero DPM  Authorized by: Melina Cordero DPM     Consent Done?:  Yes (Verbal)  Indications:  Pain  Site marked: The procedure site was marked    Timeout: Prior to procedure the correct patient, procedure, and site was verified    Prep: Patient was prepped and draped in usual sterile fashion    Needle size:  25 G  Approach:  Anteromedial  Medications:  4 mg dexamethasone 4 mg/mL  Patient tolerance:  Patient tolerated the procedure well with no immediate complications     Additional Comments: Total of 2 cc of mixture of 0.5% Marcaine plain with dexamethasone injected into the left ankle.  Time-out was performed with the patient the room.  Patient tolerated well.

## 2019-11-11 NOTE — PROGRESS NOTES
Subjective:      Patient ID: Hillary Cotton is a 48 y.o. female.    Chief Complaint: Follow-up (MRI follow up results)    48 y.o. female presenting with left ankle pain. She points lateral aspect of the ankle along the lateral collateral ligament and sinus tarsi area.  Complains of ankle swelling of the left.  Swelling has been present for 3 months and does not go way.  Patient is ambulating with ankle brace which helps with the symptoms dramatically.  Patient tells me tennis shoes with a cushion helps with the symptoms.  Patient also points lateral aspect of the hindfoot over the sinus tarsi area for pain.  Denies any trauma.  Complains of pain after prolonged walking and standing. Taking Celebrex with some help with the symptoms.  Patient tells me osteoarthritis runs her family but not rheumatoid arthritis.  She was tested for rheumatoid arthritis in the past which was negative. Pain level 3/10.     11/11/19 follow-up for left ankle pain.  Patient points lateral aspect of the ankle over the collateral ligament and peroneal tendons.  Patient also here to review MRI.  Ambulating in normal tennis shoe.  Tells me her symptoms were getting better however she was walking and standing for a while over the weekend which now causing moderate pain of the ankle.  Patient is taking Celebrex which helps with knee pain but not so much of the ankle pain.       Review of Systems   Constitution: Negative for chills, decreased appetite, fever and malaise/fatigue.   HENT: Negative for congestion, ear discharge and sore throat.    Eyes: Negative for discharge and pain.   Cardiovascular: Negative for chest pain, claudication and leg swelling.   Respiratory: Negative for cough and shortness of breath.    Skin: Negative for color change, nail changes and rash.   Musculoskeletal: Positive for joint pain, joint swelling and stiffness. Negative for arthritis and muscle weakness.        Left ankle pain   Gastrointestinal: Negative  for bloating, abdominal pain, diarrhea, nausea and vomiting.   Genitourinary: Negative for flank pain and hematuria.   Neurological: Negative for headaches, numbness and weakness.   Psychiatric/Behavioral: Negative for altered mental status.             Past Medical History:   Diagnosis Date    Abnormal Pap smear     Allergy     Asthma     Sinusitis, chronic        Past Surgical History:   Procedure Laterality Date    SALIVARY GLAND SURGERY      TONSILLECTOMY         Family History   Problem Relation Age of Onset    Diabetes Mother     Hypertension Mother     Asthma Mother     Sinus disease Mother     Allergies Father     Diabetes Maternal Grandmother     Hypertension Maternal Grandmother     Sinus disease Sister     Sinus disease Brother     Sinus disease Sister        Social History     Socioeconomic History    Marital status:      Spouse name: Not on file    Number of children: Not on file    Years of education: Not on file    Highest education level: Not on file   Occupational History     Employer: [employer]   Social Needs    Financial resource strain: Not on file    Food insecurity:     Worry: Not on file     Inability: Not on file    Transportation needs:     Medical: Not on file     Non-medical: Not on file   Tobacco Use    Smoking status: Former Smoker     Packs/day: 0.50     Types: Cigarettes     Last attempt to quit: 10/1/2015     Years since quittin.1    Smokeless tobacco: Never Used   Substance and Sexual Activity    Alcohol use: No    Drug use: No    Sexual activity: Yes     Partners: Male   Lifestyle    Physical activity:     Days per week: Not on file     Minutes per session: Not on file    Stress: Not on file   Relationships    Social connections:     Talks on phone: Not on file     Gets together: Not on file     Attends Jew service: Not on file     Active member of club or organization: Not on file     Attends meetings of clubs or organizations: Not  "on file     Relationship status: Not on file   Other Topics Concern    Not on file   Social History Narrative    Not on file       Current Outpatient Medications   Medication Sig Dispense Refill    albuterol 90 mcg/actuation inhaler Inhale 1-2 puffs into the lungs every 6 (six) hours as needed for Wheezing. 18 g 0    celecoxib (CELEBREX) 200 MG capsule Take 1 capsule (200 mg total) by mouth 2 (two) times daily. 60 capsule 0    cetirizine (ZYRTEC) 10 MG tablet Take 1 tablet (10 mg total) by mouth once daily. 90 tablet 2    norethindrone-ethinyl estradiol (JUNEL FE 1/20) 1 mg-20 mcg (21)/75 mg (7) per tablet Take 1 tablet by mouth once daily. (Patient not taking: Reported on 10/23/2019) 30 tablet 11    triamcinolone acetonide 0.1% (KENALOG) 0.1 % cream Apply topically 2 (two) times daily. for 10 days (Patient not taking: Reported on 10/23/2019) 80 g 1     No current facility-administered medications for this visit.        Review of patient's allergies indicates:   Allergen Reactions    Prednisone Rash     Hx of delayed onset rash on 2 occasion. Tolerates medrol, IM steroids, topical, and intranasal steroids w/o problem       Vitals:    11/11/19 1413   BP: 122/79   Pulse: 78   SpO2: 97%   Weight: 92.4 kg (203 lb 11.2 oz)   Height: 5' 6" (1.676 m)   PainSc:   6   PainLoc: Foot       Objective:      Physical Exam   Constitutional: She is oriented to person, place, and time. She appears well-developed and well-nourished. No distress.   HENT:   Nose: Nose normal.   Eyes: Conjunctivae are normal.   Neck: Normal range of motion.   Pulmonary/Chest: Effort normal. She exhibits no tenderness.   Abdominal: There is no tenderness.   Neurological: She is alert and oriented to person, place, and time.   Psychiatric: She has a normal mood and affect. Her behavior is normal.       Vascular: Distal DP/PT pulses palpable 2/4. CRT < 3 sec to tips of toes. No vericosities noted to LEs. Hair growth present LE, warm to touch LE, "   L ankle: mild edema lateral ankle.     Dermatologic: No open lesions, lacerations or wounds. Interdigital spaces clean, dry and intact. No erythema, rubor, calor noted LE  L ankle: soft tissue (mostly likely lipoma) anterior aspect of distal tip of lateral malleolus.     Musculoskeletal: MMT 5/5 in DF/PF/Inv/Ev resistance with no reproduction of pain in any direction. Passive range of motion of ankle and pedal joints is painless. No calf tenderness LE, Compartments soft/compressible. ROM of ankles with < 10 deg DF is noted b/l  L ankle: diffuse tenderness lateral ankle especially over PTFL and diffuse ankle pain anteriorly.  Pain along the peroneal tendon especially retro malleolar region.  No pain at 5th metatarsal base.     Neurological: Light touch, proprioception, and sharp/dull sensation are all intact. Protective threshold with the [place]-Wienstein monofilament is intact. Vibratory sensation intact.         Assessment:       Encounter Diagnoses   Name Primary?    Peroneal tendonitis of left lower leg - Left Foot Yes    Ankle arthritis - Left Foot     Edema of left ankle - Left Foot          Plan:       Hillary was seen today for follow-up.    Diagnoses and all orders for this visit:    Peroneal tendonitis of left lower leg - Left Foot  -     Ambulatory Referral to Physical/Occupational Therapy    Ankle arthritis - Left Foot    Edema of left ankle - Left Foot      I counseled the patient on her conditions, their implications and medical management.    48 y.o. female with left ankle pain.    -MRI reviewed with the patient. Mild edema of the ankle. + bony exostosis of the anterior ankle and lateral aspect of the talus.  No tendon pathologies.  -status post ankle steroid injection.  Tolerated well.  See procedure note.  -I am suspecting peroneal tendon pathology based on my clinical examination even though MRI is negative for peroneal tendon pathology.  I explained patient about magic angle phenomenon.   Patient verbalized understanding.   -Rx.  Physical therapy for peroneal tendonitis.  If patient having persistent symptoms we will consider surgical exploration of the peroneal tendon.   -The nature of the condition, options for management, as well as potential risks and complications were discussed in detail with patient. Patient was amenable to my recommendations and left my office fully informed and will follow up as instructed or sooner if necessary.    -f/u 6 weeks       Note dictated with voice recognition software, please excuse any grammatical errors.

## 2019-11-25 ENCOUNTER — PATIENT MESSAGE (OUTPATIENT)
Dept: FAMILY MEDICINE | Facility: CLINIC | Age: 49
End: 2019-11-25

## 2019-11-25 DIAGNOSIS — J30.2 CHRONIC SEASONAL ALLERGIC RHINITIS: ICD-10-CM

## 2019-11-26 ENCOUNTER — OFFICE VISIT (OUTPATIENT)
Dept: INTERNAL MEDICINE | Facility: CLINIC | Age: 49
End: 2019-11-26
Payer: OTHER GOVERNMENT

## 2019-11-26 VITALS
OXYGEN SATURATION: 98 % | TEMPERATURE: 99 F | BODY MASS INDEX: 31.82 KG/M2 | HEIGHT: 66 IN | HEART RATE: 84 BPM | DIASTOLIC BLOOD PRESSURE: 78 MMHG | WEIGHT: 198 LBS | SYSTOLIC BLOOD PRESSURE: 120 MMHG

## 2019-11-26 DIAGNOSIS — S93.402D SPRAIN OF LEFT ANKLE, UNSPECIFIED LIGAMENT, SUBSEQUENT ENCOUNTER: Primary | ICD-10-CM

## 2019-11-26 PROBLEM — M79.601 PAIN OF RIGHT ARM: Status: RESOLVED | Noted: 2017-05-19 | Resolved: 2019-11-26

## 2019-11-26 PROCEDURE — 99999 PR PBB SHADOW E&M-EST. PATIENT-LVL III: CPT | Mod: PBBFAC,,, | Performed by: INTERNAL MEDICINE

## 2019-11-26 PROCEDURE — 99213 PR OFFICE/OUTPT VISIT, EST, LEVL III, 20-29 MIN: ICD-10-PCS | Mod: S$PBB,,, | Performed by: INTERNAL MEDICINE

## 2019-11-26 PROCEDURE — 99213 OFFICE O/P EST LOW 20 MIN: CPT | Mod: PBBFAC,PO | Performed by: INTERNAL MEDICINE

## 2019-11-26 PROCEDURE — 99999 PR PBB SHADOW E&M-EST. PATIENT-LVL III: ICD-10-PCS | Mod: PBBFAC,,, | Performed by: INTERNAL MEDICINE

## 2019-11-26 PROCEDURE — 99213 OFFICE O/P EST LOW 20 MIN: CPT | Mod: S$PBB,,, | Performed by: INTERNAL MEDICINE

## 2019-11-26 RX ORDER — CELECOXIB 200 MG/1
200 CAPSULE ORAL 2 TIMES DAILY
COMMUNITY
Start: 2019-11-25 | End: 2019-11-26 | Stop reason: SDUPTHER

## 2019-11-26 RX ORDER — CETIRIZINE HYDROCHLORIDE 10 MG/1
10 TABLET ORAL DAILY
Qty: 90 TABLET | Refills: 0 | Status: SHIPPED | OUTPATIENT
Start: 2019-11-26 | End: 2020-05-27 | Stop reason: SDUPTHER

## 2019-11-26 RX ORDER — CELECOXIB 200 MG/1
200 CAPSULE ORAL DAILY
Qty: 90 CAPSULE | Refills: 4 | Status: SHIPPED | OUTPATIENT
Start: 2019-11-26 | End: 2020-06-24

## 2019-11-26 NOTE — PROGRESS NOTES
Subjective:       Patient ID: Hillary Cotton is a 48 y.o. female.    Chief Complaint: Edema (left leg) and Medication Refill (Celebrex)    HPI  Pt c/o L ankle pain x 1 month s/p twisting it badly.  Bears weight.  MRI of ankle c/w lateral and medial sprain.  Other arthralgias at baseline.  Review of Systems   All other systems reviewed and are negative.      Objective:      Physical Exam   Constitutional: She appears well-developed.   obese   HENT:   Head: Normocephalic.   Eyes: EOM are normal.   Neck: Normal range of motion.   Cardiovascular: Normal rate, regular rhythm, normal heart sounds and intact distal pulses.   Pulmonary/Chest: Effort normal and breath sounds normal.   Abdominal: Bowel sounds are normal.   Musculoskeletal: Normal range of motion.   L ankle boggy and tender behind both malleoli   Lymphadenopathy:     She has no cervical adenopathy.   Neurological: She is alert. No cranial nerve deficit. She exhibits normal muscle tone. Coordination normal.   Skin: Skin is warm and dry.   Psychiatric: She has a normal mood and affect. Her behavior is normal.   Vitals reviewed.      Assessment:       1. Sprain of left ankle, unspecified ligament, subsequent encounter        Plan:       Hillary was seen today for edema and medication refill.    Diagnoses and all orders for this visit:    Sprain of left ankle, unspecified ligament, subsequent encounter  -     celecoxib (CELEBREX) 200 MG capsule; Take 1 capsule (200 mg total) by mouth once daily.   RICE    Follow up if symptoms worsen or fail to improve.

## 2019-12-05 ENCOUNTER — PATIENT MESSAGE (OUTPATIENT)
Dept: PODIATRY | Facility: CLINIC | Age: 49
End: 2019-12-05

## 2019-12-05 ENCOUNTER — PATIENT MESSAGE (OUTPATIENT)
Dept: FAMILY MEDICINE | Facility: CLINIC | Age: 49
End: 2019-12-05

## 2019-12-07 ENCOUNTER — TELEPHONE (OUTPATIENT)
Dept: FAMILY MEDICINE | Facility: CLINIC | Age: 49
End: 2019-12-07

## 2019-12-07 DIAGNOSIS — M76.70 PERONEAL TENDINITIS, UNSPECIFIED LATERALITY: Primary | ICD-10-CM

## 2019-12-09 ENCOUNTER — PATIENT OUTREACH (OUTPATIENT)
Dept: ADMINISTRATIVE | Facility: OTHER | Age: 49
End: 2019-12-09

## 2019-12-12 ENCOUNTER — OFFICE VISIT (OUTPATIENT)
Dept: PODIATRY | Facility: CLINIC | Age: 49
End: 2019-12-12
Payer: OTHER GOVERNMENT

## 2019-12-12 ENCOUNTER — CLINICAL SUPPORT (OUTPATIENT)
Dept: REHABILITATION | Facility: HOSPITAL | Age: 49
End: 2019-12-12
Attending: PODIATRIST
Payer: OTHER GOVERNMENT

## 2019-12-12 VITALS
HEART RATE: 88 BPM | WEIGHT: 207.13 LBS | BODY MASS INDEX: 33.29 KG/M2 | SYSTOLIC BLOOD PRESSURE: 125 MMHG | HEIGHT: 66 IN | DIASTOLIC BLOOD PRESSURE: 82 MMHG

## 2019-12-12 DIAGNOSIS — M77.52 RETROCALCANEAL BURSITIS (BACK OF HEEL), LEFT: ICD-10-CM

## 2019-12-12 DIAGNOSIS — M76.72 PERONEAL TENDINITIS OF LEFT LOWER EXTREMITY: Primary | ICD-10-CM

## 2019-12-12 DIAGNOSIS — R26.89 DECREASED MOBILITY: ICD-10-CM

## 2019-12-12 DIAGNOSIS — M25.572 CHRONIC PAIN OF LEFT ANKLE: ICD-10-CM

## 2019-12-12 DIAGNOSIS — G89.29 CHRONIC PAIN OF LEFT ANKLE: ICD-10-CM

## 2019-12-12 DIAGNOSIS — M76.62 ACHILLES TENDINITIS OF LEFT LOWER EXTREMITY: ICD-10-CM

## 2019-12-12 DIAGNOSIS — M25.672 DECREASED RANGE OF MOTION OF LEFT ANKLE: ICD-10-CM

## 2019-12-12 DIAGNOSIS — S93.402A SPRAIN OF LEFT ANKLE, UNSPECIFIED LIGAMENT, INITIAL ENCOUNTER: ICD-10-CM

## 2019-12-12 PROCEDURE — G8978 MOBILITY CURRENT STATUS: HCPCS | Mod: CK,PN

## 2019-12-12 PROCEDURE — 97161 PT EVAL LOW COMPLEX 20 MIN: CPT | Mod: PN

## 2019-12-12 PROCEDURE — 99999 PR PBB SHADOW E&M-EST. PATIENT-LVL III: ICD-10-PCS | Mod: PBBFAC,,, | Performed by: PODIATRIST

## 2019-12-12 PROCEDURE — 99213 OFFICE O/P EST LOW 20 MIN: CPT | Mod: PBBFAC,PN | Performed by: PODIATRIST

## 2019-12-12 PROCEDURE — 99213 PR OFFICE/OUTPT VISIT, EST, LEVL III, 20-29 MIN: ICD-10-PCS | Mod: S$PBB,,, | Performed by: PODIATRIST

## 2019-12-12 PROCEDURE — 99999 PR PBB SHADOW E&M-EST. PATIENT-LVL III: CPT | Mod: PBBFAC,,, | Performed by: PODIATRIST

## 2019-12-12 PROCEDURE — G8979 MOBILITY GOAL STATUS: HCPCS | Mod: CJ,PN

## 2019-12-12 PROCEDURE — 99213 OFFICE O/P EST LOW 20 MIN: CPT | Mod: S$PBB,,, | Performed by: PODIATRIST

## 2019-12-12 PROCEDURE — 97110 THERAPEUTIC EXERCISES: CPT | Mod: PN

## 2019-12-12 NOTE — PLAN OF CARE
[facility]  Physical Therapy Initial Evaluation    Name: Hillary Cotton  Clinic Number: [id number]    Therapy Diagnosis:   Encounter Diagnoses   Name Primary?    Chronic pain of left ankle     Decreased range of motion of left ankle     Decreased mobility      Physician: Melina Cordero DPM    Physician Orders: PT Eval and Treat: Treat and eval for peroneal tendonitis of left foot  Medical Diagnosis from Referral: M76.72 (ICD-10-CM) - Peroneal tendonitis of left lower leg  Evaluation Date: 12/12/2019  Authorization Period Expiration: 04/04/2020  Plan of Care Expiration: 2/6/2020  Visit # / Visits authorized: 1/16  FOTO: 1/5  GCODE: 1/10  PTA Visit: 0/6    Cap Visit: 113.10  Cap Total: 113.10    Time In: 5:15  Time Out: 6:00  Total Billable Time: 45 minutes (1 TE + 1 LCE)    Precautions: Standard and asthma, WBAT with boot and without boot    Subjective   Date of onset: 8/2019  History of current condition - Hillary reports: she started noticing some left lateral ankle swelling about 4-5 months ago but no pain. She does have swelling in both ankles but left was worse, and left swelling never went down. She also has chronic B knee pain and arthritis. Pain ws not present until she slipped steeping out the shower on October 18th that resulted in sharp pain along lateral left ankle, significant swelling, and limping on left ankle. She can walk slowly with minimal pain and soreness but will have significant pain if she tries to walk quickly without any support. She received an ankle brace this morning from her MD to wear for any activities. Her ankle has been feeling better with no sharp pain and mainly soreness after standing/walking activities.     Medical History:   Past Medical History:   Diagnosis Date    Abnormal Pap smear     Allergy     Asthma     Sinusitis, chronic        Surgical History:   Hillary Cotton  has a past surgical history that includes Salivary gland  surgery and Tonsillectomy.    Medications:   Hillary has a current medication list which includes the following prescription(s): celecoxib and cetirizine.    Allergies:   Review of patient's allergies indicates:   Allergen Reactions    Prednisone Rash     Hx of delayed onset rash on 2 occasion. Tolerates medrol, IM steroids, topical, and intranasal steroids w/o problem        Imaging, see imaging    Prior Therapy: Yes for right elbow  Social History: [employer] with no steps  Occupation: administrative of office, desk work  Prior Level of Function: no issues and walking community distances freely  Current Level of Function: limited in L ankle ROM with pain, pain with turning and pivoting  Pts goals: to get better in time for dandre gras to ride on a float    Pain:  Current 2/10, worst 8/10, best 2/10   Location: left lateral ankle  Description: Aching and Dull, burning, sharp  Aggravating Factors: walking fast  Easing Factors: rest, Aspercreme     Objective     Gait: decreased LLE WB, decreased L heel and toe off, decreased R step length  Posture: decreased LLE WB  Sensation: WNL  Palpation: +TTP along length of peroneus longus and brevis tendon especially around lateral malleolus  Joint mobility: decreased TC DF  Flexibility: decreased calf length mildly  Overhead Squat: not tested    A/PROM and MMT  * = L latera ankle pain with testing  AROM: LUMBAR   Flexion 100%   Extension 100%   Right side bending 100%   Left side bending 100%   Right rotation 100%   Left rotation 100%     Hip  Right   Left  Pain/Dysfunction with Movement    AROM PROM MMT AROM PROM MMT    Flexion WFL WFL 4/5 WFL WFL 4/5    Extension WFL WFL 3+/5 WFL WFL 3+/5    Abduction WFL WFL 4/5 WFL WFL 3+/5    Adduction WFL WFL 4/5 WFL WFL 4/5    Internal rotation WFL WFL 5/5 WFL WFL 5/5    External rotation WFL WFL 5/5 WFL WFL 4/5       Knee  Right   Left  Pain/Dysfunction with Movement    AROM PROM MMT AROM PROM MMT    Flexion WFL WFL 5/5 WFL WFL 5/5     Extension WFL WFL 5/5 WFL WFL 5/5      Ankle  Right   Left  Pain/Dysfunction with Movement    AROM PROM MMT AROM PROM MMT    Plantarflexion WFL WFL 5/5 38 WFL 4/5*    Dorsiflexion WFL WFL 5/5 -1 WFL 3+/5*    Inversion WFL WFL 5/5 28 WFL 2+/5*    Eversion WFL WFL 5/5 8 12* 2+/5*      SL heel raise assessment= not tested  Anterior Drawer Test = WNL  Interdigital Neuroma Test = negative  Arzola test = not tested  Talar Tilit Test = not tested  Navicular drop test = mild drop B   (Difference of >10 mm is considered significant excessive foot pronation.)  Tinel's Sign Test (tarsal tunnel syndrome) = negative     CMS Impairment/Limitation/Restriction for FOTO ANKLE Survey    Therapist reviewed FOTO scores for Hillary Cotton on 12/12/2019.   FOTO documents entered into [employer] - see Media section.    Limitation Score: 58%  Category: Mobility  Predicted: 37%    Current : CK = at least 40% but < 60% impaired, limited or restricted  Goal: CJ = at least 20% but < 40% impaired, limited or restricted     TREATMENT   Treatment Time In: 5:50  Treatment Time Out: 6:00  Total Treatment time separate from Evaluation: 10 minutes    Hillary received therapeutic exercises to develop strength, endurance, ROM, flexibility and posture for 10 minutes including:    Seated calf stretch: 1x30'' L  Seated ankle circles: 10x CW/CCW  Seated isometric ankle ball squeezes: next  Seated towel slides: 10x L (small range and perform slowly)  Seated heel raises: 10x DL  Step ups: next    Manual for STM/MFR next visit    Home Exercises and Patient Education Provided:  Education provided:   - proper foot wear with arch support  - course of therapy, prognosis    Written Home Exercises Provided: yes.  Exercises were reviewed and Hillary was able to demonstrate them prior to the end of the session.  Hillary demonstrated good  understanding of the education provided.     See EMR under Media for exercises provided 12/12/2019.    Assessment   Hillary  is a 49 y.o. female referred to outpatient Physical Therapy with a medical diagnosis of Peroneal tendonitis of left lower leg presenting to PT at [facility]. Pt currently presents with Left ankle pain, decreased left ankle ROM, decreased BLE strength, poor posture, impaired balance and gait, and functional deficits with standing and walking activities with/without CAM boot. Pt would benefit from skilled PT consisting of gait training, muscular skeletal stretching/strengthening, manual therapy, neuro muscular re-education, and modalities prn to address limitations and increase functional mobility.    Pt prognosis is Excellent.   Pt will benefit from skilled outpatient Physical Therapy to address the deficits stated above and in the chart below, provide pt/family education, and to maximize pt's level of independence.     Plan of care discussed with patient: Yes  Pt's spiritual, cultural and educational needs considered and patient is agreeable to the plan of care and goals as stated below:     Anticipated Barriers for therapy: chronicity of injury    Medical Necessity is demonstrated by the following  History  Co-morbidities and personal factors that may impact the plan of care Co-morbidities:   asthma, WBAT with boot and without boot    Personal Factors:   no deficits     moderate   Examination  Body Structures and Functions, activity limitations and participation restrictions that may impact the plan of care Body Regions:   back  lower extremities  trunk    Body Systems:    gross symmetry  ROM  strength  gross coordinated movement  balance  gait  transfers  transitions  motor control    Participation Restrictions:   Community walking without boot    Activity limitations:   Learning and applying knowledge  no deficits    General Tasks and Commands  no deficits    Communication  no deficits    Mobility  walking  driving (bike, car, motorcycle)    Self care  dressing    Domestic  Life  shopping  cooking  doing house work (cleaning house, washing dishes, laundry)    Interactions/Relationships  no deficits    Life Areas  no deficits    Community and Social Life  no deficits         high   Clinical Presentation stable and uncomplicated low   Decision Making/ Complexity Score: low     GOALS: Short Term Goals:  4 weeks  1.Report decreased L ankle pain </=1/10 in gait with boot donned to increase tolerance for ADLs.  2. Increase DF and Eversion AROM by 5 degrees for left ankle in order to walk with min to no compensation.  3. Pt will demo good sitting and standing posture for improved spine and joint alignment for improved biomechanics.  4. Pt to tolerate HEP to improve ROM and independence with ADL's.    Long Term Goals: 8 weeks  1.Report decreased L ankle pain  < / =  2/10 in gait without boot to increase tolerance for increased QoL and improved ADLs.  2.Patient goal: to get better in time for dandre gras to ride on a float  3.Increase strength to >/= 4+/5 for BLE to increase tolerance for ADL and work activities.  4. Pt will report at CJ level (20-40% impaired) on ANKLE FOTO score to demo increased functional mobility.   5. Pt will be able to ambulate community distances and negotiate stairs with minimal ankle pain for increased functional mobility and QoL.    Plan   Plan of care Certification: 12/12/2019 to 2/6/2020.    Outpatient Physical Therapy 2 times weekly for 8 weeks to include the following interventions: Aquatic Therapy, Debridement (nonselective), Debridement (selective), Electrical Stimulation IFC/Russian, Gait Training, Manual Therapy, Moist Heat/ Ice, Neuromuscular Re-ed, Orthotic Management and Training, Patient Education, Self Care, Therapeutic Activites and Therapeutic Exercise.     Curtis Danielle, PT

## 2019-12-12 NOTE — PROGRESS NOTES
Subjective:      Patient ID: Hillary Cotton is a 49 y.o. female.    Chief Complaint: Follow-up (1 m f/u on left foot)    49 y.o. female presenting with left ankle pain. She points lateral aspect of the ankle along the lateral collateral ligament and sinus tarsi area.  Complains of ankle swelling of the left.  Swelling has been present for 3 months and does not go way.  Patient is ambulating with ankle brace which helps with the symptoms dramatically.  Patient tells me tennis shoes with a cushion helps with the symptoms.  Patient also points lateral aspect of the hindfoot over the sinus tarsi area for pain.  Denies any trauma.  Complains of pain after prolonged walking and standing. Taking Celebrex with some help with the symptoms.  Patient tells me osteoarthritis runs her family but not rheumatoid arthritis.  She was tested for rheumatoid arthritis in the past which was negative. Pain level 3/10.     11/11/19 follow-up for left ankle pain.  Patient points lateral aspect of the ankle over the collateral ligament and peroneal tendons.  Patient also here to review MRI.  Ambulating in normal tennis shoe.  Tells me her symptoms were getting better however she was walking and standing for a while over the weekend which now causing moderate pain of the ankle.  Patient is taking Celebrex which helps with knee pain but not so much of the ankle pain.     12/12/19 follow-up for left ankle pain. Has not started physical therapy yet. Schedule to start PT today. Tells me pain is slowly improving. She mentions about previous incident spraining her left ankle coming out of shower. Ankle injection last time did not help with pain. Ambulating in regular casual shoe with ACE wrap on left ankle.         Review of Systems   Constitution: Negative for chills, decreased appetite, fever and malaise/fatigue.   HENT: Negative for congestion, ear discharge and sore throat.    Eyes: Negative for discharge and pain.   Cardiovascular:  Negative for chest pain, claudication and leg swelling.   Respiratory: Negative for cough and shortness of breath.    Skin: Negative for color change, nail changes and rash.   Musculoskeletal: Positive for joint pain, joint swelling and stiffness. Negative for arthritis and muscle weakness.        Left ankle pain   Gastrointestinal: Negative for bloating, abdominal pain, diarrhea, nausea and vomiting.   Genitourinary: Negative for flank pain and hematuria.   Neurological: Negative for headaches, numbness and weakness.   Psychiatric/Behavioral: Negative for altered mental status.             Past Medical History:   Diagnosis Date    Abnormal Pap smear     Allergy     Asthma     Sinusitis, chronic        Past Surgical History:   Procedure Laterality Date    SALIVARY GLAND SURGERY      TONSILLECTOMY         Family History   Problem Relation Age of Onset    Diabetes Mother     Hypertension Mother     Asthma Mother     Sinus disease Mother     Allergies Father     Diabetes Maternal Grandmother     Hypertension Maternal Grandmother     Sinus disease Sister     Sinus disease Brother     Sinus disease Sister        Social History     Socioeconomic History    Marital status:      Spouse name: Not on file    Number of children: Not on file    Years of education: Not on file    Highest education level: Not on file   Occupational History     Employer: [employer]   Social Needs    Financial resource strain: Not on file    Food insecurity:     Worry: Not on file     Inability: Not on file    Transportation needs:     Medical: Not on file     Non-medical: Not on file   Tobacco Use    Smoking status: Former Smoker     Packs/day: 0.50     Types: Cigarettes     Last attempt to quit: 10/1/2015     Years since quittin.2    Smokeless tobacco: Never Used   Substance and Sexual Activity    Alcohol use: No    Drug use: No    Sexual activity: Yes     Partners: Male   Lifestyle    Physical activity:  "    Days per week: Not on file     Minutes per session: Not on file    Stress: Not on file   Relationships    Social connections:     Talks on phone: Not on file     Gets together: Not on file     Attends Anabaptism service: Not on file     Active member of club or organization: Not on file     Attends meetings of clubs or organizations: Not on file     Relationship status: Not on file   Other Topics Concern    Not on file   Social History Narrative    Not on file       Current Outpatient Medications   Medication Sig Dispense Refill    celecoxib (CELEBREX) 200 MG capsule Take 1 capsule (200 mg total) by mouth once daily. 90 capsule 4    cetirizine (ZYRTEC) 10 MG tablet Take 1 tablet (10 mg total) by mouth once daily. 90 tablet 0     No current facility-administered medications for this visit.        Review of patient's allergies indicates:   Allergen Reactions    Prednisone Rash     Hx of delayed onset rash on 2 occasion. Tolerates medrol, IM steroids, topical, and intranasal steroids w/o problem       Vitals:    12/12/19 0904   BP: 125/82   Pulse: 88   Weight: 93.9 kg (207 lb 1.6 oz)   Height: 5' 6" (1.676 m)   PainSc:   2   PainLoc: Foot       Objective:      Physical Exam   Constitutional: She is oriented to person, place, and time. She appears well-developed and well-nourished. No distress.   HENT:   Nose: Nose normal.   Eyes: Conjunctivae are normal.   Neck: Normal range of motion.   Pulmonary/Chest: Effort normal. She exhibits no tenderness.   Abdominal: There is no tenderness.   Neurological: She is alert and oriented to person, place, and time.   Psychiatric: She has a normal mood and affect. Her behavior is normal.       Vascular: Distal DP/PT pulses palpable 2/4. CRT < 3 sec to tips of toes. No vericosities noted to LEs. Hair growth present LE, warm to touch LE,   L ankle: no edema.     Dermatologic: No open lesions, lacerations or wounds. Interdigital spaces clean, dry and intact. No erythema, " juanjose portillo noted LE  L ankle: soft tissue (mostly likely lipoma) anterior aspect of distal tip of lateral malleolus.     Musculoskeletal: MMT 5/5 in DF/PF/Inv/Ev resistance with no reproduction of pain in any direction. Passive range of motion of ankle and pedal joints is painless. No calf tenderness LE, Compartments soft/compressible. ROM of ankles with < 10 deg DF is noted b/l  L ankle: diffuse tenderness lateral ankle especially over PTFL and diffuse ankle pain anteriorly.  Pain along the peroneal tendon especially retro malleolar region and retrocalcaneal bursa. No pain at 5th metatarsal base.     Neurological: Light touch, proprioception, and sharp/dull sensation are all intact. Protective threshold with the Clearlake-Wienstein monofilament is intact. Vibratory sensation intact.       Assessment:       Encounter Diagnoses   Name Primary?    Peroneal tendinitis of left lower extremity Yes    Achilles tendinitis of left lower extremity     Retrocalcaneal bursitis (back of heel), left     Sprain of left ankle, unspecified ligament, initial encounter          Plan:       Hillary was seen today for follow-up.    Diagnoses and all orders for this visit:    Peroneal tendinitis of left lower extremity    Achilles tendinitis of left lower extremity    Retrocalcaneal bursitis (back of heel), left    Sprain of left ankle, unspecified ligament, initial encounter      I counseled the patient on her conditions, their implications and medical management.    49 y.o. female with left ankle pain.    -MRI reviewed with the patient. Mild edema of the ankle. + bony exostosis of the anterior ankle and lateral aspect of the talus.  No tendon pathologies.  -left ankle injection did not help with the symptoms.  Based on the MRI and clinical findings most likely pain is not originating from the ankle joint. Based on the location of the symptoms my differential diagnosis are peroneal tendinitis, Achilles tendinitis, retrocalcaneal  bursitis, sprain of the ankle lateral ligament versus others.   -recommend formal physical therapy.  Ankle brace was dispensed to patient.  -The nature of the condition, options for management, as well as potential risks and complications were discussed in detail with patient. Patient was amenable to my recommendations and left my office fully informed and will follow up as instructed or sooner if necessary.    -f/u 3 months      Note dictated with voice recognition software, please excuse any grammatical errors.

## 2019-12-19 ENCOUNTER — CLINICAL SUPPORT (OUTPATIENT)
Dept: REHABILITATION | Facility: HOSPITAL | Age: 49
End: 2019-12-19
Attending: PODIATRIST
Payer: OTHER GOVERNMENT

## 2019-12-19 DIAGNOSIS — G89.29 CHRONIC PAIN OF LEFT ANKLE: ICD-10-CM

## 2019-12-19 DIAGNOSIS — M25.572 CHRONIC PAIN OF LEFT ANKLE: ICD-10-CM

## 2019-12-19 DIAGNOSIS — R26.89 DECREASED MOBILITY: ICD-10-CM

## 2019-12-19 DIAGNOSIS — M25.672 DECREASED RANGE OF MOTION OF LEFT ANKLE: ICD-10-CM

## 2019-12-19 PROCEDURE — 97110 THERAPEUTIC EXERCISES: CPT | Mod: PN

## 2019-12-19 PROCEDURE — 97140 MANUAL THERAPY 1/> REGIONS: CPT | Mod: PN

## 2019-12-19 NOTE — PROGRESS NOTES
"  Physical Therapy Daily Treatment Note     Name: Hillary Cotton  Clinic Number: [id number]    Therapy Diagnosis:   Encounter Diagnoses   Name Primary?    Chronic pain of left ankle     Decreased range of motion of left ankle     Decreased mobility      Physician: Arnulfo Barboza*    Visit Date: 12/19/2019    Physician Orders: PT Eval and Treat  Medical Diagnosis: Peroneal tendonitis of left lower leg  Evaluation Date: 12/12/19  Authorization Period Expiration: 04/04/202  Plan of Care Certification Period: 12/12/19 to 02/06/2020  Visit #/Visits authorized: 2/ 16     Time In: 1605  Time Out: 1700  Total Billable Time: 55 minutes (1MT, 3 TE)    Precautions: Standard and asthma, WBAT with boot and without boot    Subjective     Pt reports: Was slightly sore after the evaluation but over the weekend she felt good and most of the pain went away. Only feels slightly achy.  She was compliant with home exercise program.  Response to previous treatment: 1st tx session  Functional change: 1st tx session    Pain: 2/10  Location: left ankles and lower legs     Objective     Hillary received therapeutic exercises to develop strength, endurance, ROM and flexibility for 45 minutes including:    Seated calf stretch: 3x30'' B  Standing soleus Stretch 3x30 sec B  Seated ankle circles: 10x CW/CCW  Seated isometric ankle ball squeezes: 3x10, 5" hold  Seated towel slides: 10x L  Standing heel raises: 3x10, DL  Step ups: 3x10, L1    Hillary received the following manual therapy techniques: Joint mobilizations, Manual traction and Soft tissue Mobilization were applied to the: L ankle for 10 minutes, including:  STM of gastroc, soleus, peroneals  Grad I and II PA talocrual       Home Exercises Provided and Patient Education Provided     Education provided:   - HEP    Written Home Exercises Provided: yes.  Exercises were reviewed and Hillary was able to demonstrate them prior to the end of the session.  Hillary demonstrated " good  understanding of the education provided.     See EMR under Patient Instructions for exercises provided prior visit.    Assessment     Pt was able to tolerate all exercises during her first tx session. Pt progressed with soleus stretch in standing as she had difficulty performing them in long sitting and on fitter board. Pt also progressed with seated isometric ball squeezes and step ups. Pt reported she has a bone spur in L ankle that hurts when performing standing gastroc stretch.   Hillary is progressing well towards her goals.   Pt prognosis is Excellent.     Pt will continue to benefit from skilled outpatient physical therapy to address the deficits listed in the problem list box on initial evaluation, provide pt/family education and to maximize pt's level of independence in the home and community environment.     Pt's spiritual, cultural and educational needs considered and pt agreeable to plan of care and goals.     Anticipated barriers to physical therapy: chronicity of injury    Goals:   Short Term Goals:  4 weeks  1.Report decreased L ankle pain </=1/10 in gait with boot donned to increase tolerance for ADLs.  2. Increase DF and Eversion AROM by 5 degrees for left ankle in order to walk with min to no compensation.  3. Pt will demo good sitting and standing posture for improved spine and joint alignment for improved biomechanics.  4. Pt to tolerate HEP to improve ROM and independence with ADL's.     Long Term Goals: 8 weeks  1.Report decreased L ankle pain  < / =  2/10 in gait without boot to increase tolerance for increased QoL and improved ADLs.  2.Patient goal: to get better in time for dandre gras to ride on a float  3.Increase strength to >/= 4+/5 for BLE to increase tolerance for ADL and work activities.  4. Pt will report at CJ level (20-40% impaired) on ANKLE FOTO score to demo increased functional mobility.   5. Pt will be able to ambulate community distances and negotiate stairs with minimal  ankle pain for increased functional mobility and QoL.    Plan     Pt will continue with POC as tolerated. Pt will progress wth ankle isometrics Inv/Eversion    Roberto Adams, PT

## 2020-01-06 ENCOUNTER — TELEPHONE (OUTPATIENT)
Dept: REHABILITATION | Facility: HOSPITAL | Age: 50
End: 2020-01-06

## 2020-01-06 DIAGNOSIS — M25.672 DECREASED RANGE OF MOTION OF LEFT ANKLE: ICD-10-CM

## 2020-01-06 DIAGNOSIS — M25.572 CHRONIC PAIN OF LEFT ANKLE: ICD-10-CM

## 2020-01-06 DIAGNOSIS — R26.89 DECREASED MOBILITY: ICD-10-CM

## 2020-01-06 DIAGNOSIS — G89.29 CHRONIC PAIN OF LEFT ANKLE: ICD-10-CM

## 2020-01-15 ENCOUNTER — OFFICE VISIT (OUTPATIENT)
Dept: INTERNAL MEDICINE | Facility: CLINIC | Age: 50
End: 2020-01-15
Payer: OTHER GOVERNMENT

## 2020-01-15 VITALS
TEMPERATURE: 98 F | HEIGHT: 66 IN | DIASTOLIC BLOOD PRESSURE: 68 MMHG | BODY MASS INDEX: 33.34 KG/M2 | HEART RATE: 93 BPM | OXYGEN SATURATION: 98 % | SYSTOLIC BLOOD PRESSURE: 118 MMHG | WEIGHT: 207.44 LBS

## 2020-01-15 DIAGNOSIS — Z87.891 FORMER SMOKER: ICD-10-CM

## 2020-01-15 DIAGNOSIS — J45.20 MILD INTERMITTENT ASTHMA WITHOUT COMPLICATION: ICD-10-CM

## 2020-01-15 DIAGNOSIS — J01.00 ACUTE MAXILLARY SINUSITIS, RECURRENCE NOT SPECIFIED: Primary | ICD-10-CM

## 2020-01-15 PROCEDURE — 99213 PR OFFICE/OUTPT VISIT, EST, LEVL III, 20-29 MIN: ICD-10-PCS | Mod: S$PBB,,, | Performed by: INTERNAL MEDICINE

## 2020-01-15 PROCEDURE — 99214 OFFICE O/P EST MOD 30 MIN: CPT | Mod: PBBFAC,PO,25 | Performed by: INTERNAL MEDICINE

## 2020-01-15 PROCEDURE — 99213 OFFICE O/P EST LOW 20 MIN: CPT | Mod: S$PBB,,, | Performed by: INTERNAL MEDICINE

## 2020-01-15 PROCEDURE — 99999 PR PBB SHADOW E&M-EST. PATIENT-LVL IV: CPT | Mod: PBBFAC,,, | Performed by: INTERNAL MEDICINE

## 2020-01-15 PROCEDURE — 99999 PR PBB SHADOW E&M-EST. PATIENT-LVL IV: ICD-10-PCS | Mod: PBBFAC,,, | Performed by: INTERNAL MEDICINE

## 2020-01-15 PROCEDURE — 96372 THER/PROPH/DIAG INJ SC/IM: CPT | Mod: PBBFAC,PO

## 2020-01-15 RX ORDER — TRIAMCINOLONE ACETONIDE 40 MG/ML
40 INJECTION, SUSPENSION INTRA-ARTICULAR; INTRAMUSCULAR
Status: COMPLETED | OUTPATIENT
Start: 2020-01-15 | End: 2020-01-15

## 2020-01-15 RX ORDER — AZITHROMYCIN 250 MG/1
TABLET, FILM COATED ORAL
Qty: 6 TABLET | Refills: 0 | Status: SHIPPED | OUTPATIENT
Start: 2020-01-15 | End: 2020-01-20

## 2020-01-15 RX ADMIN — TRIAMCINOLONE ACETONIDE 40 MG: 40 INJECTION, SUSPENSION INTRA-ARTICULAR; INTRAMUSCULAR at 05:01

## 2020-01-15 NOTE — PROGRESS NOTES
Subjective:       Patient ID: Hillary Cotton is a 49 y.o. female.    Chief Complaint: Sinusitis and Sore Throat     I have reviewed the PMH,  and  for this patient    She  has a past medical history of Abnormal Pap smear, Allergy, Asthma, and Sinusitis, chronic.    She is a patient of Dr Barboza who is here for an urgent visit to evaluate sinus congestion. She has  A history of Asthma, but she has not needed her inhaler unless she has been sick since she stopped smoking in 2015. She has been feeling ill since January 1st. She started with runny nose and sore throat and cough. She thought she was getting better, and she suddenly was worse yesterday. She has been having thick drainage. Her cough has mostly resolved. She has not taken her temperature, but she has felt like she was having chills. She has thick drainage of various colors. Her ears do not hurt, but she has sinus pressure on both sides in her forehead and face. Her lymph nodes feel sore. She has postnasal drip.     Active Ambulatory Problems     Diagnosis Date Noted    Allergic sinusitis 11/15/2013    Strain of triceps muscle 05/05/2014    Fatigue 10/03/2014    Hyperlipemia 10/10/2014    Asthma 05/05/2015    Bacterial vaginosis 05/18/2016    Personal history of endometriosis 05/18/2016    Stiffness of joint of forearm 05/19/2017    Decreased range of motion of neck 08/29/2017    Poor posture 08/29/2017    Weakness of shoulder 08/29/2017    Pain aggravated by activities of daily living 08/29/2017    Obesity (BMI 30-39.9) 03/12/2019    Primary osteoarthritis of both knees 03/12/2019    Chronic pain of left ankle 12/19/2019    Decreased range of motion of left ankle 12/19/2019    Decreased mobility 12/19/2019     Resolved Ambulatory Problems     Diagnosis Date Noted    Pain of right upper arm 04/03/2014    Gastroenteritis 10/03/2014    Nausea 10/03/2014    Overweight (BMI 25.0-29.9) 10/03/2014    Diarrhea 10/10/2014     Flu-like symptoms 01/27/2015    Pharyngitis 01/27/2015    Acute sinusitis 05/05/2015    UTI (urinary tract infection), uncomplicated 05/18/2016    Pain of right arm 05/19/2017     Past Medical History:   Diagnosis Date    Abnormal Pap smear     Allergy     Sinusitis, chronic          MEDICATIONS:  Current Outpatient Medications:     celecoxib (CELEBREX) 200 MG capsule, Take 1 capsule (200 mg total) by mouth once daily., Disp: 90 capsule, Rfl: 4    cetirizine (ZYRTEC) 10 MG tablet, Take 1 tablet (10 mg total) by mouth once daily., Disp: 90 tablet, Rfl: 0    azithromycin (Z-ANTONIA) 250 MG tablet, Take 2 tablets by mouth on day 1; Take 1 tablet by mouth on days 2-5, Disp: 6 tablet, Rfl: 0    Current Facility-Administered Medications:     triamcinolone acetonide injection 40 mg, 40 mg, Intramuscular, 1 time in Clinic/HOD, Wendy Choudhary MD      HEALTH MAINTENANCE:   Health Maintenance   Topic Date Due    Pap Smear with HPV Cotest  06/06/2019    Mammogram  08/01/2019    Lipid Panel  10/03/2019    TETANUS VACCINE  03/12/2029       Review of Systems   Constitutional: Negative for activity change, appetite change, chills, diaphoresis, fatigue and fever.   HENT: Positive for congestion, postnasal drip, rhinorrhea and sore throat. Negative for drooling, ear discharge, ear pain, nosebleeds, sinus pressure, sinus pain, sneezing, trouble swallowing and voice change.    Eyes: Negative for pain, discharge, redness and itching.   Respiratory: Positive for cough. Negative for chest tightness, shortness of breath and wheezing.    Cardiovascular: Negative for chest pain and leg swelling.   Gastrointestinal: Negative for abdominal pain, constipation, diarrhea and nausea.   Musculoskeletal: Negative for arthralgias, joint swelling and myalgias.   Skin: Negative for pallor and rash.   Neurological: Negative for dizziness, weakness, light-headedness and headaches.   Hematological: Negative for adenopathy. Does not  bruise/bleed easily.   Psychiatric/Behavioral: Negative for agitation and sleep disturbance.       Objective:          A1C:  Recent Labs   Lab 07/17/19  1639   Hemoglobin A1C 5.6     CBC:  Recent Labs   Lab 10/03/18  1620   WBC 10.65   RBC 4.70   Hemoglobin 13.9   Hematocrit 44.2   Platelets 353 H   Mean Corpuscular Volume 94   Mean Corpuscular Hemoglobin 29.6   Mean Corpuscular Hemoglobin Conc 31.4 L     CMP:  Recent Labs   Lab 10/03/18  1620   Glucose 100   Calcium 9.6   Albumin 3.7   Total Protein 7.4   Sodium 137   Potassium 4.2   CO2 26   Chloride 103   BUN, Bld 9   Creatinine 1.0   Alkaline Phosphatase 68   ALT 14   AST 18   Total Bilirubin 0.2     LIPIDS:  Recent Labs   Lab 10/03/18  1620   TSH 1.662     TSH:  Recent Labs   Lab 10/03/18  1620   TSH 1.662           Physical Exam   Constitutional: She is oriented to person, place, and time.   HENT:   Head: Normocephalic and atraumatic.   Right Ear: External ear normal. Tympanic membrane is not injected, not erythematous and not retracted. No middle ear effusion.   Left Ear: External ear normal. Tympanic membrane is not injected, not erythematous and not retracted.  No middle ear effusion.   Nose: No rhinorrhea. Right sinus exhibits maxillary sinus tenderness. Right sinus exhibits no frontal sinus tenderness. Left sinus exhibits maxillary sinus tenderness. Left sinus exhibits no frontal sinus tenderness.   Mouth/Throat: Posterior oropharyngeal erythema present. No oropharyngeal exudate or posterior oropharyngeal edema.   Eyes: Pupils are equal, round, and reactive to light. Conjunctivae and EOM are normal. Right eye exhibits no discharge. Left eye exhibits no discharge.   Neck: Normal range of motion. Neck supple. No thyromegaly present.   Cardiovascular: Normal rate, regular rhythm, normal heart sounds and intact distal pulses.   No murmur heard.  Pulmonary/Chest: Effort normal and breath sounds normal. No accessory muscle usage. No tachypnea. No respiratory  distress. She has no wheezes. She has no rhonchi. She has no rales.   Abdominal: Soft. She exhibits no distension.   Musculoskeletal: She exhibits no edema or tenderness.   Lymphadenopathy:     She has cervical adenopathy.   Neurological: She is alert and oriented to person, place, and time.   Skin: Skin is warm and dry. No rash noted. No erythema.   Psychiatric: She has a normal mood and affect. Her behavior is normal.             Assessment and Plan:     Problem List Items Addressed This Visit        Pulmonary    Asthma - we will treat with a steroid shot to prevent airway reactivity. She has na inhaler if needed.       Other Visit Diagnoses     Acute maxillary sinusitis, recurrence not specified    -  Primary- we will treat with a zpak and a steroid shot. She can still use OTC sinus meds if they help. Rest and drink plenty of fluids.       Former smoker    - she quit in 2015.           Orders Placed This Encounter    triamcinolone acetonide injection 40 mg    azithromycin (Z-ANTONIA) 250 MG tablet         Follow-up with Dr Barboza as recommended.       Pavel Choudhary MD,  FACP  Internal Medicine

## 2020-01-15 NOTE — PATIENT INSTRUCTIONS
We have reviewed your prescription medications.   We will treat you with a steroid shot and a zpak. You can continue sinus pills if they help. Also , use your asthma inhaler if needed.  Rest and drink plenty of fluids.   You have been given a steroid shot to help manage your symptoms. Possible side effects of this medication are sleeplessness, irritability, and increased hunger.

## 2020-01-30 ENCOUNTER — CLINICAL SUPPORT (OUTPATIENT)
Dept: REHABILITATION | Facility: HOSPITAL | Age: 50
End: 2020-01-30
Attending: PODIATRIST
Payer: OTHER GOVERNMENT

## 2020-01-30 DIAGNOSIS — M25.672 DECREASED RANGE OF MOTION OF LEFT ANKLE: ICD-10-CM

## 2020-01-30 DIAGNOSIS — G89.29 CHRONIC PAIN OF LEFT ANKLE: ICD-10-CM

## 2020-01-30 DIAGNOSIS — M25.572 CHRONIC PAIN OF LEFT ANKLE: ICD-10-CM

## 2020-01-30 DIAGNOSIS — R26.89 DECREASED MOBILITY: ICD-10-CM

## 2020-01-30 PROCEDURE — 97140 MANUAL THERAPY 1/> REGIONS: CPT | Mod: PN

## 2020-01-30 PROCEDURE — 97110 THERAPEUTIC EXERCISES: CPT | Mod: PN

## 2020-01-30 NOTE — PROGRESS NOTES
Physical Therapy Daily Treatment Note     Name: Hillary Cotton  Clinic Number: [id number]    Therapy Diagnosis:   Encounter Diagnoses   Name Primary?    Chronic pain of left ankle     Decreased range of motion of left ankle     Decreased mobility      Physician: Arnulfo Barboza*    Visit Date: 1/30/2020    Physician Orders: PT Eval and Treat  Medical Diagnosis: Peroneal tendonitis of left lower leg  Evaluation Date: 12/12/19  Authorization Period Expiration: 04/04/202  Plan of Care Certification Period: 12/12/19 to 02/06/2020  Visit #/Visits authorized: 3/16     Time In: 4:00  Time Out: 5:00  Total Billable Time: 30 minutes (1 MT, 1 TE)    Precautions: Standard and asthma, WBAT with boot and without boot    Subjective     Pt reports: Was slightly sore after the evaluation but over the weekend she felt good and most of the pain went away. Only feels slightly achy.  She was compliant with home exercise program.  Response to previous treatment: 1st tx session  Functional change: 1st tx session    Pain: 2/10  Location: left ankles and lower legs     Objective     Hillary received therapeutic exercises to develop strength, endurance, ROM and flexibility for 20 minutes 1:1 with PT including:    Long sitting calf stretch: 5x30'' B  Seated ankle circles: 10x CW/CCW  L ankle DF: 2x15 RTB  L ankle eversion: 3x10 YTB, Min A for ROM  Standing isometric eversion: 12x10'' L  Stair lunges: 2x10, 5' holds L  Standing heel raises: 3x10, DL  Calf fitter stretch for gastroc and soleus: 3x30'' DL each  Step ups: 3x10, L1    Hillary received the following manual therapy techniques: Joint mobilizations, Manual traction and Soft tissue Mobilization were applied to the: L ankle for 10 minutes, including:  STM of gastroc, soleus, peroneals  STM/MFR to left peroneals  L ankle PROM    Home Exercises Provided and Patient Education Provided   Education provided:   - HEP    Written Home Exercises Provided: yes.  Exercises were  reviewed and Hillary was able to demonstrate them prior to the end of the session.  Hillary demonstrated good  understanding of the education provided.     See EMR under Patient Instructions for exercises provided prior visit.    Assessment     Pt demonstrates poor active left ankle eversion and moderate tightness in left TC joint and hindfoot. Continue manual and can concentric strengthen can be performed safety. Add in posterior and medial ankle stretching next visit.  Hillary is progressing well towards her goals.   Pt prognosis is Excellent.     Pt will continue to benefit from skilled outpatient physical therapy to address the deficits listed in the problem list box on initial evaluation, provide pt/family education and to maximize pt's level of independence in the home and community environment.   Pt's spiritual, cultural and educational needs considered and pt agreeable to plan of care and goals.     Anticipated barriers to physical therapy: chronicity of injury    Goals:   Short Term Goals:  4 weeks  1.Report decreased L ankle pain </=1/10 in gait with boot donned to increase tolerance for ADLs. - progressing  2. Increase DF and Eversion AROM by 5 degrees for left ankle in order to walk with min to no compensation. - progressing  3. Pt will demo good sitting and standing posture for improved spine and joint alignment for improved biomechanics. - progressing  4. Pt to tolerate HEP to improve ROM and independence with ADL's. - progressing     Long Term Goals: 8 weeks  1.Report decreased L ankle pain  < / =  2/10 in gait without boot to increase tolerance for increased QoL and improved ADLs. - progressing  2.Patient goal: to get better in time for dandre gras to ride on a float. - progressing  3.Increase strength to >/= 4+/5 for BLE to increase tolerance for ADL and work activities. - progressing  4. Pt will report at CJ level (20-40% impaired) on ANKLE FOTO score to demo increased functional mobility.  -  progressing  5. Pt will be able to ambulate community distances and negotiate stairs with minimal ankle pain for increased functional mobility and QoL. - progressing    Plan     Pt will continue with POC as tolerated. Pt will progress [facility] ankle isometrics Inv/Eversion    Curtis Danielle, PT

## 2020-02-12 ENCOUNTER — TELEPHONE (OUTPATIENT)
Dept: REHABILITATION | Facility: HOSPITAL | Age: 50
End: 2020-02-12

## 2020-02-12 NOTE — TELEPHONE ENCOUNTER
PT left message for pt as she missed her appointment today and multiple appointments prior. PT notified pt if she misses next appointment she will be removed from the schedule. PT made pt aware of her next appointment date and time.

## 2020-03-09 ENCOUNTER — PATIENT OUTREACH (OUTPATIENT)
Dept: ADMINISTRATIVE | Facility: OTHER | Age: 50
End: 2020-03-09

## 2020-03-12 ENCOUNTER — OFFICE VISIT (OUTPATIENT)
Dept: PODIATRY | Facility: CLINIC | Age: 50
End: 2020-03-12
Payer: OTHER GOVERNMENT

## 2020-03-12 VITALS
HEIGHT: 66 IN | BODY MASS INDEX: 32.95 KG/M2 | DIASTOLIC BLOOD PRESSURE: 78 MMHG | WEIGHT: 205 LBS | HEART RATE: 78 BPM | SYSTOLIC BLOOD PRESSURE: 117 MMHG

## 2020-03-12 DIAGNOSIS — M76.72 PERONEAL TENDONITIS OF LEFT LOWER EXTREMITY: Primary | ICD-10-CM

## 2020-03-12 PROCEDURE — 99213 OFFICE O/P EST LOW 20 MIN: CPT | Mod: S$PBB,,, | Performed by: PODIATRIST

## 2020-03-12 PROCEDURE — 99999 PR PBB SHADOW E&M-EST. PATIENT-LVL III: ICD-10-PCS | Mod: PBBFAC,,, | Performed by: PODIATRIST

## 2020-03-12 PROCEDURE — 99999 PR PBB SHADOW E&M-EST. PATIENT-LVL III: CPT | Mod: PBBFAC,,, | Performed by: PODIATRIST

## 2020-03-12 PROCEDURE — 99213 PR OFFICE/OUTPT VISIT, EST, LEVL III, 20-29 MIN: ICD-10-PCS | Mod: S$PBB,,, | Performed by: PODIATRIST

## 2020-03-12 PROCEDURE — 99213 OFFICE O/P EST LOW 20 MIN: CPT | Mod: PBBFAC,PN | Performed by: PODIATRIST

## 2020-03-12 RX ORDER — GLUCOSAMINE/CHONDRO SU A 500-400 MG
1 TABLET ORAL 3 TIMES DAILY
COMMUNITY

## 2020-03-12 NOTE — PROGRESS NOTES
Subjective:      Patient ID: Hillary Cotton is a 49 y.o. female.    Chief Complaint: Follow-up (left ankle, still swollen, can not walk barefoot )    49 y.o. female presenting with left ankle pain. She points lateral aspect of the ankle along the lateral collateral ligament and sinus tarsi area.  Complains of ankle swelling of the left.  Swelling has been present for 3 months and does not go way.  Patient is ambulating with ankle brace which helps with the symptoms dramatically.  Patient tells me tennis shoes with a cushion helps with the symptoms.  Patient also points lateral aspect of the hindfoot over the sinus tarsi area for pain.  Denies any trauma.  Complains of pain after prolonged walking and standing. Taking Celebrex with some help with the symptoms.  Patient tells me osteoarthritis runs her family but not rheumatoid arthritis.  She was tested for rheumatoid arthritis in the past which was negative. Pain level 3/10.     11/11/19 follow-up for left ankle pain.  Patient points lateral aspect of the ankle over the collateral ligament and peroneal tendons.  Patient also here to review MRI.  Ambulating in normal tennis shoe.  Tells me her symptoms were getting better however she was walking and standing for a while over the weekend which now causing moderate pain of the ankle.  Patient is taking Celebrex which helps with knee pain but not so much of the ankle pain.     12/12/19 follow-up for left ankle pain. Has not started physical therapy yet. Schedule to start PT today. Tells me pain is slowly improving. She mentions about previous incident spraining her left ankle coming out of shower. Ankle injection last time did not help with pain. Ambulating in regular casual shoe with ACE wrap on left ankle.     3/12/19 fu for left ankle pain with diagnose of peroneal tendonitis. Was fully evaluated and has finished full course of physical therapy (on and off). She tells me her symptoms is improved about 90%.  She tells me she has to wear shoe with cushion to help with her symptoms. She experiences pain when she ambulate with barefoot.         Review of Systems   Constitution: Negative for chills, decreased appetite, fever and malaise/fatigue.   HENT: Negative for congestion, ear discharge and sore throat.    Eyes: Negative for discharge and pain.   Cardiovascular: Negative for chest pain, claudication and leg swelling.   Respiratory: Negative for cough and shortness of breath.    Skin: Negative for color change, nail changes and rash.   Musculoskeletal: Positive for joint pain, joint swelling and stiffness. Negative for arthritis and muscle weakness.        Left ankle pain   Gastrointestinal: Negative for bloating, abdominal pain, diarrhea, nausea and vomiting.   Genitourinary: Negative for flank pain and hematuria.   Neurological: Negative for headaches, numbness and weakness.   Psychiatric/Behavioral: Negative for altered mental status.             Past Medical History:   Diagnosis Date    Abnormal Pap smear     Allergy     Asthma     Sinusitis, chronic        Past Surgical History:   Procedure Laterality Date    SALIVARY GLAND SURGERY      TONSILLECTOMY         Family History   Problem Relation Age of Onset    Diabetes Mother     Hypertension Mother     Asthma Mother     Sinus disease Mother     Allergies Father     Diabetes Maternal Grandmother     Hypertension Maternal Grandmother     Sinus disease Sister     Sinus disease Brother     Sinus disease Sister        Social History     Socioeconomic History    Marital status:      Spouse name: Not on file    Number of children: Not on file    Years of education: Not on file    Highest education level: Not on file   Occupational History     Employer: [employer]   Social Needs    Financial resource strain: Not on file    Food insecurity:     Worry: Not on file     Inability: Not on file    Transportation needs:     Medical: Not on file      "Non-medical: Not on file   Tobacco Use    Smoking status: Former Smoker     Packs/day: 0.50     Types: Cigarettes     Last attempt to quit: 10/1/2015     Years since quittin.4    Smokeless tobacco: Never Used   Substance and Sexual Activity    Alcohol use: No    Drug use: No    Sexual activity: Yes     Partners: Male   Lifestyle    Physical activity:     Days per week: Not on file     Minutes per session: Not on file    Stress: Not on file   Relationships    Social connections:     Talks on phone: Not on file     Gets together: Not on file     Attends Scientologist service: Not on file     Active member of club or organization: Not on file     Attends meetings of clubs or organizations: Not on file     Relationship status: Not on file   Other Topics Concern    Not on file   Social History Narrative    Not on file       Current Outpatient Medications   Medication Sig Dispense Refill    celecoxib (CELEBREX) 200 MG capsule Take 1 capsule (200 mg total) by mouth once daily. 90 capsule 4    cetirizine (ZYRTEC) 10 MG tablet Take 1 tablet (10 mg total) by mouth once daily. 90 tablet 0    glucosamine-chondroitin 500-400 mg tablet Take 1 tablet by mouth 3 (three) times daily.      multivitamin capsule Take 1 capsule by mouth once daily.       No current facility-administered medications for this visit.        Review of patient's allergies indicates:   Allergen Reactions    Prednisone Rash     Hx of delayed onset rash on 2 occasion. Tolerates medrol, IM steroids, topical, and intranasal steroids w/o problem       Vitals:    20 1046   BP: 117/78   Pulse: 78   Weight: 93 kg (205 lb 0.4 oz)   Height: 5' 6" (1.676 m)   PainSc:   6       Objective:      Physical Exam   Constitutional: She is oriented to person, place, and time. She appears well-developed and well-nourished. No distress.   HENT:   Nose: Nose normal.   Eyes: Conjunctivae are normal.   Neck: Normal range of motion.   Pulmonary/Chest: Effort " normal. She exhibits no tenderness.   Abdominal: There is no tenderness.   Neurological: She is alert and oriented to person, place, and time.   Psychiatric: She has a normal mood and affect. Her behavior is normal.       Vascular: Distal DP/PT pulses palpable 2/4. CRT < 3 sec to tips of toes. No vericosities noted to LEs. Hair growth present LE, warm to touch LE,   L ankle: no edema.     Dermatologic: No open lesions, lacerations or wounds. Interdigital spaces clean, dry and intact. No erythema, rubor, calor noted LE  L ankle: soft tissue (mostly likely lipoma) anterior aspect of distal tip of lateral malleolus.     Musculoskeletal: MMT 5/5 in DF/PF/Inv/Ev resistance with no reproduction of pain in any direction. Passive range of motion of ankle and pedal joints is painless. No calf tenderness LE, Compartments soft/compressible. ROM of ankles with < 10 deg DF is noted b/l  L ankle: diffuse mild tenderness lateral ankle. Pain along the peroneal tendon especially retro malleolar region and retrocalcaneal bursa. No pain at 5th metatarsal base.     Neurological: Light touch, proprioception, and sharp/dull sensation are all intact. Protective threshold with the [place]-Wienstein monofilament is intact. Vibratory sensation intact.       Assessment:       Encounter Diagnosis   Name Primary?    Peroneal tendonitis of left lower extremity Yes         Plan:       Hillary was seen today for follow-up.    Diagnoses and all orders for this visit:    Peroneal tendonitis of left lower extremity      I counseled the patient on her conditions, their implications and medical management.    49 y.o. female with left ankle pain.    -her symptoms improved with physical therapy.   -It was discussed the importance of wearing shoes with adequate room in toe box to accommodate toe deformities. Recommended New Balance/[employer]ics shoe brands with adequate arch supports to alleviate abnormal pressure and improve stability of foot while walking.  Avoid flat shoes and barefoot walking as these will exacerbate or worsen symptoms.     -The nature of the condition, options for management, as well as potential risks and complications were discussed in detail with patient. Patient was amenable to my recommendations and left my office fully informed and will follow up as instructed or sooner if necessary.    -f/u prn      Note dictated with voice recognition software, please excuse any grammatical errors.

## 2020-04-06 ENCOUNTER — TELEPHONE (OUTPATIENT)
Dept: OBSTETRICS AND GYNECOLOGY | Facility: CLINIC | Age: 50
End: 2020-04-06

## 2020-04-06 NOTE — TELEPHONE ENCOUNTER
Called pt and lvm and will send message via portal. We would be able to see her tomorrow at 9.am for pre menopausal visit if spot is still available.

## 2020-04-27 ENCOUNTER — PATIENT MESSAGE (OUTPATIENT)
Dept: FAMILY MEDICINE | Facility: CLINIC | Age: 50
End: 2020-04-27

## 2020-04-28 ENCOUNTER — OFFICE VISIT (OUTPATIENT)
Dept: FAMILY MEDICINE | Facility: CLINIC | Age: 50
End: 2020-04-28
Payer: OTHER GOVERNMENT

## 2020-04-28 DIAGNOSIS — M54.2 NECK PAIN, BILATERAL POSTERIOR: Primary | ICD-10-CM

## 2020-04-28 DIAGNOSIS — G44.201 ACUTE INTRACTABLE TENSION-TYPE HEADACHE: ICD-10-CM

## 2020-04-28 PROCEDURE — 99442 PR PHYSICIAN TELEPHONE EVALUATION 11-20 MIN: CPT | Mod: 95,,, | Performed by: FAMILY MEDICINE

## 2020-04-28 PROCEDURE — 99442 PR PHYSICIAN TELEPHONE EVALUATION 11-20 MIN: ICD-10-PCS | Mod: 95,,, | Performed by: FAMILY MEDICINE

## 2020-04-28 RX ORDER — TIZANIDINE 4 MG/1
4 TABLET ORAL NIGHTLY
Qty: 10 TABLET | Refills: 0 | Status: SHIPPED | OUTPATIENT
Start: 2020-04-28 | End: 2020-05-08

## 2020-04-28 RX ORDER — IBUPROFEN 800 MG/1
800 TABLET ORAL
Qty: 60 TABLET | Refills: 0 | Status: SHIPPED | OUTPATIENT
Start: 2020-04-28 | End: 2020-05-27 | Stop reason: SDUPTHER

## 2020-04-28 NOTE — PROGRESS NOTES
The patient location is:  [place]  The chief complaint leading to consultation is:  headache and neck pain  Visit type: audiovisual  Total time spent with patient: 15 min  Each patient to whom he or she provides medical services by telemedicine is:  (1) informed of the relationship between the physician and patient and the respective role of any other health care provider with respect to management of the patient; and (2) notified that he or she may decline to receive medical services by telemedicine and may withdraw from such care at any time.    Notes:  49 years old female who was evaluated by telemedicine.  Patient with significant headache associated with upper neck pain for the last couple of weeks.  The pain is 9/10 of intensity on and off aggravated with activity better with rest.  Patient was using ibuprofen with partial improvement the symptoms.  She took 1 flexeril at bedtime with no improvement.  Patient using Aspercreme.  She also reports a muscle spasm over the left upper back.  She is concerned about possible cervical injury.  No numbness or tingling.  No weakness.    Diagnoses and all orders for this visit:    Neck pain, bilateral posterior  -     Cancel: X-Ray Cervical Spine AP And Lateral; Future  -     tiZANidine (ZANAFLEX) 4 MG tablet; Take 1 tablet (4 mg total) by mouth every evening. for 10 days  -     ibuprofen (ADVIL,MOTRIN) 800 MG tablet; Take 1 tablet (800 mg total) by mouth before meals as needed for Pain.  -     X-Ray Cervical Spine AP And Lateral; Future    Acute intractable tension-type headache  -     ibuprofen (ADVIL,MOTRIN) 800 MG tablet; Take 1 tablet (800 mg total) by mouth before meals as needed for Pain.     Neck stretching exercises.

## 2020-04-29 ENCOUNTER — PATIENT MESSAGE (OUTPATIENT)
Dept: FAMILY MEDICINE | Facility: CLINIC | Age: 50
End: 2020-04-29

## 2020-04-29 ENCOUNTER — HOSPITAL ENCOUNTER (OUTPATIENT)
Dept: RADIOLOGY | Facility: HOSPITAL | Age: 50
Discharge: HOME | End: 2020-04-29
Attending: FAMILY MEDICINE
Payer: OTHER GOVERNMENT

## 2020-04-29 DIAGNOSIS — M54.2 NECK PAIN, BILATERAL POSTERIOR: ICD-10-CM

## 2020-04-29 PROCEDURE — 72040 X-RAY EXAM NECK SPINE 2-3 VW: CPT | Mod: TC,FY,PO

## 2020-04-30 ENCOUNTER — TELEPHONE (OUTPATIENT)
Dept: FAMILY MEDICINE | Facility: CLINIC | Age: 50
End: 2020-04-30

## 2020-04-30 DIAGNOSIS — M54.2 NECK PAIN OF OVER 3 MONTHS DURATION: Primary | ICD-10-CM

## 2020-05-04 ENCOUNTER — PATIENT MESSAGE (OUTPATIENT)
Dept: FAMILY MEDICINE | Facility: CLINIC | Age: 50
End: 2020-05-04

## 2020-05-12 ENCOUNTER — PATIENT MESSAGE (OUTPATIENT)
Dept: FAMILY MEDICINE | Facility: CLINIC | Age: 50
End: 2020-05-12

## 2020-05-12 ENCOUNTER — TELEPHONE (OUTPATIENT)
Dept: FAMILY MEDICINE | Facility: CLINIC | Age: 50
End: 2020-05-12

## 2020-05-12 DIAGNOSIS — M47.9 SPONDYLOSIS: Primary | ICD-10-CM

## 2020-05-12 DIAGNOSIS — M54.2 NECK PAIN: ICD-10-CM

## 2020-05-13 NOTE — TELEPHONE ENCOUNTER
Patient with chronic neck pain.    Pain management was ordered.    I ordered an MRI before that appointment.

## 2020-05-25 ENCOUNTER — HOSPITAL ENCOUNTER (OUTPATIENT)
Dept: RADIOLOGY | Facility: HOSPITAL | Age: 50
Discharge: HOME | End: 2020-05-25
Attending: FAMILY MEDICINE
Payer: OTHER GOVERNMENT

## 2020-05-25 DIAGNOSIS — M54.2 NECK PAIN: ICD-10-CM

## 2020-05-25 DIAGNOSIS — M47.9 SPONDYLOSIS: ICD-10-CM

## 2020-05-25 PROCEDURE — 72141 MRI NECK SPINE W/O DYE: CPT | Mod: TC,PO

## 2020-05-27 DIAGNOSIS — J30.2 CHRONIC SEASONAL ALLERGIC RHINITIS: ICD-10-CM

## 2020-05-27 RX ORDER — CETIRIZINE HYDROCHLORIDE 10 MG/1
10 TABLET ORAL DAILY
Qty: 90 TABLET | Refills: 0 | Status: SHIPPED | OUTPATIENT
Start: 2020-05-27 | End: 2023-12-13 | Stop reason: SDUPTHER

## 2020-05-28 ENCOUNTER — PATIENT MESSAGE (OUTPATIENT)
Dept: PAIN MEDICINE | Facility: CLINIC | Age: 50
End: 2020-05-28

## 2020-05-28 ENCOUNTER — PATIENT OUTREACH (OUTPATIENT)
Dept: ADMINISTRATIVE | Facility: OTHER | Age: 50
End: 2020-05-28

## 2020-05-28 NOTE — PROGRESS NOTES
Chart reviewed.   Immunizations: Triggered Imm Registry     Orders placed: n/a  Upcoming appts to satisfy OMA topics: n/a

## 2020-05-29 ENCOUNTER — OFFICE VISIT (OUTPATIENT)
Dept: PAIN MEDICINE | Facility: CLINIC | Age: 50
End: 2020-05-29
Payer: OTHER GOVERNMENT

## 2020-05-29 VITALS
SYSTOLIC BLOOD PRESSURE: 128 MMHG | WEIGHT: 205 LBS | BODY MASS INDEX: 32.95 KG/M2 | RESPIRATION RATE: 18 BRPM | TEMPERATURE: 97 F | HEIGHT: 66 IN | HEART RATE: 86 BPM | DIASTOLIC BLOOD PRESSURE: 82 MMHG

## 2020-05-29 DIAGNOSIS — M54.2 NECK PAIN: ICD-10-CM

## 2020-05-29 DIAGNOSIS — M47.812 CERVICAL SPONDYLOSIS: Primary | ICD-10-CM

## 2020-05-29 DIAGNOSIS — G89.4 CHRONIC PAIN DISORDER: ICD-10-CM

## 2020-05-29 PROCEDURE — 99999 PR PBB SHADOW E&M-EST. PATIENT-LVL IV: ICD-10-PCS | Mod: PBBFAC,,, | Performed by: ANESTHESIOLOGY

## 2020-05-29 PROCEDURE — 99214 OFFICE O/P EST MOD 30 MIN: CPT | Mod: PBBFAC | Performed by: ANESTHESIOLOGY

## 2020-05-29 PROCEDURE — 99244 OFF/OP CNSLTJ NEW/EST MOD 40: CPT | Mod: S$PBB,,, | Performed by: ANESTHESIOLOGY

## 2020-05-29 PROCEDURE — 99244 PR OFFICE CONSULTATION,LEVEL IV: ICD-10-PCS | Mod: S$PBB,,, | Performed by: ANESTHESIOLOGY

## 2020-05-29 PROCEDURE — 99999 PR PBB SHADOW E&M-EST. PATIENT-LVL IV: CPT | Mod: PBBFAC,,, | Performed by: ANESTHESIOLOGY

## 2020-05-29 RX ORDER — CELECOXIB 200 MG/1
200 CAPSULE ORAL DAILY
Qty: 30 CAPSULE | Refills: 2 | Status: SHIPPED | OUTPATIENT
Start: 2020-05-29 | End: 2020-09-03 | Stop reason: SDUPTHER

## 2020-05-29 RX ORDER — TIZANIDINE 4 MG/1
4 TABLET ORAL EVERY 6 HOURS PRN
Qty: 30 TABLET | Refills: 1 | Status: SHIPPED | OUTPATIENT
Start: 2020-05-29 | End: 2020-06-08

## 2020-05-29 RX ORDER — GABAPENTIN 300 MG/1
300 CAPSULE ORAL 3 TIMES DAILY
Qty: 90 CAPSULE | Refills: 2 | Status: SHIPPED | OUTPATIENT
Start: 2020-05-29 | End: 2020-10-02 | Stop reason: SDUPTHER

## 2020-05-29 NOTE — H&P (VIEW-ONLY)
Chronic Pain - New Consult    Referring Physician: Arnulfo Barboza*    Chief Complaint:   Chief Complaint   Patient presents with    Neck Pain        SUBJECTIVE:    Hillary Cotton presents to the clinic for the evaluation of neck  pain. The pain started 3 years ago following unknown and symptoms have been worsening.The pain is located in the neck area and radiates to the left shoulder blade.  The pain is described as aching, burning, dull, throbbing, tight band and intermittent and is rated as 3/10. The pain is rated with a score of  3/10 on the BEST day and a score of 10/10 on the WORST day.  Symptoms interfere with daily activity, sleeping and work. The pain is exacerbated by Sitting and Night Time and Bending.  The pain is mitigated by ice, laying down, medications and rest. She reports spending 3 hours per day reclining. The patient reports 7 hours of uninterrupted sleep per night.    Patient denies .  night fever/night sweats, urinary incontinence, bowel incontinence, significant weight loss, significant motor weakness and loss of sensations  Physical Therapy/Home Exercise: no      Pain Disability Index Review:  Last 3 PDI Scores 2020   Pain Disability Index (PDI) 49       Pain Medications:    Ibuprofen 800mg     report:  Reviewed and consistent with medication use as prescribed.    Pain Procedures: none in system    Imagin20 MRI Cervical   Narrative     EXAMINATION:  MRI CERVICAL SPINE WITHOUT CONTRAST    CLINICAL HISTORY:  CervicalgiaNeck pain, prior xray, abn neuro exam;    TECHNIQUE:  MR Cervical spine without contrast. Sagittal T1, T2, STIR. Axial 3D, T2. Coronal T1.    COMPARISON:  None available.    FINDINGS:  Vertebral body height is normal and alignment is maintained. Marrow signal is within normal limits. The craniocervical junction is unremarkable. Normal cord signal.  Intervertebral disc levels are as follows:    C2-C3 disc: No significant disc pathology. Normal  facet joints. No spinal canal or neural foraminal stenosis.    C3-C4 disc: No significant disc pathology. Normal facet joints. No spinal canal or neural foraminal stenosis.    C4-C5 disc: No significant disc pathology. Normal facet joints. No spinal canal or neural foraminal stenosis.    C5-C6 disc: Normal disc space height with tiny anterior osteophytes.  Normal comfortable joints and facet joints.  No significant spinal or foraminal stenosis.    C6-C7 disc: Right paracentral disc protrusion with annular fissure.  No specific nerve compression or displacement.  Anterior osteophytes.  Normal uncovertebral joints and facet joints.  No significant foraminal stenosis.  The thecal sac measures 11 mm.    C7-T1 disc: No significant disc pathology. Normal facet joints. No spinal canal or neural foraminal stenosis.      Impression       1. Right-sided disc protrusion with annular fissure at C6-C7 may correlate to focal symptoms.  No roland nerve compression or displacement in relation to this protrusion.  2. No significant spinal or foraminal stenosis       5/25/20 Xray cervical spine  Narrative     EXAMINATION:  XR CERVICAL SPINE AP LATERAL    CLINICAL HISTORY:  Cervicalgia    TECHNIQUE:  AP, lateral, and open mouth views of the cervical spine were performed.    COMPARISON:  None    FINDINGS:  Straightening of the normal cervical lordosis can be seen with patient positioning or muscular spasm.   No fracture or dislocation is seen.  Mild spondylosis mid lower cervical spine.  Normal prevertebral soft tissues.  Lungs are grossly clear.      Impression       No acute abnormalities.       Past Medical History:   Diagnosis Date    Abnormal Pap smear     Allergy     Asthma     Sinusitis, chronic      Past Surgical History:   Procedure Laterality Date    SALIVARY GLAND SURGERY      TONSILLECTOMY       Social History     Socioeconomic History    Marital status:      Spouse name: Not on file    Number of children: Not  on file    Years of education: Not on file    Highest education level: Not on file   Occupational History     Employer: [employer]   Social Needs    Financial resource strain: Not on file    Food insecurity:     Worry: Not on file     Inability: Not on file    Transportation needs:     Medical: Not on file     Non-medical: Not on file   Tobacco Use    Smoking status: Former Smoker     Packs/day: 0.50     Types: Cigarettes     Last attempt to quit: 10/1/2015     Years since quittin.6    Smokeless tobacco: Never Used   Substance and Sexual Activity    Alcohol use: No    Drug use: No    Sexual activity: Yes     Partners: Male   Lifestyle    Physical activity:     Days per week: Not on file     Minutes per session: Not on file    Stress: Not on file   Relationships    Social connections:     Talks on phone: Not on file     Gets together: Not on file     Attends Scientologist service: Not on file     Active member of club or organization: Not on file     Attends meetings of clubs or organizations: Not on file     Relationship status: Not on file   Other Topics Concern    Not on file   Social History Narrative    Not on file     Family History   Problem Relation Age of Onset    Diabetes Mother     Hypertension Mother     Asthma Mother     Sinus disease Mother     Allergies Father     Diabetes Maternal Grandmother     Hypertension Maternal Grandmother     Sinus disease Sister     Sinus disease Brother     Sinus disease Sister        Review of patient's allergies indicates:   Allergen Reactions    Prednisone Rash     Hx of delayed onset rash on 2 occasion. Tolerates medrol, IM steroids, topical, and intranasal steroids w/o problem       Current Outpatient Medications   Medication Sig    celecoxib (CELEBREX) 200 MG capsule Take 1 capsule (200 mg total) by mouth once daily.    cetirizine (ZYRTEC) 10 MG tablet Take 1 tablet (10 mg total) by mouth once daily.    glucosamine-chondroitin 500-400 mg  "tablet Take 1 tablet by mouth 3 (three) times daily.    ibuprofen (ADVIL,MOTRIN) 800 MG tablet Take 1 tablet (800 mg total) by mouth before meals as needed for Pain.    multivitamin capsule Take 1 capsule by mouth once daily.    celecoxib (CELEBREX) 200 MG capsule Take 1 capsule (200 mg total) by mouth once daily.    gabapentin (NEURONTIN) 300 MG capsule Take 1 capsule (300 mg total) by mouth 3 (three) times daily. One at night for 7 day, then 2 a day for 7 days then 3 a day    tiZANidine (ZANAFLEX) 4 MG tablet Take 1 tablet (4 mg total) by mouth every 6 (six) hours as needed.     No current facility-administered medications for this visit.        REVIEW OF SYSTEMS:    GENERAL:  No weight loss, malaise or fevers. +Fatigue  HEENT:  Negative for frequent or significant headaches.  NECK:  Negative for lumps, goiter and significant neck swelling. +Neck pain  RESPIRATORY:  Negative for cough, wheezing or shortness of breath. +Asthma  CARDIOVASCULAR:  Negative for chest pain, leg swelling or palpitations.  GI:  Negative for abdominal discomfort, blood in stools or black stools or change in bowel habits.  MUSCULOSKELETAL:  See HPI.  SKIN:  Negative for lesions, rash, and itching.  PSYCH:  Negative for sleep disturbance, mood disorder and recent psychosocial stressors.  HEMATOLOGY/LYMPHOLOGY:  Negative for prolonged bleeding, bruising easily or swollen nodes.  NEURO:   No history of headaches, syncope, paralysis, seizures or tremors.  All other reviewed and negative other than HPI.    OBJECTIVE:    /82   Pulse 86   Temp 97.4 °F (36.3 °C) (Oral)   Resp 18   Ht 5' 6" (1.676 m)   Wt 93 kg (205 lb)   BMI 33.09 kg/m²     PHYSICAL EXAMINATION:    General appearance: Well appearing, in no acute distress, alert and oriented x3.  Psych:  Mood and affect appropriate.  Skin: Skin color, texture, turgor normal, no rashes or lesions, in both upper and lower body.  Head/face:  Normocephalic, atraumatic. No palpable lymph " nodes.  Neck: Positive pain to palpation over the cervical paraspinous muscles. Spurling Negative.  Positive facet loading left more than right.  Limited range of motion of cervical spine.  Cor: RRR  Pulm: CTA  GI:  Soft and non-tender.  Back: Straight leg raising in the sitting and supine positions is negative to radicular pain. No pain to palpation over the spine or costovertebral angles. Normal range of motion without pain reproduction.  Extremities: Peripheral joint ROM is full and pain free without obvious instability or laxity in all four extremities. No deformities, edema, or skin discoloration. Good capillary refill.  Musculoskeletal: Shoulder, hip, sacroiliac and knee provocative maneuvers are negative. Bilateral upper and lower extremity strength is normal and symmetric.  No atrophy or tone abnormalities are noted.  Neuro: Bilateral upper and lower extremity coordination and muscle stretch reflexes are physiologic and symmetric.  Plantar response are downgoing. No loss of sensation is noted.  Gait: normal.    ASSESSMENT: 49 y.o. year old female with chronic neck pain consistent with cervical spondylosis and cervical radiculopathy.  She has failed chiropractic treatment and medication management including Advil, Celebrex, tizanidine and Flexeril.  We will start her on gabapentin 300 mg at bedtime to increase as tolerated to 300 mg t.i.d., prescribe tizanidine 4 mg at bedtime as needed and Celebrex 200 mg once a day as needed.  We will also refer her to physical therapy and schedule for cervical epidural steroid injection at C7/T1 level.  Will follow up with her 2 weeks after the procedure.  We will consider cervical medial branch block in the future.    1. Cervical spondylosis     2. Neck pain  Ambulatory referral/consult to Pain Clinic    Ambulatory referral/consult to Physical/Occupational Therapy   3. Chronic pain disorder           PLAN:     - I have stressed the importance of physical activity and a  home exercise plan to help with pain and improve health.  - Referral to Physical therapy for Lumbar stabilization, core strengthening, and a home exercise program.  - Start Neurontin 300mg gradually to three times a day to help with radicular pain.  - Tizanidine 4 mg at bedtime as needed and Celebrex 200 mg once a day as needed.  - Schedule for cervical epidural steroid injection at C7/T1 level.  - RTC in 2 weeks after the procedure.  - Counseled patient regarding the importance of activity modification and physical therapy.    The above plan and management options were discussed at length with patient. Patient is in agreement with the above and verbalized understanding. It will be communicated with the referring physician via electronic record, fax, or mail.    Augusto Hussein  05/29/2020

## 2020-05-29 NOTE — LETTER
May 29, 2020      Arnulfo Barboza MD  [address] [facility]  [place] [address]           [facility]  [address]  [facility] [address]  Phone: [phone]  Fax: [phone]          Patient: Hillary Cotton   MR Number: [id number]   YOB: 1970   Date of Visit: 5/29/2020       Dear Dr. Arnulfo Barboza:    Thank you for referring Hillary Cotton to me for evaluation. Attached you will find relevant portions of my assessment and plan of care.    If you have questions, please do not hesitate to call me. I look forward to following Hillary Cotton along with you.    Sincerely,    Augusto Hussein MD    Enclosure  CC:  No Recipients    If you would like to receive this communication electronically, please contact [email or web address] or [phone] to request more information on [employer] Link access.    For providers and/or their staff who would like to refer a patient to [facility], please contact us through our one-stop-shop provider referral line, [facility], at [phone].    If you feel you have received this communication in error or would no longer like to receive these types of communications, please e-mail [email or web address]          [facility] -   General Surgery  Progress Note    Subjective:     History of Present Illness:  43-year-old female with evidence of splenic laceration with infarction and questionable bleeding on CT abdomen and pelvis obtained by the ER for evaluation of epigastric pain.  She was admitted to the medicine service noted to have anemia and continued pain.  Surgery was consulted for evaluation.  She denies any trauma.  She does report some malaise over the last 2 weeks.  She has COVID negative.    Post-Op Info:  Procedure(s) (LRB):  LAPAROTOMY, EXPLORATORY (N/A)  SPLENECTOMY (N/A)  THORACENTESIS (Left)  BLOCK, TRANSVERSUS ABDOMINIS PLANE (N/A)   4 Days Post-Op     Interval History: No acute issues. AVFSS. Still without BM. C/o abdominal soreness overnight. CHRISTI output minimal.  Tolerating PO without nausea or emesis.     Medications:  Continuous Infusions:  Scheduled Meds:   azithromycin  250 mg Oral Daily    cefTRIAXone (ROCEPHIN) IVPB  1 g Intravenous Q12H    morphine  4 mg Intravenous Once    pantoprazole  40 mg Oral Daily    polyethylene glycol  17 g Oral Daily     PRN Meds:diphenhydrAMINE, HYDROcodone-acetaminophen, HYDROcodone-acetaminophen, morphine, ondansetron     Review of patient's allergies indicates:  No Known Allergies  Objective:     Vital Signs (Most Recent):  Temp: 98.3 °F (36.8 °C) (05/15/20 0717)  Pulse: 81 (05/15/20 0717)  Resp: 19 (05/15/20 0717)  BP: (!) 150/83 (05/15/20 0717)  SpO2: 95 % (05/15/20 0717) Vital Signs (24h Range):  Temp:  [97.9 °F (36.6 °C)-98.3 °F (36.8 °C)] 98.3 °F (36.8 °C)  Pulse:  [76-83] 81  Resp:  [16-19] 19  SpO2:  [92 %-98 %] 95 %  BP: (130-158)/(73-90) 150/83     Weight: 127.4 kg (280 lb 13.9 oz)  Body mass index is 39.17 kg/m².    Intake/Output - Last 3 Shifts       05/13 0700 - 05/14 0659 05/14 0700 - 05/15 0659 05/15 0700 - 05/16 0659    P.O. 480 480 180    I.V. (mL/kg)       IV Piggyback 100 100     Total Intake(mL/kg) 580 (4.6) 580 (4.6) 180 (1.4)     Urine (mL/kg/hr) 700 (0.2) 0 (0)     Drains 60 30     Total Output 760 30     Net -180 +550 +180           Urine Occurrence 1 x 2 x 1 x    Stool Occurrence   0 x          Physical Exam   Constitutional: She is oriented to person, place, and time. She appears well-developed and well-nourished. No distress.   HENT:   Head: Normocephalic and atraumatic.   Eyes: EOM are normal.   Neck: Neck supple.   Cardiovascular: Normal rate and regular rhythm.   Pulmonary/Chest: Effort normal.   Abdominal: Soft. She exhibits no distension. There is no tenderness (approrpiate incisional).   Incision intact with staples, no signs of infection  CHRISTI with minimal serous drainage   Musculoskeletal: Normal range of motion.   Neurological: She is alert and oriented to person, place, and time.   Skin: Skin is warm.   Vitals reviewed.      Significant Labs:  CBC:   Recent Labs   Lab 05/15/20  0729   WBC 2.01*   RBC 3.39*   HGB 9.9*   HCT 31.8*      MCV 94   MCH 29.2   MCHC 31.1*     BMP:   Recent Labs   Lab 05/15/20  0729   GLU 97      K 4.0      CO2 26   BUN 13   CREATININE 0.9   CALCIUM 8.9       Significant Diagnostics:  I have reviewed and interpreted all pertinent imaging results/findings within the past 24 hours.    Assessment/Plan:     * Non-traumatic rupture of spleen  POD#4 s/p ex lap, splenectomy    Continue diet as tolerated  Encouraged only PO analgesia use  Bowel regimen  Up with PT  SCDs  Discontinue CHRISTI drain  Vaccines prior to discharge    Anemia  H/H remains stable postop    Pleural effusion on left  Pulmonology on board        Yumi Ferguson MD  General Surgery  [facility]

## 2020-05-29 NOTE — PROGRESS NOTES
Chronic Pain - New Consult    Referring Physician: Arnulfo Barboza*    Chief Complaint:   Chief Complaint   Patient presents with    Neck Pain        SUBJECTIVE:    Hillary Cotton presents to the clinic for the evaluation of neck  pain. The pain started 3 years ago following unknown and symptoms have been worsening.The pain is located in the neck area and radiates to the left shoulder blade.  The pain is described as aching, burning, dull, throbbing, tight band and intermittent and is rated as 3/10. The pain is rated with a score of  3/10 on the BEST day and a score of 10/10 on the WORST day.  Symptoms interfere with daily activity, sleeping and work. The pain is exacerbated by Sitting and Night Time and Bending.  The pain is mitigated by ice, laying down, medications and rest. She reports spending 3 hours per day reclining. The patient reports 7 hours of uninterrupted sleep per night.    Patient denies .  night fever/night sweats, urinary incontinence, bowel incontinence, significant weight loss, significant motor weakness and loss of sensations  Physical Therapy/Home Exercise: no      Pain Disability Index Review:  Last 3 PDI Scores 2020   Pain Disability Index (PDI) 49       Pain Medications:    Ibuprofen 800mg     report:  Reviewed and consistent with medication use as prescribed.    Pain Procedures: none in system    Imagin20 MRI Cervical   Narrative     EXAMINATION:  MRI CERVICAL SPINE WITHOUT CONTRAST    CLINICAL HISTORY:  CervicalgiaNeck pain, prior xray, abn neuro exam;    TECHNIQUE:  MR Cervical spine without contrast. Sagittal T1, T2, STIR. Axial 3D, T2. Coronal T1.    COMPARISON:  None available.    FINDINGS:  Vertebral body height is normal and alignment is maintained. Marrow signal is within normal limits. The craniocervical junction is unremarkable. Normal cord signal.  Intervertebral disc levels are as follows:    C2-C3 disc: No significant disc pathology. Normal  facet joints. No spinal canal or neural foraminal stenosis.    C3-C4 disc: No significant disc pathology. Normal facet joints. No spinal canal or neural foraminal stenosis.    C4-C5 disc: No significant disc pathology. Normal facet joints. No spinal canal or neural foraminal stenosis.    C5-C6 disc: Normal disc space height with tiny anterior osteophytes.  Normal comfortable joints and facet joints.  No significant spinal or foraminal stenosis.    C6-C7 disc: Right paracentral disc protrusion with annular fissure.  No specific nerve compression or displacement.  Anterior osteophytes.  Normal uncovertebral joints and facet joints.  No significant foraminal stenosis.  The thecal sac measures 11 mm.    C7-T1 disc: No significant disc pathology. Normal facet joints. No spinal canal or neural foraminal stenosis.      Impression       1. Right-sided disc protrusion with annular fissure at C6-C7 may correlate to focal symptoms.  No roland nerve compression or displacement in relation to this protrusion.  2. No significant spinal or foraminal stenosis       5/25/20 Xray cervical spine  Narrative     EXAMINATION:  XR CERVICAL SPINE AP LATERAL    CLINICAL HISTORY:  Cervicalgia    TECHNIQUE:  AP, lateral, and open mouth views of the cervical spine were performed.    COMPARISON:  None    FINDINGS:  Straightening of the normal cervical lordosis can be seen with patient positioning or muscular spasm.   No fracture or dislocation is seen.  Mild spondylosis mid lower cervical spine.  Normal prevertebral soft tissues.  Lungs are grossly clear.      Impression       No acute abnormalities.       Past Medical History:   Diagnosis Date    Abnormal Pap smear     Allergy     Asthma     Sinusitis, chronic      Past Surgical History:   Procedure Laterality Date    SALIVARY GLAND SURGERY      TONSILLECTOMY       Social History     Socioeconomic History    Marital status:      Spouse name: Not on file    Number of children: Not  on file    Years of education: Not on file    Highest education level: Not on file   Occupational History     Employer: [employer]   Social Needs    Financial resource strain: Not on file    Food insecurity:     Worry: Not on file     Inability: Not on file    Transportation needs:     Medical: Not on file     Non-medical: Not on file   Tobacco Use    Smoking status: Former Smoker     Packs/day: 0.50     Types: Cigarettes     Last attempt to quit: 10/1/2015     Years since quittin.6    Smokeless tobacco: Never Used   Substance and Sexual Activity    Alcohol use: No    Drug use: No    Sexual activity: Yes     Partners: Male   Lifestyle    Physical activity:     Days per week: Not on file     Minutes per session: Not on file    Stress: Not on file   Relationships    Social connections:     Talks on phone: Not on file     Gets together: Not on file     Attends [employer] service: Not on file     Active member of club or organization: Not on file     Attends meetings of clubs or organizations: Not on file     Relationship status: Not on file   Other Topics Concern    Not on file   Social History Narrative    Not on file     Family History   Problem Relation Age of Onset    Diabetes Mother     Hypertension Mother     Asthma Mother     Sinus disease Mother     Allergies Father     Diabetes Maternal Grandmother     Hypertension Maternal Grandmother     Sinus disease Sister     Sinus disease Brother     Sinus disease Sister        Review of patient's allergies indicates:   Allergen Reactions    Prednisone Rash     Hx of delayed onset rash on 2 occasion. Tolerates medrol, IM steroids, topical, and intranasal steroids w/o problem       Current Outpatient Medications   Medication Sig    celecoxib (CELEBREX) 200 MG capsule Take 1 capsule (200 mg total) by mouth once daily.    cetirizine (ZYRTEC) 10 MG tablet Take 1 tablet (10 mg total) by mouth once daily.    glucosamine-chondroitin 500-400 mg  "tablet Take 1 tablet by mouth 3 (three) times daily.    ibuprofen (ADVIL,MOTRIN) 800 MG tablet Take 1 tablet (800 mg total) by mouth before meals as needed for Pain.    multivitamin capsule Take 1 capsule by mouth once daily.    celecoxib (CELEBREX) 200 MG capsule Take 1 capsule (200 mg total) by mouth once daily.    gabapentin (NEURONTIN) 300 MG capsule Take 1 capsule (300 mg total) by mouth 3 (three) times daily. One at night for 7 day, then 2 a day for 7 days then 3 a day    tiZANidine (ZANAFLEX) 4 MG tablet Take 1 tablet (4 mg total) by mouth every 6 (six) hours as needed.     No current facility-administered medications for this visit.        REVIEW OF SYSTEMS:    GENERAL:  No weight loss, malaise or fevers. +Fatigue  HEENT:  Negative for frequent or significant headaches.  NECK:  Negative for lumps, goiter and significant neck swelling. +Neck pain  RESPIRATORY:  Negative for cough, wheezing or shortness of breath. +Asthma  CARDIOVASCULAR:  Negative for chest pain, leg swelling or palpitations.  GI:  Negative for abdominal discomfort, blood in stools or black stools or change in bowel habits.  MUSCULOSKELETAL:  See HPI.  SKIN:  Negative for lesions, rash, and itching.  PSYCH:  Negative for sleep disturbance, mood disorder and recent psychosocial stressors.  HEMATOLOGY/LYMPHOLOGY:  Negative for prolonged bleeding, bruising easily or swollen nodes.  NEURO:   No history of headaches, syncope, paralysis, seizures or tremors.  All other reviewed and negative other than HPI.    OBJECTIVE:    /82   Pulse 86   Temp 97.4 °F (36.3 °C) (Oral)   Resp 18   Ht 5' 6" (1.676 m)   Wt 93 kg (205 lb)   BMI 33.09 kg/m²     PHYSICAL EXAMINATION:    General appearance: Well appearing, in no acute distress, alert and oriented x3.  Psych:  Mood and affect appropriate.  Skin: Skin color, texture, turgor normal, no rashes or lesions, in both upper and lower body.  Head/face:  Normocephalic, atraumatic. No palpable lymph " nodes.  Neck: Positive pain to palpation over the cervical paraspinous muscles. Spurling Negative.  Positive facet loading left more than right.  Limited range of motion of cervical spine.  Cor: RRR  Pulm: CTA  GI:  Soft and non-tender.  Back: Straight leg raising in the sitting and supine positions is negative to radicular pain. No pain to palpation over the spine or costovertebral angles. Normal range of motion without pain reproduction.  Extremities: Peripheral joint ROM is full and pain free without obvious instability or laxity in all four extremities. No deformities, edema, or skin discoloration. Good capillary refill.  Musculoskeletal: Shoulder, hip, sacroiliac and knee provocative maneuvers are negative. Bilateral upper and lower extremity strength is normal and symmetric.  No atrophy or tone abnormalities are noted.  Neuro: Bilateral upper and lower extremity coordination and muscle stretch reflexes are physiologic and symmetric.  Plantar response are downgoing. No loss of sensation is noted.  Gait: normal.    ASSESSMENT: 49 y.o. year old female with chronic neck pain consistent with cervical spondylosis and cervical radiculopathy.  She has failed chiropractic treatment and medication management including Advil, Celebrex, tizanidine and Flexeril.  We will start her on gabapentin 300 mg at bedtime to increase as tolerated to 300 mg t.i.d., prescribe tizanidine 4 mg at bedtime as needed and Celebrex 200 mg once a day as needed.  We will also refer her to physical therapy and schedule for cervical epidural steroid injection at C7/T1 level.  Will follow up with her 2 weeks after the procedure.  We will consider cervical medial branch block in the future.    1. Cervical spondylosis     2. Neck pain  Ambulatory referral/consult to Pain Clinic    Ambulatory referral/consult to Physical/Occupational Therapy   3. Chronic pain disorder           PLAN:     - I have stressed the importance of physical activity and a  home exercise plan to help with pain and improve health.  - Referral to Physical therapy for Lumbar stabilization, core strengthening, and a home exercise program.  - Start Neurontin 300mg gradually to three times a day to help with radicular pain.  - Tizanidine 4 mg at bedtime as needed and Celebrex 200 mg once a day as needed.  - Schedule for cervical epidural steroid injection at C7/T1 level.  - RTC in 2 weeks after the procedure.  - Counseled patient regarding the importance of activity modification and physical therapy.    The above plan and management options were discussed at length with patient. Patient is in agreement with the above and verbalized understanding. It will be communicated with the referring physician via electronic record, fax, or mail.    Augusto Hussein  05/29/2020

## 2020-06-03 ENCOUNTER — LAB VISIT (OUTPATIENT)
Dept: FAMILY MEDICINE | Facility: CLINIC | Age: 50
End: 2020-06-03
Payer: OTHER GOVERNMENT

## 2020-06-03 DIAGNOSIS — Z01.818 PREOP TESTING: Primary | ICD-10-CM

## 2020-06-03 DIAGNOSIS — Z01.818 PREOP TESTING: ICD-10-CM

## 2020-06-03 PROCEDURE — U0003 INFECTIOUS AGENT DETECTION BY NUCLEIC ACID (DNA OR RNA); SEVERE ACUTE RESPIRATORY SYNDROME CORONAVIRUS 2 (SARS-COV-2) (CORONAVIRUS DISEASE [COVID-19]), AMPLIFIED PROBE TECHNIQUE, MAKING USE OF HIGH THROUGHPUT TECHNOLOGIES AS DESCRIBED BY CMS-2020-01-R: HCPCS

## 2020-06-04 LAB — SARS-COV-2 RNA RESP QL NAA+PROBE: NOT DETECTED

## 2020-06-05 ENCOUNTER — HOSPITAL ENCOUNTER (OUTPATIENT)
Facility: OTHER | Age: 50
Discharge: HOME | End: 2020-06-05
Attending: ANESTHESIOLOGY | Admitting: ANESTHESIOLOGY
Payer: OTHER GOVERNMENT

## 2020-06-05 VITALS
HEIGHT: 66 IN | DIASTOLIC BLOOD PRESSURE: 57 MMHG | BODY MASS INDEX: 32.62 KG/M2 | RESPIRATION RATE: 16 BRPM | HEART RATE: 80 BPM | OXYGEN SATURATION: 96 % | TEMPERATURE: 99 F | SYSTOLIC BLOOD PRESSURE: 113 MMHG | WEIGHT: 203 LBS

## 2020-06-05 DIAGNOSIS — M54.12 CERVICAL RADICULOPATHY: Primary | ICD-10-CM

## 2020-06-05 DIAGNOSIS — G89.29 CHRONIC PAIN: ICD-10-CM

## 2020-06-05 PROCEDURE — 63600175 PHARM REV CODE 636 W HCPCS: Performed by: ANESTHESIOLOGY

## 2020-06-05 PROCEDURE — 25500020 PHARM REV CODE 255: Performed by: ANESTHESIOLOGY

## 2020-06-05 PROCEDURE — 62321 NJX INTERLAMINAR CRV/THRC: CPT

## 2020-06-05 PROCEDURE — 25000003 PHARM REV CODE 250: Performed by: ANESTHESIOLOGY

## 2020-06-05 PROCEDURE — 62321 PR INJ CERV/THORAC, W/GUIDANCE: ICD-10-PCS | Mod: ,,, | Performed by: ANESTHESIOLOGY

## 2020-06-05 PROCEDURE — 62321 NJX INTERLAMINAR CRV/THRC: CPT | Mod: ,,, | Performed by: ANESTHESIOLOGY

## 2020-06-05 RX ORDER — SODIUM CHLORIDE 9 MG/ML
500 INJECTION, SOLUTION INTRAVENOUS CONTINUOUS
Status: DISCONTINUED | OUTPATIENT
Start: 2020-06-05 | End: 2020-06-05 | Stop reason: HOSPADM

## 2020-06-05 RX ORDER — MIDAZOLAM HYDROCHLORIDE 1 MG/ML
INJECTION INTRAMUSCULAR; INTRAVENOUS
Status: DISCONTINUED | OUTPATIENT
Start: 2020-06-05 | End: 2020-06-05 | Stop reason: HOSPADM

## 2020-06-05 RX ORDER — DEXAMETHASONE SODIUM PHOSPHATE 4 MG/ML
INJECTION, SOLUTION INTRA-ARTICULAR; INTRALESIONAL; INTRAMUSCULAR; INTRAVENOUS; SOFT TISSUE
Status: DISCONTINUED | OUTPATIENT
Start: 2020-06-05 | End: 2020-06-05 | Stop reason: HOSPADM

## 2020-06-05 RX ORDER — LIDOCAINE HYDROCHLORIDE 10 MG/ML
INJECTION INFILTRATION; PERINEURAL
Status: DISCONTINUED | OUTPATIENT
Start: 2020-06-05 | End: 2020-06-05 | Stop reason: HOSPADM

## 2020-06-05 RX ORDER — LIDOCAINE HYDROCHLORIDE 5 MG/ML
INJECTION, SOLUTION INFILTRATION; INTRAVENOUS
Status: DISCONTINUED | OUTPATIENT
Start: 2020-06-05 | End: 2020-06-05 | Stop reason: HOSPADM

## 2020-06-05 RX ADMIN — SODIUM CHLORIDE 500 ML: 0.9 INJECTION, SOLUTION INTRAVENOUS at 09:06

## 2020-06-05 NOTE — INTERVAL H&P NOTE
HPI  Patient presenting for Procedure(s) (LRB):  INJECTION, STEROID, EPIDURAL,C7-T1 (N/A)     Patient on Anti-coagulation No    No health changes since previous encounter    Past Medical History:   Diagnosis Date    Abnormal Pap smear     Allergy     Asthma     Sinusitis, chronic      Past Surgical History:   Procedure Laterality Date    SALIVARY GLAND SURGERY      TONSILLECTOMY       Review of patient's allergies indicates:   Allergen Reactions    Prednisone Rash     Hx of delayed onset rash on 2 occasion. Tolerates medrol, IM steroids, topical, and intranasal steroids w/o problem      No current facility-administered medications for this encounter.        PMHx, PSHx, Allergies, Medications reviewed in epic    ROS negative except pain complaints in HPI    OBJECTIVE:    There were no vitals taken for this visit.    PHYSICAL EXAMINATION:    GENERAL: Well appearing, in no acute distress, alert and oriented x3.  PSYCH:  Mood and affect appropriate.  SKIN: Skin color, texture, turgor normal, no rashes or lesions which will impact the procedure.  CV: RRR with palpation of the radial artery.  PULM: No evidence of respiratory difficulty, symmetric chest rise. Clear to auscultation.  NEURO: Cranial nerves grossly intact.    Plan:    Proceed with procedure as planned Procedure(s) (LRB):  INJECTION, STEROID, EPIDURAL,C7-T1 (N/A)    Pricila Sequeira MD  06/05/2020            The patient has been examined and the H&P has been reviewed:    I concur with the findings and no changes have occurred since H&P was written.    Anesthesia/Surgery risks, benefits and alternative options discussed and understood by patient/family.          There are no hospital problems to display for this patient.

## 2020-06-05 NOTE — DISCHARGE INSTRUCTIONS
Thank you for allowing us to care for you today. You may receive a survey about the care we provided. Your feedback is valuable and helps us provide excellent care throughout the community.     Home Care Instructions for Pain Management:    1. DIET:   You may resume your normal diet today.   2. BATHING:   You may shower with luke warm water. No tub baths or anything that will soak injection sites under water for the next 24 hours.  3. DRESSING:   You may remove your bandage today.   4. ACTIVITY LEVEL:   You may resume your normal activities 24 hrs after your procedure. Nothing strenuous today.  5. MEDICATIONS:   You may resume your normal medications today. To restart blood thinners, ask your doctor.  6. DRIVING    If you have received any sedatives by mouth today, you may not drive for 12 hours.    If you have received any sedation through your IV, you may not drive for 24 hrs.   7. SPECIAL INSTRUCTIONS:   No heat to the injection site for 24 hrs including, hot bath or shower, heating pad, moist heat, or hot tubs.    Use ice pack to injection site for any pain or discomfort.  Apply ice packs for 20 minute intervals as needed.    IF you have diabetes, be sure to monitor your blood sugar more closely. IF your injection contained steroids your blood sugar levels may become higher than normal.    If you are still having pain upon discharge:  Your pain may improve over the next 48 hours. The anesthetic (numbing medication) works immediately to 48 hours. IF your injection contained a steroid (anti-inflammatory medication), it takes approximately 3 days to start feeling relief and 7-10 days to see your greatest results from the medication. It is possible you may need subsequent injections. This would be discussed at your follow up appointment with pain management or your referring doctor.    Please call the PAIN MANAGEMENT office at [phone] or ON CALL pager at [phone] if you experienced any:   -Weakness or  loss of sensation  -Fever > 101.5  -Pain uncontrolled with oral medications   -Persistent nausea, vomiting, or diarrhea  -Redness or drainage from the injection sites, or any other worrisome concerns.   If physician on call was not reached or could not communicate with our office for any reason please go to the nearest emergency department.  Adult Procedural Sedation Instructions    Recovery After Procedural Sedation (Adult)  You have been given medicine by vein to make you sleep during your surgery. This may have included both a pain medicine and sleeping medicine. Most of the effects have worn off. But you may still have some drowsiness for the next 6 to 8 hours.  Home care  Follow these guidelines when you get home:  · For the next 8 hours, you should be watched by a responsible adult. This person should make sure your condition is not getting worse.  · Don't drink any alcohol for the next 24 hours.  · Don't drive, operate dangerous machinery, or make important business or personal decisions during the next 24 hours.  Note: Your healthcare provider may tell you not to take any medicine by mouth for pain or sleep in the next 4 hours. These medicines may react with the medicines you were given in the hospital. This could cause a much stronger response than usual.  Follow-up care  Follow up with your healthcare provider if you are not alert and back to your usual level of activity within 12 hours.  When to seek medical advice  Call your healthcare provider right away if any of these occur:  · Drowsiness gets worse  · Weakness or dizziness gets worse  · Repeated vomiting  · You can't be awakened   Date Last Reviewed: 10/18/2016  © 9532-0715 [employer]. [address] [place] [address], [place] [address]. All rights reserved. This information is not intended as a substitute for professional medical care. Always follow your healthcare professional's instructions.

## 2020-06-05 NOTE — DISCHARGE SUMMARY
Discharge Note  Short Stay      SUMMARY     Admit Date: 6/5/2020    Attending Physician: Chandler Bowen      Discharge Physician: Chandler Bowen      Discharge Date: 6/5/2020 10:15 AM    Procedure(s) (LRB):  INJECTION, STEROID, EPIDURAL,C7-T1 (N/A)    Final Diagnosis: Cervical radiculopathy [M54.12]    Disposition: Home or self care    Patient Instructions:   Current Discharge Medication List      CONTINUE these medications which have NOT CHANGED    Details   !! celecoxib (CELEBREX) 200 MG capsule Take 1 capsule (200 mg total) by mouth once daily.  Qty: 90 capsule, Refills: 4    Associated Diagnoses: Sprain of left ankle, unspecified ligament, subsequent encounter      !! celecoxib (CELEBREX) 200 MG capsule Take 1 capsule (200 mg total) by mouth once daily.  Qty: 30 capsule, Refills: 2      cetirizine (ZYRTEC) 10 MG tablet Take 1 tablet (10 mg total) by mouth once daily.  Qty: 90 tablet, Refills: 0    Associated Diagnoses: Chronic seasonal allergic rhinitis      gabapentin (NEURONTIN) 300 MG capsule Take 1 capsule (300 mg total) by mouth 3 (three) times daily. One at night for 7 day, then 2 a day for 7 days then 3 a day  Qty: 90 capsule, Refills: 2      glucosamine-chondroitin 500-400 mg tablet Take 1 tablet by mouth 3 (three) times daily.      ibuprofen (ADVIL,MOTRIN) 800 MG tablet Take 1 tablet (800 mg total) by mouth before meals as needed for Pain.  Qty: 60 tablet, Refills: 0    Associated Diagnoses: Neck pain, bilateral posterior; Acute intractable tension-type headache      multivitamin capsule Take 1 capsule by mouth once daily.      tiZANidine (ZANAFLEX) 4 MG tablet Take 1 tablet (4 mg total) by mouth every 6 (six) hours as needed.  Qty: 30 tablet, Refills: 1       !! - Potential duplicate medications found. Please discuss with provider.              Discharge Diagnosis: Cervical radiculopathy [M54.12]  Condition on Discharge: Stable with no complications to procedure   Diet on Discharge: Same as before.  Activity:  as per instruction sheet.  Discharge to: Home with a responsible adult.  Follow up: 2-4 weeks       Please call my office or pager at [phone] if experienced any weakness or loss of sensation, fever > 101.5, pain uncontrolled with oral medications, persistent nausea/vomiting/or diarrhea, redness or drainage from the incisions, or any other worrisome concerns. If physician on call was not reached or could not communicate with our office for any reason please go to the nearest emergency department

## 2020-06-05 NOTE — OP NOTE
"Patient Name: Hillary Cotton  MRN: [id number]    INFORMED CONSENT: The procedure, risks, benefits and options were discussed with patient. There are no contraindications to the procedure. The patient expressed understanding and agreed to proceed. The personnel performing the procedure was discussed. I verify that I personally obtained Hillary's consent prior to the start of the procedure and the signed consent can be found on the patient's chart.    Procedure Date: 06/05/2020    Anesthesia: Topical    Pre Procedure diagnosis: Cervical radiculopathy [M54.12]  1. Cervical radiculopathy    2. Chronic pain      Post-Procedure diagnosis: SAME        Moderate Sedation: None      PROCEDURE: C7/T1 CERVICAL EPIDURAL STEROID INJECTION         DESCRIPTION OF PROCEDURE: The patient was brought to the procedure room. After performing time out.  IV access was obtained prior to the procedure. The patient was positioned prone on the fluoroscopy table. Continuous hemodynamic monitoring was initiated including blood pressure, EKG, and pulse oximetry. The area of the cervical spine was prepped with chlorhexidine and draped in a sterile fashion. Skin anesthesia was achieved using 3 mL of Lidocaine 1% over the respective injection site. The C7/T1 interspace was visualized under fluoroscopic imaging. An 20 gauge 3 1/2" Tuohy needle was slowly inserted and advanced using loss of resistance to saline with AP, oblique and lateral fluoroscopic imaging for needle guidance. Negative aspiration for blood or CSF was confirmed. Epidural contrast spread was confirmed using 2mL of Omnipaque 300 contrast. Spread of the contrast in the cervical epidural space was noted . 6 mL of lidocaine 0.5% and 10 mg decadron was injected. The needle was removed and bleeding was nil. A sterile dressing was applied. No specimens collected. patient was taken back to the recovery room for further observation.     Blood Loss: Nill  Specimen: None    The " procedure is considered time sensitive and medically-necessary given the patient's current clinical condition with moderate to severe pain, and/or significant functional impairment, and /or lack of alternative that present less risk of adverse event such as but not limited to a medication-related adverse event or care needed in the emergency department or hospitalization  due to inadequate pain relief.       Augusto Hussein MD

## 2020-06-08 ENCOUNTER — CLINICAL SUPPORT (OUTPATIENT)
Dept: REHABILITATION | Facility: HOSPITAL | Age: 50
End: 2020-06-08
Attending: ANESTHESIOLOGY
Payer: OTHER GOVERNMENT

## 2020-06-08 DIAGNOSIS — M54.2 NECK PAIN OF OVER 3 MONTHS DURATION: ICD-10-CM

## 2020-06-08 DIAGNOSIS — M54.2 NECK PAIN: Primary | ICD-10-CM

## 2020-06-08 DIAGNOSIS — M53.82 DECREASED RANGE OF MOTION OF INTERVERTEBRAL DISCS OF CERVICAL SPINE: ICD-10-CM

## 2020-06-08 PROCEDURE — 97010 HOT OR COLD PACKS THERAPY: CPT | Mod: PO

## 2020-06-08 PROCEDURE — 97162 PT EVAL MOD COMPLEX 30 MIN: CPT | Mod: PO

## 2020-06-08 PROCEDURE — 97140 MANUAL THERAPY 1/> REGIONS: CPT | Mod: PO
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
## 2020-06-11 ENCOUNTER — CLINICAL SUPPORT (OUTPATIENT)
Dept: REHABILITATION | Facility: HOSPITAL | Age: 50
End: 2020-06-11
Attending: ANESTHESIOLOGY
Payer: OTHER GOVERNMENT

## 2020-06-11 DIAGNOSIS — M53.82 DECREASED RANGE OF MOTION OF INTERVERTEBRAL DISCS OF CERVICAL SPINE: ICD-10-CM

## 2020-06-11 DIAGNOSIS — M54.2 CERVICAL PAIN: Primary | ICD-10-CM

## 2020-06-11 PROCEDURE — 97140 MANUAL THERAPY 1/> REGIONS: CPT | Mod: PO

## 2020-06-11 PROCEDURE — 97010 HOT OR COLD PACKS THERAPY: CPT | Mod: PO

## 2020-06-11 PROCEDURE — 97110 THERAPEUTIC EXERCISES: CPT | Mod: PO
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
## 2020-06-18 ENCOUNTER — CLINICAL SUPPORT (OUTPATIENT)
Dept: REHABILITATION | Facility: HOSPITAL | Age: 50
End: 2020-06-18
Attending: ANESTHESIOLOGY
Payer: OTHER GOVERNMENT

## 2020-06-18 DIAGNOSIS — M53.82 DECREASED RANGE OF MOTION OF INTERVERTEBRAL DISCS OF CERVICAL SPINE: ICD-10-CM

## 2020-06-18 DIAGNOSIS — M54.2 CERVICAL PAIN: ICD-10-CM

## 2020-06-18 PROCEDURE — 97110 THERAPEUTIC EXERCISES: CPT | Mod: PO

## 2020-06-18 PROCEDURE — 97140 MANUAL THERAPY 1/> REGIONS: CPT | Mod: PO
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
## 2020-06-22 ENCOUNTER — CLINICAL SUPPORT (OUTPATIENT)
Dept: REHABILITATION | Facility: HOSPITAL | Age: 50
End: 2020-06-22
Attending: ANESTHESIOLOGY
Payer: OTHER GOVERNMENT

## 2020-06-22 DIAGNOSIS — M53.82 DECREASED RANGE OF MOTION OF INTERVERTEBRAL DISCS OF CERVICAL SPINE: ICD-10-CM

## 2020-06-22 DIAGNOSIS — M54.2 CERVICAL PAIN: ICD-10-CM

## 2020-06-22 PROCEDURE — 97110 THERAPEUTIC EXERCISES: CPT | Mod: PO

## 2020-06-22 PROCEDURE — 97140 MANUAL THERAPY 1/> REGIONS: CPT | Mod: PO

## 2020-06-22 PROCEDURE — 97010 HOT OR COLD PACKS THERAPY: CPT | Mod: PO
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
## 2020-06-23 ENCOUNTER — PATIENT OUTREACH (OUTPATIENT)
Dept: ADMINISTRATIVE | Facility: OTHER | Age: 50
End: 2020-06-23

## 2020-06-23 ENCOUNTER — TELEPHONE (OUTPATIENT)
Dept: PAIN MEDICINE | Facility: CLINIC | Age: 50
End: 2020-06-23

## 2020-06-23 NOTE — TELEPHONE ENCOUNTER
Staff spoke with patient to confirm appointment scheduled at 12 pm with Jose García as a my chart virtual video patient was informed to login in 15 minutes before the visit to let the provider know that their ready for the visit and to give tech support number just in case any issues occur patient verbalized understanding.

## 2020-06-24 ENCOUNTER — OFFICE VISIT (OUTPATIENT)
Dept: PAIN MEDICINE | Facility: CLINIC | Age: 50
End: 2020-06-24
Payer: OTHER GOVERNMENT

## 2020-06-24 DIAGNOSIS — M79.18 MYOFASCIAL PAIN: ICD-10-CM

## 2020-06-24 DIAGNOSIS — M54.12 CERVICAL RADICULOPATHY: Primary | ICD-10-CM

## 2020-06-24 DIAGNOSIS — M47.812 CERVICAL SPONDYLOSIS: ICD-10-CM

## 2020-06-24 PROCEDURE — 99442 PR PHYSICIAN TELEPHONE EVALUATION 11-20 MIN: ICD-10-PCS | Mod: 95,,, | Performed by: NURSE PRACTITIONER

## 2020-06-24 PROCEDURE — 99442 PR PHYSICIAN TELEPHONE EVALUATION 11-20 MIN: CPT | Mod: 95,,, | Performed by: NURSE PRACTITIONER

## 2020-06-24 RX ORDER — TIZANIDINE 4 MG/1
4 TABLET ORAL NIGHTLY PRN
Qty: 30 TABLET | Refills: 2 | Status: SHIPPED | OUTPATIENT
Start: 2020-06-24 | End: 2020-07-04

## 2020-06-24 NOTE — PROGRESS NOTES
Chronic Pain-Tele-Medicine-Established Note (Follow up visit)        The patient location is: Home  The chief complaint leading to consultation is: pain  Visit type: Virtual visit with synchronous audio   Total time spent with patient: 20 min  Each patient to whom he or she provides medical services by telemedicine is:  (1) informed of the relationship between the physician and patient and the respective role of any other health care provider with respect to management of the patient; and (2) notified that he or she may decline to receive medical services by telemedicine and may withdraw from such care at any time.      Referring Physician: No ref. provider found    Chief Complaint:   No chief complaint on file.       SUBJECTIVE:    Interval History 6/24/2020:  Pt presents for follow up of cervicalgia. Pt is s/p C7-T1 JAKOB with 100% resolution between injection, Neurontin 300mg 3 tablets qhs, Zanaflex and Celebrex in conjunction with physical therapy. Her only complaint at this time is focal muscle trigger points which PT is working with. She rates pain 3/10 at worse and will be weaning her Neurontin in near future. The patient denies myelopathic symptoms such as handwriting changes or difficulty with buttons/coins/keys. Denies perineal paresthesias, bowel/bladder dysfunction.      Hillary Cotton presents to the clinic for the evaluation of neck  pain. The pain started 3 years ago following unknown and symptoms have been worsening.The pain is located in the neck area and radiates to the left shoulder blade.  The pain is described as aching, burning, dull, throbbing, tight band and intermittent and is rated as 3/10. The pain is rated with a score of  3/10 on the BEST day and a score of 10/10 on the WORST day.  Symptoms interfere with daily activity, sleeping and work. The pain is exacerbated by Sitting and Night Time and Bending.  The pain is mitigated by ice, laying down, medications and rest. She reports  spending 3 hours per day reclining. The patient reports 7 hours of uninterrupted sleep per night.    Patient denies .  night fever/night sweats, urinary incontinence, bowel incontinence, significant weight loss, significant motor weakness and loss of sensations  Physical Therapy/Home Exercise: no      Pain Disability Index Review:  Last 3 PDI Scores 2020   Pain Disability Index (PDI) 49       Pain Medications:    Ibuprofen 800mg     report:  Reviewed and consistent with medication use as prescribed.    Pain Procedures:   2020 JAKOB C7-T1 100% resolution.     Imagin20 MRI Cervical   Narrative     EXAMINATION:  MRI CERVICAL SPINE WITHOUT CONTRAST    CLINICAL HISTORY:  CervicalgiaNeck pain, prior xray, abn neuro exam;    TECHNIQUE:  MR Cervical spine without contrast. Sagittal T1, T2, STIR. Axial 3D, T2. Coronal T1.    COMPARISON:  None available.    FINDINGS:  Vertebral body height is normal and alignment is maintained. Marrow signal is within normal limits. The craniocervical junction is unremarkable. Normal cord signal.  Intervertebral disc levels are as follows:    C2-C3 disc: No significant disc pathology. Normal facet joints. No spinal canal or neural foraminal stenosis.    C3-C4 disc: No significant disc pathology. Normal facet joints. No spinal canal or neural foraminal stenosis.    C4-C5 disc: No significant disc pathology. Normal facet joints. No spinal canal or neural foraminal stenosis.    C5-C6 disc: Normal disc space height with tiny anterior osteophytes.  Normal comfortable joints and facet joints.  No significant spinal or foraminal stenosis.    C6-C7 disc: Right paracentral disc protrusion with annular fissure.  No specific nerve compression or displacement.  Anterior osteophytes.  Normal uncovertebral joints and facet joints.  No significant foraminal stenosis.  The thecal sac measures 11 mm.    C7-T1 disc: No significant disc pathology. Normal facet joints. No spinal canal or  neural foraminal stenosis.      Impression       1. Right-sided disc protrusion with annular fissure at C6-C7 may correlate to focal symptoms.  No roland nerve compression or displacement in relation to this protrusion.  2. No significant spinal or foraminal stenosis       20 Xray cervical spine  Narrative     EXAMINATION:  XR CERVICAL SPINE AP LATERAL    CLINICAL HISTORY:  Cervicalgia    TECHNIQUE:  AP, lateral, and open mouth views of the cervical spine were performed.    COMPARISON:  None    FINDINGS:  Straightening of the normal cervical lordosis can be seen with patient positioning or muscular spasm.   No fracture or dislocation is seen.  Mild spondylosis mid lower cervical spine.  Normal prevertebral soft tissues.  Lungs are grossly clear.      Impression       No acute abnormalities.       Past Medical History:   Diagnosis Date    Abnormal Pap smear     Allergy     Asthma     Sinusitis, chronic      Past Surgical History:   Procedure Laterality Date    EPIDURAL STEROID INJECTION N/A 2020    Procedure: INJECTION, STEROID, EPIDURAL,C7-T1;  Surgeon: Augusto Hussein MD;  Location: [facility];  Service: Pain Management;  Laterality: N/A;    SALIVARY GLAND SURGERY      TONSILLECTOMY       Social History     Socioeconomic History    Marital status:      Spouse name: Not on file    Number of children: Not on file    Years of education: Not on file    Highest education level: Not on file   Occupational History     Employer: [employer]   Social Needs    Financial resource strain: Not on file    Food insecurity     Worry: Not on file     Inability: Not on file    Transportation needs     Medical: Not on file     Non-medical: Not on file   Tobacco Use    Smoking status: Former Smoker     Packs/day: 0.50     Types: Cigarettes     Quit date: 10/1/2015     Years since quittin.7    Smokeless tobacco: Never Used   Substance and Sexual Activity    Alcohol use: No    Drug use: No     Sexual activity: Yes     Partners: Male   Lifestyle    Physical activity     Days per week: Not on file     Minutes per session: Not on file    Stress: Not on file   Relationships    Social connections     Talks on phone: Not on file     Gets together: Not on file     Attends Holiness service: Not on file     Active member of club or organization: Not on file     Attends meetings of clubs or organizations: Not on file     Relationship status: Not on file   Other Topics Concern    Not on file   Social History Narrative    Not on file     Family History   Problem Relation Age of Onset    Diabetes Mother     Hypertension Mother     Asthma Mother     Sinus disease Mother     Allergies Father     Diabetes Maternal Grandmother     Hypertension Maternal Grandmother     Sinus disease Sister     Sinus disease Brother     Sinus disease Sister        Review of patient's allergies indicates:   Allergen Reactions    Prednisone Rash     Hx of delayed onset rash on 2 occasion. Tolerates medrol, IM steroids, topical, and intranasal steroids w/o problem       Current Outpatient Medications   Medication Sig    celecoxib (CELEBREX) 200 MG capsule Take 1 capsule (200 mg total) by mouth once daily.    cetirizine (ZYRTEC) 10 MG tablet Take 1 tablet (10 mg total) by mouth once daily.    gabapentin (NEURONTIN) 300 MG capsule Take 1 capsule (300 mg total) by mouth 3 (three) times daily. One at night for 7 day, then 2 a day for 7 days then 3 a day    glucosamine-chondroitin 500-400 mg tablet Take 1 tablet by mouth 3 (three) times daily.    multivitamin capsule Take 1 capsule by mouth once daily.    tiZANidine (ZANAFLEX) 4 MG tablet Take 1 tablet (4 mg total) by mouth nightly as needed.     No current facility-administered medications for this visit.        REVIEW OF SYSTEMS:    GENERAL:  No weight loss, malaise or fevers. +Fatigue  HEENT:  Negative for frequent or significant headaches.  NECK:  Negative for lumps,  goiter and significant neck swelling. +Neck pain  RESPIRATORY:  Negative for cough, wheezing or shortness of breath. +Asthma  CARDIOVASCULAR:  Negative for chest pain, leg swelling or palpitations.  GI:  Negative for abdominal discomfort, blood in stools or black stools or change in bowel habits.  MUSCULOSKELETAL:  See HPI.  SKIN:  Negative for lesions, rash, and itching.  PSYCH:  Negative for sleep disturbance, mood disorder and recent psychosocial stressors.  HEMATOLOGY/LYMPHOLOGY:  Negative for prolonged bleeding, bruising easily or swollen nodes.  NEURO:   No history of headaches, syncope, paralysis, seizures or tremors.  All other reviewed and negative other than HPI.    OBJECTIVE:    There were no vitals taken for this visit.    PHYSICAL EXAMINATION:  Voice normal.  Normal affect without evidence of distress.    Prior PHYSICAL EXAMINATION:    General appearance: Well appearing, in no acute distress, alert and oriented x3.  Psych:  Mood and affect appropriate.  Skin: Skin color, texture, turgor normal, no rashes or lesions, in both upper and lower body.  Head/face:  Normocephalic, atraumatic. No palpable lymph nodes.  Neck: Positive pain to palpation over the cervical paraspinous muscles. Spurling Negative.  Positive facet loading left more than right.  Limited range of motion of cervical spine.  Cor: RRR  Pulm: CTA  GI:  Soft and non-tender.  Back: Straight leg raising in the sitting and supine positions is negative to radicular pain. No pain to palpation over the spine or costovertebral angles. Normal range of motion without pain reproduction.  Extremities: Peripheral joint ROM is full and pain free without obvious instability or laxity in all four extremities. No deformities, edema, or skin discoloration. Good capillary refill.  Musculoskeletal: Shoulder, hip, sacroiliac and knee provocative maneuvers are negative. Bilateral upper and lower extremity strength is normal and symmetric.  No atrophy or tone  abnormalities are noted.  Neuro: Bilateral upper and lower extremity coordination and muscle stretch reflexes are physiologic and symmetric.  Plantar response are downgoing. No loss of sensation is noted.  Gait: normal.    ASSESSMENT: 49 y.o. year old female with chronic neck pain consistent with cervical spondylosis and cervical radiculopathy.  She has failed chiropractic treatment and medication management including Advil, Celebrex, tizanidine and Flexeril.  We will start her on gabapentin 300 mg at bedtime to increase as tolerated to 300 mg t.i.d., prescribe tizanidine 4 mg at bedtime as needed and Celebrex 200 mg once a day as needed.  We will also refer her to physical therapy and schedule for cervical epidural steroid injection at C7/T1 level.  Will follow up with her 2 weeks after the procedure.  We will consider cervical medial branch block in the future.    1. Cervical radiculopathy     2. Cervical spondylosis     3. Myofascial pain           PLAN:     -Prior records reviewed  - She is s/p C7-T1 JAKOB with 100% resolution and 3 weeks out. Can repeat as needed  - Continue PT  - She can come at any time in office for TPIs, if no improvement can consider Botox as discussed  - Continue Celebrex  - Continue Zanaflex  - Continue Neurontin (Pt will be attempting to self wean)  - RTC PRN     The above plan and management options were discussed at length with patient. Patient is in agreement with the above and verbalized understanding. It will be communicated with the referring physician via electronic record, fax, or mail.    Jose García  06/24/2020

## 2020-06-25 ENCOUNTER — CLINICAL SUPPORT (OUTPATIENT)
Dept: REHABILITATION | Facility: HOSPITAL | Age: 50
End: 2020-06-25
Attending: ANESTHESIOLOGY
Payer: OTHER GOVERNMENT

## 2020-06-25 DIAGNOSIS — M54.2 CERVICAL PAIN: ICD-10-CM

## 2020-06-25 DIAGNOSIS — M53.82 DECREASED RANGE OF MOTION OF INTERVERTEBRAL DISCS OF CERVICAL SPINE: ICD-10-CM

## 2020-06-25 PROCEDURE — 97140 MANUAL THERAPY 1/> REGIONS: CPT | Mod: PO

## 2020-06-25 PROCEDURE — 97110 THERAPEUTIC EXERCISES: CPT | Mod: PO
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
## 2020-07-08 ENCOUNTER — PATIENT OUTREACH (OUTPATIENT)
Dept: ADMINISTRATIVE | Facility: OTHER | Age: 50
End: 2020-07-08

## 2020-07-08 NOTE — PROGRESS NOTES
Requested updates within Care Everywhere.  Patient's chart was reviewed for overdue OMA topics.  Immunizations reconciled.    Orders placed:  Tasked appts:  Labs Linked:

## 2020-07-10 ENCOUNTER — OFFICE VISIT (OUTPATIENT)
Dept: OBSTETRICS AND GYNECOLOGY | Facility: CLINIC | Age: 50
End: 2020-07-10
Payer: OTHER GOVERNMENT

## 2020-07-10 VITALS
BODY MASS INDEX: 33.13 KG/M2 | DIASTOLIC BLOOD PRESSURE: 84 MMHG | SYSTOLIC BLOOD PRESSURE: 122 MMHG | WEIGHT: 205.25 LBS

## 2020-07-10 DIAGNOSIS — Z12.4 CERVICAL CANCER SCREENING: ICD-10-CM

## 2020-07-10 DIAGNOSIS — Z12.31 VISIT FOR SCREENING MAMMOGRAM: ICD-10-CM

## 2020-07-10 DIAGNOSIS — Z01.419 ROUTINE GYNECOLOGICAL EXAMINATION: Primary | ICD-10-CM

## 2020-07-10 PROCEDURE — 99213 OFFICE O/P EST LOW 20 MIN: CPT | Mod: PBBFAC,PO | Performed by: OBSTETRICS & GYNECOLOGY

## 2020-07-10 PROCEDURE — 99396 PR PREVENTIVE VISIT,EST,40-64: ICD-10-PCS | Mod: S$PBB,,, | Performed by: OBSTETRICS & GYNECOLOGY

## 2020-07-10 PROCEDURE — 99999 PR PBB SHADOW E&M-EST. PATIENT-LVL III: ICD-10-PCS | Mod: PBBFAC,,, | Performed by: OBSTETRICS & GYNECOLOGY

## 2020-07-10 PROCEDURE — 87624 HPV HI-RISK TYP POOLED RSLT: CPT

## 2020-07-10 PROCEDURE — 88175 CYTOPATH C/V AUTO FLUID REDO: CPT

## 2020-07-10 PROCEDURE — 99396 PREV VISIT EST AGE 40-64: CPT | Mod: S$PBB,,, | Performed by: OBSTETRICS & GYNECOLOGY

## 2020-07-10 PROCEDURE — 99999 PR PBB SHADOW E&M-EST. PATIENT-LVL III: CPT | Mod: PBBFAC,,, | Performed by: OBSTETRICS & GYNECOLOGY

## 2020-07-10 NOTE — PROGRESS NOTES
46 yo  female who presents for routine gyn visit.  Reports cycles come q month. Now only lasts for 3-5 days.  Reports hot flashes.  Reports lots of physical symptoms and changes around the time of her cycle:   Reports having episodes before her cycle when she feels very weak and shaky.  Patient has used OCPs in the past and reports sometimes that she got no cycle with OCP.  When this happened, she didn't know how/when to start her next pack of OCPs.    No other gyn complaints.    ROS:  GENERAL: positive weight gain  Feeling irritated.   SKIN: Denies rash or lesions.   HEAD: Denies head injury or headache.   CHEST: Denies chest pain or shortness of breath.   CARDIOVASCULAR: Denies palpitations or left sided chest pain.   ABDOMEN: No abdominal pain, constipation, diarrhea, nausea, vomiting or rectal bleeding.   URINARY: No frequency, dysuria, hematuria, or burning on urination.  REPRODUCTIVE: See HPI.   BREASTS: denies pain, lumps, or nipple discharge.   HEMATOLOGIC: No easy bruisability or excessive bleeding.   MUSCULOSKELETAL: Denies joint pain or swelling.   NEUROLOGIC: Denies syncope or weakness.   PSYCHIATRIC: Denies depression, anxiety or mood swings.       PE:   Vitals: /84   Wt 93.1 kg (205 lb 4 oz)   LMP 2020 (Exact Date)   BMI 33.13 kg/m²   APPEARANCE: Well nourished, well developed, in no acute distress.  BREASTS: Symmetrical, no skin changes or visible lesions. No palpable masses, nipple discharge or adenopathy bilaterally.  PELVIC: Normal external female genitalia without lesions. Normal hair distribution. Adequate perineal body, normal urethral meatus. Vagina moist and well rugated without lesions or discharge. Cervix pink and without lesions. No significant cystocele or rectocele. Bimanual exam showed uterus normal size, shape, position, mobile and nontender. Adnexa without masses or tenderness. Urethra and bladder normal.  EXTREMITIES: No clubbing cyanosis or  edema.      AP  Routine gyn  -s/p normal breast exam: mammogram ordered  -s/p normal pelvic exam:   -Pap and HPV collected  -STD testing: declined  -concerns about cycle: patient has used junel in the past. We have discussed restarting pills (monophasic/triphasic/natazia) vs.expectant management as patient is perimenopausal and symptoms may not last very long.    Patient instructed to contact me regarding use of OCPs at her convenience once she has considered her options.    vamsi yi MD

## 2020-07-13 ENCOUNTER — CLINICAL SUPPORT (OUTPATIENT)
Dept: REHABILITATION | Facility: HOSPITAL | Age: 50
End: 2020-07-13
Attending: ANESTHESIOLOGY
Payer: OTHER GOVERNMENT

## 2020-07-13 DIAGNOSIS — M54.2 CERVICAL PAIN: ICD-10-CM

## 2020-07-13 DIAGNOSIS — M53.82 DECREASED RANGE OF MOTION OF INTERVERTEBRAL DISCS OF CERVICAL SPINE: ICD-10-CM

## 2020-07-13 PROCEDURE — 97110 THERAPEUTIC EXERCISES: CPT | Mod: PO

## 2020-07-13 PROCEDURE — 97140 MANUAL THERAPY 1/> REGIONS: CPT | Mod: PO
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
## 2020-07-15 ENCOUNTER — CLINICAL SUPPORT (OUTPATIENT)
Dept: REHABILITATION | Facility: HOSPITAL | Age: 50
End: 2020-07-15
Attending: ANESTHESIOLOGY
Payer: OTHER GOVERNMENT

## 2020-07-15 DIAGNOSIS — M53.82 DECREASED RANGE OF MOTION OF INTERVERTEBRAL DISCS OF CERVICAL SPINE: ICD-10-CM

## 2020-07-15 DIAGNOSIS — M54.2 CERVICAL PAIN: ICD-10-CM

## 2020-07-15 PROCEDURE — 97140 MANUAL THERAPY 1/> REGIONS: CPT | Mod: PO
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
## 2020-07-17 LAB
HPV HR 12 DNA SPEC QL NAA+PROBE: NEGATIVE
HPV16 AG SPEC QL: NEGATIVE
HPV18 DNA SPEC QL NAA+PROBE: NEGATIVE

## 2020-07-20 ENCOUNTER — CLINICAL SUPPORT (OUTPATIENT)
Dept: REHABILITATION | Facility: HOSPITAL | Age: 50
End: 2020-07-20
Attending: ANESTHESIOLOGY
Payer: OTHER GOVERNMENT

## 2020-07-20 DIAGNOSIS — M54.2 CERVICAL PAIN: ICD-10-CM

## 2020-07-20 DIAGNOSIS — M53.82 DECREASED RANGE OF MOTION OF INTERVERTEBRAL DISCS OF CERVICAL SPINE: ICD-10-CM

## 2020-07-20 LAB
FINAL PATHOLOGIC DIAGNOSIS: NORMAL
Lab: NORMAL

## 2020-07-20 PROCEDURE — 97140 MANUAL THERAPY 1/> REGIONS: CPT | Mod: PO

## 2020-07-20 PROCEDURE — 97110 THERAPEUTIC EXERCISES: CPT | Mod: PO

## 2020-07-20 NOTE — PROGRESS NOTES
Pap and hpv are normal    Your pelvic exam will still need to occur once a year!    See you then!    Dr yi
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
## 2020-07-22 ENCOUNTER — CLINICAL SUPPORT (OUTPATIENT)
Dept: REHABILITATION | Facility: HOSPITAL | Age: 50
End: 2020-07-22
Attending: ANESTHESIOLOGY
Payer: OTHER GOVERNMENT

## 2020-07-22 DIAGNOSIS — M54.2 CERVICAL PAIN: ICD-10-CM

## 2020-07-22 DIAGNOSIS — M53.82 DECREASED RANGE OF MOTION OF INTERVERTEBRAL DISCS OF CERVICAL SPINE: ICD-10-CM

## 2020-07-22 PROCEDURE — 97010 HOT OR COLD PACKS THERAPY: CPT | Mod: PO

## 2020-07-22 PROCEDURE — 97140 MANUAL THERAPY 1/> REGIONS: CPT | Mod: PO
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
## 2020-07-27 ENCOUNTER — CLINICAL SUPPORT (OUTPATIENT)
Dept: REHABILITATION | Facility: HOSPITAL | Age: 50
End: 2020-07-27
Attending: ANESTHESIOLOGY
Payer: OTHER GOVERNMENT

## 2020-07-27 DIAGNOSIS — M53.82 DECREASED RANGE OF MOTION OF INTERVERTEBRAL DISCS OF CERVICAL SPINE: ICD-10-CM

## 2020-07-27 DIAGNOSIS — M54.2 CERVICAL PAIN: ICD-10-CM

## 2020-07-27 PROCEDURE — 97110 THERAPEUTIC EXERCISES: CPT | Mod: PO

## 2020-07-27 PROCEDURE — 97140 MANUAL THERAPY 1/> REGIONS: CPT | Mod: PO
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
## 2020-07-29 ENCOUNTER — CLINICAL SUPPORT (OUTPATIENT)
Dept: REHABILITATION | Facility: HOSPITAL | Age: 50
End: 2020-07-29
Attending: ANESTHESIOLOGY
Payer: OTHER GOVERNMENT

## 2020-07-29 DIAGNOSIS — M54.2 CERVICAL PAIN: ICD-10-CM

## 2020-07-29 DIAGNOSIS — M53.82 DECREASED RANGE OF MOTION OF INTERVERTEBRAL DISCS OF CERVICAL SPINE: ICD-10-CM

## 2020-07-29 PROCEDURE — 97140 MANUAL THERAPY 1/> REGIONS: CPT | Mod: PO

## 2020-07-29 PROCEDURE — 97110 THERAPEUTIC EXERCISES: CPT | Mod: PO
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
## 2020-08-05 ENCOUNTER — CLINICAL SUPPORT (OUTPATIENT)
Dept: REHABILITATION | Facility: HOSPITAL | Age: 50
End: 2020-08-05
Attending: ANESTHESIOLOGY
Payer: OTHER GOVERNMENT

## 2020-08-05 DIAGNOSIS — M53.82 DECREASED RANGE OF MOTION OF INTERVERTEBRAL DISCS OF CERVICAL SPINE: ICD-10-CM

## 2020-08-05 DIAGNOSIS — M54.2 CERVICAL PAIN: ICD-10-CM

## 2020-08-05 PROCEDURE — 97110 THERAPEUTIC EXERCISES: CPT | Mod: PO

## 2020-08-05 PROCEDURE — 97140 MANUAL THERAPY 1/> REGIONS: CPT | Mod: PO
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
## 2020-08-17 ENCOUNTER — PATIENT OUTREACH (OUTPATIENT)
Dept: ADMINISTRATIVE | Facility: OTHER | Age: 50
End: 2020-08-17

## 2020-08-18 ENCOUNTER — OFFICE VISIT (OUTPATIENT)
Dept: PAIN MEDICINE | Facility: CLINIC | Age: 50
End: 2020-08-18
Payer: OTHER GOVERNMENT

## 2020-08-18 VITALS
SYSTOLIC BLOOD PRESSURE: 132 MMHG | HEIGHT: 66 IN | BODY MASS INDEX: 32.99 KG/M2 | RESPIRATION RATE: 18 BRPM | DIASTOLIC BLOOD PRESSURE: 78 MMHG | WEIGHT: 205.25 LBS | HEART RATE: 78 BPM | OXYGEN SATURATION: 100 %

## 2020-08-18 DIAGNOSIS — M79.18 MYOFASCIAL PAIN: ICD-10-CM

## 2020-08-18 DIAGNOSIS — M54.12 CERVICAL RADICULOPATHY: Primary | ICD-10-CM

## 2020-08-18 DIAGNOSIS — M47.812 CERVICAL SPONDYLOSIS: ICD-10-CM

## 2020-08-18 PROCEDURE — 99214 PR OFFICE/OUTPT VISIT, EST, LEVL IV, 30-39 MIN: ICD-10-PCS | Mod: S$PBB,,, | Performed by: ANESTHESIOLOGY

## 2020-08-18 PROCEDURE — 99213 OFFICE O/P EST LOW 20 MIN: CPT | Mod: PBBFAC | Performed by: ANESTHESIOLOGY

## 2020-08-18 PROCEDURE — 99999 PR PBB SHADOW E&M-EST. PATIENT-LVL III: CPT | Mod: PBBFAC,,, | Performed by: ANESTHESIOLOGY

## 2020-08-18 PROCEDURE — 99214 OFFICE O/P EST MOD 30 MIN: CPT | Mod: S$PBB,,, | Performed by: ANESTHESIOLOGY

## 2020-08-18 PROCEDURE — 99999 PR PBB SHADOW E&M-EST. PATIENT-LVL III: ICD-10-PCS | Mod: PBBFAC,,, | Performed by: ANESTHESIOLOGY
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
## 2020-08-20 ENCOUNTER — TELEPHONE (OUTPATIENT)
Dept: PAIN MEDICINE | Facility: CLINIC | Age: 50
End: 2020-08-20

## 2020-08-20 NOTE — TELEPHONE ENCOUNTER
Staff contacted patient informing her schedulers will be contacting her regarding scheduling procedure      Patient verbalized understanding and confirmed

## 2020-08-20 NOTE — TELEPHONE ENCOUNTER
----- Message from Gloria García sent at 8/20/2020 10:28 AM CDT -----  Regarding: injection procedure  Name of Who is Calling: GABE LARA [[id number]]      What is the request in detail: Patient is requesting a call back she would like to schedule an injection procedure       Can the clinic reply by [facility]: no      What Number to Call Back if not in [facility]: [phone]

## 2020-09-03 DIAGNOSIS — M54.12 CERVICAL RADICULOPATHY: Primary | ICD-10-CM

## 2020-09-03 DIAGNOSIS — M79.18 MYOFASCIAL PAIN: ICD-10-CM

## 2020-09-03 DIAGNOSIS — M47.812 CERVICAL SPONDYLOSIS: ICD-10-CM

## 2020-09-03 RX ORDER — CELECOXIB 200 MG/1
200 CAPSULE ORAL DAILY
Qty: 30 CAPSULE | Refills: 2 | Status: SHIPPED | OUTPATIENT
Start: 2020-09-03 | End: 2020-10-02 | Stop reason: SDUPTHER

## 2020-09-18 ENCOUNTER — HOSPITAL ENCOUNTER (OUTPATIENT)
Facility: OTHER | Age: 50
Discharge: HOME | End: 2020-09-18
Attending: ANESTHESIOLOGY | Admitting: ANESTHESIOLOGY
Payer: OTHER GOVERNMENT

## 2020-09-18 VITALS
RESPIRATION RATE: 16 BRPM | TEMPERATURE: 98 F | HEIGHT: 66 IN | BODY MASS INDEX: 32.47 KG/M2 | OXYGEN SATURATION: 98 % | WEIGHT: 202 LBS | SYSTOLIC BLOOD PRESSURE: 127 MMHG | DIASTOLIC BLOOD PRESSURE: 79 MMHG | HEART RATE: 75 BPM

## 2020-09-18 DIAGNOSIS — M54.12 CERVICAL RADICULOPATHY: Primary | ICD-10-CM

## 2020-09-18 DIAGNOSIS — G89.29 CHRONIC PAIN: ICD-10-CM

## 2020-09-18 PROCEDURE — 25500020 PHARM REV CODE 255: Performed by: ANESTHESIOLOGY

## 2020-09-18 PROCEDURE — 62321 PR INJ CERV/THORAC, W/GUIDANCE: ICD-10-PCS | Mod: ,,, | Performed by: ANESTHESIOLOGY

## 2020-09-18 PROCEDURE — 63600175 PHARM REV CODE 636 W HCPCS: Performed by: ANESTHESIOLOGY

## 2020-09-18 PROCEDURE — 62321 NJX INTERLAMINAR CRV/THRC: CPT | Performed by: ANESTHESIOLOGY

## 2020-09-18 PROCEDURE — 62321 NJX INTERLAMINAR CRV/THRC: CPT | Mod: ,,, | Performed by: ANESTHESIOLOGY

## 2020-09-18 PROCEDURE — 25000003 PHARM REV CODE 250: Performed by: ANESTHESIOLOGY

## 2020-09-18 RX ORDER — MIDAZOLAM HYDROCHLORIDE 1 MG/ML
INJECTION INTRAMUSCULAR; INTRAVENOUS
Status: DISCONTINUED | OUTPATIENT
Start: 2020-09-18 | End: 2020-09-18 | Stop reason: HOSPADM

## 2020-09-18 RX ORDER — LIDOCAINE HYDROCHLORIDE 10 MG/ML
INJECTION INFILTRATION; PERINEURAL
Status: DISCONTINUED | OUTPATIENT
Start: 2020-09-18 | End: 2020-09-18 | Stop reason: HOSPADM

## 2020-09-18 RX ORDER — LIDOCAINE HYDROCHLORIDE 5 MG/ML
INJECTION, SOLUTION INFILTRATION; INTRAVENOUS
Status: DISCONTINUED | OUTPATIENT
Start: 2020-09-18 | End: 2020-09-18 | Stop reason: HOSPADM

## 2020-09-18 RX ORDER — SODIUM CHLORIDE 9 MG/ML
500 INJECTION, SOLUTION INTRAVENOUS CONTINUOUS
Status: DISCONTINUED | OUTPATIENT
Start: 2020-09-18 | End: 2020-09-18 | Stop reason: HOSPADM

## 2020-09-18 RX ORDER — DEXAMETHASONE SODIUM PHOSPHATE 4 MG/ML
INJECTION, SOLUTION INTRA-ARTICULAR; INTRALESIONAL; INTRAMUSCULAR; INTRAVENOUS; SOFT TISSUE
Status: DISCONTINUED | OUTPATIENT
Start: 2020-09-18 | End: 2020-09-18 | Stop reason: HOSPADM

## 2020-09-18 RX ADMIN — SODIUM CHLORIDE 500 ML: 0.9 INJECTION, SOLUTION INTRAVENOUS at 02:09

## 2020-09-18 NOTE — H&P
"HPI  Patient presenting for Procedure(s) (LRB):  INJECTION, STEROID, EPIDURAL C7/T1 (N/A)     Patient on Anti-coagulation No    No health changes since previous encounter    Past Medical History:   Diagnosis Date    Abnormal Pap smear     Allergy     Asthma     Sinusitis, chronic      Past Surgical History:   Procedure Laterality Date    EPIDURAL STEROID INJECTION N/A 6/5/2020    Procedure: INJECTION, STEROID, EPIDURAL,C7-T1;  Surgeon: Augusto Hussein MD;  Location: [facility];  Service: Pain Management;  Laterality: N/A;    SALIVARY GLAND SURGERY      TONSILLECTOMY       Review of patient's allergies indicates:   Allergen Reactions    Prednisone Rash     Hx of delayed onset rash on 2 occasion. Tolerates medrol, IM steroids, topical, and intranasal steroids w/o problem      Current Facility-Administered Medications   Medication    0.9%  NaCl infusion       PMHx, PSHx, Allergies, Medications reviewed in epic    ROS negative except pain complaints in HPI    OBJECTIVE:    BP (!) 141/84   Pulse 82   Temp 97.5 °F (36.4 °C) (Oral)   Resp 19   Ht 5' 6" (1.676 m)   Wt 91.6 kg (202 lb)   SpO2 97%   BMI 32.60 kg/m²     PHYSICAL EXAMINATION:    GENERAL: Well appearing, in no acute distress, alert and oriented x3.  PSYCH:  Mood and affect appropriate.  SKIN: Skin color, texture, turgor normal, no rashes or lesions which will impact the procedure.  CV: RRR with palpation of the radial artery.  PULM: No evidence of respiratory difficulty, symmetric chest rise. Clear to auscultation.  NEURO: Cranial nerves grossly intact.    Plan:    Proceed with procedure as planned Procedure(s) (LRB):  INJECTION, STEROID, EPIDURAL C7/T1 (N/A)    Gary Barkley MD  Anesthesiology PGY3            "

## 2020-09-18 NOTE — OP NOTE
"Patient Name: Hillary Cotton  MRN: [id number]    INFORMED CONSENT: The procedure, risks, benefits and options were discussed with patient. There are no contraindications to the procedure. The patient expressed understanding and agreed to proceed. The personnel performing the procedure was discussed. I verify that I personally obtained Hillary's consent prior to the start of the procedure and the signed consent can be found on the patient's chart.    Procedure Date: 09/18/2020    Anesthesia: Topical    Pre Procedure diagnosis: Cervical radiculopathy [M54.12]  1. Cervical radiculopathy    2. Chronic pain      Post-Procedure diagnosis: SAME        Moderate Sedation: None  Sedation time: Less than 15 minutes    PROCEDURE: C7/T1 CERVICAL EPIDURAL STEROID INJECTION         DESCRIPTION OF PROCEDURE: The patient was brought to the procedure room. After performing time out.  IV access was obtained prior to the procedure. The patient was positioned prone on the fluoroscopy table. Continuous hemodynamic monitoring was initiated including blood pressure, EKG, and pulse oximetry. The area of the cervical spine was prepped with chlorhexidine and draped in a sterile fashion. Skin anesthesia was achieved using 3 mL of Lidocaine 1% over the respective injection site. The C7/T1 interspace was visualized under fluoroscopic imaging. An 20 gauge 3 1/2" Tuohy needle was slowly inserted and advanced using loss of resistance to saline with AP, oblique and lateral fluoroscopic imaging for needle guidance. Negative aspiration for blood or CSF was confirmed. Epidural contrast spread was confirmed using 2mL of Omnipaque 300 contrast. Spread of the contrast in the cervical epidural space was noted . 6 mL of lidocaine 0.5% and 10 mg decadron was injected. The needle was removed and bleeding was nil. A sterile dressing was applied. No specimens collected. patient was taken back to the recovery room for further observation.     Blood Loss: " Nill  Specimen: None    Augusto Hussein MD

## 2020-09-18 NOTE — DISCHARGE INSTRUCTIONS

## 2020-09-18 NOTE — DISCHARGE SUMMARY
Discharge Note  Short Stay      SUMMARY     Admit Date: 9/18/2020    Attending Physician: Augusto Hussein      Discharge Physician: Augusto Hussein      Discharge Date: 9/18/2020 2:41 PM    Procedure(s) (LRB):  INJECTION, STEROID, EPIDURAL C7/T1 (N/A)    Final Diagnosis: Cervical radiculopathy [M54.12]    Disposition: Home or self care    Patient Instructions:   Current Discharge Medication List      CONTINUE these medications which have NOT CHANGED    Details   celecoxib (CELEBREX) 200 MG capsule Take 1 capsule (200 mg total) by mouth once daily.  Qty: 30 capsule, Refills: 2    Associated Diagnoses: Cervical radiculopathy; Cervical spondylosis; Myofascial pain      cetirizine (ZYRTEC) 10 MG tablet Take 1 tablet (10 mg total) by mouth once daily.  Qty: 90 tablet, Refills: 0    Associated Diagnoses: Chronic seasonal allergic rhinitis      gabapentin (NEURONTIN) 300 MG capsule Take 1 capsule (300 mg total) by mouth 3 (three) times daily. One at night for 7 day, then 2 a day for 7 days then 3 a day  Qty: 90 capsule, Refills: 2      glucosamine-chondroitin 500-400 mg tablet Take 1 tablet by mouth 3 (three) times daily.      multivitamin capsule Take 1 capsule by mouth once daily.                 Discharge Diagnosis: Cervical radiculopathy [M54.12]  Condition on Discharge: Stable with no complications to procedure   Diet on Discharge: Same as before.  Activity: as per instruction sheet.  Discharge to: Home with a responsible adult.  Follow up: 2-4 weeks

## 2020-09-30 ENCOUNTER — TELEPHONE (OUTPATIENT)
Dept: PAIN MEDICINE | Facility: CLINIC | Age: 50
End: 2020-09-30

## 2020-09-30 NOTE — TELEPHONE ENCOUNTER
"This message is for patient in regards to his/her appointment 10/02/20 at 2:20 pm .      [facility] Policy: For the safety of all patients and staff members.     Patient Visitor policy: Due to social distancing and limited seating staff are requesting patient to arrive to their schedule appointments alone. If patient do not need assistance with their visit, we're asking all visitors to remain outside the waiting area.    Upon arriving to facility; patient will have temperature taking and are required to wear a face mask, if patient do not have a face mask one will be provided. Upon arriving patient we ask patients to contact clinic at this number [phone] to notify staff that they have arrived or they may do do by utilizing the Mobile checked in [place](if patient have patient portal; clinical staff will send a message through there letting them know it's okay to proceed to their visit). Staff will give the patient the "okay" to come up or wait inside their vehicle until clinic is ready for patient to be seen by Jose García Np If you have any questions or concerns please contact [phone]        "

## 2020-10-01 ENCOUNTER — PATIENT OUTREACH (OUTPATIENT)
Dept: ADMINISTRATIVE | Facility: OTHER | Age: 50
End: 2020-10-01

## 2020-10-01 NOTE — PROGRESS NOTES
Health Maintenance Due   Topic Date Due    Hepatitis C Screening  1970    HIV Screening  12/07/1985    Mammogram  08/01/2019    Lipid Panel  10/03/2019    Influenza Vaccine (1) 08/01/2020     Updates were requested from care everywhere.  Chart was reviewed for overdue Proactive [facility] ([facility]) topics (CRS, Breast Cancer Screening, Eye exam)  Health Maintenance has been updated.  LINKS immunization registry triggered.  Immunizations were reconciled.

## 2020-10-02 ENCOUNTER — OFFICE VISIT (OUTPATIENT)
Dept: PAIN MEDICINE | Facility: CLINIC | Age: 50
End: 2020-10-02
Payer: OTHER GOVERNMENT

## 2020-10-02 DIAGNOSIS — M47.812 CERVICAL SPONDYLOSIS: ICD-10-CM

## 2020-10-02 DIAGNOSIS — M79.18 MYOFASCIAL PAIN: ICD-10-CM

## 2020-10-02 DIAGNOSIS — M54.12 CERVICAL RADICULOPATHY: ICD-10-CM

## 2020-10-02 PROCEDURE — 99213 PR OFFICE/OUTPT VISIT, EST, LEVL III, 20-29 MIN: ICD-10-PCS | Mod: 95,,, | Performed by: NURSE PRACTITIONER

## 2020-10-02 PROCEDURE — 99213 OFFICE O/P EST LOW 20 MIN: CPT | Mod: 95,,, | Performed by: NURSE PRACTITIONER

## 2020-10-02 RX ORDER — CELECOXIB 200 MG/1
200 CAPSULE ORAL DAILY
Qty: 30 CAPSULE | Refills: 5 | Status: SHIPPED | OUTPATIENT
Start: 2020-10-02 | End: 2021-04-27 | Stop reason: SDUPTHER

## 2020-10-02 RX ORDER — GABAPENTIN 300 MG/1
300 CAPSULE ORAL 3 TIMES DAILY
Qty: 90 CAPSULE | Refills: 5 | Status: SHIPPED | OUTPATIENT
Start: 2020-10-02 | End: 2021-04-27 | Stop reason: SDUPTHER
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
## 2020-10-05 ENCOUNTER — PATIENT MESSAGE (OUTPATIENT)
Dept: ADMINISTRATIVE | Facility: HOSPITAL | Age: 50
End: 2020-10-05

## 2020-12-14 ENCOUNTER — TELEPHONE (OUTPATIENT)
Dept: FAMILY MEDICINE | Facility: CLINIC | Age: 50
End: 2020-12-14

## 2020-12-14 NOTE — TELEPHONE ENCOUNTER
----- Message from Juana Colin sent at 12/14/2020  9:24 AM CST -----  Type:  Needs Medical Advice    Who Called: patient  Would the patient rather a call back or a response via [facility]? call  Best Call Back Number: [phone]  Additional Information: Sister and nephew tested positive; she needs to get a test asap; she is in the [facility] and would like to have it done asap.

## 2020-12-14 NOTE — TELEPHONE ENCOUNTER
Spoke to patient and I advised to go to urgent care to be tested for Covid-19 to be tested. Patient voices understanding.

## 2021-01-05 ENCOUNTER — PATIENT MESSAGE (OUTPATIENT)
Dept: ADMINISTRATIVE | Facility: HOSPITAL | Age: 51
End: 2021-01-05

## 2021-02-15 ENCOUNTER — PATIENT OUTREACH (OUTPATIENT)
Dept: ADMINISTRATIVE | Facility: OTHER | Age: 51
End: 2021-02-15

## 2021-02-15 DIAGNOSIS — Z12.11 ENCOUNTER FOR SCREENING COLONOSCOPY: Primary | ICD-10-CM

## 2021-04-05 ENCOUNTER — PATIENT MESSAGE (OUTPATIENT)
Dept: ADMINISTRATIVE | Facility: HOSPITAL | Age: 51
End: 2021-04-05

## 2021-04-26 ENCOUNTER — OFFICE VISIT (OUTPATIENT)
Dept: PODIATRY | Facility: CLINIC | Age: 51
End: 2021-04-26
Payer: OTHER GOVERNMENT

## 2021-04-26 ENCOUNTER — PATIENT MESSAGE (OUTPATIENT)
Dept: ADMINISTRATIVE | Facility: HOSPITAL | Age: 51
End: 2021-04-26

## 2021-04-26 ENCOUNTER — HOSPITAL ENCOUNTER (OUTPATIENT)
Dept: RADIOLOGY | Facility: HOSPITAL | Age: 51
Discharge: HOME | End: 2021-04-26
Attending: STUDENT IN AN ORGANIZED HEALTH CARE EDUCATION/TRAINING PROGRAM
Payer: OTHER GOVERNMENT

## 2021-04-26 VITALS — WEIGHT: 201.94 LBS | BODY MASS INDEX: 32.45 KG/M2 | HEIGHT: 66 IN

## 2021-04-26 DIAGNOSIS — M25.572 LEFT ANKLE PAIN, UNSPECIFIED CHRONICITY: ICD-10-CM

## 2021-04-26 DIAGNOSIS — M76.70 PERONEAL TENDINITIS, UNSPECIFIED LATERALITY: Primary | ICD-10-CM

## 2021-04-26 DIAGNOSIS — M76.70 PERONEAL TENDINITIS, UNSPECIFIED LATERALITY: ICD-10-CM

## 2021-04-26 DIAGNOSIS — M25.572 PAIN OF JOINT OF LEFT ANKLE AND FOOT: ICD-10-CM

## 2021-04-26 PROCEDURE — 73630 X-RAY EXAM OF FOOT: CPT | Mod: TC,FY,PO,LT

## 2021-04-26 PROCEDURE — 99999 PR PBB SHADOW E&M-EST. PATIENT-LVL III: ICD-10-PCS | Mod: PBBFAC,,, | Performed by: STUDENT IN AN ORGANIZED HEALTH CARE EDUCATION/TRAINING PROGRAM

## 2021-04-26 PROCEDURE — 99213 OFFICE O/P EST LOW 20 MIN: CPT | Mod: PBBFAC,PN,25 | Performed by: STUDENT IN AN ORGANIZED HEALTH CARE EDUCATION/TRAINING PROGRAM

## 2021-04-26 PROCEDURE — 73610 X-RAY EXAM OF ANKLE: CPT | Mod: TC,FY,PO,LT

## 2021-04-26 PROCEDURE — 99214 OFFICE O/P EST MOD 30 MIN: CPT | Mod: S$PBB,,, | Performed by: STUDENT IN AN ORGANIZED HEALTH CARE EDUCATION/TRAINING PROGRAM

## 2021-04-26 PROCEDURE — 99999 PR PBB SHADOW E&M-EST. PATIENT-LVL III: CPT | Mod: PBBFAC,,, | Performed by: STUDENT IN AN ORGANIZED HEALTH CARE EDUCATION/TRAINING PROGRAM

## 2021-04-26 PROCEDURE — 99214 PR OFFICE/OUTPT VISIT, EST, LEVL IV, 30-39 MIN: ICD-10-PCS | Mod: S$PBB,,, | Performed by: STUDENT IN AN ORGANIZED HEALTH CARE EDUCATION/TRAINING PROGRAM

## 2021-04-26 RX ORDER — METHYLPREDNISOLONE 4 MG/1
TABLET ORAL
Qty: 1 PACKAGE | Refills: 0 | Status: SHIPPED | OUTPATIENT
Start: 2021-04-26 | End: 2021-05-17

## 2021-05-04 ENCOUNTER — HOSPITAL ENCOUNTER (OUTPATIENT)
Dept: RADIOLOGY | Facility: HOSPITAL | Age: 51
Discharge: HOME | End: 2021-05-04
Attending: STUDENT IN AN ORGANIZED HEALTH CARE EDUCATION/TRAINING PROGRAM
Payer: OTHER GOVERNMENT

## 2021-05-04 DIAGNOSIS — M25.572 PAIN OF JOINT OF LEFT ANKLE AND FOOT: ICD-10-CM

## 2021-05-04 PROCEDURE — 73721 MRI JNT OF LWR EXTRE W/O DYE: CPT | Mod: 26,LT,, | Performed by: RADIOLOGY

## 2021-05-04 PROCEDURE — 73721 MRI JNT OF LWR EXTRE W/O DYE: CPT | Mod: TC,LT

## 2021-05-04 PROCEDURE — 73721 MRI ANKLE WITHOUT CONTRAST LEFT: ICD-10-PCS | Mod: 26,LT,, | Performed by: RADIOLOGY

## 2021-05-06 ENCOUNTER — PATIENT MESSAGE (OUTPATIENT)
Dept: PODIATRY | Facility: CLINIC | Age: 51
End: 2021-05-06

## 2021-05-14 ENCOUNTER — PATIENT MESSAGE (OUTPATIENT)
Dept: ADMINISTRATIVE | Facility: HOSPITAL | Age: 51
End: 2021-05-14

## 2021-05-18 ENCOUNTER — PATIENT OUTREACH (OUTPATIENT)
Dept: ADMINISTRATIVE | Facility: HOSPITAL | Age: 51
End: 2021-05-18

## 2021-05-18 DIAGNOSIS — Z12.39 ENCOUNTER FOR SCREENING FOR MALIGNANT NEOPLASM OF BREAST, UNSPECIFIED SCREENING MODALITY: Primary | ICD-10-CM

## 2021-05-21 ENCOUNTER — HOSPITAL ENCOUNTER (OUTPATIENT)
Dept: RADIOLOGY | Facility: HOSPITAL | Age: 51
Discharge: HOME | End: 2021-05-21
Attending: FAMILY MEDICINE
Payer: OTHER GOVERNMENT

## 2021-05-21 DIAGNOSIS — Z12.39 ENCOUNTER FOR SCREENING FOR MALIGNANT NEOPLASM OF BREAST, UNSPECIFIED SCREENING MODALITY: ICD-10-CM

## 2021-05-21 PROCEDURE — 77067 MAMMO DIGITAL SCREENING BILAT WITH TOMO: ICD-10-PCS | Mod: 26,,, | Performed by: RADIOLOGY

## 2021-05-21 PROCEDURE — 77067 SCR MAMMO BI INCL CAD: CPT | Mod: 26,,, | Performed by: RADIOLOGY

## 2021-05-21 PROCEDURE — 77063 MAMMO DIGITAL SCREENING BILAT WITH TOMO: ICD-10-PCS | Mod: 26,,, | Performed by: RADIOLOGY

## 2021-05-21 PROCEDURE — 77063 BREAST TOMOSYNTHESIS BI: CPT | Mod: 26,,, | Performed by: RADIOLOGY

## 2021-05-21 PROCEDURE — 77067 SCR MAMMO BI INCL CAD: CPT | Mod: TC,PO

## 2021-05-27 ENCOUNTER — OFFICE VISIT (OUTPATIENT)
Dept: PODIATRY | Facility: CLINIC | Age: 51
End: 2021-05-27
Payer: OTHER GOVERNMENT

## 2021-05-27 VITALS — HEIGHT: 66 IN | BODY MASS INDEX: 32.45 KG/M2 | WEIGHT: 201.94 LBS

## 2021-05-27 DIAGNOSIS — M25.572 LEFT ANKLE PAIN, UNSPECIFIED CHRONICITY: ICD-10-CM

## 2021-05-27 DIAGNOSIS — M25.572 PAIN OF JOINT OF LEFT ANKLE AND FOOT: Primary | ICD-10-CM

## 2021-05-27 DIAGNOSIS — M76.70 PERONEAL TENDINITIS, UNSPECIFIED LATERALITY: ICD-10-CM

## 2021-05-27 PROCEDURE — 99214 OFFICE O/P EST MOD 30 MIN: CPT | Mod: S$PBB,,, | Performed by: STUDENT IN AN ORGANIZED HEALTH CARE EDUCATION/TRAINING PROGRAM

## 2021-05-27 PROCEDURE — 99213 OFFICE O/P EST LOW 20 MIN: CPT | Mod: PBBFAC,PN | Performed by: STUDENT IN AN ORGANIZED HEALTH CARE EDUCATION/TRAINING PROGRAM

## 2021-05-27 PROCEDURE — 99999 PR PBB SHADOW E&M-EST. PATIENT-LVL III: CPT | Mod: PBBFAC,,, | Performed by: STUDENT IN AN ORGANIZED HEALTH CARE EDUCATION/TRAINING PROGRAM

## 2021-05-27 PROCEDURE — 99999 PR PBB SHADOW E&M-EST. PATIENT-LVL III: ICD-10-PCS | Mod: PBBFAC,,, | Performed by: STUDENT IN AN ORGANIZED HEALTH CARE EDUCATION/TRAINING PROGRAM

## 2021-05-27 PROCEDURE — 99214 PR OFFICE/OUTPT VISIT, EST, LEVL IV, 30-39 MIN: ICD-10-PCS | Mod: S$PBB,,, | Performed by: STUDENT IN AN ORGANIZED HEALTH CARE EDUCATION/TRAINING PROGRAM

## 2021-06-01 ENCOUNTER — OFFICE VISIT (OUTPATIENT)
Dept: FAMILY MEDICINE | Facility: CLINIC | Age: 51
End: 2021-06-01
Payer: OTHER GOVERNMENT

## 2021-06-01 VITALS
BODY MASS INDEX: 33.77 KG/M2 | HEART RATE: 78 BPM | HEIGHT: 66 IN | OXYGEN SATURATION: 98 % | DIASTOLIC BLOOD PRESSURE: 70 MMHG | WEIGHT: 210.13 LBS | SYSTOLIC BLOOD PRESSURE: 120 MMHG

## 2021-06-01 DIAGNOSIS — Z11.4 ENCOUNTER FOR SCREENING FOR HIV: ICD-10-CM

## 2021-06-01 DIAGNOSIS — N95.1 MENOPAUSE SYNDROME: ICD-10-CM

## 2021-06-01 DIAGNOSIS — M25.50 ARTHRALGIA, UNSPECIFIED JOINT: ICD-10-CM

## 2021-06-01 DIAGNOSIS — Z00.00 ANNUAL PHYSICAL EXAM: Primary | ICD-10-CM

## 2021-06-01 DIAGNOSIS — M15.9 PRIMARY OSTEOARTHRITIS INVOLVING MULTIPLE JOINTS: ICD-10-CM

## 2021-06-01 DIAGNOSIS — Z11.59 ENCOUNTER FOR HCV SCREENING TEST FOR LOW RISK PATIENT: ICD-10-CM

## 2021-06-01 PROCEDURE — 99396 PREV VISIT EST AGE 40-64: CPT | Mod: S$PBB,,, | Performed by: FAMILY MEDICINE

## 2021-06-01 PROCEDURE — 99999 PR PBB SHADOW E&M-EST. PATIENT-LVL IV: CPT | Mod: PBBFAC,,, | Performed by: FAMILY MEDICINE

## 2021-06-01 PROCEDURE — 99214 OFFICE O/P EST MOD 30 MIN: CPT | Mod: PBBFAC,PO | Performed by: FAMILY MEDICINE

## 2021-06-01 PROCEDURE — 99999 PR PBB SHADOW E&M-EST. PATIENT-LVL IV: ICD-10-PCS | Mod: PBBFAC,,, | Performed by: FAMILY MEDICINE

## 2021-06-01 PROCEDURE — 99396 PR PREVENTIVE VISIT,EST,40-64: ICD-10-PCS | Mod: S$PBB,,, | Performed by: FAMILY MEDICINE

## 2021-06-01 RX ORDER — METHYLPREDNISOLONE 4 MG/1
TABLET ORAL
COMMUNITY
Start: 2021-04-26 | End: 2021-11-04 | Stop reason: ALTCHOICE

## 2021-06-01 RX ORDER — HYDROXYZINE HYDROCHLORIDE 25 MG/1
TABLET, FILM COATED ORAL
COMMUNITY
Start: 2020-12-08 | End: 2023-03-17

## 2021-06-01 RX ORDER — TIZANIDINE 4 MG/1
TABLET ORAL
COMMUNITY
Start: 2020-05-29 | End: 2021-06-10 | Stop reason: SDUPTHER

## 2021-06-01 RX ORDER — IBUPROFEN 800 MG/1
TABLET ORAL
COMMUNITY
Start: 2020-05-27 | End: 2021-11-04 | Stop reason: ALTCHOICE

## 2021-06-01 RX ORDER — METHYLPREDNISOLONE 4 MG/1
TABLET ORAL
COMMUNITY
Start: 2020-12-08 | End: 2021-11-04 | Stop reason: ALTCHOICE

## 2021-06-01 RX ORDER — AMOXICILLIN 875 MG/1
TABLET, FILM COATED ORAL
COMMUNITY
Start: 2021-01-21 | End: 2021-11-04 | Stop reason: ALTCHOICE

## 2021-06-02 ENCOUNTER — PATIENT OUTREACH (OUTPATIENT)
Dept: ADMINISTRATIVE | Facility: OTHER | Age: 51
End: 2021-06-02

## 2021-06-02 ENCOUNTER — LAB VISIT (OUTPATIENT)
Dept: LAB | Facility: HOSPITAL | Age: 51
End: 2021-06-02
Attending: FAMILY MEDICINE
Payer: OTHER GOVERNMENT

## 2021-06-02 DIAGNOSIS — Z11.4 ENCOUNTER FOR SCREENING FOR HIV: ICD-10-CM

## 2021-06-02 DIAGNOSIS — Z11.59 ENCOUNTER FOR HCV SCREENING TEST FOR LOW RISK PATIENT: ICD-10-CM

## 2021-06-02 DIAGNOSIS — Z00.00 ANNUAL PHYSICAL EXAM: ICD-10-CM

## 2021-06-02 LAB
BASOPHILS # BLD AUTO: 0.05 K/UL (ref 0–0.2)
BASOPHILS NFR BLD: 0.9 % (ref 0–1.9)
DIFFERENTIAL METHOD: ABNORMAL
EOSINOPHIL # BLD AUTO: 0.2 K/UL (ref 0–0.5)
EOSINOPHIL NFR BLD: 4.1 % (ref 0–8)
ERYTHROCYTE [DISTWIDTH] IN BLOOD BY AUTOMATED COUNT: 12.8 % (ref 11.5–14.5)
HCT VFR BLD AUTO: 44.8 % (ref 37–48.5)
HGB BLD-MCNC: 13.8 G/DL (ref 12–16)
IMM GRANULOCYTES # BLD AUTO: 0.01 K/UL (ref 0–0.04)
IMM GRANULOCYTES NFR BLD AUTO: 0.2 % (ref 0–0.5)
LYMPHOCYTES # BLD AUTO: 1.8 K/UL (ref 1–4.8)
LYMPHOCYTES NFR BLD: 34.4 % (ref 18–48)
MCH RBC QN AUTO: 29.2 PG (ref 27–31)
MCHC RBC AUTO-ENTMCNC: 30.8 G/DL (ref 32–36)
MCV RBC AUTO: 95 FL (ref 82–98)
MONOCYTES # BLD AUTO: 0.5 K/UL (ref 0.3–1)
MONOCYTES NFR BLD: 9 % (ref 4–15)
NEUTROPHILS # BLD AUTO: 2.7 K/UL (ref 1.8–7.7)
NEUTROPHILS NFR BLD: 51.4 % (ref 38–73)
NRBC BLD-RTO: 0 /100 WBC
PLATELET # BLD AUTO: 309 K/UL (ref 150–450)
PMV BLD AUTO: 11 FL (ref 9.2–12.9)
RBC # BLD AUTO: 4.72 M/UL (ref 4–5.4)
WBC # BLD AUTO: 5.32 K/UL (ref 3.9–12.7)

## 2021-06-02 PROCEDURE — 85025 COMPLETE CBC W/AUTO DIFF WBC: CPT | Performed by: FAMILY MEDICINE

## 2021-06-02 PROCEDURE — 84443 ASSAY THYROID STIM HORMONE: CPT | Performed by: FAMILY MEDICINE

## 2021-06-02 PROCEDURE — 36415 COLL VENOUS BLD VENIPUNCTURE: CPT | Mod: PO | Performed by: FAMILY MEDICINE

## 2021-06-02 PROCEDURE — 86803 HEPATITIS C AB TEST: CPT | Performed by: FAMILY MEDICINE

## 2021-06-02 PROCEDURE — 80053 COMPREHEN METABOLIC PANEL: CPT | Performed by: FAMILY MEDICINE

## 2021-06-02 PROCEDURE — 80061 LIPID PANEL: CPT | Performed by: FAMILY MEDICINE

## 2021-06-02 PROCEDURE — 86703 HIV-1/HIV-2 1 RESULT ANTBDY: CPT | Performed by: FAMILY MEDICINE

## 2021-06-03 ENCOUNTER — TELEPHONE (OUTPATIENT)
Dept: RHEUMATOLOGY | Facility: CLINIC | Age: 51
End: 2021-06-03

## 2021-06-03 ENCOUNTER — PATIENT MESSAGE (OUTPATIENT)
Dept: RHEUMATOLOGY | Facility: CLINIC | Age: 51
End: 2021-06-03

## 2021-06-03 LAB
ALBUMIN SERPL BCP-MCNC: 3.7 G/DL (ref 3.5–5.2)
ALP SERPL-CCNC: 53 U/L (ref 55–135)
ALT SERPL W/O P-5'-P-CCNC: 10 U/L (ref 10–44)
ANION GAP SERPL CALC-SCNC: 15 MMOL/L (ref 8–16)
AST SERPL-CCNC: 17 U/L (ref 10–40)
BILIRUB SERPL-MCNC: 0.3 MG/DL (ref 0.1–1)
BUN SERPL-MCNC: 11 MG/DL (ref 6–20)
CALCIUM SERPL-MCNC: 9.8 MG/DL (ref 8.7–10.5)
CHLORIDE SERPL-SCNC: 102 MMOL/L (ref 95–110)
CHOLEST SERPL-MCNC: 232 MG/DL (ref 120–199)
CHOLEST/HDLC SERPL: 5.2 {RATIO} (ref 2–5)
CO2 SERPL-SCNC: 23 MMOL/L (ref 23–29)
CREAT SERPL-MCNC: 0.8 MG/DL (ref 0.5–1.4)
EST. GFR  (AFRICAN AMERICAN): >60 ML/MIN/1.73 M^2
EST. GFR  (NON AFRICAN AMERICAN): >60 ML/MIN/1.73 M^2
GLUCOSE SERPL-MCNC: 88 MG/DL (ref 70–110)
HCV AB SERPL QL IA: NEGATIVE
HDLC SERPL-MCNC: 45 MG/DL (ref 40–75)
HDLC SERPL: 19.4 % (ref 20–50)
HIV 1+2 AB+HIV1 P24 AG SERPL QL IA: NEGATIVE
LDLC SERPL CALC-MCNC: 148.6 MG/DL (ref 63–159)
NONHDLC SERPL-MCNC: 187 MG/DL
POTASSIUM SERPL-SCNC: 4.2 MMOL/L (ref 3.5–5.1)
PROT SERPL-MCNC: 7.4 G/DL (ref 6–8.4)
SODIUM SERPL-SCNC: 140 MMOL/L (ref 136–145)
TRIGL SERPL-MCNC: 192 MG/DL (ref 30–150)
TSH SERPL DL<=0.005 MIU/L-ACNC: 2.08 UIU/ML (ref 0.4–4)

## 2021-06-10 ENCOUNTER — OFFICE VISIT (OUTPATIENT)
Dept: RHEUMATOLOGY | Facility: CLINIC | Age: 51
End: 2021-06-10
Payer: OTHER GOVERNMENT

## 2021-06-10 VITALS
DIASTOLIC BLOOD PRESSURE: 79 MMHG | SYSTOLIC BLOOD PRESSURE: 136 MMHG | WEIGHT: 210.13 LBS | BODY MASS INDEX: 33.77 KG/M2 | HEIGHT: 66 IN | HEART RATE: 89 BPM

## 2021-06-10 DIAGNOSIS — M47.812 CERVICAL SPONDYLOSIS: Primary | ICD-10-CM

## 2021-06-10 DIAGNOSIS — M15.9 PRIMARY OSTEOARTHRITIS INVOLVING MULTIPLE JOINTS: ICD-10-CM

## 2021-06-10 DIAGNOSIS — M25.572 CHRONIC PAIN OF LEFT ANKLE: ICD-10-CM

## 2021-06-10 DIAGNOSIS — G89.29 CHRONIC PAIN OF LEFT ANKLE: ICD-10-CM

## 2021-06-10 DIAGNOSIS — M25.50 ARTHRALGIA, UNSPECIFIED JOINT: ICD-10-CM

## 2021-06-10 PROCEDURE — 99999 PR PBB SHADOW E&M-EST. PATIENT-LVL III: CPT | Mod: PBBFAC,,, | Performed by: INTERNAL MEDICINE

## 2021-06-10 PROCEDURE — 99999 PR PBB SHADOW E&M-EST. PATIENT-LVL III: ICD-10-PCS | Mod: PBBFAC,,, | Performed by: INTERNAL MEDICINE

## 2021-06-10 PROCEDURE — 99204 OFFICE O/P NEW MOD 45 MIN: CPT | Mod: S$PBB,,, | Performed by: INTERNAL MEDICINE

## 2021-06-10 PROCEDURE — 99213 OFFICE O/P EST LOW 20 MIN: CPT | Mod: PBBFAC | Performed by: INTERNAL MEDICINE

## 2021-06-10 PROCEDURE — 99204 PR OFFICE/OUTPT VISIT, NEW, LEVL IV, 45-59 MIN: ICD-10-PCS | Mod: S$PBB,,, | Performed by: INTERNAL MEDICINE

## 2021-06-10 RX ORDER — TIZANIDINE 4 MG/1
4 TABLET ORAL NIGHTLY PRN
Qty: 30 TABLET | Refills: 3 | Status: SHIPPED | OUTPATIENT
Start: 2021-06-10 | End: 2021-07-10

## 2021-06-10 RX ORDER — GABAPENTIN 300 MG/1
CAPSULE ORAL
COMMUNITY
Start: 2020-10-02 | End: 2021-11-04 | Stop reason: SDUPTHER

## 2021-06-11 ENCOUNTER — LAB VISIT (OUTPATIENT)
Dept: LAB | Facility: HOSPITAL | Age: 51
End: 2021-06-11
Attending: INTERNAL MEDICINE
Payer: OTHER GOVERNMENT

## 2021-06-11 DIAGNOSIS — M15.9 PRIMARY OSTEOARTHRITIS INVOLVING MULTIPLE JOINTS: ICD-10-CM

## 2021-06-11 LAB
CCP AB SER IA-ACNC: 1.7 U/ML
ERYTHROCYTE [SEDIMENTATION RATE] IN BLOOD BY WESTERGREN METHOD: 20 MM/HR (ref 0–36)

## 2021-06-11 PROCEDURE — 86200 CCP ANTIBODY: CPT | Performed by: INTERNAL MEDICINE

## 2021-06-11 PROCEDURE — 85652 RBC SED RATE AUTOMATED: CPT | Performed by: INTERNAL MEDICINE

## 2021-06-11 PROCEDURE — 86140 C-REACTIVE PROTEIN: CPT | Performed by: INTERNAL MEDICINE

## 2021-06-11 PROCEDURE — 36415 COLL VENOUS BLD VENIPUNCTURE: CPT | Mod: PO | Performed by: INTERNAL MEDICINE

## 2021-06-12 LAB — CRP SERPL-MCNC: 7.6 MG/L (ref 0–8.2)

## 2021-07-01 ENCOUNTER — PATIENT MESSAGE (OUTPATIENT)
Dept: PODIATRY | Facility: CLINIC | Age: 51
End: 2021-07-01

## 2021-07-06 ENCOUNTER — PATIENT MESSAGE (OUTPATIENT)
Dept: ADMINISTRATIVE | Facility: HOSPITAL | Age: 51
End: 2021-07-06

## 2021-07-08 ENCOUNTER — PATIENT OUTREACH (OUTPATIENT)
Dept: ADMINISTRATIVE | Facility: OTHER | Age: 51
End: 2021-07-08

## 2021-07-08 ENCOUNTER — OFFICE VISIT (OUTPATIENT)
Dept: PODIATRY | Facility: CLINIC | Age: 51
End: 2021-07-08
Payer: OTHER GOVERNMENT

## 2021-07-08 VITALS
WEIGHT: 210.13 LBS | DIASTOLIC BLOOD PRESSURE: 80 MMHG | HEIGHT: 66 IN | BODY MASS INDEX: 33.77 KG/M2 | SYSTOLIC BLOOD PRESSURE: 122 MMHG | HEART RATE: 85 BPM

## 2021-07-08 DIAGNOSIS — M79.672 CHRONIC FOOT PAIN, LEFT: ICD-10-CM

## 2021-07-08 DIAGNOSIS — R22.42 MASS OF LEFT FOOT: Primary | ICD-10-CM

## 2021-07-08 DIAGNOSIS — G89.29 CHRONIC FOOT PAIN, LEFT: ICD-10-CM

## 2021-07-08 DIAGNOSIS — M19.072 ARTHRITIS OF LEFT SUBTALAR JOINT: ICD-10-CM

## 2021-07-08 PROCEDURE — 99999 PR PBB SHADOW E&M-EST. PATIENT-LVL III: ICD-10-PCS | Mod: PBBFAC,,, | Performed by: PODIATRIST

## 2021-07-08 PROCEDURE — 20600 DRAIN/INJ JOINT/BURSA W/O US: CPT | Mod: PBBFAC,PN | Performed by: PODIATRIST

## 2021-07-08 PROCEDURE — 20605 DRAIN/INJ JOINT/BURSA W/O US: CPT | Mod: PBBFAC,PN | Performed by: PODIATRIST

## 2021-07-08 PROCEDURE — 20600 DRAIN/INJ JOINT/BURSA W/O US: CPT | Mod: S$PBB,51,LT, | Performed by: PODIATRIST

## 2021-07-08 PROCEDURE — 20605 DRAIN/INJ JOINT/BURSA W/O US: CPT | Mod: S$PBB,LT,, | Performed by: PODIATRIST

## 2021-07-08 PROCEDURE — 99213 OFFICE O/P EST LOW 20 MIN: CPT | Mod: PBBFAC,PN,25 | Performed by: PODIATRIST

## 2021-07-08 PROCEDURE — 20605 PR DRAIN/INJECT INTERMEDIATE JOINT/BURSA: ICD-10-PCS | Mod: S$PBB,LT,, | Performed by: PODIATRIST

## 2021-07-08 PROCEDURE — 99214 OFFICE O/P EST MOD 30 MIN: CPT | Mod: 25,S$PBB,, | Performed by: PODIATRIST

## 2021-07-08 PROCEDURE — 99999 PR PBB SHADOW E&M-EST. PATIENT-LVL III: CPT | Mod: PBBFAC,,, | Performed by: PODIATRIST

## 2021-07-08 PROCEDURE — 99214 PR OFFICE/OUTPT VISIT, EST, LEVL IV, 30-39 MIN: ICD-10-PCS | Mod: 25,S$PBB,, | Performed by: PODIATRIST

## 2021-07-08 PROCEDURE — 20600 PR DRAIN/INJECT SMALL JOINT/BURSA: ICD-10-PCS | Mod: S$PBB,51,LT, | Performed by: PODIATRIST

## 2021-07-08 RX ORDER — DEXAMETHASONE SODIUM PHOSPHATE 4 MG/ML
4 INJECTION, SOLUTION INTRA-ARTICULAR; INTRALESIONAL; INTRAMUSCULAR; INTRAVENOUS; SOFT TISSUE
Status: COMPLETED | OUTPATIENT
Start: 2021-07-08 | End: 2021-07-08

## 2021-07-08 RX ORDER — METHYLPREDNISOLONE ACETATE 40 MG/ML
20 INJECTION, SUSPENSION INTRA-ARTICULAR; INTRALESIONAL; INTRAMUSCULAR; SOFT TISSUE
Status: COMPLETED | OUTPATIENT
Start: 2021-07-08 | End: 2021-07-08

## 2021-07-08 RX ADMIN — DEXAMETHASONE SODIUM PHOSPHATE 4 MG: 4 INJECTION, SOLUTION INTRA-ARTICULAR; INTRALESIONAL; INTRAMUSCULAR; INTRAVENOUS; SOFT TISSUE at 11:07

## 2021-07-08 RX ADMIN — METHYLPREDNISOLONE ACETATE 20 MG: 40 INJECTION, SUSPENSION INTRA-ARTICULAR; INTRALESIONAL; INTRAMUSCULAR; SOFT TISSUE at 11:07

## 2021-07-23 ENCOUNTER — OFFICE VISIT (OUTPATIENT)
Dept: PODIATRY | Facility: CLINIC | Age: 51
End: 2021-07-23
Payer: OTHER GOVERNMENT

## 2021-07-23 VITALS — BODY MASS INDEX: 33.77 KG/M2 | HEIGHT: 66 IN | WEIGHT: 210.13 LBS

## 2021-07-23 DIAGNOSIS — M79.672 CHRONIC FOOT PAIN, LEFT: ICD-10-CM

## 2021-07-23 DIAGNOSIS — G89.29 CHRONIC FOOT PAIN, LEFT: ICD-10-CM

## 2021-07-23 DIAGNOSIS — D17.24 LIPOMA OF LEFT LOWER EXTREMITY: Primary | ICD-10-CM

## 2021-07-23 DIAGNOSIS — Z01.818 PREOP TESTING: ICD-10-CM

## 2021-07-23 DIAGNOSIS — D16.9 BENIGN NEOPLASM OF BONE, UNSPECIFIED BONE: ICD-10-CM

## 2021-07-23 PROCEDURE — 99999 PR PBB SHADOW E&M-EST. PATIENT-LVL V: ICD-10-PCS | Mod: PBBFAC,,, | Performed by: PODIATRIST

## 2021-07-23 PROCEDURE — 99999 PR PBB SHADOW E&M-EST. PATIENT-LVL V: CPT | Mod: PBBFAC,,, | Performed by: PODIATRIST

## 2021-07-23 PROCEDURE — 99215 OFFICE O/P EST HI 40 MIN: CPT | Mod: PBBFAC,PN | Performed by: PODIATRIST

## 2021-07-23 PROCEDURE — 99214 PR OFFICE/OUTPT VISIT, EST, LEVL IV, 30-39 MIN: ICD-10-PCS | Mod: S$PBB,,, | Performed by: PODIATRIST

## 2021-07-23 PROCEDURE — 99214 OFFICE O/P EST MOD 30 MIN: CPT | Mod: S$PBB,,, | Performed by: PODIATRIST

## 2021-07-23 RX ORDER — TRAMADOL HYDROCHLORIDE 50 MG/1
50 TABLET ORAL EVERY 6 HOURS PRN
Qty: 30 TABLET | Refills: 0 | Status: SHIPPED | OUTPATIENT
Start: 2021-07-23 | End: 2021-08-02

## 2021-07-23 RX ORDER — SODIUM CHLORIDE 0.9 % (FLUSH) 0.9 %
10 SYRINGE (ML) INJECTION
Status: DISCONTINUED | OUTPATIENT
Start: 2021-07-23 | End: 2024-02-22

## 2021-07-26 ENCOUNTER — PATIENT OUTREACH (OUTPATIENT)
Dept: ADMINISTRATIVE | Facility: HOSPITAL | Age: 51
End: 2021-07-26

## 2021-07-26 ENCOUNTER — PATIENT MESSAGE (OUTPATIENT)
Dept: SURGERY | Facility: HOSPITAL | Age: 51
End: 2021-07-26

## 2021-07-27 ENCOUNTER — TELEPHONE (OUTPATIENT)
Dept: PODIATRY | Facility: CLINIC | Age: 51
End: 2021-07-27

## 2021-08-05 ENCOUNTER — HOSPITAL ENCOUNTER (EMERGENCY)
Facility: HOSPITAL | Age: 51
Discharge: HOME | End: 2021-08-05
Attending: EMERGENCY MEDICINE
Payer: OTHER GOVERNMENT

## 2021-08-05 VITALS
RESPIRATION RATE: 18 BRPM | BODY MASS INDEX: 32.95 KG/M2 | SYSTOLIC BLOOD PRESSURE: 130 MMHG | OXYGEN SATURATION: 96 % | HEIGHT: 66 IN | DIASTOLIC BLOOD PRESSURE: 81 MMHG | TEMPERATURE: 98 F | HEART RATE: 100 BPM | WEIGHT: 205 LBS

## 2021-08-05 DIAGNOSIS — Z20.822 EXPOSURE TO COVID-19 VIRUS: Primary | ICD-10-CM

## 2021-08-05 DIAGNOSIS — J32.9 SINUSITIS, UNSPECIFIED CHRONICITY, UNSPECIFIED LOCATION: ICD-10-CM

## 2021-08-05 LAB — SARS-COV-2 RDRP RESP QL NAA+PROBE: NEGATIVE

## 2021-08-05 PROCEDURE — 99284 EMERGENCY DEPT VISIT MOD MDM: CPT | Mod: ER

## 2021-08-05 PROCEDURE — U0002 COVID-19 LAB TEST NON-CDC: HCPCS | Mod: ER | Performed by: PHYSICIAN ASSISTANT

## 2021-08-05 RX ORDER — AMOXICILLIN AND CLAVULANATE POTASSIUM 875; 125 MG/1; MG/1
1 TABLET, FILM COATED ORAL 2 TIMES DAILY
Qty: 14 TABLET | Refills: 0 | Status: SHIPPED | OUTPATIENT
Start: 2021-08-05 | End: 2021-11-04 | Stop reason: ALTCHOICE

## 2021-08-05 RX ORDER — BUTALBITAL, ACETAMINOPHEN AND CAFFEINE 50; 325; 40 MG/1; MG/1; MG/1
1 TABLET ORAL EVERY 4 HOURS PRN
Qty: 10 TABLET | Refills: 0 | Status: SHIPPED | OUTPATIENT
Start: 2021-08-05 | End: 2021-09-04

## 2021-08-12 ENCOUNTER — TELEPHONE (OUTPATIENT)
Dept: FAMILY MEDICINE | Facility: CLINIC | Age: 51
End: 2021-08-12

## 2021-08-12 DIAGNOSIS — D16.9 CHONDROMA: ICD-10-CM

## 2021-08-12 DIAGNOSIS — D17.24 BENIGN LIPOMATOUS NEOPLASM OF SKIN AND SUBCUTANEOUS TISSUE OF LEFT LEG: Primary | ICD-10-CM

## 2021-08-24 ENCOUNTER — PATIENT MESSAGE (OUTPATIENT)
Dept: SURGERY | Facility: HOSPITAL | Age: 51
End: 2021-08-24

## 2021-08-25 ENCOUNTER — PATIENT MESSAGE (OUTPATIENT)
Dept: SURGERY | Facility: HOSPITAL | Age: 51
End: 2021-08-25

## 2021-09-10 ENCOUNTER — PATIENT MESSAGE (OUTPATIENT)
Dept: SURGERY | Facility: HOSPITAL | Age: 51
End: 2021-09-10

## 2021-09-10 DIAGNOSIS — D16.32 BENIGN NEOPLASM OF SHORT BONE OF LEFT LOWER EXTREMITY: ICD-10-CM

## 2021-09-10 DIAGNOSIS — R22.42 MASS OF FOOT, LEFT: Primary | ICD-10-CM

## 2021-10-04 ENCOUNTER — PATIENT MESSAGE (OUTPATIENT)
Dept: ADMINISTRATIVE | Facility: HOSPITAL | Age: 51
End: 2021-10-04

## 2021-10-13 ENCOUNTER — PATIENT MESSAGE (OUTPATIENT)
Dept: SURGERY | Facility: HOSPITAL | Age: 51
End: 2021-10-13

## 2021-10-15 ENCOUNTER — PATIENT MESSAGE (OUTPATIENT)
Dept: SURGERY | Facility: HOSPITAL | Age: 51
End: 2021-10-15
Payer: OTHER GOVERNMENT

## 2021-10-22 ENCOUNTER — ANESTHESIA EVENT (OUTPATIENT)
Dept: SURGERY | Facility: HOSPITAL | Age: 51
End: 2021-10-22
Payer: OTHER GOVERNMENT

## 2021-10-29 ENCOUNTER — PATIENT MESSAGE (OUTPATIENT)
Dept: FAMILY MEDICINE | Facility: CLINIC | Age: 51
End: 2021-10-29
Payer: OTHER GOVERNMENT

## 2021-11-01 ENCOUNTER — TELEPHONE (OUTPATIENT)
Dept: FAMILY MEDICINE | Facility: CLINIC | Age: 51
End: 2021-11-01
Payer: OTHER GOVERNMENT

## 2021-11-04 ENCOUNTER — OFFICE VISIT (OUTPATIENT)
Dept: FAMILY MEDICINE | Facility: CLINIC | Age: 51
End: 2021-11-04
Payer: OTHER GOVERNMENT

## 2021-11-04 ENCOUNTER — LAB VISIT (OUTPATIENT)
Dept: LAB | Facility: HOSPITAL | Age: 51
End: 2021-11-04
Attending: NURSE PRACTITIONER
Payer: OTHER GOVERNMENT

## 2021-11-04 VITALS
SYSTOLIC BLOOD PRESSURE: 120 MMHG | RESPIRATION RATE: 18 BRPM | OXYGEN SATURATION: 97 % | WEIGHT: 207.25 LBS | DIASTOLIC BLOOD PRESSURE: 80 MMHG | BODY MASS INDEX: 33.31 KG/M2 | HEIGHT: 66 IN | TEMPERATURE: 98 F | HEART RATE: 94 BPM

## 2021-11-04 DIAGNOSIS — M25.50 ARTHRALGIA, UNSPECIFIED JOINT: ICD-10-CM

## 2021-11-04 DIAGNOSIS — M47.812 CERVICAL SPONDYLOSIS: ICD-10-CM

## 2021-11-04 DIAGNOSIS — Z01.818 PREOP EXAMINATION: ICD-10-CM

## 2021-11-04 DIAGNOSIS — Z01.818 PREOP EXAMINATION: Primary | ICD-10-CM

## 2021-11-04 DIAGNOSIS — Z12.11 SCREENING FOR COLON CANCER: ICD-10-CM

## 2021-11-04 DIAGNOSIS — R26.9 GAIT ABNORMALITY: ICD-10-CM

## 2021-11-04 DIAGNOSIS — E66.9 OBESITY (BMI 30.0-34.9): ICD-10-CM

## 2021-11-04 DIAGNOSIS — Z23 NEED FOR INFLUENZA VACCINATION: ICD-10-CM

## 2021-11-04 DIAGNOSIS — D17.24 BENIGN LIPOMATOUS NEOPLASM OF SKIN AND SUBCUTANEOUS TISSUE OF LEFT LEG: ICD-10-CM

## 2021-11-04 DIAGNOSIS — M79.18 MYOFASCIAL PAIN: ICD-10-CM

## 2021-11-04 DIAGNOSIS — M54.12 CERVICAL RADICULOPATHY: ICD-10-CM

## 2021-11-04 PROCEDURE — 99215 OFFICE O/P EST HI 40 MIN: CPT | Mod: S$PBB,,, | Performed by: NURSE PRACTITIONER

## 2021-11-04 PROCEDURE — 85025 COMPLETE CBC W/AUTO DIFF WBC: CPT | Performed by: NURSE PRACTITIONER

## 2021-11-04 PROCEDURE — 90686 IIV4 VACC NO PRSV 0.5 ML IM: CPT | Mod: PBBFAC,PO

## 2021-11-04 PROCEDURE — 93010 EKG 12-LEAD: ICD-10-PCS | Mod: S$PBB,,, | Performed by: INTERNAL MEDICINE

## 2021-11-04 PROCEDURE — 99999 PR PBB SHADOW E&M-EST. PATIENT-LVL IV: CPT | Mod: PBBFAC,,, | Performed by: NURSE PRACTITIONER

## 2021-11-04 PROCEDURE — 36415 COLL VENOUS BLD VENIPUNCTURE: CPT | Mod: PO | Performed by: NURSE PRACTITIONER

## 2021-11-04 PROCEDURE — 93005 ELECTROCARDIOGRAM TRACING: CPT | Mod: PBBFAC,PO | Performed by: INTERNAL MEDICINE

## 2021-11-04 PROCEDURE — 93010 ELECTROCARDIOGRAM REPORT: CPT | Mod: S$PBB,,, | Performed by: INTERNAL MEDICINE

## 2021-11-04 PROCEDURE — 99999 PR PBB SHADOW E&M-EST. PATIENT-LVL IV: ICD-10-PCS | Mod: PBBFAC,,, | Performed by: NURSE PRACTITIONER

## 2021-11-04 PROCEDURE — 80053 COMPREHEN METABOLIC PANEL: CPT | Performed by: NURSE PRACTITIONER

## 2021-11-04 PROCEDURE — 99214 OFFICE O/P EST MOD 30 MIN: CPT | Mod: 25,PBBFAC,PO | Performed by: NURSE PRACTITIONER

## 2021-11-04 PROCEDURE — 99215 PR OFFICE/OUTPT VISIT, EST, LEVL V, 40-54 MIN: ICD-10-PCS | Mod: S$PBB,,, | Performed by: NURSE PRACTITIONER

## 2021-11-04 RX ORDER — CELECOXIB 200 MG/1
200 CAPSULE ORAL DAILY
Qty: 30 CAPSULE | Refills: 5 | Status: SHIPPED | OUTPATIENT
Start: 2021-11-04 | End: 2022-06-13 | Stop reason: SDUPTHER

## 2021-11-04 RX ORDER — GABAPENTIN 300 MG/1
300 CAPSULE ORAL NIGHTLY PRN
Qty: 30 CAPSULE | Refills: 1 | Status: SHIPPED | OUTPATIENT
Start: 2021-11-04 | End: 2021-11-22 | Stop reason: SDUPTHER

## 2021-11-05 ENCOUNTER — LAB VISIT (OUTPATIENT)
Dept: LAB | Facility: HOSPITAL | Age: 51
End: 2021-11-05
Attending: FAMILY MEDICINE
Payer: OTHER GOVERNMENT

## 2021-11-05 ENCOUNTER — PATIENT MESSAGE (OUTPATIENT)
Dept: FAMILY MEDICINE | Facility: CLINIC | Age: 51
End: 2021-11-05
Payer: OTHER GOVERNMENT

## 2021-11-05 DIAGNOSIS — N30.01 ACUTE CYSTITIS WITH HEMATURIA: Primary | ICD-10-CM

## 2021-11-05 DIAGNOSIS — N30.01 ACUTE CYSTITIS WITH HEMATURIA: ICD-10-CM

## 2021-11-05 LAB
ALBUMIN SERPL BCP-MCNC: 3.7 G/DL (ref 3.5–5.2)
ALP SERPL-CCNC: 60 U/L (ref 55–135)
ALT SERPL W/O P-5'-P-CCNC: 14 U/L (ref 10–44)
ANION GAP SERPL CALC-SCNC: 8 MMOL/L (ref 8–16)
AST SERPL-CCNC: 15 U/L (ref 10–40)
BASOPHILS # BLD AUTO: 0.06 K/UL (ref 0–0.2)
BASOPHILS NFR BLD: 0.8 % (ref 0–1.9)
BILIRUB SERPL-MCNC: 0.3 MG/DL (ref 0.1–1)
BUN SERPL-MCNC: 10 MG/DL (ref 6–20)
CALCIUM SERPL-MCNC: 9.5 MG/DL (ref 8.7–10.5)
CHLORIDE SERPL-SCNC: 102 MMOL/L (ref 95–110)
CO2 SERPL-SCNC: 28 MMOL/L (ref 23–29)
CREAT SERPL-MCNC: 0.7 MG/DL (ref 0.5–1.4)
DIFFERENTIAL METHOD: ABNORMAL
EOSINOPHIL # BLD AUTO: 0.1 K/UL (ref 0–0.5)
EOSINOPHIL NFR BLD: 1.8 % (ref 0–8)
ERYTHROCYTE [DISTWIDTH] IN BLOOD BY AUTOMATED COUNT: 13 % (ref 11.5–14.5)
EST. GFR  (AFRICAN AMERICAN): >60 ML/MIN/1.73 M^2
EST. GFR  (NON AFRICAN AMERICAN): >60 ML/MIN/1.73 M^2
GLUCOSE SERPL-MCNC: 100 MG/DL (ref 70–110)
HCT VFR BLD AUTO: 44.7 % (ref 37–48.5)
HGB BLD-MCNC: 13.5 G/DL (ref 12–16)
IMM GRANULOCYTES # BLD AUTO: 0.02 K/UL (ref 0–0.04)
IMM GRANULOCYTES NFR BLD AUTO: 0.3 % (ref 0–0.5)
LYMPHOCYTES # BLD AUTO: 2.3 K/UL (ref 1–4.8)
LYMPHOCYTES NFR BLD: 29.6 % (ref 18–48)
MCH RBC QN AUTO: 29 PG (ref 27–31)
MCHC RBC AUTO-ENTMCNC: 30.2 G/DL (ref 32–36)
MCV RBC AUTO: 96 FL (ref 82–98)
MONOCYTES # BLD AUTO: 0.7 K/UL (ref 0.3–1)
MONOCYTES NFR BLD: 8.4 % (ref 4–15)
NEUTROPHILS # BLD AUTO: 4.6 K/UL (ref 1.8–7.7)
NEUTROPHILS NFR BLD: 59.1 % (ref 38–73)
NRBC BLD-RTO: 0 /100 WBC
PLATELET # BLD AUTO: 323 K/UL (ref 150–450)
PMV BLD AUTO: 11.4 FL (ref 9.2–12.9)
POTASSIUM SERPL-SCNC: 5 MMOL/L (ref 3.5–5.1)
PROT SERPL-MCNC: 6.8 G/DL (ref 6–8.4)
RBC # BLD AUTO: 4.65 M/UL (ref 4–5.4)
SODIUM SERPL-SCNC: 138 MMOL/L (ref 136–145)
WBC # BLD AUTO: 7.7 K/UL (ref 3.9–12.7)

## 2021-11-05 PROCEDURE — 87186 SC STD MICRODIL/AGAR DIL: CPT | Performed by: NURSE PRACTITIONER

## 2021-11-05 PROCEDURE — 87088 URINE BACTERIA CULTURE: CPT | Performed by: NURSE PRACTITIONER

## 2021-11-05 PROCEDURE — 87077 CULTURE AEROBIC IDENTIFY: CPT | Performed by: NURSE PRACTITIONER

## 2021-11-05 PROCEDURE — 87086 URINE CULTURE/COLONY COUNT: CPT | Performed by: NURSE PRACTITIONER

## 2021-11-05 RX ORDER — NITROFURANTOIN (MACROCRYSTALS) 100 MG/1
100 CAPSULE ORAL EVERY 12 HOURS
Qty: 10 CAPSULE | Refills: 0 | Status: SHIPPED | OUTPATIENT
Start: 2021-11-05 | End: 2021-11-10

## 2021-11-08 ENCOUNTER — CLINICAL SUPPORT (OUTPATIENT)
Dept: REHABILITATION | Facility: HOSPITAL | Age: 51
End: 2021-11-08
Payer: OTHER GOVERNMENT

## 2021-11-08 ENCOUNTER — PATIENT MESSAGE (OUTPATIENT)
Dept: SURGERY | Facility: HOSPITAL | Age: 51
End: 2021-11-08
Payer: OTHER GOVERNMENT

## 2021-11-08 DIAGNOSIS — D16.9 CHONDROMA: ICD-10-CM

## 2021-11-08 DIAGNOSIS — M25.672 DECREASED RANGE OF MOTION OF LEFT ANKLE: Primary | ICD-10-CM

## 2021-11-08 DIAGNOSIS — D17.24 BENIGN LIPOMATOUS NEOPLASM OF SKIN AND SUBCUTANEOUS TISSUE OF LEFT LEG: ICD-10-CM

## 2021-11-08 DIAGNOSIS — G89.29 CHRONIC PAIN OF LEFT ANKLE: ICD-10-CM

## 2021-11-08 DIAGNOSIS — M25.572 CHRONIC PAIN OF LEFT ANKLE: ICD-10-CM

## 2021-11-08 LAB — BACTERIA UR CULT: ABNORMAL

## 2021-11-08 PROCEDURE — 97162 PT EVAL MOD COMPLEX 30 MIN: CPT | Mod: PN

## 2021-11-08 PROCEDURE — 97116 GAIT TRAINING THERAPY: CPT | Mod: PN

## 2021-11-10 ENCOUNTER — PATIENT OUTREACH (OUTPATIENT)
Dept: ADMINISTRATIVE | Facility: OTHER | Age: 51
End: 2021-11-10
Payer: OTHER GOVERNMENT

## 2021-11-11 ENCOUNTER — OFFICE VISIT (OUTPATIENT)
Dept: PODIATRY | Facility: CLINIC | Age: 51
End: 2021-11-11
Payer: OTHER GOVERNMENT

## 2021-11-11 ENCOUNTER — HOSPITAL ENCOUNTER (OUTPATIENT)
Dept: PREADMISSION TESTING | Facility: HOSPITAL | Age: 51
Discharge: HOME | End: 2021-11-11
Attending: PODIATRIST
Payer: OTHER GOVERNMENT

## 2021-11-11 VITALS
HEIGHT: 66 IN | SYSTOLIC BLOOD PRESSURE: 120 MMHG | WEIGHT: 207 LBS | DIASTOLIC BLOOD PRESSURE: 64 MMHG | HEART RATE: 86 BPM | BODY MASS INDEX: 33.27 KG/M2

## 2021-11-11 VITALS — HEART RATE: 76 BPM | OXYGEN SATURATION: 96 % | SYSTOLIC BLOOD PRESSURE: 120 MMHG | DIASTOLIC BLOOD PRESSURE: 64 MMHG

## 2021-11-11 DIAGNOSIS — M79.672 CHRONIC FOOT PAIN, LEFT: ICD-10-CM

## 2021-11-11 DIAGNOSIS — D16.9 BENIGN NEOPLASM OF BONE, UNSPECIFIED BONE: ICD-10-CM

## 2021-11-11 DIAGNOSIS — G89.29 CHRONIC FOOT PAIN, LEFT: ICD-10-CM

## 2021-11-11 DIAGNOSIS — Z01.818 PREOP TESTING: ICD-10-CM

## 2021-11-11 DIAGNOSIS — D17.24 LIPOMA OF LEFT LOWER EXTREMITY: Primary | ICD-10-CM

## 2021-11-11 PROBLEM — M25.572 LEFT ANKLE PAIN: Status: ACTIVE | Noted: 2021-11-11

## 2021-11-11 PROCEDURE — 99213 OFFICE O/P EST LOW 20 MIN: CPT | Mod: S$PBB,,, | Performed by: PODIATRIST

## 2021-11-11 PROCEDURE — 99213 PR OFFICE/OUTPT VISIT, EST, LEVL III, 20-29 MIN: ICD-10-PCS | Mod: S$PBB,,, | Performed by: PODIATRIST

## 2021-11-11 PROCEDURE — 99999 PR PBB SHADOW E&M-EST. PATIENT-LVL III: ICD-10-PCS | Mod: PBBFAC,,, | Performed by: PODIATRIST

## 2021-11-11 PROCEDURE — 99213 OFFICE O/P EST LOW 20 MIN: CPT | Mod: PBBFAC,PN | Performed by: PODIATRIST

## 2021-11-11 PROCEDURE — 99999 PR PBB SHADOW E&M-EST. PATIENT-LVL III: CPT | Mod: PBBFAC,,, | Performed by: PODIATRIST

## 2021-11-11 RX ORDER — SODIUM CHLORIDE, SODIUM LACTATE, POTASSIUM CHLORIDE, CALCIUM CHLORIDE 600; 310; 30; 20 MG/100ML; MG/100ML; MG/100ML; MG/100ML
INJECTION, SOLUTION INTRAVENOUS CONTINUOUS
Status: CANCELLED | OUTPATIENT
Start: 2021-11-11

## 2021-11-11 RX ORDER — SCOLOPAMINE TRANSDERMAL SYSTEM 1 MG/1
1 PATCH, EXTENDED RELEASE TRANSDERMAL
Status: CANCELLED | OUTPATIENT
Start: 2021-11-11

## 2021-11-11 RX ORDER — LIDOCAINE HYDROCHLORIDE 10 MG/ML
1 INJECTION, SOLUTION EPIDURAL; INFILTRATION; INTRACAUDAL; PERINEURAL ONCE
Status: CANCELLED | OUTPATIENT
Start: 2021-11-11 | End: 2021-11-11

## 2021-11-17 ENCOUNTER — HOSPITAL ENCOUNTER (OUTPATIENT)
Facility: HOSPITAL | Age: 51
Discharge: HOME | End: 2021-11-17
Attending: PODIATRIST | Admitting: PODIATRIST
Payer: OTHER GOVERNMENT

## 2021-11-17 ENCOUNTER — ANESTHESIA (OUTPATIENT)
Dept: SURGERY | Facility: HOSPITAL | Age: 51
End: 2021-11-17
Payer: OTHER GOVERNMENT

## 2021-11-17 VITALS
HEIGHT: 66 IN | SYSTOLIC BLOOD PRESSURE: 133 MMHG | BODY MASS INDEX: 32.95 KG/M2 | HEART RATE: 78 BPM | TEMPERATURE: 98 F | WEIGHT: 205 LBS | DIASTOLIC BLOOD PRESSURE: 69 MMHG | RESPIRATION RATE: 18 BRPM | OXYGEN SATURATION: 97 %

## 2021-11-17 DIAGNOSIS — G89.29 CHRONIC FOOT PAIN, LEFT: ICD-10-CM

## 2021-11-17 DIAGNOSIS — M79.672 CHRONIC FOOT PAIN, LEFT: ICD-10-CM

## 2021-11-17 DIAGNOSIS — Z98.890 POSTOPERATIVE STATE: Primary | ICD-10-CM

## 2021-11-17 DIAGNOSIS — D17.24 LIPOMA OF LEFT LOWER EXTREMITY: ICD-10-CM

## 2021-11-17 DIAGNOSIS — G57.82 NEURITIS OF LEFT SURAL NERVE: ICD-10-CM

## 2021-11-17 DIAGNOSIS — D16.9 BENIGN NEOPLASM OF BONE, UNSPECIFIED BONE: ICD-10-CM

## 2021-11-17 PROCEDURE — 28103 REMOVE/GRAFT FOOT LESION: CPT | Mod: 51,LT,, | Performed by: PODIATRIST

## 2021-11-17 PROCEDURE — 64708 PR NEUROPLASTY OTHER ARM/LEG NERVE,OPEN: ICD-10-PCS | Mod: LT,,, | Performed by: PODIATRIST

## 2021-11-17 PROCEDURE — 63600175 PHARM REV CODE 636 W HCPCS: Performed by: NURSE PRACTITIONER

## 2021-11-17 PROCEDURE — 71000016 HC POSTOP RECOV ADDL HR: Performed by: PODIATRIST

## 2021-11-17 PROCEDURE — 36000706: Performed by: PODIATRIST

## 2021-11-17 PROCEDURE — 28103: ICD-10-PCS | Mod: 51,LT,, | Performed by: PODIATRIST

## 2021-11-17 PROCEDURE — 63600175 PHARM REV CODE 636 W HCPCS: Performed by: STUDENT IN AN ORGANIZED HEALTH CARE EDUCATION/TRAINING PROGRAM

## 2021-11-17 PROCEDURE — 64708 REVISE ARM/LEG NERVE: CPT | Mod: LT,,, | Performed by: PODIATRIST

## 2021-11-17 PROCEDURE — 63600175 PHARM REV CODE 636 W HCPCS: Performed by: ANESTHESIOLOGY

## 2021-11-17 PROCEDURE — C1713 ANCHOR/SCREW BN/BN,TIS/BN: HCPCS

## 2021-11-17 PROCEDURE — 37000009 HC ANESTHESIA EA ADD 15 MINS: Performed by: PODIATRIST

## 2021-11-17 PROCEDURE — 25000003 PHARM REV CODE 250: Performed by: NURSE ANESTHETIST, CERTIFIED REGISTERED

## 2021-11-17 PROCEDURE — 63600175 PHARM REV CODE 636 W HCPCS: Performed by: PODIATRIST

## 2021-11-17 PROCEDURE — 27201423 OPTIME MED/SURG SUP & DEVICES STERILE SUPPLY: Performed by: PODIATRIST

## 2021-11-17 PROCEDURE — 37000008 HC ANESTHESIA 1ST 15 MINUTES: Performed by: PODIATRIST

## 2021-11-17 PROCEDURE — 64445 NJX AA&/STRD SCIATIC NRV IMG: CPT | Performed by: STUDENT IN AN ORGANIZED HEALTH CARE EDUCATION/TRAINING PROGRAM

## 2021-11-17 PROCEDURE — 36000707: Performed by: PODIATRIST

## 2021-11-17 PROCEDURE — 27800903 OPTIME MED/SURG SUP & DEVICES OTHER IMPLANTS: Performed by: PODIATRIST

## 2021-11-17 PROCEDURE — 71000015 HC POSTOP RECOV 1ST HR: Performed by: PODIATRIST

## 2021-11-17 PROCEDURE — 01480 ANES OPEN PX LOWER L/A/F NOS: CPT | Performed by: PODIATRIST

## 2021-11-17 PROCEDURE — 63600175 PHARM REV CODE 636 W HCPCS: Performed by: NURSE ANESTHETIST, CERTIFIED REGISTERED

## 2021-11-17 DEVICE — IMPLANTABLE DEVICE: Type: IMPLANTABLE DEVICE | Site: ANKLE | Status: FUNCTIONAL

## 2021-11-17 RX ORDER — ROPIVACAINE HYDROCHLORIDE 5 MG/ML
INJECTION, SOLUTION EPIDURAL; INFILTRATION; PERINEURAL
Status: COMPLETED | OUTPATIENT
Start: 2021-11-17 | End: 2021-11-17

## 2021-11-17 RX ORDER — CEPHALEXIN 500 MG/1
500 CAPSULE ORAL 4 TIMES DAILY
Qty: 28 CAPSULE | Refills: 0 | Status: SHIPPED | OUTPATIENT
Start: 2021-11-17 | End: 2023-03-17

## 2021-11-17 RX ORDER — LIDOCAINE HYDROCHLORIDE 10 MG/ML
1 INJECTION, SOLUTION EPIDURAL; INFILTRATION; INTRACAUDAL; PERINEURAL ONCE
Status: DISCONTINUED | OUTPATIENT
Start: 2021-11-17 | End: 2021-11-17 | Stop reason: HOSPADM

## 2021-11-17 RX ORDER — CEFAZOLIN SODIUM 2 G/50ML
2 SOLUTION INTRAVENOUS
Status: COMPLETED | OUTPATIENT
Start: 2021-11-17 | End: 2021-11-17

## 2021-11-17 RX ORDER — PROPOFOL 10 MG/ML
VIAL (ML) INTRAVENOUS
Status: DISCONTINUED | OUTPATIENT
Start: 2021-11-17 | End: 2021-11-17

## 2021-11-17 RX ORDER — ONDANSETRON 2 MG/ML
INJECTION INTRAMUSCULAR; INTRAVENOUS
Status: DISCONTINUED | OUTPATIENT
Start: 2021-11-17 | End: 2021-11-17

## 2021-11-17 RX ORDER — MIDAZOLAM HYDROCHLORIDE 1 MG/ML
INJECTION, SOLUTION INTRAMUSCULAR; INTRAVENOUS
Status: DISCONTINUED | OUTPATIENT
Start: 2021-11-17 | End: 2021-11-17

## 2021-11-17 RX ORDER — OXYCODONE AND ACETAMINOPHEN 10; 325 MG/1; MG/1
1 TABLET ORAL EVERY 4 HOURS PRN
Qty: 30 TABLET | Refills: 0 | Status: SHIPPED | OUTPATIENT
Start: 2021-11-17 | End: 2021-11-22

## 2021-11-17 RX ORDER — FENTANYL CITRATE 50 UG/ML
INJECTION, SOLUTION INTRAMUSCULAR; INTRAVENOUS
Status: DISCONTINUED | OUTPATIENT
Start: 2021-11-17 | End: 2021-11-17

## 2021-11-17 RX ORDER — HYDROCODONE BITARTRATE AND ACETAMINOPHEN 5; 325 MG/1; MG/1
1 TABLET ORAL EVERY 4 HOURS PRN
Status: DISCONTINUED | OUTPATIENT
Start: 2021-11-17 | End: 2021-11-17 | Stop reason: HOSPADM

## 2021-11-17 RX ORDER — SODIUM CHLORIDE, SODIUM LACTATE, POTASSIUM CHLORIDE, CALCIUM CHLORIDE 600; 310; 30; 20 MG/100ML; MG/100ML; MG/100ML; MG/100ML
INJECTION, SOLUTION INTRAVENOUS CONTINUOUS
Status: DISCONTINUED | OUTPATIENT
Start: 2021-11-17 | End: 2021-11-17 | Stop reason: HOSPADM

## 2021-11-17 RX ORDER — LIDOCAINE HYDROCHLORIDE 20 MG/ML
INJECTION INTRAVENOUS
Status: DISCONTINUED | OUTPATIENT
Start: 2021-11-17 | End: 2021-11-17

## 2021-11-17 RX ORDER — SCOLOPAMINE TRANSDERMAL SYSTEM 1 MG/1
1 PATCH, EXTENDED RELEASE TRANSDERMAL
Status: DISCONTINUED | OUTPATIENT
Start: 2021-11-17 | End: 2021-11-17 | Stop reason: HOSPADM

## 2021-11-17 RX ORDER — PROPOFOL 10 MG/ML
VIAL (ML) INTRAVENOUS CONTINUOUS PRN
Status: DISCONTINUED | OUTPATIENT
Start: 2021-11-17 | End: 2021-11-17

## 2021-11-17 RX ADMIN — MIDAZOLAM 5 MG: 1 INJECTION INTRAMUSCULAR; INTRAVENOUS at 11:11

## 2021-11-17 RX ADMIN — FENTANYL CITRATE 100 MCG: 50 INJECTION, SOLUTION INTRAMUSCULAR; INTRAVENOUS at 01:11

## 2021-11-17 RX ADMIN — ROPIVACAINE HYDROCHLORIDE 45 ML: 5 INJECTION, SOLUTION EPIDURAL; INFILTRATION; PERINEURAL at 11:11

## 2021-11-17 RX ADMIN — PROPOFOL 150 MCG/KG/MIN: 10 INJECTION, EMULSION INTRAVENOUS at 12:11

## 2021-11-17 RX ADMIN — CEFAZOLIN SODIUM 2 G: 2 SOLUTION INTRAVENOUS at 01:11

## 2021-11-17 RX ADMIN — SODIUM CHLORIDE, SODIUM LACTATE, POTASSIUM CHLORIDE, AND CALCIUM CHLORIDE: .6; .31; .03; .02 INJECTION, SOLUTION INTRAVENOUS at 10:11

## 2021-11-17 RX ADMIN — LIDOCAINE HYDROCHLORIDE 100 MG: 20 INJECTION, SOLUTION INTRAVENOUS at 12:11

## 2021-11-17 RX ADMIN — PROPOFOL 50 MG: 10 INJECTION, EMULSION INTRAVENOUS at 12:11

## 2021-11-17 RX ADMIN — GLYCOPYRROLATE 0.1 MG: 0.2 INJECTION, SOLUTION INTRAMUSCULAR; INTRAVITREAL at 12:11

## 2021-11-17 RX ADMIN — ONDANSETRON 8 MG: 2 INJECTION, SOLUTION INTRAMUSCULAR; INTRAVENOUS at 02:11

## 2021-11-17 RX ADMIN — SODIUM CHLORIDE, SODIUM LACTATE, POTASSIUM CHLORIDE, AND CALCIUM CHLORIDE: .6; .31; .03; .02 INJECTION, SOLUTION INTRAVENOUS at 01:11

## 2021-11-18 ENCOUNTER — TELEPHONE (OUTPATIENT)
Dept: PODIATRY | Facility: CLINIC | Age: 51
End: 2021-11-18
Payer: OTHER GOVERNMENT

## 2021-11-18 ENCOUNTER — PATIENT MESSAGE (OUTPATIENT)
Dept: PODIATRY | Facility: CLINIC | Age: 51
End: 2021-11-18
Payer: OTHER GOVERNMENT

## 2021-11-22 ENCOUNTER — PATIENT MESSAGE (OUTPATIENT)
Dept: PODIATRY | Facility: CLINIC | Age: 51
End: 2021-11-22
Payer: OTHER GOVERNMENT

## 2021-11-22 ENCOUNTER — PATIENT MESSAGE (OUTPATIENT)
Dept: FAMILY MEDICINE | Facility: CLINIC | Age: 51
End: 2021-11-22
Payer: OTHER GOVERNMENT

## 2021-11-22 ENCOUNTER — TELEPHONE (OUTPATIENT)
Dept: PODIATRY | Facility: CLINIC | Age: 51
End: 2021-11-22
Payer: OTHER GOVERNMENT

## 2021-11-22 DIAGNOSIS — M54.12 CERVICAL RADICULOPATHY: Primary | ICD-10-CM

## 2021-11-22 RX ORDER — GABAPENTIN 300 MG/1
300 CAPSULE ORAL NIGHTLY PRN
Qty: 30 CAPSULE | Refills: 1 | Status: SHIPPED | OUTPATIENT
Start: 2021-11-22 | End: 2022-01-24 | Stop reason: SDUPTHER

## 2021-11-22 RX ORDER — TRAMADOL HYDROCHLORIDE 50 MG/1
50 TABLET ORAL EVERY 6 HOURS PRN
Qty: 30 TABLET | Refills: 0 | Status: SHIPPED | OUTPATIENT
Start: 2021-11-22 | End: 2021-12-02

## 2021-11-23 ENCOUNTER — TELEPHONE (OUTPATIENT)
Dept: ADMINISTRATIVE | Facility: OTHER | Age: 51
End: 2021-11-23
Payer: OTHER GOVERNMENT

## 2021-11-24 ENCOUNTER — TELEPHONE (OUTPATIENT)
Dept: ADMINISTRATIVE | Facility: OTHER | Age: 51
End: 2021-11-24
Payer: OTHER GOVERNMENT

## 2021-11-29 ENCOUNTER — OFFICE VISIT (OUTPATIENT)
Dept: PODIATRY | Facility: CLINIC | Age: 51
End: 2021-11-29
Payer: OTHER GOVERNMENT

## 2021-11-29 VITALS
HEIGHT: 66 IN | DIASTOLIC BLOOD PRESSURE: 66 MMHG | SYSTOLIC BLOOD PRESSURE: 117 MMHG | HEART RATE: 88 BPM | BODY MASS INDEX: 33.09 KG/M2

## 2021-11-29 DIAGNOSIS — Z98.890 POSTOPERATIVE STATE: Primary | ICD-10-CM

## 2021-11-29 PROCEDURE — 99024 POSTOP FOLLOW-UP VISIT: CPT | Mod: ,,, | Performed by: PODIATRIST

## 2021-11-29 PROCEDURE — 99213 OFFICE O/P EST LOW 20 MIN: CPT | Mod: PBBFAC,PN | Performed by: PODIATRIST

## 2021-11-29 PROCEDURE — 99999 PR PBB SHADOW E&M-EST. PATIENT-LVL III: CPT | Mod: PBBFAC,,, | Performed by: PODIATRIST

## 2021-11-29 PROCEDURE — 99024 PR POST-OP FOLLOW-UP VISIT: ICD-10-PCS | Mod: ,,, | Performed by: PODIATRIST

## 2021-11-29 PROCEDURE — 99999 PR PBB SHADOW E&M-EST. PATIENT-LVL III: ICD-10-PCS | Mod: PBBFAC,,, | Performed by: PODIATRIST

## 2021-12-02 ENCOUNTER — PATIENT MESSAGE (OUTPATIENT)
Dept: PODIATRY | Facility: CLINIC | Age: 51
End: 2021-12-02
Payer: OTHER GOVERNMENT

## 2021-12-13 ENCOUNTER — OFFICE VISIT (OUTPATIENT)
Dept: PODIATRY | Facility: CLINIC | Age: 51
End: 2021-12-13
Payer: OTHER GOVERNMENT

## 2021-12-13 VITALS
DIASTOLIC BLOOD PRESSURE: 74 MMHG | WEIGHT: 205 LBS | BODY MASS INDEX: 32.95 KG/M2 | SYSTOLIC BLOOD PRESSURE: 125 MMHG | HEIGHT: 66 IN | HEART RATE: 82 BPM

## 2021-12-13 DIAGNOSIS — Z98.890 POSTOPERATIVE STATE: Primary | ICD-10-CM

## 2021-12-13 DIAGNOSIS — M62.81 MUSCLE WEAKNESS OF LOWER EXTREMITY: ICD-10-CM

## 2021-12-13 PROCEDURE — 99999 PR PBB SHADOW E&M-EST. PATIENT-LVL IV: CPT | Mod: PBBFAC,,, | Performed by: PODIATRIST

## 2021-12-13 PROCEDURE — 99999 PR PBB SHADOW E&M-EST. PATIENT-LVL IV: ICD-10-PCS | Mod: PBBFAC,,, | Performed by: PODIATRIST

## 2021-12-13 PROCEDURE — 99024 POSTOP FOLLOW-UP VISIT: CPT | Mod: ,,, | Performed by: PODIATRIST

## 2021-12-13 PROCEDURE — 99214 OFFICE O/P EST MOD 30 MIN: CPT | Mod: PBBFAC,PN | Performed by: PODIATRIST

## 2021-12-13 PROCEDURE — 99024 PR POST-OP FOLLOW-UP VISIT: ICD-10-PCS | Mod: ,,, | Performed by: PODIATRIST

## 2021-12-17 ENCOUNTER — TELEPHONE (OUTPATIENT)
Dept: PODIATRY | Facility: CLINIC | Age: 51
End: 2021-12-17
Payer: OTHER GOVERNMENT

## 2021-12-21 ENCOUNTER — CLINICAL SUPPORT (OUTPATIENT)
Dept: REHABILITATION | Facility: HOSPITAL | Age: 51
End: 2021-12-21
Attending: PODIATRIST
Payer: OTHER GOVERNMENT

## 2021-12-21 DIAGNOSIS — R26.9 GAIT ABNORMALITY: ICD-10-CM

## 2021-12-21 DIAGNOSIS — Z98.890 POSTOPERATIVE STATE: ICD-10-CM

## 2021-12-21 DIAGNOSIS — G89.29 CHRONIC PAIN OF LEFT ANKLE: Primary | ICD-10-CM

## 2021-12-21 DIAGNOSIS — M62.81 MUSCLE WEAKNESS OF LOWER EXTREMITY: ICD-10-CM

## 2021-12-21 DIAGNOSIS — M25.672 DECREASED RANGE OF MOTION OF LEFT ANKLE: ICD-10-CM

## 2021-12-21 DIAGNOSIS — M25.572 CHRONIC PAIN OF LEFT ANKLE: Primary | ICD-10-CM

## 2021-12-21 PROCEDURE — 97110 THERAPEUTIC EXERCISES: CPT | Mod: PO

## 2021-12-21 PROCEDURE — 97140 MANUAL THERAPY 1/> REGIONS: CPT | Mod: PO

## 2021-12-21 PROCEDURE — 97161 PT EVAL LOW COMPLEX 20 MIN: CPT | Mod: PO

## 2021-12-22 PROBLEM — R26.9 GAIT ABNORMALITY: Status: ACTIVE | Noted: 2021-12-22

## 2021-12-29 ENCOUNTER — CLINICAL SUPPORT (OUTPATIENT)
Dept: REHABILITATION | Facility: HOSPITAL | Age: 51
End: 2021-12-29
Payer: OTHER GOVERNMENT

## 2021-12-29 DIAGNOSIS — R26.9 GAIT ABNORMALITY: ICD-10-CM

## 2021-12-29 PROCEDURE — 97140 MANUAL THERAPY 1/> REGIONS: CPT | Mod: PO,CQ

## 2021-12-29 PROCEDURE — 97110 THERAPEUTIC EXERCISES: CPT | Mod: PO,CQ

## 2021-12-30 ENCOUNTER — DOCUMENTATION ONLY (OUTPATIENT)
Dept: ADMINISTRATIVE | Facility: HOSPITAL | Age: 51
End: 2021-12-30
Payer: OTHER GOVERNMENT

## 2022-01-06 ENCOUNTER — CLINICAL SUPPORT (OUTPATIENT)
Dept: REHABILITATION | Facility: HOSPITAL | Age: 52
End: 2022-01-06
Payer: OTHER GOVERNMENT

## 2022-01-06 DIAGNOSIS — R26.9 GAIT ABNORMALITY: ICD-10-CM

## 2022-01-06 DIAGNOSIS — M25.672 DECREASED RANGE OF MOTION OF LEFT ANKLE: Primary | ICD-10-CM

## 2022-01-06 PROCEDURE — 97116 GAIT TRAINING THERAPY: CPT | Mod: PO

## 2022-01-06 PROCEDURE — 97140 MANUAL THERAPY 1/> REGIONS: CPT | Mod: PO

## 2022-01-06 PROCEDURE — 97110 THERAPEUTIC EXERCISES: CPT | Mod: PO

## 2022-01-06 NOTE — PROGRESS NOTES
"[facility]   Physical Therapy Treatment Note     Name: Hillary Cotton  Clinic Number: [id number]    Therapy Diagnosis:   Encounter Diagnoses   Name Primary?    Gait abnormality     Decreased range of motion of left ankle Yes     Physician: Elías Aranda DPM    Visit Date: 1/6/2022    Physician Orders: PT Eval and Treat   Medical Diagnosis from Referral:   Z98.890 (ICD-10-CM) - Postoperative state   M62.81 (ICD-10-CM) - Muscle weakness of lower extremity      Evaluation Date: 12/21/2021  Authorization Period Expiration: 12/31/2021  Plan of Care Expiration: 2/21/2022  Progress Note Due: 1/21/2022  Visit # / Visits authorized: 2/ 20  FOTO: 2/5     Precautions: Standard     PTA Visit #: 1/5     Time In: 4:20 pm  Time Out: 5:10 pm  Total Billable Time: 50 minutes    SUBJECTIVE     Pt reports: that she felt some pain coming into therapy. Pt explains that the doctor states on 1/10/2022 she can start wearing a tennis shoe 30 minutes every day. Pt states that that she goes back to the office on 1/18/2022.  She was compliant with home exercise program.  Response to previous treatment: pretty rough  Functional change: single axillary crutch    Pain: 2/10  Location: left ankle    OBJECTIVE     Objective Measures updated at progress report unless specified.     Treatment     Hillary received the treatments listed below:      therapeutic exercises to develop strength and ROM for 25 minutes including:        Date 1/6/2022 12/29/2021 12/21/2021   Visit 2/20 1/20 1/1   POC exp 2/21/2022 2/21/2022 2/21/2022   PN 1/21/2022 1/21/2022 1/21/2022   FTF 1/21/2022 1/21/2022 1/21/2022   FOTO 2/5 1/5 0/5           Bike/NuStep        HS stretch 3 x 30" B (long sitting) 3 x 20" B      gastroc stretch 2 x 30" B      Ankle tband   4 directions 2 x 10 ea w YTB 20x ea      BAPS (seated)       Toe yoga      [place]       Towel crunch 30x  15x   Towel inv/ev   15x   Ankle AROM        dowel roll   2 " min      Heel raise       BAPS       SLS       Seen by EG EM EG      manual therapy techniques: Joint mobilizations and Soft tissue Mobilization were applied to the: L ankle for 15 minutes, including:  - Medial to Lateral calcaneal mobilizations   - AP and PA talocrural mobilization  - AP and PA metatarsal mobilizations   - manual drainage   - scar tissue massage   - PROM all directions      gait training for 10 minutes:  - ambulation with single crutch used on R  - ambulation with proper gait pattern ascending and descending steps     PATIENT EDUCATION AND HOME EXERCISES      Education provided:   - importance of consistent performance of HEP     Written Home Exercises Provided: yes. Exercises were reviewed and Hillary was able to demonstrate them prior to the end of the session.  Hillary demonstrated good  understanding of the education provided. See EMR under Patient Instructions for exercises provided during therapy sessions.      ASSESSMENT     Patient presented to session with mild complaints ankle soreness right over the surgery site. Pt was able to perform prescribed activities with minimal VCs as well as minimal pain provocation. She has noticeably improved L ankle ROM after completion of MT with scar tissue massage and PROM in all directions. Pt explains that she was trying to figure out how to ascend and descend stairs to get into and out of her RV with her crutch, with PT providing instruction on how to properly do this in order to maintain balance and provide support to LLE as it heals.     Hillary Is progressing well towards her goals.   Pt prognosis is Good.     Pt will continue to benefit from skilled outpatient physical therapy to address the deficits listed in the problem list box on initial evaluation, provide pt/family education and to maximize pt's level of independence in the home and community environment.     Pt's spiritual, cultural and educational needs considered and pt agreeable to plan  of care and goals.     Anticipated barriers to physical therapy: none    Goals:  Short Term Goals: 4 weeks   1. This patient will be independent with a basic HEP. In progress, not met  2. This patient will increase B LE strength by 1 grade in order to be able to walk with a normalized gait pattern with or without AD. In progress, not met  3. This patient will have a pain rating of 5/10 at worst with ADLs. In progress, not met     Long Term Goals 8 weeks   1. This patient will be independent with an updated HEP. In progress, not met  2. This patient will ambulate without AD in order to be able to perform usual household duties with no complaints of pain. In progress, not met  3. This patient will have a pain rating of 1/10 at worst with ADLs. In progress, not met  4. This patient with have at least 5 degrees of active DF in order to be able to ambulate without gait abnormalities. In progress, not met    PLAN     Follow up on patient response to exercises. Toe yoga and marbles added for next visit.    Magda Barksdale, PT

## 2022-01-11 ENCOUNTER — CLINICAL SUPPORT (OUTPATIENT)
Dept: REHABILITATION | Facility: HOSPITAL | Age: 52
End: 2022-01-11
Payer: OTHER GOVERNMENT

## 2022-01-11 DIAGNOSIS — R26.9 GAIT ABNORMALITY: ICD-10-CM

## 2022-01-11 PROCEDURE — 97140 MANUAL THERAPY 1/> REGIONS: CPT | Mod: PO,CQ

## 2022-01-11 PROCEDURE — 97110 THERAPEUTIC EXERCISES: CPT | Mod: PO,CQ

## 2022-01-11 NOTE — PROGRESS NOTES
"[facility]   Physical Therapy Treatment Note     Name: Hillary Cotton  Clinic Number: [id number]    Therapy Diagnosis:   Encounter Diagnosis   Name Primary?    Gait abnormality      Physician: Elías Aranda DPM    Visit Date: 1/11/2022    Physician Orders: PT Eval and Treat   Medical Diagnosis from Referral:   Z98.890 (ICD-10-CM) - Postoperative state   M62.81 (ICD-10-CM) - Muscle weakness of lower extremity      Evaluation Date: 12/21/2021  Authorization Period Expiration: 12/31/2021  Plan of Care Expiration: 2/21/2022  Progress Note Due: 1/21/2022  Visit # / Visits authorized: 2/ 20  FOTO: 2/5     Precautions: Standard     PTA Visit #: 1/5     Time In: 4:20 pm  Time Out: 5:10 pm  Total Billable Time: 50 minutes    SUBJECTIVE     Pt reports: feels pretty good just sore   She was compliant with home exercise program.  Response to previous treatment: pretty rough  Functional change: single axillary crutch    Pain: 2/10  Location: left ankle    OBJECTIVE     Objective Measures updated at progress report unless specified.     Treatment     Hillary received the treatments listed below:      therapeutic exercises to develop strength and ROM for 35 minutes including:        Date 1/11/2022 1/6/2022 12/29/2021 12/21/2021   Visit 3/20 2/20 1/20 1/1   POC exp 2/21/2022 2/21/2022 2/21/2022 2/21/2022   PN 1/21/2022 1/21/2022 1/21/2022 1/21/2022   FTF 1/21/2022 1/21/2022 1/21/2022 1/21/2022   FOTO 3/5 2/5 1/5 0/5            Bike/NuStep         HS stretch 3 x 30" B   (long sitting)  3 x 30" B (long sitting) 3 x 20" B      gastroc stretch 2 x 30" B 2 x 30" B      Ankle tband   4 directions 2 x 10 ea   w YTB 2 x 10 ea w YTB 20x ea      BAPS (seated)        Toe yoga 2 x 10   Big toe & little toes       [place]   1 min       Towel crunch 20x  30x  15x   Towel inv/ev    15x   Ankle AROM        Wt shifts  Fwd/lateral  Side to side   1 min ea       dowel roll 2 min    2 min      Heel " "raise/toe raises Sitting   1 min        BAPS        SLS        Seen by EM EG EM EG      manual therapy techniques: Joint mobilizations and Soft tissue Mobilization were applied to the: L ankle for 10 minutes, including:  - Medial to Lateral calcaneal mobilizations   - AP and PA talocrural mobilization  - AP and PA metatarsal mobilizations   - manual drainage   - scar tissue massage   - PROM all directions      gait training for 0 minutes:  - ambulation with single crutch used on R  - ambulation with proper gait pattern ascending and descending steps     PATIENT EDUCATION AND HOME EXERCISES      Education provided:   - importance of consistent performance of HEP     Written Home Exercises Provided: yes. Exercises were reviewed and Hillary was able to demonstrate them prior to the end of the session.  Hillary demonstrated good  understanding of the education provided. See EMR under Patient Instructions for exercises provided during therapy sessions.      ASSESSMENT     Patient presented to session with mild complaints ankle soreness right over the surgery site. Patient was able to progress through exercises with increased control over AROM as noted by patient and PTA. Patient was able to progress to standing exercises with no LOB although patient did note she felt "wobbly" and was leaning away from affected side. Patient showed good control with toe flexors and extensors, patient will continue to work on standing without the boot. Patient ended session stating she felt fine.     Hillary Is progressing well towards her goals.   Pt prognosis is Good.     Pt will continue to benefit from skilled outpatient physical therapy to address the deficits listed in the problem list box on initial evaluation, provide pt/family education and to maximize pt's level of independence in the home and community environment.     Pt's spiritual, cultural and educational needs considered and pt agreeable to plan of care and goals.   "   Anticipated barriers to physical therapy: none    Goals:  Short Term Goals: 4 weeks   1. This patient will be independent with a basic HEP. In progress, not met  2. This patient will increase B LE strength by 1 grade in order to be able to walk with a normalized gait pattern with or without AD. In progress, not met  3. This patient will have a pain rating of 5/10 at worst with ADLs. In progress, not met     Long Term Goals 8 weeks   1. This patient will be independent with an updated HEP. In progress, not met  2. This patient will ambulate without AD in order to be able to perform usual household duties with no complaints of pain. In progress, not met  3. This patient will have a pain rating of 1/10 at worst with ADLs. In progress, not met  4. This patient with have at least 5 degrees of active DF in order to be able to ambulate without gait abnormalities. In progress, not met    PLAN     Follow up on patient response to exercises.     Ryan Degroot, PTA

## 2022-01-13 ENCOUNTER — CLINICAL SUPPORT (OUTPATIENT)
Dept: REHABILITATION | Facility: HOSPITAL | Age: 52
End: 2022-01-13
Payer: OTHER GOVERNMENT

## 2022-01-13 DIAGNOSIS — M25.672 DECREASED RANGE OF MOTION OF LEFT ANKLE: Primary | ICD-10-CM

## 2022-01-13 DIAGNOSIS — R26.9 GAIT ABNORMALITY: ICD-10-CM

## 2022-01-13 PROCEDURE — 97110 THERAPEUTIC EXERCISES: CPT | Mod: PO

## 2022-01-13 NOTE — PROGRESS NOTES
"[facility]   Physical Therapy Treatment Note     Name: Hillary Cotton  Clinic Number: [id number]    Therapy Diagnosis:   Encounter Diagnoses   Name Primary?    Gait abnormality     Decreased range of motion of left ankle Yes     Physician: Elías Aranda DPM    Visit Date: 1/13/2022    Physician Orders: PT Eval and Treat   Medical Diagnosis from Referral:   Z98.890 (ICD-10-CM) - Postoperative state   M62.81 (ICD-10-CM) - Muscle weakness of lower extremity      Evaluation Date: 12/21/2021  Authorization Period Expiration: 12/31/2021  Plan of Care Expiration: 2/21/2022  Progress Note Due: 1/21/2022  Visit # / Visits authorized: 4/ 20  FOTO: 4/5     Precautions: Standard     PTA Visit #: 1/5     Time In: 4:15 pm  Time Out: 5:00 pm  Total Billable Time: 45 minutes    SUBJECTIVE     Pt reports: that she feeling okay initially right after therapy the last time after she iced her ankle. She states the next day she was in a lot of pain, and she is currently still feeling a bit of pain. Yesterday her pain level was at about a 5/10 and she could not walk around without the crutch.  She was compliant with home exercise program.  Response to previous treatment: she felt a lot more pain  Functional change: single axillary crutch, no boot replaced with tennis shoe    Pain: 4/10  Location: left ankle    OBJECTIVE     Objective Measures updated at progress report unless specified.     Treatment     Hillary received the treatments listed below:      therapeutic exercises to develop strength and ROM for 45 minutes including:        Date 1/13/2022 1/11/2022 1/6/2022 12/29/2021 12/21/2021   Visit 4/20 3/20 2/20 1/20 1/1   POC exp 2/21/2022 2/21/2022 2/21/2022 2/21/2022 2/21/2022   PN 1/21/2022 1/21/2022 1/21/2022 1/21/2022 1/21/2022   FTF 1/21/2022 1/21/2022 1/21/2022 1/21/2022 1/21/2022   FOTO 4/5 3/5 2/5 1/5 0/5             Bike/NuStep          HS stretch 3 x 30" B  (long sitting) 3 x 30" B " "  (long sitting)  3 x 30" B (long sitting) 3 x 20" B      gastroc stretch  2 x 30" B 2 x 30" B      Ankle tband   4 directions 2 x 10 ea w YTB 2 x 10 ea   w YTB 2 x 10 ea w YTB 20x ea      BAPS (seated)         Toe yoga 2 x 10 Big toes and little toes 2 x 10   Big toe & little toes       [place]    1 min       Towel crunch 20x 20x  30x  15x   Towel inv/ev     15x   Ankle AROM         Wt shifts  2' lateral  1' ea fwd/bwd Fwd/lateral  Side to side   1 min ea       dowel roll  2 min    2 min      Heel raise/toe raises 15x Sitting   1 min        BAPS         SLS         Seen by EG EM EG EM EG      manual therapy techniques: Joint mobilizations and Soft tissue Mobilization were applied to the: L ankle for 0 minutes, including:  - Medial to Lateral calcaneal mobilizations   - AP and PA talocrural mobilization  - AP and PA metatarsal mobilizations   - manual drainage   - scar tissue massage   - PROM all directions      gait training for 0 minutes:  - ambulation with single crutch used on R  - ambulation with proper gait pattern ascending and descending steps     PATIENT EDUCATION AND HOME EXERCISES      Education provided:   - importance of consistent performance of HEP     Written Home Exercises Provided: yes. Exercises were reviewed and Hillary was able to demonstrate them prior to the end of the session.  Hillary demonstrated good  understanding of the education provided. See EMR under Patient Instructions for exercises provided during therapy sessions.      ASSESSMENT     Patient presented to session with moderate complaints of ankle soreness right over the surgery site. While receiving MT she states she can not feel pressure the same on her L foot around surgical site as she can feel on her R foot. She is continuing to wear her shoe at this session and states she feels more comfortable wearing her shoe than wearing the boot. Pt ambulates out of therapy clinic at completion of session without boot using her shoe " instead. Pt did not ask her doctor about whether it was okay to only wear her regular tennis shoe instead of the boot, and has made this change independently, however pt is not experiencing increased pain and soreness following this change. Pt is able to perform prescribed activities with minimal to no pain provocation as well as minimal to no VCs and TCs for postural awareness and appropriate body mechanics.       Hillary Is progressing well towards her goals.   Pt prognosis is Good.     Pt will continue to benefit from skilled outpatient physical therapy to address the deficits listed in the problem list box on initial evaluation, provide pt/family education and to maximize pt's level of independence in the home and community environment.     Pt's spiritual, cultural and educational needs considered and pt agreeable to plan of care and goals.     Anticipated barriers to physical therapy: none    Goals:  Short Term Goals: 4 weeks   1. This patient will be independent with a basic HEP. In progress, not met  2. This patient will increase B LE strength by 1 grade in order to be able to walk with a normalized gait pattern with or without AD. In progress, not met  3. This patient will have a pain rating of 5/10 at worst with ADLs. In progress, not met     Long Term Goals 8 weeks   1. This patient will be independent with an updated HEP. In progress, not met  2. This patient will ambulate without AD in order to be able to perform usual household duties with no complaints of pain. In progress, not met  3. This patient will have a pain rating of 1/10 at worst with ADLs. In progress, not met  4. This patient with have at least 5 degrees of active DF in order to be able to ambulate without gait abnormalities. In progress, not met    PLAN     Continue to adjust pt activities based on tolerance.     Magda Barksdale, PT

## 2022-01-18 ENCOUNTER — CLINICAL SUPPORT (OUTPATIENT)
Dept: REHABILITATION | Facility: HOSPITAL | Age: 52
End: 2022-01-18
Payer: OTHER GOVERNMENT

## 2022-01-18 DIAGNOSIS — M25.672 DECREASED RANGE OF MOTION OF LEFT ANKLE: ICD-10-CM

## 2022-01-18 DIAGNOSIS — G89.29 CHRONIC PAIN OF LEFT ANKLE: Primary | ICD-10-CM

## 2022-01-18 DIAGNOSIS — R26.9 GAIT ABNORMALITY: ICD-10-CM

## 2022-01-18 DIAGNOSIS — M25.572 CHRONIC PAIN OF LEFT ANKLE: Primary | ICD-10-CM

## 2022-01-18 PROCEDURE — 97110 THERAPEUTIC EXERCISES: CPT | Mod: PO

## 2022-01-18 NOTE — PROGRESS NOTES
[facility]   Physical Therapy Treatment Note     Name: Hillary Cotton  Clinic Number: [id number]    Therapy Diagnosis:   Encounter Diagnoses   Name Primary?    Gait abnormality     Chronic pain of left ankle Yes    Decreased range of motion of left ankle      Physician: Elías Aranda DPM    Visit Date: 1/18/2022    Physician Orders: PT Eval and Treat   Medical Diagnosis from Referral:   Z98.890 (ICD-10-CM) - Postoperative state   M62.81 (ICD-10-CM) - Muscle weakness of lower extremity      Evaluation Date: 12/21/2021  Authorization Period Expiration: 12/31/2021  Plan of Care Expiration: 2/21/2022  Progress Note Due: 1/21/2022  Visit # / Visits authorized: 5/ 20  FOTO: 5/5     Precautions: Standard     PTA Visit #: 1/5     Time In: 4:30 pm  Time Out: 5:15 pm  Total Billable Time: 45 minutes    SUBJECTIVE     Pt reports: that today was her first day back at work and she got caught in traffic on her way to therapy. Pt states that she has been walking around with her shoe instead of the boot since her shoe feels more comfortable. Pt states she sits down and takes breaks as she needs them and does not use the crutch when she is walking short distances or around her home.   She was compliant with home exercise program.  Response to previous treatment: not bad at all  Functional change: wearing a tennis shoe while using single axillary crutch    Pain: 1/10  Location: left ankle    OBJECTIVE     Objective Measures updated at progress report unless specified.     Treatment     Hillary received the treatments listed below:      therapeutic exercises to develop strength and ROM for 40 minutes including:        Date 1/18/2022 1/13/2022 1/11/2022 1/6/2022 12/29/2021 12/21/2021   Visit 5/20 4/20 3/20 2/20 1/20 1/1   POC exp 2/21/2022 2/21/2022 2/21/2022 2/21/2022 2/21/2022 2/21/2022   PN 1/21/2022 1/21/2022 1/21/2022 1/21/2022 1/21/2022 1/21/2022   FTF 1/21/20222022 1/21/2022 1/21/2022  "1/21/2022 1/21/2022 1/21/2022   FOTO 5/5 4/5 3/5 2/5 1/5 0/5              Bike/NuStep 5' at L2          HS stretch 3 x 30" B (long sitting) 3 x 30" B  (long sitting) 3 x 30" B   (long sitting)  3 x 30" B (long sitting) 3 x 20" B      gastroc stretch 2 x 30" B  2 x 30" B 2 x 30" B      Ankle tband   4 directions  2 x 10 ea w YTB 2 x 10 ea   w YTB 2 x 10 ea w YTB 20x ea      BAPS (seated)          Toe yoga  2 x 10 Big toes and little toes 2 x 10   Big toe & little toes       [place]     1 min       Towel crunch  20x 20x  30x  15x   Towel inv/ev      15x   Ankle AROM          Marching         Wt shifts  2' lateral   1' ea fwd/bwd 2' lateral  1' ea fwd/bwd Fwd/lateral  Side to side   1 min ea       dowel roll   2 min    2 min      Heel raise/toe raises  15x Sitting   1 min        BAPS          SLS          Seen by EG EG EM EG EM EG      manual therapy techniques: Joint mobilizations and Soft tissue Mobilization were applied to the: L ankle for 5 minutes, including:  - Medial to Lateral calcaneal mobilizations   - AP and PA talocrural mobilization  - AP and PA metatarsal mobilizations   - manual drainage   - scar tissue massage   - PROM all directions      gait training for 0 minutes:  - ambulation with single crutch used on R  - ambulation with proper gait pattern ascending and descending steps     PATIENT EDUCATION AND HOME EXERCISES      Education provided:   - importance of consistent performance of HEP     Written Home Exercises Provided: yes. Exercises were reviewed and Hillary was able to demonstrate them prior to the end of the session.  Hillary demonstrated good  understanding of the education provided. See EMR under Patient Instructions for exercises provided during therapy sessions.      ASSESSMENT     Patient presented to session with mild complaints of ankle soreness right over the surgery site. She is able to perform prescribed activities at this session with minimal complaints of pain provocation. " She requires minimal to no VCs and TCs for proper completion of activities with appropriate body mechanics and for postural awareness. Pt continues to wear her shoe to therapy and does not display or express increased pain during session. She states she continues to wear shoe at home and continues to feel more comfortable with the shoe instead of the boot.    Hillary Is progressing well towards her goals.   Pt prognosis is Good.     Pt will continue to benefit from skilled outpatient physical therapy to address the deficits listed in the problem list box on initial evaluation, provide pt/family education and to maximize pt's level of independence in the home and community environment.     Pt's spiritual, cultural and educational needs considered and pt agreeable to plan of care and goals.     Anticipated barriers to physical therapy: none    Goals:  Short Term Goals: 4 weeks   1. This patient will be independent with a basic HEP. In progress, not met  2. This patient will increase B LE strength by 1 grade in order to be able to walk with a normalized gait pattern with or without AD. In progress, not met  3. This patient will have a pain rating of 5/10 at worst with ADLs. In progress, not met     Long Term Goals 8 weeks   1. This patient will be independent with an updated HEP. In progress, not met  2. This patient will ambulate without AD in order to be able to perform usual household duties with no complaints of pain. In progress, not met  3. This patient will have a pain rating of 1/10 at worst with ADLs. In progress, not met  4. This patient with have at least 5 degrees of active DF in order to be able to ambulate without gait abnormalities. In progress, not met    PLAN     Increase performance of standing activities to include SLS and marching.     Magda Barksdale, PT

## 2022-01-26 PROBLEM — M25.672 DECREASED RANGE OF MOTION OF LEFT ANKLE: Status: RESOLVED | Noted: 2019-12-19 | Resolved: 2022-01-26

## 2022-01-26 PROBLEM — D17.24 BENIGN LIPOMATOUS NEOPLASM OF SKIN AND SUBCUTANEOUS TISSUE OF LEFT LEG: Status: RESOLVED | Noted: 2021-11-11 | Resolved: 2022-01-26

## 2022-01-26 PROBLEM — D16.9 CHONDROMA: Status: RESOLVED | Noted: 2021-11-11 | Resolved: 2022-01-26

## 2022-01-26 PROBLEM — M25.572 LEFT ANKLE PAIN: Status: RESOLVED | Noted: 2021-11-11 | Resolved: 2022-01-26

## 2022-02-01 ENCOUNTER — CLINICAL SUPPORT (OUTPATIENT)
Dept: REHABILITATION | Facility: HOSPITAL | Age: 52
End: 2022-02-01
Payer: OTHER GOVERNMENT

## 2022-02-01 DIAGNOSIS — R26.9 GAIT ABNORMALITY: ICD-10-CM

## 2022-02-01 PROCEDURE — 97110 THERAPEUTIC EXERCISES: CPT | Mod: PO

## 2022-02-01 PROCEDURE — 97140 MANUAL THERAPY 1/> REGIONS: CPT | Mod: PO

## 2022-02-01 NOTE — PROGRESS NOTES
[facility]   Physical Therapy Treatment Note     Name: Hillary Cotton  Clinic Number: [id number]    Therapy Diagnosis:   Encounter Diagnosis   Name Primary?    Gait abnormality      Physician: Elías Aranda DPM    Visit Date: 2/1/2022    Physician Orders: PT Eval and Treat   Medical Diagnosis from Referral:   Z98.890 (ICD-10-CM) - Postoperative state   M62.81 (ICD-10-CM) - Muscle weakness of lower extremity      Evaluation Date: 12/21/2021  Authorization Period Expiration: 12/31/2021  Plan of Care Expiration: 2/21/2022  Progress Note Due: 1/21/2022  Visit # / Visits authorized: 6/ 20  FOTO: 5/5     Precautions: Standard     PTA Visit #: 1/5     Time In: 4:15 pm  Time Out: 5:29 pm  Total Billable Time: 44 minutes    SUBJECTIVE     Pt reports: that she was walking and packing her vehicle without crutches all day on Friday 1/28/2022. Pt states she was a little sore on Saturday and she needed the crutch. Sunday she could not really walk. Pt states that Saturday she went dancing with the crutch.  She was compliant with home exercise program.  Response to previous treatment: fine  Functional change: wearing a tennis shoe while using single axillary crutch, pt has been doing some ambulation without the crutch (2/1/2022)    Pain: 2/10  Location: left ankle    OBJECTIVE     Objective Measures updated at progress report unless specified.     Treatment     Hillary received the treatments listed below:      therapeutic exercises to develop strength and ROM for 36 minutes including:        Date 2/1/2022 1/18/2022 1/13/2022 1/11/2022 1/6/2022 12/29/2021 12/21/2021   Visit 6/20 5/20 4/20 3/20 2/20 1/20 1/1   POC exp 2/21/2022 2/21/2022 2/21/2022 2/21/2022 2/21/2022 2/21/2022 2/21/2022   PN 1/21/2022 1/21/2022 1/21/2022 1/21/2022 1/21/2022 1/21/2022 1/21/2022   FTF 1/21/2022 1/21/2022 1/21/2022 1/21/2022 1/21/2022 1/21/2022 1/21/2022   FOTO  5/5 4/5 3/5 2/5 1/5 0/5              "  Bike/NuStep 7' at L2 5' at L2          HS stretch  3 x 30" B (long sitting) 3 x 30" B  (long sitting) 3 x 30" B   (long sitting)  3 x 30" B (long sitting) 3 x 20" B      gastroc stretch  2 x 30" B  2 x 30" B 2 x 30" B      Ankle pumps 20x         Ankle tband   4 directions 2 x 10 ea  w YTB  2 x 10 ea w YTB 2 x 10 ea   w YTB 2 x 10 ea w YTB 20x ea      BAPS (seated)           Toe yoga   2 x 10 Big toes and little toes 2 x 10   Big toe & little toes       [place]      1 min       Towel crunch 20x  20x 20x  30x  15x   Towel inv/ev       15x   Ankle AROM           Marching          Wt shifts   2' lateral   1' ea fwd/bwd 2' lateral  1' ea fwd/bwd Fwd/lateral  Side to side   1 min ea       dowel roll    2 min    2 min      Heel raise/toe raises   15x Sitting   1 min        BAPS           SLS           Seen by EG EG EG EM EG EM EG      manual therapy techniques: Joint mobilizations and Soft tissue Mobilization were applied to the: L ankle for 8 minutes, including:  - Medial to Lateral calcaneal mobilizations   - AP and PA talocrural mobilization  - AP and PA metatarsal mobilizations   - manual drainage   - scar tissue massage   - PROM all directions      gait training for 0 minutes:  - ambulation with single crutch used on R  - ambulation with proper gait pattern ascending and descending steps     PATIENT EDUCATION AND HOME EXERCISES      Education provided:   - importance of consistent performance of HEP     Written Home Exercises Provided: yes. Exercises were reviewed and Hillary was able to demonstrate them prior to the end of the session.  Hillary demonstrated good  understanding of the education provided. See EMR under Patient Instructions for exercises provided during therapy sessions.      ASSESSMENT     Hillary presents to therapy with mild pain levels in her L ankle, and continuing to make progress. She ambulates into therapy with a single axillary crutch and a tennis shoe on her L foot. She has improved " ambulation and states she has been ambulating without crutches. She states that she believes she over did it over the weekend, and that is why she is using the crutch again. Pt has not notified her doctor that she has completely switched over to wearing her tennis shoes on her LLE. Pt is able to tolerate all prescribed activities well without pain provocation. MT appears to have reduced edema formation in L ankle and foot. Pt completed multiple therex with L ankle elevated, including ankle pumps, toe crunches, and toe yoga. She is also able to ambulate around for 60ft without crutch correcting ambulation to perform proper weight shifting based feedback and cues provided by PT at a previous therapy session. She completes session without pain provocation and is able to perform all prescribed activities. Additional short term goal added at this session.    Hillary Is progressing well towards her goals.   Pt prognosis is Good.     Pt will continue to benefit from skilled outpatient physical therapy to address the deficits listed in the problem list box on initial evaluation, provide pt/family education and to maximize pt's level of independence in the home and community environment.     Pt's spiritual, cultural and educational needs considered and pt agreeable to plan of care and goals.     Anticipated barriers to physical therapy: none    Goals:  Short Term Goals: 4 weeks   1. This patient will be independent with a basic HEP. In progress, not met  2. This patient will increase B LE strength by 1 grade in order to be able to walk with a normalized gait pattern with or without AD. In progress, not met  3. This patient will have a pain rating of 5/10 at worst with ADLs. In progress, not met  4. This patient will be able to tolerate FWB on her LLE with appropriate weight shifting to maintain balance on a stable surface so that she will be able to get on a float to participate in a [employer] parade at the end of February.  In progress, not met     Long Term Goals 8 weeks   1. This patient will be independent with an updated HEP. In progress, not met  2. This patient will ambulate without AD in order to be able to perform usual household duties with no complaints of pain. In progress, not met  3. This patient will have a pain rating of 1/10 at worst with ADLs. In progress, not met  4. This patient with have at least 5 degrees of active DF in order to be able to ambulate without gait abnormalities. In progress, not met    PLAN     Increase performance of standing activities to include SLS and marching.     Magda Barksdale, PT

## 2022-02-08 ENCOUNTER — DOCUMENTATION ONLY (OUTPATIENT)
Dept: REHABILITATION | Facility: HOSPITAL | Age: 52
End: 2022-02-08
Payer: OTHER GOVERNMENT

## 2022-02-08 ENCOUNTER — CLINICAL SUPPORT (OUTPATIENT)
Dept: REHABILITATION | Facility: HOSPITAL | Age: 52
End: 2022-02-08
Payer: OTHER GOVERNMENT

## 2022-02-08 DIAGNOSIS — R26.9 GAIT ABNORMALITY: ICD-10-CM

## 2022-02-08 PROCEDURE — 97110 THERAPEUTIC EXERCISES: CPT | Mod: PO

## 2022-02-08 NOTE — PROGRESS NOTES
[facility]   Physical Therapy Treatment Note     Name: Hillary Cotton  Clinic Number: [id number]    Therapy Diagnosis:   Encounter Diagnosis   Name Primary?    Gait abnormality      Physician: Elías Aranda DPM    Visit Date: 2/8/2022    Physician Orders: PT Eval and Treat   Medical Diagnosis from Referral:   Z98.890 (ICD-10-CM) - Postoperative state   M62.81 (ICD-10-CM) - Muscle weakness of lower extremity      Evaluation Date: 12/21/2021  Authorization Period Expiration: 12/31/2021  Plan of Care Expiration: 2/21/2022  Progress Note Due: 1/21/2022  Visit # / Visits authorized: 7/ 20  FOTO: 5/5     Precautions: Standard     PTA Visit #: 1/5     Time In: 4:27 pm  Time Out: 5:00 pm  Total Billable Time: 33 minutes    SUBJECTIVE     Pt reports: that she had a lot of time getting around last week and had a lot of pain last Thursday, possibly because it has been cold outside.  She was compliant with home exercise program.  Response to previous treatment: fine  Functional change: ambulating without her crutch more for short distance and only using crutch when she has to walk longer distance. Pt continues to only wear tennis shoes    Pain: 1/10  Location: left ankle    OBJECTIVE     Objective Measures updated at progress report unless specified.     Treatment     Hillary received the treatments listed below:      therapeutic exercises to develop strength and ROM for 33 minutes including:        Date 2/8/2022 2/1/2022 1/18/2022 1/13/2022 1/11/2022 1/6/2022 12/29/2021 12/21/2021   Visit 7/20 6/20 5/20 4/20 3/20 2/20 1/20 1/1   POC exp 2/21/2022 2/21/2022 2/21/2022 2/21/2022 2/21/2022 2/21/2022 2/21/2022 2/21/2022   PN 3/8/2022 1/21/2022 1/21/2022 1/21/2022 1/21/2022 1/21/2022 1/21/2022 1/21/2022   FTF 3/8/2022 1/21/2022 1/21/2022 1/21/2022 1/21/2022 1/21/2022 1/21/2022 1/21/2022   FOTO   5/5 4/5 3/5 2/5 1/5 0/5                Bike/NuStep 5' at L2 7' at L2 5' at L2          HS  "stretch   3 x 30" B (long sitting) 3 x 30" B  (long sitting) 3 x 30" B   (long sitting)  3 x 30" B (long sitting) 3 x 20" B      gastroc stretch 3 x 30" B  2 x 30" B  2 x 30" B 2 x 30" B      Ankle pumps  20x         Ankle tband   4 directions 2 x 10 ea w YTB 2 x 10 ea  w YTB  2 x 10 ea w YTB 2 x 10 ea   w YTB 2 x 10 ea w YTB 20x ea      BAPS (seated)            Toe yoga    2 x 10 Big toes and little toes 2 x 10   Big toe & little toes       [place]       1 min       Towel crunch  20x  20x 20x  30x  15x   Towel inv/ev        15x   Ankle AROM            Standing:           Marching 2 x 10          Wt shifts    2' lateral   1' ea fwd/bwd 2' lateral  1' ea fwd/bwd Fwd/lateral  Side to side   1 min ea       dowel roll     2 min    2 min      Heel raise/toe raises 2 x 10 ea   15x Sitting   1 min        BAPS            SLS            Seen by EG EG EG EG EM EG EM EG      manual therapy techniques: Joint mobilizations and Soft tissue Mobilization were applied to the: L ankle for 0 minutes, including:  - Medial to Lateral calcaneal mobilizations   - AP and PA talocrural mobilization  - AP and PA metatarsal mobilizations   - manual drainage   - scar tissue massage   - PROM all directions      gait training for 0 minutes:  - ambulation with single crutch used on R  - ambulation with proper gait pattern ascending and descending steps     PATIENT EDUCATION AND HOME EXERCISES      Education provided:   - importance of consistent performance of HEP     Written Home Exercises Provided: yes. Exercises were reviewed and Hillary was able to demonstrate them prior to the end of the session.  Hillary demonstrated good  understanding of the education provided. See EMR under Patient Instructions for exercises provided during therapy sessions.      ASSESSMENT     Hillary presents to therapy 13 minutes late with minimal pain levels in her L ankle, using a single axillary crutch today only because she stated that she over did it last " week and just wants to be careful. PT explains to pt that her ambulation has improved significantly, and pt should ambulated around the clinic without any AD. At completion of session PT recommends that pt no longer bring her crutch to therapy to continue to progress pt back to ambulation without any AD. Pt is able to progress through prescribed activities with good tolerance, without any complaints of pain provocation.    Hillary Is progressing well towards her goals.   Pt prognosis is Good.     Pt will continue to benefit from skilled outpatient physical therapy to address the deficits listed in the problem list box on initial evaluation, provide pt/family education and to maximize pt's level of independence in the home and community environment.     Pt's spiritual, cultural and educational needs considered and pt agreeable to plan of care and goals.     Anticipated barriers to physical therapy: none    Goals:  Short Term Goals: 4 weeks   1. This patient will be independent with a basic HEP. In progress, not met  2. This patient will increase B LE strength by 1 grade in order to be able to walk with a normalized gait pattern with or without AD. In progress, not met  3. This patient will have a pain rating of 5/10 at worst with ADLs. In progress, not met  4. This patient will be able to tolerate FWB on her LLE with appropriate weight shifting to maintain balance on a stable surface so that she will be able to get on a float to participate in a [place] parade at the end of February. In progress, not met     Long Term Goals 8 weeks   1. This patient will be independent with an updated HEP. In progress, not met  2. This patient will ambulate without AD in order to be able to perform usual household duties with no complaints of pain. In progress, not met  3. This patient will have a pain rating of 1/10 at worst with ADLs. In progress, not met  4. This patient with have at least 5 degrees of active DF in order to be  able to ambulate without gait abnormalities. In progress, not met    PLAN     Increase performance of standing activities to include SLS and marching.     Magda Barksdale, PT

## 2022-02-08 NOTE — PROGRESS NOTES
PT/PTA met face to face to discuss pt's treatment plan and progress towards established goals. Pt will be seen by a physical therapist minimally every 6th visit or every 30 days.    Magda Barksdale PT    Face to Face PTA Conference performed with Magda Barksdale PT regarding patient's current status, overall progress, and plan of care. Pt will be seen by a physical therapist minimally every 6th visit or every 30 days.    Ryan Degroot, ROBYN   2/8/2022

## 2022-02-14 ENCOUNTER — HOSPITAL ENCOUNTER (OUTPATIENT)
Dept: RADIOLOGY | Facility: HOSPITAL | Age: 52
Discharge: HOME | End: 2022-02-14
Attending: PODIATRIST
Payer: OTHER GOVERNMENT

## 2022-02-14 ENCOUNTER — OFFICE VISIT (OUTPATIENT)
Dept: PODIATRY | Facility: CLINIC | Age: 52
End: 2022-02-14
Payer: OTHER GOVERNMENT

## 2022-02-14 VITALS
BODY MASS INDEX: 32.95 KG/M2 | HEIGHT: 66 IN | HEART RATE: 72 BPM | SYSTOLIC BLOOD PRESSURE: 123 MMHG | WEIGHT: 205 LBS | DIASTOLIC BLOOD PRESSURE: 83 MMHG

## 2022-02-14 DIAGNOSIS — Z98.890 POSTOPERATIVE STATE: ICD-10-CM

## 2022-02-14 DIAGNOSIS — M62.81 MUSCLE WEAKNESS OF LOWER EXTREMITY: ICD-10-CM

## 2022-02-14 DIAGNOSIS — Z98.890 POSTOPERATIVE STATE: Primary | ICD-10-CM

## 2022-02-14 PROCEDURE — 73630 XR FOOT COMPLETE 3 VIEW LEFT: ICD-10-PCS | Mod: 26,LT,, | Performed by: RADIOLOGY

## 2022-02-14 PROCEDURE — 99024 POSTOP FOLLOW-UP VISIT: CPT | Mod: ,,, | Performed by: PODIATRIST

## 2022-02-14 PROCEDURE — 99213 OFFICE O/P EST LOW 20 MIN: CPT | Mod: PBBFAC,PN | Performed by: PODIATRIST

## 2022-02-14 PROCEDURE — 99999 PR PBB SHADOW E&M-EST. PATIENT-LVL III: ICD-10-PCS | Mod: PBBFAC,,, | Performed by: PODIATRIST

## 2022-02-14 PROCEDURE — 73630 X-RAY EXAM OF FOOT: CPT | Mod: TC,PN,LT

## 2022-02-14 PROCEDURE — 99024 PR POST-OP FOLLOW-UP VISIT: ICD-10-PCS | Mod: ,,, | Performed by: PODIATRIST

## 2022-02-14 PROCEDURE — 99999 PR PBB SHADOW E&M-EST. PATIENT-LVL III: CPT | Mod: PBBFAC,,, | Performed by: PODIATRIST

## 2022-02-14 PROCEDURE — 73630 X-RAY EXAM OF FOOT: CPT | Mod: 26,LT,, | Performed by: RADIOLOGY
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
## 2022-02-17 ENCOUNTER — CLINICAL SUPPORT (OUTPATIENT)
Dept: REHABILITATION | Facility: HOSPITAL | Age: 52
End: 2022-02-17
Payer: OTHER GOVERNMENT

## 2022-02-17 DIAGNOSIS — R26.9 GAIT ABNORMALITY: ICD-10-CM

## 2022-02-17 PROCEDURE — 97110 THERAPEUTIC EXERCISES: CPT | Mod: PO,CQ

## 2022-02-17 NOTE — PROGRESS NOTES
"[facility] OUTPATIENT THERAPY AND WELLNESS   Physical Therapy Treatment Note     Name: Hillary Cotton  Clinic Number: [id number]    Therapy Diagnosis:   Encounter Diagnosis   Name Primary?    Gait abnormality      Physician: Elías Aranda DPM    Visit Date: 2/17/2022    Physician Orders: PT Eval and Treat   Medical Diagnosis from Referral:   Z98.890 (ICD-10-CM) - Postoperative state   M62.81 (ICD-10-CM) - Muscle weakness of lower extremity      Evaluation Date: 12/21/2021  Authorization Period Expiration: 12/31/2021  Plan of Care Expiration: 2/21/2022  Progress Note Due: 1/21/2022  Visit # / Visits authorized: 8/ 20  FOTO: 5/5     Precautions: Standard     PTA Visit #: 1/5     Time In: 4: 50 pm  Time Out: 5:30 pm  Total Billable Time: 40 minutes    SUBJECTIVE     Pt reports: feel good, its only painful when I walk a lot   She was compliant with home exercise program.  Response to previous treatment: fine  Functional change: ambulating without her crutch more for short distance and only using crutch when she has to walk longer distance. Pt continues to only wear tennis shoes    Pain: 1/10  Location: left ankle    OBJECTIVE     Objective Measures updated at progress report unless specified.     Treatment     Hillary received the treatments listed below:      therapeutic exercises to develop strength and ROM for 40 minutes including:        Date 2/17/2022 2/8/2022 2/1/2022 1/18/2022 1/13/2022 1/11/2022 1/6/2022 12/29/2021 12/21/2021   Visit 8/20 7/20 6/20 5/20 4/20 3/20 2/20 1/20 1/1   POC exp 2/21/2022 2/21/2022 2/21/2022 2/21/2022 2/21/2022 2/21/2022 2/21/2022 2/21/2022 2/21/2022   PN 3/8/2022 3/8/2022 1/21/2022 1/21/2022 1/21/2022 1/21/2022 1/21/2022 1/21/2022 1/21/2022   FTF 3/8/2022 3/8/2022 1/21/2022 1/21/2022 1/21/2022 1/21/2022 1/21/2022 1/21/2022 1/21/2022   FOTO    5/5 4/5 3/5 2/5 1/5 0/5                 Bike/NuStep 5' at L2  5' at L2 7' at L2 5' at L2          HS stretch    3 x 30" B (long " "sitting) 3 x 30" B  (long sitting) 3 x 30" B   (long sitting)  3 x 30" B (long sitting) 3 x 20" B      gastroc stretch  3 x 30" B  2 x 30" B  2 x 30" B 2 x 30" B      Ankle pumps   20x         Ankle tband   4 directions  2 x 10 ea w YTB 2 x 10 ea  w YTB  2 x 10 ea w YTB 2 x 10 ea   w YTB 2 x 10 ea w YTB 20x ea      Seated tib anterior str 5 x 15"            BAPS (seated)             Toe yoga     2 x 10 Big toes and little toes 2 x 10   Big toe & little toes       [place]        1 min       Towel crunch   20x  20x 20x  30x  15x   Towel inv/ev         15x   Ankle AROM             Standing:            gastroc str  3 x 30"           HS str 3 x 30"   Stairs            Tibialis ant str 3 x 30"            Marching 1 min 1 UE support  2 x 10          Wt shifts     2' lateral   1' ea fwd/bwd 2' lateral  1' ea fwd/bwd Fwd/lateral  Side to side   1 min ea       dowel roll      2 min    2 min      Lateral steps 2 rounds x 12 ft  YTB around feet            Heel raise/toe raises 3 x 10 ea  2 x 10 ea   15x Sitting   1 min        BAPS             SLS             Seen by EM EG EG EG EG EM EG EM EG      manual therapy techniques: Joint mobilizations and Soft tissue Mobilization were applied to the: L ankle for 0 minutes, including:  - Medial to Lateral calcaneal mobilizations   - AP and PA talocrural mobilization  - AP and PA metatarsal mobilizations   - manual drainage   - scar tissue massage   - PROM all directions      gait training for 0 minutes:  - ambulation with single crutch used on R  - ambulation with proper gait pattern ascending and descending steps     PATIENT EDUCATION AND HOME EXERCISES      Education provided:   - importance of consistent performance of HEP     Written Home Exercises Provided: yes. Exercises were reviewed and Hillary was able to demonstrate them prior to the end of the session.  Hillary demonstrated good  understanding of the education provided. See EMR under Patient Instructions for exercises " provided during therapy sessions.      ASSESSMENT     Hillary presents to therapy with lower levels of pain as compared to last session. Patient is able to amublate with no AD to session. Patient stated pain only occurs after a long duration of walking and descending stairs. Patient states she believes the ankle especially the front is still very stiff.  Patient was able to progress through exercises with no increases in pain. Patient ended session stating she felt fine.     Hillary Is progressing well towards her goals.   Pt prognosis is Good.     Pt will continue to benefit from skilled outpatient physical therapy to address the deficits listed in the problem list box on initial evaluation, provide pt/family education and to maximize pt's level of independence in the home and community environment.     Pt's spiritual, cultural and educational needs considered and pt agreeable to plan of care and goals.     Anticipated barriers to physical therapy: none    Goals:  Short Term Goals: 4 weeks   1. This patient will be independent with a basic HEP. In progress, not met  2. This patient will increase B LE strength by 1 grade in order to be able to walk with a normalized gait pattern with or without AD. In progress, not met  3. This patient will have a pain rating of 5/10 at worst with ADLs. In progress, not met  4. This patient will be able to tolerate FWB on her LLE with appropriate weight shifting to maintain balance on a stable surface so that she will be able to get on a float to participate [email or web address] at the end of February. In progress, not met     Long Term Goals 8 weeks   1. This patient will be independent with an updated HEP. In progress, not met  2. This patient will ambulate without AD in order to be able to perform usual household duties with no complaints of pain. In progress, not met  3. This patient will have a pain rating of 1/10 at worst with ADLs. In progress, not met  4. This patient with  have at least 5 degrees of active DF in order to be able to ambulate without gait abnormalities. In progress, not met    PLAN     Increase performance of standing activities to include SLS and marching.     Ryan Degroot, PTA

## 2022-03-25 ENCOUNTER — PATIENT MESSAGE (OUTPATIENT)
Dept: PAIN MEDICINE | Facility: CLINIC | Age: 52
End: 2022-03-25
Payer: OTHER GOVERNMENT

## 2022-03-25 DIAGNOSIS — M54.12 CERVICAL RADICULOPATHY: ICD-10-CM

## 2022-03-28 DIAGNOSIS — M54.12 CERVICAL RADICULOPATHY: ICD-10-CM

## 2022-03-28 RX ORDER — GABAPENTIN 300 MG/1
300 CAPSULE ORAL NIGHTLY PRN
Qty: 30 CAPSULE | Refills: 1 | Status: SHIPPED | OUTPATIENT
Start: 2022-03-28 | End: 2022-05-31 | Stop reason: SDUPTHER

## 2022-03-28 RX ORDER — GABAPENTIN 300 MG/1
300 CAPSULE ORAL NIGHTLY PRN
Qty: 30 CAPSULE | Refills: 1 | Status: SHIPPED | OUTPATIENT
Start: 2022-03-28 | End: 2022-03-28 | Stop reason: SDUPTHER

## 2022-03-28 NOTE — TELEPHONE ENCOUNTER
Care Due:                  Date            Visit Type   Department     Provider  --------------------------------------------------------------------------------                                MYCHART                              FOLLOWUP/OF  [facility]  Last Visit: 06-      [facility] VISIT   MEDICINE       Arnulfo Barboza  Next Visit: None Scheduled  None         None Found                                                            Last  Test          Frequency    Reason                     Performed    Due Date  --------------------------------------------------------------------------------    Office Visit  12 months..  cetirizine...............  06- 05-    Powered by [employer]. Reference number: [id number].   3/28/2022 10:31:47 AM CDT

## 2022-04-12 ENCOUNTER — PATIENT MESSAGE (OUTPATIENT)
Dept: PODIATRY | Facility: CLINIC | Age: 52
End: 2022-04-12
Payer: OTHER GOVERNMENT

## 2022-04-13 ENCOUNTER — PATIENT MESSAGE (OUTPATIENT)
Dept: PODIATRY | Facility: CLINIC | Age: 52
End: 2022-04-13
Payer: OTHER GOVERNMENT

## 2022-05-30 DIAGNOSIS — M54.12 CERVICAL RADICULOPATHY: ICD-10-CM

## 2022-05-30 DIAGNOSIS — M47.812 CERVICAL SPONDYLOSIS: ICD-10-CM

## 2022-05-30 DIAGNOSIS — M79.18 MYOFASCIAL PAIN: ICD-10-CM

## 2022-05-30 RX ORDER — CELECOXIB 200 MG/1
200 CAPSULE ORAL DAILY
Qty: 30 CAPSULE | Refills: 5 | Status: CANCELLED | OUTPATIENT
Start: 2022-05-30

## 2022-05-31 ENCOUNTER — PATIENT MESSAGE (OUTPATIENT)
Dept: ADMINISTRATIVE | Facility: HOSPITAL | Age: 52
End: 2022-05-31
Payer: OTHER GOVERNMENT

## 2022-05-31 DIAGNOSIS — M54.12 CERVICAL RADICULOPATHY: ICD-10-CM

## 2022-05-31 DIAGNOSIS — M47.812 CERVICAL SPONDYLOSIS: ICD-10-CM

## 2022-05-31 DIAGNOSIS — M79.18 MYOFASCIAL PAIN: ICD-10-CM

## 2022-05-31 RX ORDER — CELECOXIB 200 MG/1
200 CAPSULE ORAL DAILY
Qty: 30 CAPSULE | Refills: 5 | Status: CANCELLED | OUTPATIENT
Start: 2022-05-31

## 2022-05-31 RX ORDER — GABAPENTIN 300 MG/1
300 CAPSULE ORAL NIGHTLY PRN
Qty: 30 CAPSULE | Refills: 1 | Status: SHIPPED | OUTPATIENT
Start: 2022-05-31 | End: 2022-07-28

## 2022-05-31 NOTE — TELEPHONE ENCOUNTER
No new care gaps identified.  [facility] Embedded Care Gaps. Reference number: [id number]. 5/31/2022   9:17:40 AM CDT

## 2022-05-31 NOTE — TELEPHONE ENCOUNTER
Contacted pt to set up an annual appt for her being it has been almost a year since she has been seen. Pt had two Rx requests submitted but is now only needing one. She stated she no longer needs the Celebrex, only the Gabapentin. Pt was informed a note will be made and the request will be sent to the provider.

## 2022-06-01 DIAGNOSIS — Z12.31 OTHER SCREENING MAMMOGRAM: ICD-10-CM

## 2022-06-08 DIAGNOSIS — Z12.31 OTHER SCREENING MAMMOGRAM: ICD-10-CM

## 2022-07-28 DIAGNOSIS — M54.12 CERVICAL RADICULOPATHY: ICD-10-CM

## 2022-07-28 RX ORDER — GABAPENTIN 300 MG/1
300 CAPSULE ORAL NIGHTLY PRN
Qty: 30 CAPSULE | Refills: 0 | Status: SHIPPED | OUTPATIENT
Start: 2022-07-28 | End: 2022-08-29

## 2022-07-28 NOTE — TELEPHONE ENCOUNTER
No new care gaps identified.  [facility] Embedded Care Gaps. Reference number: [id number]. 7/28/2022   8:47:34 AM APRILT

## 2022-08-16 ENCOUNTER — OFFICE VISIT (OUTPATIENT)
Dept: FAMILY MEDICINE | Facility: CLINIC | Age: 52
End: 2022-08-16
Payer: OTHER GOVERNMENT

## 2022-08-16 VITALS
RESPIRATION RATE: 16 BRPM | OXYGEN SATURATION: 96 % | HEIGHT: 66 IN | HEART RATE: 84 BPM | SYSTOLIC BLOOD PRESSURE: 128 MMHG | TEMPERATURE: 98 F | WEIGHT: 207.69 LBS | DIASTOLIC BLOOD PRESSURE: 86 MMHG | BODY MASS INDEX: 33.38 KG/M2

## 2022-08-16 DIAGNOSIS — J01.00 ACUTE NON-RECURRENT MAXILLARY SINUSITIS: Primary | ICD-10-CM

## 2022-08-16 DIAGNOSIS — J02.9 SORE THROAT: ICD-10-CM

## 2022-08-16 PROCEDURE — 99213 OFFICE O/P EST LOW 20 MIN: CPT | Mod: PBBFAC,PN | Performed by: NURSE PRACTITIONER

## 2022-08-16 PROCEDURE — 99213 PR OFFICE/OUTPT VISIT, EST, LEVL III, 20-29 MIN: ICD-10-PCS | Mod: S$PBB,,, | Performed by: NURSE PRACTITIONER

## 2022-08-16 PROCEDURE — 99999 PR PBB SHADOW E&M-EST. PATIENT-LVL III: CPT | Mod: PBBFAC,,, | Performed by: NURSE PRACTITIONER

## 2022-08-16 PROCEDURE — 99999 PR PBB SHADOW E&M-EST. PATIENT-LVL III: ICD-10-PCS | Mod: PBBFAC,,, | Performed by: NURSE PRACTITIONER

## 2022-08-16 PROCEDURE — 99213 OFFICE O/P EST LOW 20 MIN: CPT | Mod: S$PBB,,, | Performed by: NURSE PRACTITIONER

## 2022-08-16 RX ORDER — AZITHROMYCIN 250 MG/1
TABLET, FILM COATED ORAL
Qty: 6 TABLET | Refills: 0 | Status: SHIPPED | OUTPATIENT
Start: 2022-08-16 | End: 2022-08-21

## 2022-08-16 RX ORDER — METHYLPREDNISOLONE 4 MG/1
TABLET ORAL
Qty: 21 EACH | Refills: 0 | Status: SHIPPED | OUTPATIENT
Start: 2022-08-16 | End: 2022-09-01

## 2022-08-16 NOTE — PROGRESS NOTES
Subjective:      Patient ID: Hillary Cotton is a 51 y.o. female.    Chief Complaint: sinus pain    Sinusitis  This is a new problem. The current episode started in the past 7 days. The problem has been gradually worsening since onset. There has been no fever. Associated symptoms include congestion, ear pain, headaches, sinus pressure and a sore throat. Pertinent negatives include no coughing or shortness of breath. Past treatments include oral decongestants. The treatment provided mild relief.     Past Medical History:   Diagnosis Date    Abnormal Pap smear     Allergy     Asthma     Sinusitis, chronic      Past Surgical History:   Procedure Laterality Date    EPIDURAL STEROID INJECTION N/A 6/5/2020    Procedure: INJECTION, STEROID, EPIDURAL,C7-T1;  Surgeon: Augusto Hussein MD;  Location: [facility];  Service: Pain Management;  Laterality: N/A;    EPIDURAL STEROID INJECTION N/A 9/18/2020    Procedure: INJECTION, STEROID, EPIDURAL C7/T1;  Surgeon: Augusto Hussein MD;  Location: [facility];  Service: Pain Management;  Laterality: N/A;  INJECTION, STEROID, EPIDURAL C7/T1    FOOT MASS EXCISION Left 11/17/2021    Procedure: EXCISION, MASS, FOOT;  Surgeon: Elías Aranda DPM;  Location: [facility] OR;  Service: Podiatry;  Laterality: Left;  mini c-arm, Allograft bone Arthrex  Arthrex notified 11/8 CC  Bi[employer] notified 11/16/21 AM    SALIVARY GLAND SURGERY      TONSILLECTOMY       Review of Systems   Constitutional: Negative for fever.   HENT: Positive for congestion, ear pain, postnasal drip, rhinorrhea, sinus pressure and sore throat.    Respiratory: Negative for cough and shortness of breath.    Neurological: Positive for headaches.        Objective:     Vitals:    08/16/22 1512   BP: 128/86   Pulse: 84   Resp: 16   Temp: 98.1 °F (36.7 °C)      Physical Exam  Vitals reviewed.   Constitutional:       General: She is not in acute distress.     Appearance: Normal appearance.   HENT:      Right Ear:  A middle ear effusion is present.      Left Ear: A middle ear effusion is present.      Nose: Rhinorrhea present.      Right Sinus: Maxillary sinus tenderness present.      Left Sinus: Maxillary sinus tenderness present.   Cardiovascular:      Rate and Rhythm: Normal rate and regular rhythm.      Heart sounds: Normal heart sounds.   Pulmonary:      Effort: Pulmonary effort is normal. No respiratory distress.      Breath sounds: Normal breath sounds.   Neurological:      Mental Status: She is alert.        Assessment:         1. Acute non-recurrent maxillary sinusitis    2. Sore throat          Plan:   1.  Acute non-recurrent maxillary sinusitis  Sinus infections are usually viral until you have had symptoms for longer than 7-10 days. Discussed long term adverse effects of steroids and antibiotic overuse. Sent azithromycin and medrol dosepack to pharmacy and advised that she wait 2-3 days before starting medications. Symptoms typically resolve on their own with time. Start Mucinex, Flonase, continue sudafed as needed.     2. Sore throat  - POCT COVID-19 Rapid Screening        Hetal Villegas   [facility]   8/16/22

## 2022-09-01 ENCOUNTER — OFFICE VISIT (OUTPATIENT)
Dept: FAMILY MEDICINE | Facility: CLINIC | Age: 52
End: 2022-09-01
Payer: OTHER GOVERNMENT

## 2022-09-01 DIAGNOSIS — Z12.31 ENCOUNTER FOR SCREENING MAMMOGRAM FOR BREAST CANCER: ICD-10-CM

## 2022-09-01 DIAGNOSIS — Z00.00 ANNUAL PHYSICAL EXAM: Primary | ICD-10-CM

## 2022-09-01 DIAGNOSIS — Z12.11 SCREEN FOR COLON CANCER: ICD-10-CM

## 2022-09-01 DIAGNOSIS — M62.838 MUSCLE SPASMS OF NECK: ICD-10-CM

## 2022-09-01 PROCEDURE — 99396 PR PREVENTIVE VISIT,EST,40-64: ICD-10-PCS | Mod: S$PBB,,, | Performed by: FAMILY MEDICINE

## 2022-09-01 PROCEDURE — 99396 PREV VISIT EST AGE 40-64: CPT | Mod: S$PBB,,, | Performed by: FAMILY MEDICINE

## 2022-09-01 PROCEDURE — 99999 PR PBB SHADOW E&M-EST. PATIENT-LVL III: CPT | Mod: PBBFAC,,, | Performed by: FAMILY MEDICINE

## 2022-09-01 PROCEDURE — 99999 PR PBB SHADOW E&M-EST. PATIENT-LVL III: ICD-10-PCS | Mod: PBBFAC,,, | Performed by: FAMILY MEDICINE

## 2022-09-01 PROCEDURE — 99213 OFFICE O/P EST LOW 20 MIN: CPT | Mod: PBBFAC,PO | Performed by: FAMILY MEDICINE

## 2022-09-01 RX ORDER — CYCLOBENZAPRINE HCL 5 MG
5 TABLET ORAL 3 TIMES DAILY PRN
Qty: 90 TABLET | Refills: 0 | Status: SHIPPED | OUTPATIENT
Start: 2022-09-01 | End: 2022-09-11

## 2022-09-01 NOTE — PROGRESS NOTES
Subjective:       Patient ID: Hillary Cotton is a 51 y.o. female.    Chief Complaint: Annual Exam    51 years old female came to for her physical examination.  Patient with currently pain for the last couple years.  The pain is 6/10 of intensity on and off aggravated with activity better with rest.  Patient was treated with epidural injections before with partial improvement of the symptoms.  Patient is requesting a possible muscle relaxer to help with the symptoms..  Patient due for her mammogram.    Review of Systems   Constitutional: Negative.    HENT: Negative.     Eyes: Negative.    Respiratory: Negative.     Gastrointestinal: Negative.    Genitourinary: Negative.    Musculoskeletal:  Positive for neck pain.   Neurological: Negative.    Psychiatric/Behavioral: Negative.         Objective:      Physical Exam  Constitutional:       General: She is not in acute distress.     Appearance: She is well-developed. She is not diaphoretic.   HENT:      Head: Normocephalic and atraumatic.      Right Ear: External ear normal.      Left Ear: External ear normal.      Nose: Nose normal.      Mouth/Throat:      Pharynx: No oropharyngeal exudate.   Eyes:      General: No scleral icterus.        Right eye: No discharge.         Left eye: No discharge.      Conjunctiva/sclera: Conjunctivae normal.      Pupils: Pupils are equal, round, and reactive to light.   Neck:      Thyroid: No thyromegaly.      Vascular: No JVD.      Trachea: No tracheal deviation.   Cardiovascular:      Rate and Rhythm: Normal rate and regular rhythm.      Heart sounds: Normal heart sounds. No murmur heard.    No friction rub. No gallop.   Pulmonary:      Effort: Pulmonary effort is normal. No respiratory distress.      Breath sounds: Normal breath sounds. No stridor. No wheezing or rales.   Chest:      Chest wall: No tenderness.   Abdominal:      General: Bowel sounds are normal. There is no distension.      Palpations: Abdomen is soft. There is  no mass.      Tenderness: There is no abdominal tenderness. There is no guarding or rebound.   Musculoskeletal:         General: No tenderness. Normal range of motion.      Cervical back: Normal range of motion and neck supple. Muscular tenderness present.   Lymphadenopathy:      Cervical: No cervical adenopathy.   Skin:     General: Skin is warm and dry.      Coloration: Skin is not pale.      Findings: No erythema or rash.   Neurological:      Mental Status: She is alert and oriented to person, place, and time.      Cranial Nerves: No cranial nerve deficit.      Motor: No abnormal muscle tone.      Coordination: Coordination normal.      Deep Tendon Reflexes: Reflexes are normal and symmetric.   Psychiatric:         Behavior: Behavior normal.         Thought Content: Thought content normal.         Judgment: Judgment normal.       Assessment:       Problem List Items Addressed This Visit    None  Visit Diagnoses       Annual physical exam    -  Primary    Relevant Orders    CBC Auto Differential    Comprehensive Metabolic Panel    Lipid Panel    Urinalysis    TSH    Mammo Digital Screening Bilat    Case Request Endoscopy: COLONOSCOPY (Completed)    Hemoglobin A1C    Muscle spasms of neck        Relevant Medications    cyclobenzaprine (FLEXERIL) 5 MG tablet    Screen for colon cancer        Relevant Orders    Case Request Endoscopy: COLONOSCOPY (Completed)    Encounter for screening mammogram for breast cancer        Relevant Orders    Mammo Digital Screening Bilat              Plan:         Hillary was seen today for annual exam.    Diagnoses and all orders for this visit:    Annual physical exam  -     CBC Auto Differential; Future  -     Comprehensive Metabolic Panel; Future  -     Lipid Panel; Future  -     Urinalysis; Future  -     TSH; Future  -     Mammo Digital Screening Bilat; Future  -     Case Request Endoscopy: COLONOSCOPY  -     Hemoglobin A1C; Future    Muscle spasms of neck  -     cyclobenzaprine  (FLEXERIL) 5 MG tablet; Take 1 tablet (5 mg total) by mouth 3 (three) times daily as needed for Muscle spasms.    Screen for colon cancer  -     Case Request Endoscopy: COLONOSCOPY    Encounter for screening mammogram for breast cancer  -     Mammo Digital Screening Bilat; Future

## 2022-09-06 ENCOUNTER — TELEPHONE (OUTPATIENT)
Dept: ENDOSCOPY | Facility: HOSPITAL | Age: 52
End: 2022-09-06
Payer: OTHER GOVERNMENT

## 2022-09-06 RX ORDER — POLYETHYLENE GLYCOL 3350, SODIUM SULFATE, SODIUM CHLORIDE, POTASSIUM CHLORIDE, SODIUM ASCORBATE, AND ASCORBIC ACID 7.5-2.691G
2000 KIT ORAL ONCE
Qty: 1 KIT | Refills: 0 | Status: SHIPPED | OUTPATIENT
Start: 2022-09-06 | End: 2022-09-06

## 2022-09-06 NOTE — TELEPHONE ENCOUNTER
Endoscopy Scheduling Questionnaire:         Are you taking any blood thinners? No               If Yes  Have you been on them for longer than one year?     2. Have you been diagnosed with Diverticulitis in past three months?      3. Are you on dialysis or have Kidney Disease ? No     4. Previous Colonoscopy?  No         If yes Do you have a history of colon polyps?        6. Are you a diabetic?  No    7. Do you have a history of constipation?        Procedure scheduled with Dr. Bonner on  11/11/2022     The prep being used is Moviprep     The patient's prep instructions were sent by [employer]

## 2022-09-30 DIAGNOSIS — M54.12 CERVICAL RADICULOPATHY: ICD-10-CM

## 2022-09-30 NOTE — TELEPHONE ENCOUNTER
No new care gaps identified.  [facility] Embedded Care Gaps. Reference number: [id number]. 9/30/2022   6:50:22 PM CDT

## 2022-10-02 RX ORDER — GABAPENTIN 300 MG/1
300 CAPSULE ORAL NIGHTLY
Qty: 90 CAPSULE | Refills: 0 | Status: SHIPPED | OUTPATIENT
Start: 2022-10-02 | End: 2023-02-07 | Stop reason: SDUPTHER

## 2022-10-06 ENCOUNTER — OFFICE VISIT (OUTPATIENT)
Dept: PODIATRY | Facility: CLINIC | Age: 52
End: 2022-10-06
Payer: OTHER GOVERNMENT

## 2022-10-06 VITALS
SYSTOLIC BLOOD PRESSURE: 119 MMHG | DIASTOLIC BLOOD PRESSURE: 79 MMHG | BODY MASS INDEX: 33.52 KG/M2 | HEIGHT: 66 IN | HEART RATE: 85 BPM

## 2022-10-06 DIAGNOSIS — D17.24 LIPOMA OF LEFT LOWER EXTREMITY: Primary | ICD-10-CM

## 2022-10-06 DIAGNOSIS — Z98.890 HISTORY OF FOOT SURGERY: ICD-10-CM

## 2022-10-06 PROCEDURE — 20600 PR DRAIN/INJECT SMALL JOINT/BURSA: ICD-10-PCS | Mod: S$PBB,LT,, | Performed by: PODIATRIST

## 2022-10-06 PROCEDURE — 20600 DRAIN/INJ JOINT/BURSA W/O US: CPT | Mod: PBBFAC,PN | Performed by: PODIATRIST

## 2022-10-06 PROCEDURE — 20600 DRAIN/INJ JOINT/BURSA W/O US: CPT | Mod: S$PBB,LT,, | Performed by: PODIATRIST

## 2022-10-06 PROCEDURE — 99214 PR OFFICE/OUTPT VISIT, EST, LEVL IV, 30-39 MIN: ICD-10-PCS | Mod: 25,S$PBB,, | Performed by: PODIATRIST

## 2022-10-06 PROCEDURE — 99213 OFFICE O/P EST LOW 20 MIN: CPT | Mod: PBBFAC,PN,25 | Performed by: PODIATRIST

## 2022-10-06 PROCEDURE — 99999 PR PBB SHADOW E&M-EST. PATIENT-LVL III: CPT | Mod: PBBFAC,,, | Performed by: PODIATRIST

## 2022-10-06 PROCEDURE — 99999 PR PBB SHADOW E&M-EST. PATIENT-LVL III: ICD-10-PCS | Mod: PBBFAC,,, | Performed by: PODIATRIST

## 2022-10-06 PROCEDURE — 99214 OFFICE O/P EST MOD 30 MIN: CPT | Mod: 25,S$PBB,, | Performed by: PODIATRIST

## 2022-10-06 RX ORDER — TRIAMCINOLONE ACETONIDE 40 MG/ML
40 INJECTION, SUSPENSION INTRA-ARTICULAR; INTRAMUSCULAR
Status: COMPLETED | OUTPATIENT
Start: 2022-10-06 | End: 2022-10-06

## 2022-10-06 RX ADMIN — TRIAMCINOLONE ACETONIDE 40 MG: 40 INJECTION, SUSPENSION INTRA-ARTICULAR; INTRAMUSCULAR at 04:10
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
## 2022-10-10 ENCOUNTER — PATIENT MESSAGE (OUTPATIENT)
Dept: ADMINISTRATIVE | Facility: HOSPITAL | Age: 52
End: 2022-10-10
Payer: OTHER GOVERNMENT

## 2022-10-14 ENCOUNTER — HOSPITAL ENCOUNTER (OUTPATIENT)
Dept: RADIOLOGY | Facility: HOSPITAL | Age: 52
Discharge: HOME | End: 2022-10-14
Attending: PODIATRIST
Payer: OTHER GOVERNMENT

## 2022-10-14 DIAGNOSIS — Z98.890 HISTORY OF FOOT SURGERY: ICD-10-CM

## 2022-10-14 DIAGNOSIS — D17.24 LIPOMA OF LEFT LOWER EXTREMITY: ICD-10-CM

## 2022-10-14 PROCEDURE — 73630 XR FOOT COMPLETE 3 VIEW LEFT: ICD-10-PCS | Mod: 26,LT,, | Performed by: RADIOLOGY

## 2022-10-14 PROCEDURE — 73630 X-RAY EXAM OF FOOT: CPT | Mod: TC,FY,LT

## 2022-10-14 PROCEDURE — 73610 X-RAY EXAM OF ANKLE: CPT | Mod: 26,LT,, | Performed by: RADIOLOGY

## 2022-10-14 PROCEDURE — 73630 X-RAY EXAM OF FOOT: CPT | Mod: 26,LT,, | Performed by: RADIOLOGY

## 2022-10-14 PROCEDURE — 73610 X-RAY EXAM OF ANKLE: CPT | Mod: TC,FY,LT

## 2022-10-14 PROCEDURE — 73610 XR ANKLE COMPLETE 3 VIEW LEFT: ICD-10-PCS | Mod: 26,LT,, | Performed by: RADIOLOGY

## 2022-10-19 ENCOUNTER — TELEPHONE (OUTPATIENT)
Dept: GASTROENTEROLOGY | Facility: CLINIC | Age: 52
End: 2022-10-19
Payer: OTHER GOVERNMENT

## 2022-10-19 ENCOUNTER — PATIENT MESSAGE (OUTPATIENT)
Dept: FAMILY MEDICINE | Facility: CLINIC | Age: 52
End: 2022-10-19
Payer: OTHER GOVERNMENT

## 2022-10-19 ENCOUNTER — PATIENT MESSAGE (OUTPATIENT)
Dept: PODIATRY | Facility: CLINIC | Age: 52
End: 2022-10-19
Payer: OTHER GOVERNMENT

## 2022-10-19 NOTE — TELEPHONE ENCOUNTER
Spoke with patient and she would like to know the arrival time for her procedure. I informed patient that the hospital will call 2 days prior to the procedure with an arrival time.

## 2022-10-19 NOTE — TELEPHONE ENCOUNTER
----- Message from Cleo Castellanos sent at 10/19/2022  2:28 PM CDT -----  Type:  Needs Medical Advice    Who Called: PT  Symptoms (please be specific):    How long has patient had these symptoms:    Pharmacy name and phone #:    Would the patient rather a call back or a response via [facility]? CALL  Best Call Back Number: [phone]  Additional Information: PT WANTS TO START COORDINATING TIME FOR TRANSPORTATION FOR COLONOSCOPY. WANTS TO KNOW OF PROCEDURE TIME

## 2022-10-20 ENCOUNTER — TELEPHONE (OUTPATIENT)
Dept: PODIATRY | Facility: CLINIC | Age: 52
End: 2022-10-20
Payer: OTHER GOVERNMENT

## 2022-10-20 NOTE — TELEPHONE ENCOUNTER
Spoke with Ms Cotton to offer her an appt with Dr. Aranda on Friday;however, she could not accept due to work. Did accept an appt on Monday, 10/24/22 at 10:45 am. No other needs voiced. Encouraged to call if further assistance is needed

## 2022-10-24 ENCOUNTER — OFFICE VISIT (OUTPATIENT)
Dept: PODIATRY | Facility: CLINIC | Age: 52
End: 2022-10-24
Payer: OTHER GOVERNMENT

## 2022-10-24 ENCOUNTER — TELEPHONE (OUTPATIENT)
Dept: FAMILY MEDICINE | Facility: CLINIC | Age: 52
End: 2022-10-24

## 2022-10-24 VITALS
BODY MASS INDEX: 33.52 KG/M2 | SYSTOLIC BLOOD PRESSURE: 132 MMHG | HEIGHT: 66 IN | DIASTOLIC BLOOD PRESSURE: 86 MMHG | HEART RATE: 85 BPM

## 2022-10-24 DIAGNOSIS — M79.672 CHRONIC PAIN IN LEFT FOOT: ICD-10-CM

## 2022-10-24 DIAGNOSIS — M25.572 PAIN IN JOINT INVOLVING ANKLE AND FOOT, LEFT: Primary | ICD-10-CM

## 2022-10-24 DIAGNOSIS — G89.29 CHRONIC PAIN IN LEFT FOOT: ICD-10-CM

## 2022-10-24 DIAGNOSIS — H00.012 HORDEOLUM EXTERNUM OF RIGHT LOWER EYELID: Primary | ICD-10-CM

## 2022-10-24 PROCEDURE — 20605 PR DRAIN/INJECT INTERMEDIATE JOINT/BURSA: ICD-10-PCS | Mod: S$PBB,LT,, | Performed by: PODIATRIST

## 2022-10-24 PROCEDURE — 99213 OFFICE O/P EST LOW 20 MIN: CPT | Mod: PBBFAC,PN | Performed by: PODIATRIST

## 2022-10-24 PROCEDURE — 99214 PR OFFICE/OUTPT VISIT, EST, LEVL IV, 30-39 MIN: ICD-10-PCS | Mod: 25,S$PBB,, | Performed by: PODIATRIST

## 2022-10-24 PROCEDURE — 20605 DRAIN/INJ JOINT/BURSA W/O US: CPT | Mod: S$PBB,LT,, | Performed by: PODIATRIST

## 2022-10-24 PROCEDURE — 99999 PR PBB SHADOW E&M-EST. PATIENT-LVL III: CPT | Mod: PBBFAC,,, | Performed by: PODIATRIST

## 2022-10-24 PROCEDURE — 20605 DRAIN/INJ JOINT/BURSA W/O US: CPT | Mod: PBBFAC,PN | Performed by: PODIATRIST

## 2022-10-24 PROCEDURE — 99999 PR PBB SHADOW E&M-EST. PATIENT-LVL III: ICD-10-PCS | Mod: PBBFAC,,, | Performed by: PODIATRIST

## 2022-10-24 PROCEDURE — 99214 OFFICE O/P EST MOD 30 MIN: CPT | Mod: 25,S$PBB,, | Performed by: PODIATRIST

## 2022-10-24 RX ORDER — ERYTHROMYCIN 5 MG/G
OINTMENT OPHTHALMIC 3 TIMES DAILY
Qty: 3.5 G | Refills: 0 | Status: SHIPPED | OUTPATIENT
Start: 2022-10-24 | End: 2023-03-17
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
## 2022-11-07 ENCOUNTER — HOSPITAL ENCOUNTER (OUTPATIENT)
Dept: RADIOLOGY | Facility: HOSPITAL | Age: 52
Discharge: HOME | End: 2022-11-07
Attending: PODIATRIST
Payer: OTHER GOVERNMENT

## 2022-11-07 DIAGNOSIS — M79.672 CHRONIC PAIN IN LEFT FOOT: ICD-10-CM

## 2022-11-07 DIAGNOSIS — M25.572 PAIN IN JOINT INVOLVING ANKLE AND FOOT, LEFT: ICD-10-CM

## 2022-11-07 DIAGNOSIS — G89.29 CHRONIC PAIN IN LEFT FOOT: ICD-10-CM

## 2022-11-07 PROCEDURE — 73721 MRI JNT OF LWR EXTRE W/O DYE: CPT | Mod: TC,PO,LT

## 2022-11-09 ENCOUNTER — TELEPHONE (OUTPATIENT)
Dept: ENDOSCOPY | Facility: HOSPITAL | Age: 52
End: 2022-11-09
Payer: OTHER GOVERNMENT

## 2022-11-09 ENCOUNTER — PATIENT MESSAGE (OUTPATIENT)
Dept: PODIATRY | Facility: CLINIC | Age: 52
End: 2022-11-09
Payer: OTHER GOVERNMENT

## 2022-11-09 NOTE — TELEPHONE ENCOUNTER
Spoke with patient about arrival time @ 1130.   Covid test = vacc    Prep instructions reviewed: the day before the procedure, follow a clear liquid diet all day, then start the first 1/2 of prep at 5pm and take 2nd 1/2 of prep @ 0630.  Pt must be completely NPO when prep completed @ 0830.              Medications: Do not take Insulin or oral diabetic medications the day of the procedure.  Take as prescribed: heart, seizure and blood pressure medication in the morning with a sip of water (less than an ounce).  Take any breathing medications and bring inhalers to hospital with you Leave all valuables and jewelry at home.     Wear comfortable clothes to procedure to change into hospital gown You cannot drive for 24 hours after your procedure because you will receive sedation for your procedure to make you comfortable.  A ride must be provided at discharge.

## 2022-11-11 ENCOUNTER — ANESTHESIA (OUTPATIENT)
Dept: ENDOSCOPY | Facility: HOSPITAL | Age: 52
End: 2022-11-11
Payer: OTHER GOVERNMENT

## 2022-11-11 ENCOUNTER — ANESTHESIA EVENT (OUTPATIENT)
Dept: ENDOSCOPY | Facility: HOSPITAL | Age: 52
End: 2022-11-11
Payer: OTHER GOVERNMENT

## 2022-11-11 ENCOUNTER — HOSPITAL ENCOUNTER (OUTPATIENT)
Facility: HOSPITAL | Age: 52
Discharge: HOME | End: 2022-11-11
Attending: INTERNAL MEDICINE | Admitting: INTERNAL MEDICINE
Payer: OTHER GOVERNMENT

## 2022-11-11 VITALS
DIASTOLIC BLOOD PRESSURE: 64 MMHG | HEIGHT: 66 IN | BODY MASS INDEX: 34.23 KG/M2 | HEART RATE: 75 BPM | TEMPERATURE: 98 F | RESPIRATION RATE: 12 BRPM | OXYGEN SATURATION: 97 % | SYSTOLIC BLOOD PRESSURE: 110 MMHG | WEIGHT: 213 LBS

## 2022-11-11 DIAGNOSIS — Z12.11 SCREEN FOR COLON CANCER: ICD-10-CM

## 2022-11-11 LAB
B-HCG UR QL: NEGATIVE
CTP QC/QA: YES

## 2022-11-11 PROCEDURE — 37000009 HC ANESTHESIA EA ADD 15 MINS: Performed by: INTERNAL MEDICINE

## 2022-11-11 PROCEDURE — 25000003 PHARM REV CODE 250: Performed by: INTERNAL MEDICINE

## 2022-11-11 PROCEDURE — 63600175 PHARM REV CODE 636 W HCPCS: Performed by: NURSE ANESTHETIST, CERTIFIED REGISTERED

## 2022-11-11 PROCEDURE — 88305 TISSUE EXAM BY PATHOLOGIST: CPT | Mod: 26,,, | Performed by: PATHOLOGY

## 2022-11-11 PROCEDURE — 27201089 HC SNARE, DISP (ANY): Performed by: INTERNAL MEDICINE

## 2022-11-11 PROCEDURE — 37000008 HC ANESTHESIA 1ST 15 MINUTES: Performed by: INTERNAL MEDICINE

## 2022-11-11 PROCEDURE — 88305 TISSUE EXAM BY PATHOLOGIST: CPT | Mod: 59 | Performed by: PATHOLOGY

## 2022-11-11 PROCEDURE — 81025 URINE PREGNANCY TEST: CPT | Performed by: INTERNAL MEDICINE

## 2022-11-11 PROCEDURE — D9220A PRA ANESTHESIA: Mod: 33,ANES,, | Performed by: ANESTHESIOLOGY

## 2022-11-11 PROCEDURE — 88305 TISSUE EXAM BY PATHOLOGIST: ICD-10-PCS | Mod: 26,,, | Performed by: PATHOLOGY

## 2022-11-11 PROCEDURE — 25000003 PHARM REV CODE 250: Performed by: NURSE ANESTHETIST, CERTIFIED REGISTERED

## 2022-11-11 PROCEDURE — D9220A PRA ANESTHESIA: Mod: 33,CRNA,, | Performed by: NURSE ANESTHETIST, CERTIFIED REGISTERED

## 2022-11-11 PROCEDURE — 45385 COLONOSCOPY W/LESION REMOVAL: CPT | Mod: PT | Performed by: INTERNAL MEDICINE

## 2022-11-11 PROCEDURE — D9220A PRA ANESTHESIA: ICD-10-PCS | Mod: 33,CRNA,, | Performed by: NURSE ANESTHETIST, CERTIFIED REGISTERED

## 2022-11-11 PROCEDURE — 45385 COLONOSCOPY W/LESION REMOVAL: CPT | Mod: 33,,, | Performed by: INTERNAL MEDICINE

## 2022-11-11 PROCEDURE — 45385 PR COLONOSCOPY,REMV LESN,SNARE: ICD-10-PCS | Mod: 33,,, | Performed by: INTERNAL MEDICINE

## 2022-11-11 PROCEDURE — D9220A PRA ANESTHESIA: ICD-10-PCS | Mod: 33,ANES,, | Performed by: ANESTHESIOLOGY

## 2022-11-11 RX ORDER — SODIUM CHLORIDE 9 MG/ML
INJECTION, SOLUTION INTRAVENOUS CONTINUOUS
Status: DISCONTINUED | OUTPATIENT
Start: 2022-11-11 | End: 2022-11-11 | Stop reason: HOSPADM

## 2022-11-11 RX ORDER — SODIUM CHLORIDE 0.9 % (FLUSH) 0.9 %
10 SYRINGE (ML) INJECTION
Status: DISCONTINUED | OUTPATIENT
Start: 2022-11-11 | End: 2022-11-11 | Stop reason: HOSPADM

## 2022-11-11 RX ORDER — PROPOFOL 10 MG/ML
VIAL (ML) INTRAVENOUS CONTINUOUS PRN
Status: DISCONTINUED | OUTPATIENT
Start: 2022-11-11 | End: 2022-11-11

## 2022-11-11 RX ORDER — LIDOCAINE HCL/PF 100 MG/5ML
SYRINGE (ML) INTRAVENOUS
Status: DISCONTINUED | OUTPATIENT
Start: 2022-11-11 | End: 2022-11-11

## 2022-11-11 RX ORDER — SODIUM CHLORIDE, SODIUM LACTATE, POTASSIUM CHLORIDE, CALCIUM CHLORIDE 600; 310; 30; 20 MG/100ML; MG/100ML; MG/100ML; MG/100ML
INJECTION, SOLUTION INTRAVENOUS CONTINUOUS PRN
Status: DISCONTINUED | OUTPATIENT
Start: 2022-11-11 | End: 2022-11-11

## 2022-11-11 RX ORDER — DEXMEDETOMIDINE HYDROCHLORIDE 100 UG/ML
INJECTION, SOLUTION INTRAVENOUS
Status: DISCONTINUED | OUTPATIENT
Start: 2022-11-11 | End: 2022-11-11

## 2022-11-11 RX ORDER — PROPOFOL 10 MG/ML
VIAL (ML) INTRAVENOUS
Status: DISCONTINUED | OUTPATIENT
Start: 2022-11-11 | End: 2022-11-11

## 2022-11-11 RX ADMIN — SODIUM CHLORIDE, SODIUM LACTATE, POTASSIUM CHLORIDE, AND CALCIUM CHLORIDE: .6; .31; .03; .02 INJECTION, SOLUTION INTRAVENOUS at 12:11

## 2022-11-11 RX ADMIN — PROPOFOL 150 MCG/KG/MIN: 10 INJECTION, EMULSION INTRAVENOUS at 12:11

## 2022-11-11 RX ADMIN — DEXMEDETOMIDINE HCL 12 MCG: 100 INJECTION INTRAVENOUS at 12:11

## 2022-11-11 RX ADMIN — SODIUM CHLORIDE: 0.9 INJECTION, SOLUTION INTRAVENOUS at 12:11

## 2022-11-11 RX ADMIN — LIDOCAINE HYDROCHLORIDE 75 MG: 20 INJECTION, SOLUTION INTRAVENOUS at 12:11

## 2022-11-11 RX ADMIN — PROPOFOL 70 MG: 10 INJECTION, EMULSION INTRAVENOUS at 12:11

## 2022-11-11 NOTE — TRANSFER OF CARE
"Anesthesia Transfer of Care Note    Patient: Hillary Cotton    Procedure(s) Performed: Procedure(s) (LRB):  COLONOSCOPY Moviprep (N/A)    Patient location: GI    Anesthesia Type: general    Transport from OR: Transported from OR on room air with adequate spontaneous ventilation    Post pain: adequate analgesia    Post assessment: no apparent anesthetic complications and tolerated procedure well    Post vital signs: stable    Level of consciousness: awake    Nausea/Vomiting: no nausea/vomiting    Complications: none    Transfer of care protocol was followed      Last vitals:   Visit Vitals  BP (!) 121/58 (BP Location: Left arm, Patient Position: Lying)   Pulse 96   Temp 36.7 °C (98.1 °F) (Temporal)   Resp 18   Ht 5' 6" (1.676 m)   Wt 96.6 kg (213 lb)   SpO2 (!) 94%   Breastfeeding No   BMI 34.38 kg/m²     "

## 2022-11-11 NOTE — PROVATION PATIENT INSTRUCTIONS
Discharge Summary/Instructions after an Endoscopic Procedure  Patient Name: Hillary Cotton  Patient MRN: [id number]  Patient YOB: 1970 Friday, November 11, 2022  John Bonner MD  Dear patient,  As a result of recent federal legislation (The Federal Cures Act), you may   receive lab or pathology results from your procedure in your [facility]   account before your physician is able to contact you. Your physician or   their representative will relay the results to you with their   recommendations at their soonest availability.  Thank you,  Your health is very important to us during the Covid Crisis. Following your   procedure today, you will receive a daily text for 2 weeks asking about   signs or symptoms of Covid 19.  Please respond to this text when you   receive it so we can follow up and keep you as safe as possible.   RESTRICTIONS:  During your procedure today, you received medications for sedation.  These   medications may affect your judgment, balance and coordination.  Therefore,   for 24 hours, you have the following restrictions:   - DO NOT drive a car, operate machinery, make legal/financial decisions,   sign important papers or drink alcohol.    ACTIVITY:  Today: no heavy lifting, straining or running due to procedural   sedation/anesthesia.  The following day: return to full activity including work.  DIET:  Eat and drink normally unless instructed otherwise.     TREATMENT FOR COMMON SIDE EFFECTS:  - Mild abdominal pain, nausea, belching, bloating or excessive gas:  rest,   eat lightly and use a heating pad.  - Sore Throat: treat with throat lozenges and/or gargle with warm salt   water.  - Because air was used during the procedure, expelling large amounts of air   from your rectum or belching is normal.  - If a bowel prep was taken, you may not have a bowel movement for 1-3 days.    This is normal.  SYMPTOMS TO WATCH FOR AND REPORT TO YOUR PHYSICIAN:  1. Abdominal pain or bloating, other  than gas cramps.  2. Chest pain.  3. Back pain.  4. Signs of infection such as: chills or fever occurring within 24 hours   after the procedure.  5. Rectal bleeding, which would show as bright red, maroon, or black stools.   (A tablespoon of blood from the rectum is not serious, especially if   hemorrhoids are present.)  6. Vomiting.  7. Weakness or dizziness.  GO DIRECTLY TO THE NEAREST EMERGENCY ROOM IF YOU HAVE ANY OF THE FOLLOWING:      Difficulty breathing              Chills and/or fever over 101 F   Persistent vomiting and/or vomiting blood   Severe abdominal pain   Severe chest pain   Black, tarry stools   Bleeding- more than one tablespoon   Any other symptom or condition that you feel may need urgent attention  Your doctor recommends these additional instructions:  If any biopsies were taken, your doctors clinic will contact you in 1 to 2   weeks with any results.  - Discharge patient to home.   - Patient has a contact number available for emergencies.  The signs and   symptoms of potential delayed complications were discussed with the   patient.  Return to normal activities tomorrow.  Written discharge   instructions were provided to the patient.   - Resume previous diet.   - Continue present medications.   - Await pathology results.   - Repeat colonoscopy in 5 years for surveillance.   - Avoid NSAIDs (ibuprofen, aleve, naproxen, BC / Goodys, aspirin etc) for 10   days; Aspirin is OK to continue if indicated for cardiovascular   protection.  For questions, problems or results please call your physician - John Bonner MD.  EMERGENCY PHONE NUMBER: [phone],  LAB RESULTS: [phone]  IF A COMPLICATION OR EMERGENCY SITUATION ARISES AND YOU ARE UNABLE TO REACH   YOUR PHYSICIAN - GO DIRECTLY TO THE EMERGENCY ROOM.  John Bonner MD  11/11/2022 1:03:21 PM  This report has been verified and signed electronically.  Dear patient,  As a result of recent federal legislation (The Federal Cures Act),  you may   receive lab or pathology results from your procedure in your [facility]   account before your physician is able to contact you. Your physician or   their representative will relay the results to you with their   recommendations at their soonest availability.  Thank you,  PROVATION

## 2022-11-11 NOTE — H&P
Short Stay Endoscopy History and Physical    PCP - Arnulfo Barboza MD    Procedure - Colonoscopy  ASA - per anesthesia  Mallampati - per anesthesia  History of Anesthesia problems - no  Family history Anesthesia problems - no   Plan of anesthesia - General    HPI:  This is a 51 y.o. female here for evaluation of : asymptomatic screening exam      ROS:  Constitutional: No fevers, chills, No weight loss  CV: No chest pain  Pulm: No cough, No shortness of breath  GI: see HPI  Derm: No rash    Medical History:  has a past medical history of Abnormal Pap smear, Allergy, Asthma, and Sinusitis, chronic.    Surgical History:  has a past surgical history that includes Salivary gland surgery; Tonsillectomy; Epidural steroid injection (N/A, 6/5/2020); Epidural steroid injection (N/A, 9/18/2020); and Foot mass excision (Left, 11/17/2021).    Family History: family history includes Allergies in her father; Asthma in her mother; Diabetes in her maternal grandmother and mother; Hypertension in her maternal grandmother and mother; Sinus disease in her brother, mother, sister, and sister.. Otherwise no colon cancer, inflammatory bowel disease, or GI malignancies.    Social History:  reports that she quit smoking about 7 years ago. Her smoking use included cigarettes. She smoked an average of .5 packs per day. She has never used smokeless tobacco. She reports that she does not drink alcohol and does not use drugs.    Review of patient's allergies indicates:   Allergen Reactions    Prednisone Rash     Hx of delayed onset rash on 2 occasion. Tolerates medrol, IM steroids, topical, and intranasal steroids w/o problem       Medications:   Facility-Administered Medications Prior to Admission   Medication Dose Route Frequency Provider Last Rate Last Admin    sodium chloride 0.9% flush 10 mL  10 mL Intravenous PRN Elías Aranda DPSATURNINO         Medications Prior to Admission   Medication Sig Dispense Refill Last Dose     celecoxib (CELEBREX) 200 MG capsule Take 1 capsule (200 mg total) by mouth daily as needed for Pain. 30 capsule 5 11/10/2022    cetirizine (ZYRTEC) 10 MG tablet Take 1 tablet (10 mg total) by mouth once daily. 90 tablet 0 11/10/2022    gabapentin (NEURONTIN) 300 MG capsule Take 1 capsule (300 mg total) by mouth every evening. 90 capsule 0 11/10/2022    multivitamin capsule Take 1 capsule by mouth once daily.   11/10/2022    cephALEXin (KEFLEX) 500 MG capsule Take 1 capsule (500 mg total) by mouth 4 (four) times daily. (Patient not taking: Reported on 12/13/2021) 28 capsule 0 Unknown    erythromycin (ROMYCIN) ophthalmic ointment Place into the right eye 3 (three) times daily. 3.5 g 0     glucosamine-chondroitin 500-400 mg tablet Take 1 tablet by mouth 3 (three) times daily.       hydrOXYzine HCL (ATARAX) 25 MG tablet    More than a month         Physical Exam:    Vital Signs:   Vitals:    11/11/22 1201   BP: (!) 121/58   Pulse: 96   Resp: 18   Temp: 98.1 °F (36.7 °C)       General Appearance: Well appearing in no acute distress  Eyes:    No scleral icterus  ENT: Neck supple, Lips, mucosa, and tongue normal; teeth and gums normal  Abdomen: Soft, non tender, non distended with positive bowel sounds. No hepatosplenomegaly, ascites, or mass.  Extremities: 2+ pulses, no clubbing, cyanosis or edema  Skin: No rash      Labs:  Lab Results   Component Value Date    WBC 7.70 11/04/2021    HGB 13.5 11/04/2021    HCT 44.7 11/04/2021     11/04/2021    CHOL 232 (H) 06/02/2021    TRIG 192 (H) 06/02/2021    HDL 45 06/02/2021    ALT 14 11/04/2021    AST 15 11/04/2021     11/04/2021    K 5.0 11/04/2021     11/04/2021    CREATININE 0.7 11/04/2021    BUN 10 11/04/2021    CO2 28 11/04/2021    TSH 2.075 06/02/2021    HGBA1C 5.6 07/17/2019       I have explained the risks and benefits of endoscopy procedures to the patient including but not limited to bleeding, perforation, infection, and death.  The patient was asked if  they understand and allowed to ask any further questions to their satisfaction.    John Bonner MD

## 2022-11-11 NOTE — ANESTHESIA POSTPROCEDURE EVALUATION
Anesthesia Post Evaluation    Patient: Hillary Cotton    Procedure(s) Performed: Procedure(s) (LRB):  COLONOSCOPY Moviprep (N/A)    Final Anesthesia Type: general      Patient location during evaluation: GI PACU  Patient participation: Yes- Able to Participate  Level of consciousness: awake and alert and oriented  Post-procedure vital signs: reviewed and stable  Pain management: adequate  Airway patency: patent    PONV status at discharge: No PONV  Anesthetic complications: no      Cardiovascular status: hemodynamically stable  Respiratory status: room air, spontaneous ventilation and unassisted  Hydration status: euvolemic  Follow-up not needed.          Vitals Value Taken Time   /82 11/11/22 1306   Temp 98.1 11/11/22 1306   Pulse 81 11/11/22 1306   Resp 18 11/11/22 1306   SpO2 99 11/11/22 1306         No case tracking events are documented in the log.      Pain/Erin Score: No data recorded

## 2022-11-11 NOTE — ANESTHESIA PREPROCEDURE EVALUATION
11/11/2022  Hillary Cotton is a 51 y.o., female for colonoscopy under MAC    Past Medical History:   Diagnosis Date    Abnormal Pap smear     Allergy     Asthma     Sinusitis, chronic      Past Surgical History:   Procedure Laterality Date    EPIDURAL STEROID INJECTION N/A 6/5/2020    Procedure: INJECTION, STEROID, EPIDURAL,C7-T1;  Surgeon: Augusto Hussein MD;  Location: [facility] PAIN [facility]GT;  Service: Pain Management;  Laterality: N/A;    EPIDURAL STEROID INJECTION N/A 9/18/2020    Procedure: INJECTION, STEROID, EPIDURAL C7/T1;  Surgeon: Augusto Hussein MD;  Location: [facility] PAIN MGT;  Service: Pain Management;  Laterality: N/A;  INJECTION, STEROID, EPIDURAL C7/T1    FOOT MASS EXCISION Left 11/17/2021    Procedure: EXCISION, MASS, FOOT;  Surgeon: Elías Aranda DPM;  Location: [facility] OR;  Service: Podiatry;  Laterality: Left;  mini c-arm, Allograft bone Arthrex  Arthrex notified 11/8 CC  Biomet notified 11/16/21 AM    SALIVARY GLAND SURGERY      TONSILLECTOMY             Pre-op Assessment    I have reviewed the Patient Summary Reports.    I have reviewed the NPO Status.   I have reviewed the Medications.     Review of Systems  Social:  Former Smoker        Physical Exam  General: Well nourished    Airway:  Mallampati: II           Anesthesia Plan  Type of Anesthesia, risks & benefits discussed:    Anesthesia Type: Gen Natural Airway  Intra-op Monitoring Plan: Standard ASA Monitors  Informed Consent: Informed consent signed with the Patient and all parties understand the risks and agree with anesthesia plan.  All questions answered.   ASA Score: 2    Ready For Surgery From Anesthesia Perspective.     .

## 2022-11-11 NOTE — PLAN OF CARE
Patient recovered to baseline. VSS. Patient seen by MD at bedside. Discharge instructions reviewed with patient. Federal cures act read and explained to patient. Patient verbalized understanding. Patient escorted out via WC w/staff member and discharged w/ family.

## 2022-11-16 ENCOUNTER — TELEPHONE (OUTPATIENT)
Dept: PODIATRY | Facility: CLINIC | Age: 52
End: 2022-11-16
Payer: OTHER GOVERNMENT

## 2022-11-16 NOTE — TELEPHONE ENCOUNTER
Spoke with Ms Lula to inform her that Dr. Aranda will not be in clinic on 11/18/22. Accepted new appt date and time of 11/17/22 at 8:30 am. No other needs voiced at this time. Encouraged to call if further assistance is needed

## 2022-11-17 ENCOUNTER — OFFICE VISIT (OUTPATIENT)
Dept: PODIATRY | Facility: CLINIC | Age: 52
End: 2022-11-17
Payer: OTHER GOVERNMENT

## 2022-11-17 ENCOUNTER — TELEPHONE (OUTPATIENT)
Dept: PODIATRY | Facility: CLINIC | Age: 52
End: 2022-11-17

## 2022-11-17 VITALS
SYSTOLIC BLOOD PRESSURE: 120 MMHG | BODY MASS INDEX: 34.23 KG/M2 | WEIGHT: 213 LBS | DIASTOLIC BLOOD PRESSURE: 82 MMHG | HEIGHT: 66 IN | HEART RATE: 93 BPM

## 2022-11-17 DIAGNOSIS — Z01.818 PREOP TESTING: ICD-10-CM

## 2022-11-17 DIAGNOSIS — M19.072 ARTHRITIS OF LEFT SUBTALAR JOINT: Primary | ICD-10-CM

## 2022-11-17 DIAGNOSIS — M85.672 BONE CYST OF LEFT FOOT: ICD-10-CM

## 2022-11-17 DIAGNOSIS — R22.42 MASS OF LEFT FOOT: ICD-10-CM

## 2022-11-17 LAB
FINAL PATHOLOGIC DIAGNOSIS: NORMAL
GROSS: NORMAL
Lab: NORMAL

## 2022-11-17 PROCEDURE — 99215 OFFICE O/P EST HI 40 MIN: CPT | Mod: PBBFAC,PN | Performed by: PODIATRIST

## 2022-11-17 PROCEDURE — 99999 PR PBB SHADOW E&M-EST. PATIENT-LVL V: ICD-10-PCS | Mod: PBBFAC,,, | Performed by: PODIATRIST

## 2022-11-17 PROCEDURE — 96372 PR INJECTION,THERAP/PROPH/DIAG2ST, IM OR SUBCUT: ICD-10-PCS | Mod: S$PBB,LT,, | Performed by: PODIATRIST

## 2022-11-17 PROCEDURE — 96372 THER/PROPH/DIAG INJ SC/IM: CPT | Mod: S$PBB,LT,, | Performed by: PODIATRIST

## 2022-11-17 PROCEDURE — 99215 OFFICE O/P EST HI 40 MIN: CPT | Mod: 25,S$PBB,, | Performed by: PODIATRIST

## 2022-11-17 PROCEDURE — 99999 PR PBB SHADOW E&M-EST. PATIENT-LVL V: CPT | Mod: PBBFAC,,, | Performed by: PODIATRIST

## 2022-11-17 PROCEDURE — 99215 PR OFFICE/OUTPT VISIT, EST, LEVL V, 40-54 MIN: ICD-10-PCS | Mod: 25,S$PBB,, | Performed by: PODIATRIST

## 2022-11-17 RX ORDER — METHYLPREDNISOLONE ACETATE 40 MG/ML
20 INJECTION, SUSPENSION INTRA-ARTICULAR; INTRALESIONAL; INTRAMUSCULAR; SOFT TISSUE
Status: COMPLETED | OUTPATIENT
Start: 2022-11-17 | End: 2022-11-17

## 2022-11-17 RX ORDER — SODIUM CHLORIDE 0.9 % (FLUSH) 0.9 %
10 SYRINGE (ML) INJECTION
Status: DISCONTINUED | OUTPATIENT
Start: 2022-11-17 | End: 2024-02-22

## 2022-11-17 RX ORDER — CEFAZOLIN SODIUM 2 G/50ML
2 SOLUTION INTRAVENOUS
Status: CANCELLED | OUTPATIENT
Start: 2022-11-17

## 2022-11-17 RX ADMIN — METHYLPREDNISOLONE ACETATE 20 MG: 40 INJECTION, SUSPENSION INTRA-ARTICULAR; INTRALESIONAL; INTRAMUSCULAR; INTRASYNOVIAL; SOFT TISSUE at 09:11
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
## 2022-11-17 NOTE — TELEPHONE ENCOUNTER
Spoke with Ms Lula to inform her that her surgery with Dr Aranda is scheduled for 12/30/22. Post op appts in place. Pt reports she does have a knee scooter already. Per pt she will plan to make an appt with Dr. Barboza for medical clearance.

## 2022-11-23 ENCOUNTER — PATIENT MESSAGE (OUTPATIENT)
Dept: SURGERY | Facility: HOSPITAL | Age: 52
End: 2022-11-23
Payer: OTHER GOVERNMENT

## 2022-11-23 ENCOUNTER — PATIENT MESSAGE (OUTPATIENT)
Dept: PODIATRY | Facility: CLINIC | Age: 52
End: 2022-11-23
Payer: OTHER GOVERNMENT

## 2023-01-06 DIAGNOSIS — M79.18 MYOFASCIAL PAIN: ICD-10-CM

## 2023-01-06 DIAGNOSIS — M47.812 CERVICAL SPONDYLOSIS: ICD-10-CM

## 2023-01-06 DIAGNOSIS — M54.12 CERVICAL RADICULOPATHY: ICD-10-CM

## 2023-01-09 RX ORDER — CELECOXIB 200 MG/1
200 CAPSULE ORAL DAILY PRN
Qty: 30 CAPSULE | Refills: 5 | Status: SHIPPED | OUTPATIENT
Start: 2023-01-09 | End: 2023-12-13 | Stop reason: SDUPTHER

## 2023-01-10 ENCOUNTER — PATIENT MESSAGE (OUTPATIENT)
Dept: SURGERY | Facility: HOSPITAL | Age: 53
End: 2023-01-10
Payer: OTHER GOVERNMENT

## 2023-01-11 ENCOUNTER — TELEPHONE (OUTPATIENT)
Dept: PODIATRY | Facility: CLINIC | Age: 53
End: 2023-01-11
Payer: OTHER GOVERNMENT

## 2023-01-11 ENCOUNTER — PATIENT MESSAGE (OUTPATIENT)
Dept: SURGERY | Facility: HOSPITAL | Age: 53
End: 2023-01-11
Payer: OTHER GOVERNMENT

## 2023-01-11 DIAGNOSIS — M19.072 ARTHRITIS OF LEFT SUBTALAR JOINT: Primary | ICD-10-CM

## 2023-01-11 NOTE — TELEPHONE ENCOUNTER
----- Message from Elías Aranda DPM sent at 1/11/2023  1:04 PM CST -----  Please assist patient with getting a follow up on 02/28 to reassess her ankle.    ThanksRustam

## 2023-01-11 NOTE — TELEPHONE ENCOUNTER
Spoke with Ms Cotton to assist her with scheduling an appt with Dr. Aranda for 2/28/23 as requested per doctor. Accepted appt date and time of 2/28/23 at 2:15 pm. Also of note, per Ms Lula she will need to reschedule her surgery to anytime after 3/10/23 due to work. Will let Dr Aranda and his  know and provide her with a new surgery date once it has been set. No other needs voiced. Encouraged to call if further assistance is needed

## 2023-01-12 ENCOUNTER — TELEPHONE (OUTPATIENT)
Dept: PODIATRY | Facility: CLINIC | Age: 53
End: 2023-01-12
Payer: OTHER GOVERNMENT

## 2023-01-12 NOTE — TELEPHONE ENCOUNTER
Spoke with Ms Lula to assist her with scheduling an appt with Dr. Aranda tomorrow 1/13/23 at 8:15 am per his request due to concern for her increased pain and redness to her foot. Accepted appt for tomorrow with no other needs voiced at this time. Encouraged to call if further assistance is needed.

## 2023-01-13 ENCOUNTER — OFFICE VISIT (OUTPATIENT)
Dept: PODIATRY | Facility: CLINIC | Age: 53
End: 2023-01-13
Payer: OTHER GOVERNMENT

## 2023-01-13 ENCOUNTER — HOSPITAL ENCOUNTER (OUTPATIENT)
Dept: RADIOLOGY | Facility: HOSPITAL | Age: 53
Discharge: HOME | End: 2023-01-13
Attending: PODIATRIST
Payer: OTHER GOVERNMENT

## 2023-01-13 VITALS
DIASTOLIC BLOOD PRESSURE: 88 MMHG | HEART RATE: 96 BPM | BODY MASS INDEX: 34.23 KG/M2 | HEIGHT: 66 IN | WEIGHT: 213 LBS | SYSTOLIC BLOOD PRESSURE: 118 MMHG

## 2023-01-13 DIAGNOSIS — M19.072 ARTHRITIS OF LEFT SUBTALAR JOINT: Primary | ICD-10-CM

## 2023-01-13 DIAGNOSIS — M25.572 LEFT ANKLE PAIN, UNSPECIFIED CHRONICITY: ICD-10-CM

## 2023-01-13 DIAGNOSIS — M19.072 ARTHRITIS OF LEFT SUBTALAR JOINT: ICD-10-CM

## 2023-01-13 DIAGNOSIS — M76.72 PERONEAL TENDONITIS, LEFT: ICD-10-CM

## 2023-01-13 DIAGNOSIS — S99.922A TOE INJURY, LEFT, INITIAL ENCOUNTER: ICD-10-CM

## 2023-01-13 PROCEDURE — 99999 PR PBB SHADOW E&M-EST. PATIENT-LVL IV: CPT | Mod: PBBFAC,,, | Performed by: PODIATRIST

## 2023-01-13 PROCEDURE — 73630 XR FOOT COMPLETE 3 VIEW LEFT: ICD-10-PCS | Mod: 26,LT,, | Performed by: RADIOLOGY

## 2023-01-13 PROCEDURE — 99214 OFFICE O/P EST MOD 30 MIN: CPT | Mod: S$PBB,,, | Performed by: PODIATRIST

## 2023-01-13 PROCEDURE — 99214 PR OFFICE/OUTPT VISIT, EST, LEVL IV, 30-39 MIN: ICD-10-PCS | Mod: S$PBB,,, | Performed by: PODIATRIST

## 2023-01-13 PROCEDURE — 99214 OFFICE O/P EST MOD 30 MIN: CPT | Mod: PBBFAC,PN | Performed by: PODIATRIST

## 2023-01-13 PROCEDURE — 99999 PR PBB SHADOW E&M-EST. PATIENT-LVL IV: ICD-10-PCS | Mod: PBBFAC,,, | Performed by: PODIATRIST

## 2023-01-13 PROCEDURE — 73630 X-RAY EXAM OF FOOT: CPT | Mod: TC,PN,LT

## 2023-01-13 PROCEDURE — 73630 X-RAY EXAM OF FOOT: CPT | Mod: 26,LT,, | Performed by: RADIOLOGY
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
## 2023-01-17 ENCOUNTER — PATIENT MESSAGE (OUTPATIENT)
Dept: ADMINISTRATIVE | Facility: HOSPITAL | Age: 53
End: 2023-01-17
Payer: OTHER GOVERNMENT

## 2023-01-18 ENCOUNTER — TELEPHONE (OUTPATIENT)
Dept: PODIATRY | Facility: CLINIC | Age: 53
End: 2023-01-18
Payer: OTHER GOVERNMENT

## 2023-01-18 NOTE — TELEPHONE ENCOUNTER
Spoke with Ms Cotton to assist with scheduling her CT at [facility] on 2/6/23 at 5:30pm per pt request. Will reach back out to patient in regard to scheduling virtual appt. No other needs voiced at this time.

## 2023-01-18 NOTE — TELEPHONE ENCOUNTER
Patient needs a follow up CT. Please remind her that I have been out of the office. Schedule a follow up virtual once the CT is done to review and further discuss.

## 2023-02-06 ENCOUNTER — HOSPITAL ENCOUNTER (OUTPATIENT)
Dept: RADIOLOGY | Facility: HOSPITAL | Age: 53
Discharge: HOME | End: 2023-02-06
Attending: PODIATRIST
Payer: OTHER GOVERNMENT

## 2023-02-06 ENCOUNTER — TELEPHONE (OUTPATIENT)
Dept: PODIATRY | Facility: CLINIC | Age: 53
End: 2023-02-06
Payer: OTHER GOVERNMENT

## 2023-02-06 DIAGNOSIS — M19.072 ARTHRITIS OF LEFT SUBTALAR JOINT: ICD-10-CM

## 2023-02-06 PROCEDURE — 73700 CT LOWER EXTREMITY W/O DYE: CPT | Mod: 26,LT,, | Performed by: RADIOLOGY

## 2023-02-06 PROCEDURE — 73700 CT ANKLE (INCLUDING HINDFOOT) WITHOUT CONTRAST LEFT: ICD-10-PCS | Mod: 26,LT,, | Performed by: RADIOLOGY

## 2023-02-06 PROCEDURE — 73700 CT LOWER EXTREMITY W/O DYE: CPT | Mod: TC,LT

## 2023-02-06 NOTE — TELEPHONE ENCOUNTER
"Called patient and because they have already received the approval from her insurance company that she would like to keep that appt today for CT.   I asked the patient if she would like another appt for her CT in [facility] at another date and time?  She stated, "no", she will keep her appt for today.      Tamica"

## 2023-02-06 NOTE — TELEPHONE ENCOUNTER
----- Message from Jessica Ro sent at 2/6/2023  9:21 AM CST -----  .Type:  Needs Medical Advice    Who Called: pt    Would the patient rather a call back or a response via [facility]? Call back  Best Call Back Number: [phone]  Additional Information:     Pt stated she would like to get her Ct scan moved to [facility] instead of [facility]

## 2023-02-07 ENCOUNTER — OFFICE VISIT (OUTPATIENT)
Dept: PODIATRY | Facility: CLINIC | Age: 53
End: 2023-02-07
Payer: OTHER GOVERNMENT

## 2023-02-07 ENCOUNTER — TELEPHONE (OUTPATIENT)
Dept: PODIATRY | Facility: CLINIC | Age: 53
End: 2023-02-07

## 2023-02-07 DIAGNOSIS — Z01.818 PREOP TESTING: ICD-10-CM

## 2023-02-07 DIAGNOSIS — D16.32 BENIGN NEOPLASM OF SHORT BONE OF LEFT LOWER EXTREMITY: Primary | ICD-10-CM

## 2023-02-07 DIAGNOSIS — M19.072 ARTHRITIS OF LEFT SUBTALAR JOINT: ICD-10-CM

## 2023-02-07 PROCEDURE — 99214 OFFICE O/P EST MOD 30 MIN: CPT | Mod: 57,95,, | Performed by: PODIATRIST

## 2023-02-07 PROCEDURE — 99214 PR OFFICE/OUTPT VISIT, EST, LEVL IV, 30-39 MIN: ICD-10-PCS | Mod: 57,95,, | Performed by: PODIATRIST

## 2023-02-07 RX ORDER — CEFAZOLIN SODIUM 2 G/50ML
2 SOLUTION INTRAVENOUS
Status: CANCELLED | OUTPATIENT
Start: 2023-02-07

## 2023-02-07 RX ORDER — SODIUM CHLORIDE 0.9 % (FLUSH) 0.9 %
10 SYRINGE (ML) INJECTION
Status: DISCONTINUED | OUTPATIENT
Start: 2023-02-07 | End: 2024-02-22
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
## 2023-02-07 NOTE — TELEPHONE ENCOUNTER
----- Message from Elías Aranda DPM sent at 2/7/2023  3:07 PM CST -----  Patient scheduled 03/24/23. Please book out morning clinic for her surgery and 1 week follow up.    Thanks,  Max.

## 2023-02-07 NOTE — TELEPHONE ENCOUNTER
Spoke with Ms Cotton to confirm if she was aware that her surgery is for 3/24/23. Per Ms Cotton she is aware and that she is fine with post op appts being scheduled for after 1 pm. Reinforced that she will need to see her PCP within 30 days prior to her procedure and will need medical clearance within 1 week prior. Verbalized understanding without any other needs voiced at this time. Encouraged to call if further assistance is needed
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
## 2023-03-03 ENCOUNTER — OFFICE VISIT (OUTPATIENT)
Dept: URGENT CARE | Facility: CLINIC | Age: 53
End: 2023-03-03
Payer: OTHER GOVERNMENT

## 2023-03-03 VITALS
HEART RATE: 89 BPM | HEIGHT: 66 IN | WEIGHT: 213 LBS | DIASTOLIC BLOOD PRESSURE: 85 MMHG | RESPIRATION RATE: 18 BRPM | OXYGEN SATURATION: 95 % | TEMPERATURE: 98 F | BODY MASS INDEX: 34.23 KG/M2 | SYSTOLIC BLOOD PRESSURE: 145 MMHG

## 2023-03-03 DIAGNOSIS — R05.9 COUGH, UNSPECIFIED TYPE: Primary | ICD-10-CM

## 2023-03-03 DIAGNOSIS — R09.81 NASAL CONGESTION: ICD-10-CM

## 2023-03-03 DIAGNOSIS — H66.93 OTITIS MEDIA FOLLOW-UP, NOT RESOLVED, BILATERAL: ICD-10-CM

## 2023-03-03 LAB
CTP QC/QA: YES
SARS-COV-2 AG RESP QL IA.RAPID: NEGATIVE

## 2023-03-03 PROCEDURE — 87811 SARS-COV-2 COVID19 W/OPTIC: CPT | Mod: QW,S$GLB,, | Performed by: FAMILY MEDICINE

## 2023-03-03 PROCEDURE — 99213 PR OFFICE/OUTPT VISIT, EST, LEVL III, 20-29 MIN: ICD-10-PCS | Mod: S$GLB,,, | Performed by: FAMILY MEDICINE

## 2023-03-03 PROCEDURE — 87811 SARS CORONAVIRUS 2 ANTIGEN POCT, MANUAL READ: ICD-10-PCS | Mod: QW,S$GLB,, | Performed by: FAMILY MEDICINE

## 2023-03-03 PROCEDURE — 99213 OFFICE O/P EST LOW 20 MIN: CPT | Mod: S$GLB,,, | Performed by: FAMILY MEDICINE

## 2023-03-03 RX ORDER — CYCLOBENZAPRINE HCL 5 MG
TABLET ORAL
COMMUNITY
Start: 2022-09-01 | End: 2024-02-22

## 2023-03-03 RX ORDER — POLYETHYLENE GLYCOL 3350, SODIUM SULFATE, SODIUM CHLORIDE, POTASSIUM CHLORIDE, SODIUM ASCORBATE, AND ASCORBIC ACID 7.5-2.691G
KIT ORAL
COMMUNITY
Start: 2022-09-06 | End: 2024-02-22

## 2023-03-03 RX ORDER — IPRATROPIUM BROMIDE 21 UG/1
2 SPRAY, METERED NASAL 2 TIMES DAILY PRN
Qty: 30 ML | Refills: 0 | Status: SHIPPED | OUTPATIENT
Start: 2023-03-03

## 2023-03-03 RX ORDER — IPRATROPIUM BROMIDE 21 UG/1
2 SPRAY, METERED NASAL 2 TIMES DAILY PRN
Qty: 30 ML | Refills: 0 | Status: SHIPPED | OUTPATIENT
Start: 2023-03-03 | End: 2024-02-22

## 2023-03-03 RX ORDER — AZITHROMYCIN 250 MG/1
500 TABLET, FILM COATED ORAL DAILY
Qty: 6 TABLET | Refills: 0 | Status: SHIPPED | OUTPATIENT
Start: 2023-03-03 | End: 2023-03-17

## 2023-03-03 NOTE — PROGRESS NOTES
Subjective:       Patient ID: Hillary Cotton is a 52 y.o. female.    Vitals:  vitals were not taken for this visit.     Chief Complaint: Nasal Congestion (Sinus infection for the past week. Need Steroid shot & Z-pac. - Entered by patient)    Pt states she has had symptoms for 8 days. Pt states her symptoms are worsening. Pt states she has had mild relief from otc meds.     Sinus Problem  This is a new problem. The current episode started 1 to 4 weeks ago. The problem has been gradually worsening since onset. There has been no fever. Her pain is at a severity of 4/10. The pain is mild. Associated symptoms include chills, congestion, coughing, ear pain, headaches, sinus pressure, sneezing and a sore throat. Treatments tried: bradley angelica plus. The treatment provided mild relief.     Constitution: Positive for chills and fatigue.   HENT:  Positive for ear pain, congestion, postnasal drip, sinus pressure and sore throat.    Respiratory:  Positive for cough.    Allergic/Immunologic: Positive for sneezing.   Neurological:  Positive for headaches.     Objective:      Physical Exam   Constitutional: She is oriented to person, place, and time. She appears ill. No distress.   HENT:   Head: Normocephalic and atraumatic.   Ears:   Right Ear: There is drainage, swelling and tenderness. Tympanic membrane is injected and erythematous.   Left Ear: There is drainage, swelling and tenderness. Tympanic membrane is injected and erythematous.   Nose: Rhinorrhea and congestion present.   Mouth/Throat: Mucous membranes are moist. No oropharyngeal exudate or posterior oropharyngeal erythema. Oropharynx is clear.   Eyes: Conjunctivae are normal. Pupils are equal, round, and reactive to light. Right eye exhibits no discharge. Extraocular movement intact   Neck: Neck supple. No neck rigidity present.   Cardiovascular: Normal rate, regular rhythm, normal heart sounds and normal pulses.   No murmur heard.  Pulmonary/Chest: Effort normal  and breath sounds normal.   Abdominal: Normal appearance and bowel sounds are normal. Soft.   Musculoskeletal: Normal range of motion.         General: No swelling or tenderness. Normal range of motion.   Neurological: no focal deficit. She is alert, oriented to person, place, and time and at baseline.   Skin: Skin is warm and dry. Capillary refill takes less than 2 seconds. jaundice  Psychiatric: Her behavior is normal. Mood, judgment and thought content normal.   Nursing note and vitals reviewed.      Assessment:Plan:     1. Cough, unspecified type  - SARS Coronavirus 2 Antigen, POCT Manual Read    2. Nasal congestion  - ipratropium (ATROVENT) 21 mcg (0.03 %) nasal spray; 2 sprays by Each Nostril route 2 (two) times daily as needed.  Dispense: 30 mL; Refill: 0  - ipratropium (ATROVENT) 21 mcg (0.03 %) nasal spray; 2 sprays by Each Nostril route 2 (two) times daily as needed for Rhinitis.  Dispense: 30 mL; Refill: 0    3. Otitis media follow-up, not resolved, bilateral  - azithromycin (Z-ANTONIA) 250 MG tablet; Take 2 tablets (500 mg total) by mouth once daily.  Dispense: 6 tablet; Refill: 0   All results discussed with pt prior to discharge from clinic

## 2023-03-17 ENCOUNTER — PATIENT MESSAGE (OUTPATIENT)
Dept: SURGERY | Facility: HOSPITAL | Age: 53
End: 2023-03-17
Payer: OTHER GOVERNMENT

## 2023-03-17 ENCOUNTER — ANESTHESIA EVENT (OUTPATIENT)
Dept: SURGERY | Facility: HOSPITAL | Age: 53
End: 2023-03-17
Payer: OTHER GOVERNMENT

## 2023-03-17 ENCOUNTER — HOSPITAL ENCOUNTER (OUTPATIENT)
Dept: PREADMISSION TESTING | Facility: HOSPITAL | Age: 53
Discharge: HOME | End: 2023-03-17
Attending: PODIATRIST
Payer: OTHER GOVERNMENT

## 2023-03-17 VITALS
RESPIRATION RATE: 16 BRPM | SYSTOLIC BLOOD PRESSURE: 133 MMHG | WEIGHT: 221 LBS | HEIGHT: 66 IN | OXYGEN SATURATION: 96 % | TEMPERATURE: 98 F | HEART RATE: 83 BPM | BODY MASS INDEX: 35.52 KG/M2 | DIASTOLIC BLOOD PRESSURE: 71 MMHG

## 2023-03-17 RX ORDER — LIDOCAINE HYDROCHLORIDE 10 MG/ML
1 INJECTION, SOLUTION EPIDURAL; INFILTRATION; INTRACAUDAL; PERINEURAL ONCE
Status: CANCELLED | OUTPATIENT
Start: 2023-03-17 | End: 2023-03-17

## 2023-03-17 RX ORDER — SODIUM CHLORIDE, SODIUM LACTATE, POTASSIUM CHLORIDE, CALCIUM CHLORIDE 600; 310; 30; 20 MG/100ML; MG/100ML; MG/100ML; MG/100ML
INJECTION, SOLUTION INTRAVENOUS CONTINUOUS
Status: CANCELLED | OUTPATIENT
Start: 2023-03-17

## 2023-03-17 NOTE — ANESTHESIA PREPROCEDURE EVALUATION
"                                                                                                             03/17/2023  Hillary Cotton is a 52 y.o., female scheduled for ARTHROSCOPY, FOOT (Left: Foot) on 3/24/23.    Per family medicine Dr. Barboza, " (scheduled 3/20/23)    Past Medical History:   Diagnosis Date    Abnormal Pap smear     Allergy     Asthma     Sinusitis, chronic      Past Surgical History:   Procedure Laterality Date    COLONOSCOPY N/A 11/11/2022    Procedure: COLONOSCOPY Moviprep;  Surgeon: John Bonner MD;  Location: [facility] ENDO;  Service: Endoscopy;  Laterality: N/A;    EPIDURAL STEROID INJECTION N/A 6/5/2020    Procedure: INJECTION, STEROID, EPIDURAL,C7-T1;  Surgeon: Augusto Hussein MD;  Location: [facility]GT;  Service: Pain Management;  Laterality: N/A;    EPIDURAL STEROID INJECTION N/A 9/18/2020    Procedure: INJECTION, STEROID, EPIDURAL C7/T1;  Surgeon: Augusto Hussein MD;  Location: [facility];  Service: Pain Management;  Laterality: N/A;  INJECTION, STEROID, EPIDURAL C7/T1    FOOT MASS EXCISION Left 11/17/2021    Procedure: EXCISION, MASS, FOOT;  Surgeon: Elías Aranda DPM;  Location: [facility] OR;  Service: Podiatry;  Laterality: Left;  mini c-arm, Allograft bone [facility]  [facility] notified 11/8 CC  [employer] notified 11/16/21 AM    SALIVARY GLAND SURGERY      TONSILLECTOMY       Current Outpatient Medications   Medication Instructions    azithromycin (Z-ANTONIA) 500 mg, Oral, Daily    celecoxib (CELEBREX) 200 mg, Oral, Daily PRN    cephALEXin (KEFLEX) 500 mg, Oral, 4 times daily    cetirizine (ZYRTEC) 10 mg, Oral, Daily    cyclobenzaprine (FLEXERIL) 5 MG tablet No dose, route, or frequency recorded.    erythromycin (ROMYCIN) ophthalmic ointment Right Eye, 3 times daily    gabapentin (NEURONTIN) 300 mg, Oral, Nightly    glucosamine-chondroitin 500-400 mg tablet 1 tablet, Oral, 3 times daily    hydrOXYzine HCL (ATARAX) 25 MG tablet No dose, route, or " frequency recorded.    ipratropium (ATROVENT) 21 mcg (0.03 %) nasal spray 2 sprays, Each Nostril, 2 times daily PRN    ipratropium (ATROVENT) 21 mcg (0.03 %) nasal spray 2 sprays, Each Nostril, 2 times daily PRN    multivitamin capsule 1 capsule, Oral, Daily    polyethylene glycol (MOVIPREP) 100-7.5-2.691 gram solution No dose, route, or frequency recorded.       Pre-op Assessment    I have reviewed the Patient Summary Reports.     I have reviewed the Nursing Notes. I have reviewed the NPO Status.   I have reviewed the Medications.     Review of Systems  Anesthesia Hx:  No problems with previous Anesthesia  History of prior surgery of interest to airway management or planning:  Denies Personal Hx of Anesthesia complications.   Social:  Former Smoker, Social Alcohol Use    Hematology/Oncology:  Hematology Normal        Cardiovascular:   Exercise tolerance: good Denies Pacemaker.  Denies Hypertension.  Denies MI.  Denies CAD.     Denies Angina. hyperlipidemia    Pulmonary:   Asthma (Associated with seasonal allergies) asymptomatic Denies Shortness of breath.    Renal/:  Renal/ Normal     Hepatic/GI:  Hepatic/GI Normal  Denies GERD.    Musculoskeletal:   Arthritis     Neurological:   Denies TIA. Denies CVA. Neuromuscular Disease,  Denies Seizures.   Chronic Pain Syndrome   Endocrine:   BMI 35.5 Obesity / BMI > 30  Psych:  Psychiatric Normal           Physical Exam  General: Well nourished, Cooperative, Alert and Oriented    Airway:  Mallampati: I   Mouth Opening: Normal  TM Distance: Normal  Tongue: Normal  Neck ROM: Normal ROM    Dental:  Intact, Retainer    Chest/Lungs:  Clear to auscultation, Normal Respiratory Rate    Heart:  Rate: Normal  Rhythm: Regular Rhythm  Sounds: Normal      Lab Results   Component Value Date    WBC 7.70 11/04/2021    HGB 13.5 11/04/2021    HCT 44.7 11/04/2021     11/04/2021    CHOL 232 (H) 06/02/2021    TRIG 192 (H) 06/02/2021    HDL 45 06/02/2021    ALT 14 11/04/2021    AST  15 11/04/2021     11/04/2021    K 5.0 11/04/2021     11/04/2021    CREATININE 0.7 11/04/2021    BUN 10 11/04/2021    CO2 28 11/04/2021    TSH 2.075 06/02/2021    HGBA1C 5.6 07/17/2019     Results for orders placed or performed in visit on 11/04/21   IN OFFICE EKG 12-LEAD (to [place])    Collection Time: 11/04/21  7:39 AM    Narrative    Test Reason : Z01.818,    Vent. Rate : 072 BPM     Atrial Rate : 072 BPM     P-R Int : 142 ms          QRS Dur : 070 ms      QT Int : 400 ms       P-R-T Axes : 057 035 050 degrees     QTc Int : 438 ms    Normal sinus rhythm  Normal ECG  No previous ECGs available  Confirmed by Eran Elizalde MD ([id number]) on 11/15/2021 10:18:15 AM    Referred By:  MARIE RODRIGUEZ           Confirmed By:Eran Elizalde MD         Anesthesia Plan  Type of Anesthesia, risks & benefits discussed:    Anesthesia Type: Regional, Gen ETT  Intra-op Monitoring Plan: Standard ASA Monitors  Post Op Pain Control Plan: multimodal analgesia, IV/PO Opioids PRN and peripheral nerve block  Induction:  IV  Airway Plan: Direct and Video, Post-Induction  Informed Consent: Informed consent signed with the Patient and all parties understand the risks and agree with anesthesia plan.  All questions answered.   ASA Score: 2  Day of Surgery Review of History & Physical: H&P Update referred to the surgeon/provider.  Anesthesia Plan Notes:       Ready For Surgery From Anesthesia Perspective.     .

## 2023-03-17 NOTE — DISCHARGE INSTRUCTIONS
Your surgery is scheduled for 3/24/23.    Please report to Hospital [facility] Lobby on the 1st Floor at 0530 a.m.    THIS TIME IS SUBJECT TO CHANGE.  YOU WILL RECEIVE A PHONE CALL THE DAY BEFORE SURGERY BY 3:30 PM TO CONFIRM YOUR TIME OF ARRIVAL.  IF YOU HAVE NOT RECEIVED A PHONE CALL BY 3:30 PM THE DAY BEFORE YOUR SURGERY PLEASE CALL [phone]     INSTRUCTIONS IMPORTANT!!!  ¨ Do not eat or drink after 12 midnight-including water, candy, gum, & mints. OK to brush teeth.      ____  Proceed to [facility] on *** for additional testing.        ____  Do not wear makeup, including mascara.  ____  No powder, lotions or creams to surgical area.  ____  Please remove all jewelry, including piercings and leave at home.  ____  No money or valuables needed. Please leave at home.  ____  Please bring any documents given by your doctor.  ____  If going home the same day, arrange for a ride home. You will not be able to             drive if Anesthesia was used.  ____  Children under 18 years require a parent / guardian present the entire time             they are in surgery / recovery.  ____  Wear loose fitting clothing. Allow for dressings, bandages.  ____  Stop Aspirin, Ibuprofen, Motrin, Aleve, Goody's/BC powders, Excedrine and Naproxen (NSAIDS) at least 3-5 days before surgery, unless otherwise instructed by your doctor, or the nurse.   You MAY use Tylenol/acetaminophen until day of surgery.  ____  Wash the surgical area with Hibiclens the night before surgery, and again the             morning of surgery.  Be sure to rinse hibiclens off completely (if instructed by   nurse).  ____  If you take diabetic medication, do not take am of surgery unless instructed by Doctor.  ____  Call MD for temperature above 101 degrees or any other signs of infection such as Urinary (bladder) infection, Upper respiratory infection, skin boils, etc.  ____ Stop taking any Fish Oil supplement or any Vitamins that contain Vitamin E at  least 5 days prior to surgery.  ____ Do Not wear your contact lenses the day of your procedure.  You may wear your glasses.      ____Do not shave surgical site for 3 days prior to surgery.  ____ Practice Good hand washing before, during, and after procedure.      I have read or had read and explained to me, and understand the above information.  Additional comments or instructions:  For additional questions call [phone]      ANESTHESIA SIDE EFFECTS  -For the first 24 hours after surgery:  Do not drive, use heavy equipment, make important decisions, or drink alcohol  -It is normal to feel sleepy for several hours.  Rest until you are more awake.  -Have someone stay with you, if needed.  They can watch for problems and help keep you safe.  -Some possible post anesthesia side effects include: nausea and vomiting, sore throat and hoarseness, sleepiness, and dizziness.        Pre-Op Bathing Instructions    Before surgery, you can play an important role in your own health.    Because skin is not sterile, we need to be sure that your skin is as free of germs as possible. By following the instructions below, you can reduce the number of germs on your skin before surgery.    IMPORTANT: You will need to shower with a special soap called Hibiclens*, available at any pharmacy.  If you are allergic to Chlorhexidine (the antiseptic in Hibiclens), use an antibacterial soap such as Dial Soap for your preoperative shower.  You will shower with Hibiclens both the night before your surgery and the morning of your surgery.  Do not use Hibiclens on the head, face or genitals to avoid injury to those areas.    STEP #1: THE NIGHT BEFORE YOUR SURGERY     Do not shave the area of your body where your surgery will be performed.  Shower and wash your hair and body as usual with your normal soap and shampoo.  Rinse your hair and body thoroughly after you shower to remove all soap residue.  With your hand, apply one packet of Hibiclens soap to  the surgical site.   Wash the site gently for five (5) minutes. Do not scrub your skin too hard.   Do not wash with your regular soap after Hibiclens is used.  Rinse your body thoroughly.  Pat yourself dry with a clean, soft towel.  Do not use lotion, cream, or powder.  Wear clean clothes.    STEP #2: THE MORNING OF YOUR SURGERY     Repeat Step #1.    * Not to be used by people allergic to Chlorhexidine.

## 2023-03-17 NOTE — PRE-PROCEDURE INSTRUCTIONS
John [phone]      Allergies, medical, surgical, family and psychosocial histories reviewed with patient. Periop plan of care reviewed. Preop instructions given, including medications to take and to hold. Hibiclens soap and instructions on use given. Time allotted for questions to be addressed.  Patient verbalized understanding.

## 2023-03-20 ENCOUNTER — OFFICE VISIT (OUTPATIENT)
Dept: FAMILY MEDICINE | Facility: CLINIC | Age: 53
End: 2023-03-20
Payer: OTHER GOVERNMENT

## 2023-03-20 ENCOUNTER — LAB VISIT (OUTPATIENT)
Dept: LAB | Facility: HOSPITAL | Age: 53
End: 2023-03-20
Attending: FAMILY MEDICINE
Payer: OTHER GOVERNMENT

## 2023-03-20 VITALS
HEART RATE: 94 BPM | BODY MASS INDEX: 35.75 KG/M2 | SYSTOLIC BLOOD PRESSURE: 122 MMHG | DIASTOLIC BLOOD PRESSURE: 84 MMHG | WEIGHT: 222.44 LBS | OXYGEN SATURATION: 95 % | HEIGHT: 66 IN

## 2023-03-20 DIAGNOSIS — D16.32 BENIGN NEOPLASM OF SHORT BONE OF LEFT LOWER EXTREMITY: ICD-10-CM

## 2023-03-20 DIAGNOSIS — M19.072 ARTHRITIS OF LEFT SUBTALAR JOINT: ICD-10-CM

## 2023-03-20 DIAGNOSIS — Z01.818 PREOP EXAMINATION: ICD-10-CM

## 2023-03-20 DIAGNOSIS — Z01.818 PREOP EXAMINATION: Primary | ICD-10-CM

## 2023-03-20 DIAGNOSIS — Z01.818 PREOP TESTING: ICD-10-CM

## 2023-03-20 LAB
BILIRUB UR QL STRIP: NEGATIVE
CLARITY UR REFRACT.AUTO: CLEAR
COLOR UR AUTO: YELLOW
GLUCOSE UR QL STRIP: NEGATIVE
HGB UR QL STRIP: NEGATIVE
KETONES UR QL STRIP: NEGATIVE
LEUKOCYTE ESTERASE UR QL STRIP: NEGATIVE
NITRITE UR QL STRIP: NEGATIVE
PH UR STRIP: 7 [PH] (ref 5–8)
PROT UR QL STRIP: NEGATIVE
SP GR UR STRIP: 1.02 (ref 1–1.03)
URN SPEC COLLECT METH UR: NORMAL

## 2023-03-20 PROCEDURE — 99999 PR PBB SHADOW E&M-EST. PATIENT-LVL III: CPT | Mod: PBBFAC,,, | Performed by: FAMILY MEDICINE

## 2023-03-20 PROCEDURE — 99213 OFFICE O/P EST LOW 20 MIN: CPT | Mod: PBBFAC,PO | Performed by: FAMILY MEDICINE

## 2023-03-20 PROCEDURE — 99214 PR OFFICE/OUTPT VISIT, EST, LEVL IV, 30-39 MIN: ICD-10-PCS | Mod: S$PBB,,, | Performed by: FAMILY MEDICINE

## 2023-03-20 PROCEDURE — 99999 PR PBB SHADOW E&M-EST. PATIENT-LVL III: ICD-10-PCS | Mod: PBBFAC,,, | Performed by: FAMILY MEDICINE

## 2023-03-20 PROCEDURE — 93005 ELECTROCARDIOGRAM TRACING: CPT | Mod: PBBFAC,PO | Performed by: INTERNAL MEDICINE

## 2023-03-20 PROCEDURE — 93010 EKG 12-LEAD: ICD-10-PCS | Mod: S$PBB,,, | Performed by: INTERNAL MEDICINE

## 2023-03-20 PROCEDURE — 93010 ELECTROCARDIOGRAM REPORT: CPT | Mod: S$PBB,,, | Performed by: INTERNAL MEDICINE

## 2023-03-20 PROCEDURE — 99214 OFFICE O/P EST MOD 30 MIN: CPT | Mod: S$PBB,,, | Performed by: FAMILY MEDICINE

## 2023-03-20 PROCEDURE — 81003 URINALYSIS AUTO W/O SCOPE: CPT | Performed by: FAMILY MEDICINE

## 2023-03-20 NOTE — PROGRESS NOTES
Subjective:       Patient ID: Hillary Cotton is a 52 y.o. female.    Chief Complaint: Pre-op Exam    52 years old female came to the clinic for preoperative evaluation for left ankle surgery.  No chest pain, palpitation, orthopnea or PND.    Review of Systems   Constitutional: Negative.    HENT: Negative.     Eyes: Negative.    Respiratory: Negative.     Cardiovascular:  Negative for chest pain, palpitations, leg swelling and claudication.   Gastrointestinal: Negative.    Genitourinary: Negative.    Musculoskeletal: Negative.    Neurological: Negative.    Psychiatric/Behavioral: Negative.         Objective:      Physical Exam  Constitutional:       General: She is not in acute distress.     Appearance: She is well-developed. She is not diaphoretic.   HENT:      Head: Normocephalic and atraumatic.      Right Ear: External ear normal.      Left Ear: External ear normal.      Nose: Nose normal.      Mouth/Throat:      Pharynx: No oropharyngeal exudate.   Eyes:      General: No scleral icterus.        Right eye: No discharge.         Left eye: No discharge.      Conjunctiva/sclera: Conjunctivae normal.      Pupils: Pupils are equal, round, and reactive to light.   Neck:      Thyroid: No thyromegaly.      Vascular: No JVD.      Trachea: No tracheal deviation.   Cardiovascular:      Rate and Rhythm: Normal rate and regular rhythm.      Heart sounds: Normal heart sounds. No murmur heard.    No friction rub. No gallop.   Pulmonary:      Effort: Pulmonary effort is normal. No respiratory distress.      Breath sounds: Normal breath sounds. No stridor. No wheezing or rales.   Chest:      Chest wall: No tenderness.   Abdominal:      General: Bowel sounds are normal. There is no distension.      Palpations: Abdomen is soft. There is no mass.      Tenderness: There is no abdominal tenderness. There is no guarding or rebound.   Musculoskeletal:         General: No tenderness. Normal range of motion.      Cervical back:  Normal range of motion and neck supple.   Lymphadenopathy:      Cervical: No cervical adenopathy.   Skin:     General: Skin is warm and dry.      Coloration: Skin is not pale.      Findings: No erythema or rash.   Neurological:      Mental Status: She is alert and oriented to person, place, and time.      Cranial Nerves: No cranial nerve deficit.      Motor: No abnormal muscle tone.      Coordination: Coordination normal.      Deep Tendon Reflexes: Reflexes are normal and symmetric.   Psychiatric:         Behavior: Behavior normal.         Thought Content: Thought content normal.         Judgment: Judgment normal.       Assessment:       Problem List Items Addressed This Visit    None  Visit Diagnoses       Preop examination    -  Primary    Relevant Orders    Comprehensive Metabolic Panel    CBC Auto Differential    Urinalysis    IN OFFICE EKG 12-LEAD (to [place])              Plan:           Hillary was seen today for pre-op exam.    Diagnoses and all orders for this visit:    Preop examination  -     Comprehensive Metabolic Panel; Future  -     CBC Auto Differential; Future  -     Urinalysis; Future  -     IN OFFICE EKG 12-LEAD (to [place])

## 2023-03-20 NOTE — H&P (VIEW-ONLY)
Subjective:       Patient ID: Hillary Cotton is a 52 y.o. female.    Chief Complaint: Pre-op Exam    52 years old female came to the clinic for preoperative evaluation for left ankle surgery.  No chest pain, palpitation, orthopnea or PND.    Review of Systems   Constitutional: Negative.    HENT: Negative.     Eyes: Negative.    Respiratory: Negative.     Cardiovascular:  Negative for chest pain, palpitations, leg swelling and claudication.   Gastrointestinal: Negative.    Genitourinary: Negative.    Musculoskeletal: Negative.    Neurological: Negative.    Psychiatric/Behavioral: Negative.         Objective:      Physical Exam  Constitutional:       General: She is not in acute distress.     Appearance: She is well-developed. She is not diaphoretic.   HENT:      Head: Normocephalic and atraumatic.      Right Ear: External ear normal.      Left Ear: External ear normal.      Nose: Nose normal.      Mouth/Throat:      Pharynx: No oropharyngeal exudate.   Eyes:      General: No scleral icterus.        Right eye: No discharge.         Left eye: No discharge.      Conjunctiva/sclera: Conjunctivae normal.      Pupils: Pupils are equal, round, and reactive to light.   Neck:      Thyroid: No thyromegaly.      Vascular: No JVD.      Trachea: No tracheal deviation.   Cardiovascular:      Rate and Rhythm: Normal rate and regular rhythm.      Heart sounds: Normal heart sounds. No murmur heard.    No friction rub. No gallop.   Pulmonary:      Effort: Pulmonary effort is normal. No respiratory distress.      Breath sounds: Normal breath sounds. No stridor. No wheezing or rales.   Chest:      Chest wall: No tenderness.   Abdominal:      General: Bowel sounds are normal. There is no distension.      Palpations: Abdomen is soft. There is no mass.      Tenderness: There is no abdominal tenderness. There is no guarding or rebound.   Musculoskeletal:         General: No tenderness. Normal range of motion.      Cervical back:  Normal range of motion and neck supple.   Lymphadenopathy:      Cervical: No cervical adenopathy.   Skin:     General: Skin is warm and dry.      Coloration: Skin is not pale.      Findings: No erythema or rash.   Neurological:      Mental Status: She is alert and oriented to person, place, and time.      Cranial Nerves: No cranial nerve deficit.      Motor: No abnormal muscle tone.      Coordination: Coordination normal.      Deep Tendon Reflexes: Reflexes are normal and symmetric.   Psychiatric:         Behavior: Behavior normal.         Thought Content: Thought content normal.         Judgment: Judgment normal.       Assessment:       Problem List Items Addressed This Visit    None  Visit Diagnoses       Preop examination    -  Primary    Relevant Orders    Comprehensive Metabolic Panel    CBC Auto Differential    Urinalysis    IN OFFICE EKG 12-LEAD (to [place])              Plan:           Hillary was seen today for pre-op exam.    Diagnoses and all orders for this visit:    Preop examination  -     Comprehensive Metabolic Panel; Future  -     CBC Auto Differential; Future  -     Urinalysis; Future  -     IN OFFICE EKG 12-LEAD (to [place])

## 2023-03-24 ENCOUNTER — ANESTHESIA (OUTPATIENT)
Dept: SURGERY | Facility: HOSPITAL | Age: 53
End: 2023-03-24
Payer: OTHER GOVERNMENT

## 2023-03-24 ENCOUNTER — HOSPITAL ENCOUNTER (OUTPATIENT)
Facility: HOSPITAL | Age: 53
Discharge: HOME | End: 2023-03-24
Attending: PODIATRIST | Admitting: PODIATRIST
Payer: OTHER GOVERNMENT

## 2023-03-24 DIAGNOSIS — D16.32 BENIGN NEOPLASM OF SHORT BONE OF LEFT LOWER EXTREMITY: ICD-10-CM

## 2023-03-24 DIAGNOSIS — M19.072 ARTHRITIS OF LEFT SUBTALAR JOINT: ICD-10-CM

## 2023-03-24 LAB
B-HCG UR QL: NEGATIVE
CTP QC/QA: YES

## 2023-03-24 PROCEDURE — D9220A PRA ANESTHESIA: Mod: ANES,,, | Performed by: STUDENT IN AN ORGANIZED HEALTH CARE EDUCATION/TRAINING PROGRAM

## 2023-03-24 PROCEDURE — 63600175 PHARM REV CODE 636 W HCPCS: Mod: TB,JG | Performed by: PODIATRIST

## 2023-03-24 PROCEDURE — 25000003 PHARM REV CODE 250: Performed by: NURSE ANESTHETIST, CERTIFIED REGISTERED

## 2023-03-24 PROCEDURE — 27201423 OPTIME MED/SURG SUP & DEVICES STERILE SUPPLY: Performed by: PODIATRIST

## 2023-03-24 PROCEDURE — 64447 NJX AA&/STRD FEMORAL NRV IMG: CPT | Performed by: STUDENT IN AN ORGANIZED HEALTH CARE EDUCATION/TRAINING PROGRAM

## 2023-03-24 PROCEDURE — 63600175 PHARM REV CODE 636 W HCPCS: Performed by: NURSE ANESTHETIST, CERTIFIED REGISTERED

## 2023-03-24 PROCEDURE — D9220A PRA ANESTHESIA: ICD-10-PCS | Mod: CRNA,,, | Performed by: NURSE ANESTHETIST, CERTIFIED REGISTERED

## 2023-03-24 PROCEDURE — C1889 IMPLANT/INSERT DEVICE, NOC: HCPCS | Performed by: PODIATRIST

## 2023-03-24 PROCEDURE — 71000033 HC RECOVERY, INTIAL HOUR: Performed by: PODIATRIST

## 2023-03-24 PROCEDURE — 88305 TISSUE EXAM BY PATHOLOGIST: CPT | Performed by: PATHOLOGY

## 2023-03-24 PROCEDURE — 87176 TISSUE HOMOGENIZATION CULTR: CPT | Performed by: PODIATRIST

## 2023-03-24 PROCEDURE — 87205 SMEAR GRAM STAIN: CPT | Performed by: PODIATRIST

## 2023-03-24 PROCEDURE — 01464 ANES ARTHRS PX ANKLE&/FOOT: CPT | Performed by: PODIATRIST

## 2023-03-24 PROCEDURE — 63600175 PHARM REV CODE 636 W HCPCS: Performed by: STUDENT IN AN ORGANIZED HEALTH CARE EDUCATION/TRAINING PROGRAM

## 2023-03-24 PROCEDURE — 71000015 HC POSTOP RECOV 1ST HR: Performed by: PODIATRIST

## 2023-03-24 PROCEDURE — 25000003 PHARM REV CODE 250: Performed by: STUDENT IN AN ORGANIZED HEALTH CARE EDUCATION/TRAINING PROGRAM

## 2023-03-24 PROCEDURE — 88305 TISSUE EXAM BY PATHOLOGIST: CPT | Mod: 26,,, | Performed by: PATHOLOGY

## 2023-03-24 PROCEDURE — 88311 PR  DECALCIFY TISSUE: ICD-10-PCS | Mod: 26,,, | Performed by: PATHOLOGY

## 2023-03-24 PROCEDURE — 88305 TISSUE EXAM BY PATHOLOGIST: ICD-10-PCS | Mod: 26,,, | Performed by: PATHOLOGY

## 2023-03-24 PROCEDURE — 25000003 PHARM REV CODE 250: Performed by: PODIATRIST

## 2023-03-24 PROCEDURE — 87206 SMEAR FLUORESCENT/ACID STAI: CPT | Performed by: PODIATRIST

## 2023-03-24 PROCEDURE — 28103 REMOVE/GRAFT FOOT LESION: CPT | Mod: 51,LT,, | Performed by: PODIATRIST

## 2023-03-24 PROCEDURE — 87070 CULTURE OTHR SPECIMN AEROBIC: CPT | Performed by: PODIATRIST

## 2023-03-24 PROCEDURE — D9220A PRA ANESTHESIA: Mod: CRNA,,, | Performed by: NURSE ANESTHETIST, CERTIFIED REGISTERED

## 2023-03-24 PROCEDURE — 36000709 HC OR TIME LEV III EA ADD 15 MIN: Performed by: PODIATRIST

## 2023-03-24 PROCEDURE — 64447 LEFT ADDUCTOR CANAL PERIPHERAL BLOCK: ICD-10-PCS | Mod: 59,LT,, | Performed by: STUDENT IN AN ORGANIZED HEALTH CARE EDUCATION/TRAINING PROGRAM

## 2023-03-24 PROCEDURE — D9220A PRA ANESTHESIA: ICD-10-PCS | Mod: ANES,,, | Performed by: STUDENT IN AN ORGANIZED HEALTH CARE EDUCATION/TRAINING PROGRAM

## 2023-03-24 PROCEDURE — 87116 MYCOBACTERIA CULTURE: CPT | Performed by: PODIATRIST

## 2023-03-24 PROCEDURE — 36000708 HC OR TIME LEV III 1ST 15 MIN: Performed by: PODIATRIST

## 2023-03-24 PROCEDURE — 64445 NJX AA&/STRD SCIATIC NRV IMG: CPT | Performed by: STUDENT IN AN ORGANIZED HEALTH CARE EDUCATION/TRAINING PROGRAM

## 2023-03-24 PROCEDURE — 87102 FUNGUS ISOLATION CULTURE: CPT | Performed by: PODIATRIST

## 2023-03-24 PROCEDURE — 37000009 HC ANESTHESIA EA ADD 15 MINS: Performed by: PODIATRIST

## 2023-03-24 PROCEDURE — 71000016 HC POSTOP RECOV ADDL HR: Performed by: PODIATRIST

## 2023-03-24 PROCEDURE — 88311 DECALCIFY TISSUE: CPT | Mod: 26,,, | Performed by: PATHOLOGY

## 2023-03-24 PROCEDURE — 37000008 HC ANESTHESIA 1ST 15 MINUTES: Performed by: PODIATRIST

## 2023-03-24 PROCEDURE — 28103: ICD-10-PCS | Mod: 51,LT,, | Performed by: PODIATRIST

## 2023-03-24 PROCEDURE — 87075 CULTR BACTERIA EXCEPT BLOOD: CPT | Performed by: PODIATRIST

## 2023-03-24 PROCEDURE — 64450 NJX AA&/STRD OTHER PN/BRANCH: CPT | Mod: 59,LT,, | Performed by: STUDENT IN AN ORGANIZED HEALTH CARE EDUCATION/TRAINING PROGRAM

## 2023-03-24 PROCEDURE — 64450 LEFT POPLITEAL PERIPHERAL BLOCK: ICD-10-PCS | Mod: 59,LT,, | Performed by: STUDENT IN AN ORGANIZED HEALTH CARE EDUCATION/TRAINING PROGRAM

## 2023-03-24 PROCEDURE — 29906 PR ARTHROSCOPY SUBTALAR JOINT WITH DEBRIDEMENT: ICD-10-PCS | Mod: LT,,, | Performed by: PODIATRIST

## 2023-03-24 PROCEDURE — 29906 SUBTALAR ARTHRO W/DEB: CPT | Mod: LT,,, | Performed by: PODIATRIST

## 2023-03-24 DEVICE — GEL ALLOSYNC DBM 5CC: Type: IMPLANTABLE DEVICE | Site: FOOT | Status: FUNCTIONAL

## 2023-03-24 RX ORDER — CEFAZOLIN SODIUM 1 G/3ML
INJECTION, POWDER, FOR SOLUTION INTRAMUSCULAR; INTRAVENOUS
Status: DISCONTINUED | OUTPATIENT
Start: 2023-03-24 | End: 2023-03-24

## 2023-03-24 RX ORDER — BUPIVACAINE HYDROCHLORIDE 5 MG/ML
INJECTION, SOLUTION EPIDURAL; INTRACAUDAL
Status: COMPLETED | OUTPATIENT
Start: 2023-03-24 | End: 2023-03-24

## 2023-03-24 RX ORDER — IPRATROPIUM BROMIDE AND ALBUTEROL SULFATE 2.5; .5 MG/3ML; MG/3ML
3 SOLUTION RESPIRATORY (INHALATION) EVERY 4 HOURS PRN
Status: DISCONTINUED | OUTPATIENT
Start: 2023-03-24 | End: 2023-03-24 | Stop reason: HOSPADM

## 2023-03-24 RX ORDER — FENTANYL CITRATE 50 UG/ML
25 INJECTION, SOLUTION INTRAMUSCULAR; INTRAVENOUS EVERY 5 MIN PRN
Status: DISCONTINUED | OUTPATIENT
Start: 2023-03-24 | End: 2023-03-24 | Stop reason: HOSPADM

## 2023-03-24 RX ORDER — FENTANYL CITRATE 50 UG/ML
INJECTION, SOLUTION INTRAMUSCULAR; INTRAVENOUS
Status: DISCONTINUED | OUTPATIENT
Start: 2023-03-24 | End: 2023-03-24

## 2023-03-24 RX ORDER — SODIUM CHLORIDE, SODIUM LACTATE, POTASSIUM CHLORIDE, CALCIUM CHLORIDE 600; 310; 30; 20 MG/100ML; MG/100ML; MG/100ML; MG/100ML
INJECTION, SOLUTION INTRAVENOUS CONTINUOUS
Status: DISCONTINUED | OUTPATIENT
Start: 2023-03-24 | End: 2023-03-24 | Stop reason: HOSPADM

## 2023-03-24 RX ORDER — PROPOFOL 10 MG/ML
VIAL (ML) INTRAVENOUS
Status: DISCONTINUED | OUTPATIENT
Start: 2023-03-24 | End: 2023-03-24

## 2023-03-24 RX ORDER — ACETAMINOPHEN 10 MG/ML
1000 INJECTION, SOLUTION INTRAVENOUS ONCE
Status: COMPLETED | OUTPATIENT
Start: 2023-03-24 | End: 2023-03-24

## 2023-03-24 RX ORDER — HYDROMORPHONE HYDROCHLORIDE 2 MG/ML
0.5 INJECTION, SOLUTION INTRAMUSCULAR; INTRAVENOUS; SUBCUTANEOUS EVERY 10 MIN PRN
Status: DISCONTINUED | OUTPATIENT
Start: 2023-03-24 | End: 2023-03-24 | Stop reason: HOSPADM

## 2023-03-24 RX ORDER — MIDAZOLAM HYDROCHLORIDE 1 MG/ML
INJECTION, SOLUTION INTRAMUSCULAR; INTRAVENOUS
Status: DISCONTINUED | OUTPATIENT
Start: 2023-03-24 | End: 2023-03-24

## 2023-03-24 RX ORDER — CEPHALEXIN 500 MG/1
500 CAPSULE ORAL 4 TIMES DAILY
Qty: 28 CAPSULE | Refills: 0 | Status: SHIPPED | OUTPATIENT
Start: 2023-03-24 | End: 2023-08-18

## 2023-03-24 RX ORDER — FENTANYL CITRATE 50 UG/ML
25-100 INJECTION, SOLUTION INTRAMUSCULAR; INTRAVENOUS
Status: DISCONTINUED | OUTPATIENT
Start: 2023-03-24 | End: 2023-03-24 | Stop reason: HOSPADM

## 2023-03-24 RX ORDER — HYDROCODONE BITARTRATE AND ACETAMINOPHEN 5; 325 MG/1; MG/1
1 TABLET ORAL EVERY 4 HOURS PRN
Status: DISCONTINUED | OUTPATIENT
Start: 2023-03-24 | End: 2023-03-24 | Stop reason: HOSPADM

## 2023-03-24 RX ORDER — CEFAZOLIN SODIUM 2 G/50ML
2 SOLUTION INTRAVENOUS
Status: DISCONTINUED | OUTPATIENT
Start: 2023-03-24 | End: 2023-03-24 | Stop reason: HOSPADM

## 2023-03-24 RX ORDER — ONDANSETRON 2 MG/ML
INJECTION INTRAMUSCULAR; INTRAVENOUS
Status: DISCONTINUED | OUTPATIENT
Start: 2023-03-24 | End: 2023-03-24

## 2023-03-24 RX ORDER — OXYCODONE AND ACETAMINOPHEN 10; 325 MG/1; MG/1
1 TABLET ORAL EVERY 4 HOURS PRN
Qty: 30 TABLET | Refills: 0 | Status: SHIPPED | OUTPATIENT
Start: 2023-03-24 | End: 2023-03-31

## 2023-03-24 RX ORDER — LIDOCAINE HYDROCHLORIDE 10 MG/ML
1 INJECTION, SOLUTION EPIDURAL; INFILTRATION; INTRACAUDAL; PERINEURAL ONCE
Status: DISCONTINUED | OUTPATIENT
Start: 2023-03-24 | End: 2023-03-24 | Stop reason: HOSPADM

## 2023-03-24 RX ORDER — METHOCARBAMOL 500 MG/1
1000 TABLET, FILM COATED ORAL ONCE
Status: COMPLETED | OUTPATIENT
Start: 2023-03-24 | End: 2023-03-24

## 2023-03-24 RX ORDER — CELECOXIB 100 MG/1
200 CAPSULE ORAL ONCE
Status: COMPLETED | OUTPATIENT
Start: 2023-03-24 | End: 2023-03-24

## 2023-03-24 RX ORDER — LIDOCAINE HYDROCHLORIDE 20 MG/ML
INJECTION INTRAVENOUS
Status: DISCONTINUED | OUTPATIENT
Start: 2023-03-24 | End: 2023-03-24

## 2023-03-24 RX ORDER — PROCHLORPERAZINE EDISYLATE 5 MG/ML
5 INJECTION INTRAMUSCULAR; INTRAVENOUS EVERY 30 MIN PRN
Status: DISCONTINUED | OUTPATIENT
Start: 2023-03-24 | End: 2023-03-24 | Stop reason: HOSPADM

## 2023-03-24 RX ORDER — ROCURONIUM BROMIDE 10 MG/ML
INJECTION, SOLUTION INTRAVENOUS
Status: DISCONTINUED | OUTPATIENT
Start: 2023-03-24 | End: 2023-03-24

## 2023-03-24 RX ORDER — PHENYLEPHRINE HYDROCHLORIDE 10 MG/ML
INJECTION INTRAVENOUS
Status: DISCONTINUED | OUTPATIENT
Start: 2023-03-24 | End: 2023-03-24

## 2023-03-24 RX ADMIN — LIDOCAINE HYDROCHLORIDE 50 MG: 20 INJECTION, SOLUTION INTRAVENOUS at 09:03

## 2023-03-24 RX ADMIN — FENTANYL CITRATE 50 MCG: 50 INJECTION, SOLUTION INTRAMUSCULAR; INTRAVENOUS at 09:03

## 2023-03-24 RX ADMIN — MIDAZOLAM 2 MG: 1 INJECTION INTRAMUSCULAR; INTRAVENOUS at 08:03

## 2023-03-24 RX ADMIN — BUPIVACAINE HYDROCHLORIDE 15 ML: 5 INJECTION, SOLUTION EPIDURAL; INTRACAUDAL at 08:03

## 2023-03-24 RX ADMIN — METHOCARBAMOL TABLETS 1000 MG: 500 TABLET, COATED ORAL at 01:03

## 2023-03-24 RX ADMIN — SUGAMMADEX 200 MG: 100 INJECTION, SOLUTION INTRAVENOUS at 12:03

## 2023-03-24 RX ADMIN — HYDROMORPHONE HYDROCHLORIDE 0.5 MG: 2 INJECTION INTRAMUSCULAR; INTRAVENOUS; SUBCUTANEOUS at 12:03

## 2023-03-24 RX ADMIN — PHENYLEPHRINE HYDROCHLORIDE 100 MCG: 10 INJECTION INTRAVENOUS at 10:03

## 2023-03-24 RX ADMIN — GLYCOPYRROLATE 0.2 MG: 0.2 INJECTION, SOLUTION INTRAMUSCULAR; INTRAVITREAL at 09:03

## 2023-03-24 RX ADMIN — CEFAZOLIN 2 G: 330 INJECTION, POWDER, FOR SOLUTION INTRAMUSCULAR; INTRAVENOUS at 09:03

## 2023-03-24 RX ADMIN — ROCURONIUM BROMIDE 50 MG: 10 INJECTION, SOLUTION INTRAVENOUS at 09:03

## 2023-03-24 RX ADMIN — BUPIVACAINE HYDROCHLORIDE 27 ML: 5 INJECTION, SOLUTION EPIDURAL; INTRACAUDAL; PERINEURAL at 08:03

## 2023-03-24 RX ADMIN — FENTANYL CITRATE 50 MCG: 50 INJECTION INTRAMUSCULAR; INTRAVENOUS at 04:03

## 2023-03-24 RX ADMIN — PROPOFOL 150 MG: 10 INJECTION, EMULSION INTRAVENOUS at 09:03

## 2023-03-24 RX ADMIN — HYDROMORPHONE HYDROCHLORIDE 0.5 MG: 2 INJECTION INTRAMUSCULAR; INTRAVENOUS; SUBCUTANEOUS at 01:03

## 2023-03-24 RX ADMIN — ONDANSETRON 8 MG: 2 INJECTION, SOLUTION INTRAMUSCULAR; INTRAVENOUS at 11:03

## 2023-03-24 RX ADMIN — HYDROCODONE BITARTRATE AND ACETAMINOPHEN 1 TABLET: 5; 325 TABLET ORAL at 12:03

## 2023-03-24 RX ADMIN — ACETAMINOPHEN 1000 MG: 10 INJECTION INTRAVENOUS at 12:03

## 2023-03-24 RX ADMIN — SODIUM CHLORIDE, SODIUM LACTATE, POTASSIUM CHLORIDE, AND CALCIUM CHLORIDE: .6; .31; .03; .02 INJECTION, SOLUTION INTRAVENOUS at 09:03

## 2023-03-24 RX ADMIN — CELECOXIB 200 MG: 100 CAPSULE ORAL at 03:03

## 2023-03-24 NOTE — ANESTHESIA PROCEDURE NOTES
Left Popliteal Peripheral Block    Patient location during procedure: pre-op   Block not for primary anesthetic.  Reason for block: at surgeon's request and post-op pain management   Post-op Pain Location: Left foot   Start time: 3/24/2023 2:50 PM  Timeout: 3/24/2023 2:45 PM   End time: 3/24/2023 3:09 PM    Staffing  Authorizing Provider: Johnathan Bah MD  Performing Provider: Johnathan Bah MD    Preanesthetic Checklist  Completed: patient identified, IV checked, site marked, risks and benefits discussed, surgical consent, monitors and equipment checked, pre-op evaluation and timeout performed  Peripheral Block  Patient position: supine  Prep: ChloraPrep  Patient monitoring: heart rate, cardiac monitor, continuous pulse ox, continuous capnometry and frequent blood pressure checks  Block type: popliteal  Laterality: left  Injection technique: single shot  Needle  Needle type: Stimuplex   Needle gauge: 20 G  Needle length: 4 in  Needle localization: anatomical landmarks, ultrasound guidance and nerve stimulator   -ultrasound image captured on disc.  Assessment  Injection assessment: negative aspiration, negative parasthesia and local visualized surrounding nerve  Paresthesia pain: none  Heart rate change: no  Slow fractionated injection: yes  Pain Tolerance: comfortable throughout block      Additional Notes  VSS.  [facility] RN monitoring vitals throughout procedure.  Patient tolerated procedure well.    Pt in holding area with continued pain. Complaining of pain in left foot (top), thigh over tourniquet site, and knee.  Discussed with patient and she opted to have blocks repeated for analgesia.  Fentanyl IV administered for relief during procedure.     Lidocaine 1% 2 ML for skin injection   Ropivacaine 0.25% 20 mL perineural      Nerve stimulator used with response in foot.  Upon injection around nerve, it was seen splitting into the peroneal and tibial nerves.   Previously, the peroneal nerve was thought to be  lateral and blocked at a different location.      At the conclusion of the case, pt reported relief of pain in her foot.

## 2023-03-24 NOTE — TRANSFER OF CARE
"Anesthesia Transfer of Care Note    Patient: Hillary Cotton    Procedure(s) Performed: Procedure(s) (LRB):  ARTHROSCOPY, FOOT (Left)    Patient location: PACU    Anesthesia Type: general    Transport from OR: Transported from OR on 6-10 L/min O2 by face mask with adequate spontaneous ventilation    Post pain: adequate analgesia    Post assessment: no apparent anesthetic complications and tolerated procedure well    Post vital signs: stable    Level of consciousness: awake and alert    Nausea/Vomiting: no nausea/vomiting    Complications: none    Transfer of care protocol was followed      Last vitals:   Visit Vitals  /66   Pulse 79   Temp 36.9 °C (98.4 °F) (Skin)   Resp 16   Ht 5' 6" (1.676 m)   Wt 99.8 kg (220 lb)   LMP 03/21/2023 (Exact Date)   SpO2 (!) 94%   Breastfeeding No   BMI 35.51 kg/m²     "

## 2023-03-24 NOTE — BRIEF OP NOTE
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 INJECTED PRP INTO THE SUBTALAR JOINT LEFT    Anesthesia: Regional    Operative Findings: cyst measured approximately 1.1 x 2.3 x 1.3 cm and then same size graft harvested from tibia and implanted. Dense hypertrophic synovial tissue, adipose and fat bands in the sinus tarsi were debrided. Cartilage from posterior and middle facets directly visualized and appeared healthy. Intraosseous ligament appeared frayed and split.     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 Left Calcaneous Bone- biopsy     References:    Click here for ordering Quick Tip   Question Answer Comment   Procedure Type: General Surgery    Specimen Class: Routine/Screening    Which provider would you like to cc? ELÍAS ABDUL    Release to patient Immediate        03/24/23 1202                      Discharge Note    OUTCOME: Patient tolerated treatment/procedure well without complication and is now ready for discharge.    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

## 2023-03-24 NOTE — ANESTHESIA POSTPROCEDURE EVALUATION
Anesthesia Post Evaluation    Patient: Hillary Cotton    Procedure(s) Performed: Procedure(s) (LRB):  ARTHROSCOPY, FOOT (Left)    Final Anesthesia Type: general      Patient location during evaluation: PACU  Patient participation: Yes- Able to Participate  Level of consciousness: awake and alert and oriented  Post-procedure vital signs: reviewed and stable  Pain management: adequate  Airway patency: patent    PONV status at discharge: No PONV  Anesthetic complications: no      Cardiovascular status: blood pressure returned to baseline, hemodynamically stable and stable  Respiratory status: unassisted, spontaneous ventilation and face mask  Hydration status: euvolemic  Follow-up not needed.          Vitals Value Taken Time   /64 03/24/23 1226   Temp 98* 03/24/23 1230   Pulse 96 03/24/23 1230   Resp 14 03/24/23 1230   SpO2 97 % 03/24/23 1230   Vitals shown include unvalidated device data.      No case tracking events are documented in the log.      Pain/Erin Score: Pain Rating Prior to Med Admin: 8 (3/24/2023 12:28 PM)  Erin Score: 10 (3/24/2023  8:55 AM)

## 2023-03-24 NOTE — PLAN OF CARE
Pt reports much improvement with pain. Ready to go home.  Meets criteria for discharge. VSS, AAOx3. Tolerating po fluids without nausea. IV discontinued with tip intact.Discharge instructions given. Time allowed for questions. Verbalizes understanding. Home prescriptions received from Pharmacy. Discharged to home in good condition.

## 2023-03-24 NOTE — ANESTHESIA POSTPROCEDURE EVALUATION
Anesthesia Post Evaluation    Patient: Hillary Cotton    Procedure(s) Performed: Procedure(s) (LRB):  ARTHROSCOPY, FOOT (Left)    Final Anesthesia Type: general (with regional)      Patient location during evaluation: PACU  Patient participation: Yes- Able to Participate  Level of consciousness: awake and awake and alert  Post-procedure vital signs: reviewed and stable  Pain management: adequate  Airway patency: patent  HUMZA mitigation strategies: Extubation while patient is awake, Verification of full reversal of neuromuscular block and Multimodal analgesia  PONV status at discharge: No PONV  Anesthetic complications: no      Cardiovascular status: blood pressure returned to baseline  Respiratory status: unassisted  Hydration status: euvolemic  Follow-up not needed.  Comments:      Despite medications, pt with significant pain, located in Left foot, knee, and thigh over tourniquet site.   After discussing options, we agreed to repeat the block and used the nerve stimulator with ultrasound during the procedure.  There was greater success in terms of analgesia and the patient was satisfied with the repeat procedure and relief upon discharge.           Vitals Value Taken Time   /65 03/24/23 1310   Temp 36.5 °C (97.7 °F) 03/24/23 1310   Pulse 86 03/24/23 1310   Resp 14 03/24/23 1310   SpO2 100 % 03/24/23 1310         Event Time   Out of Recovery 13:07:06         Pain/Erin Score: Pain Rating Prior to Med Admin: 5 (3/24/2023  3:28 PM)  Erin Score: 8 (3/24/2023  1:10 PM)

## 2023-03-24 NOTE — ANESTHESIA PROCEDURE NOTES
Intubation    Date/Time: 3/24/2023 9:14 AM  Performed by: Kathy Lomax CRNA  Authorized by: Johnathan Bah MD     Intubation:     Induction:  Intravenous    Intubated:  Postinduction    Mask Ventilation:  Easy mask    Attempts:  1    Attempted By:  CRNA    Method of Intubation:  Video laryngoscopy    Blade:  Bee 3    Laryngeal View Grade: Grade I - full view of cords      Difficult Airway Encountered?: No      Complications:  None    Airway Device:  Oral endotracheal tube    Airway Device Size:  7.5    Style/Cuff Inflation:  Cuffed    Inflation Amount (mL):  6    Tube secured:  21    Secured at:  The lips    Placement Verified By:  Capnometry    Complicating Factors:  None    Findings Post-Intubation:  BS equal bilateral and atraumatic/condition of teeth unchanged

## 2023-03-24 NOTE — ADDENDUM NOTE
Addendum  created 03/24/23 1531 by Johnathan Bah MD    Attestation recorded in Intraprocedure, Child order released for a procedure order, Clinical Note Signed, Intraprocedure Attestations filed, Intraprocedure Blocks edited, SmartForm saved

## 2023-03-24 NOTE — ADDENDUM NOTE
Addendum  created 03/24/23 1518 by Johnathan Bah MD    Order list changed, Pharmacy for encounter modified

## 2023-03-24 NOTE — ANESTHESIA PROCEDURE NOTES
Left Popliteal Peripheral Block    Patient location during procedure: pre-op   Block not for primary anesthetic.  Reason for block: at surgeon's request and post-op pain management   Post-op Pain Location: Left foot   Start time: 3/24/2023 8:28 AM  Timeout: 3/24/2023 8:23 AM   End time: 3/24/2023 8:56 AM    Staffing  Authorizing Provider: Johnathan Bah MD  Performing Provider: Johnathan Bah MD    Preanesthetic Checklist  Completed: patient identified, IV checked, site marked, risks and benefits discussed, surgical consent, monitors and equipment checked, pre-op evaluation and timeout performed  Peripheral Block  Patient position: supine  Prep: ChloraPrep  Patient monitoring: heart rate, cardiac monitor, continuous pulse ox, continuous capnometry and frequent blood pressure checks  Block type: popliteal  Laterality: left  Injection technique: single shot  Needle  Needle type: Stimuplex   Needle gauge: 20 G  Needle length: 4 in  Needle localization: anatomical landmarks and ultrasound guidance   -ultrasound image captured on disc.  Assessment  Injection assessment: negative aspiration, negative parasthesia and local visualized surrounding nerve  Paresthesia pain: none  Heart rate change: no  Slow fractionated injection: yes  Pain Tolerance: comfortable throughout block  Medications:    Medications: bupivacaine (pf) (MARCAINE) injection 0.5% - Perineural   27 mL - 3/24/2023 8:52:00 AM    Additional Notes  VSS.  [facility] RN monitoring vitals throughout procedure.  Patient tolerated procedure well.     Lidocaine 1% used for skin injection 2 mL     First, blocked above the popliteal fossa with 15 mL and then continued to the adductor canal block.   Pt noted that she didn't have posterior or lateral coverage, so I opted to revisit the popliteal site and addition 12 mL near the peroneal nerve for greater relief.

## 2023-03-24 NOTE — ANESTHESIA PROCEDURE NOTES
Left Adductor Canal Peripheral Block    Patient location during procedure: pre-op   Block not for primary anesthetic.  Reason for block: at surgeon's request and post-op pain management   Post-op Pain Location: Left foot and knee   Start time: 3/24/2023 2:50 PM  Timeout: 3/24/2023 2:45 PM   End time: 3/24/2023 3:09 PM    Staffing  Authorizing Provider: Johnathan Bah MD  Performing Provider: Johnathan Bah MD    Staffing  Other anesthesia staff: SB Sorenson MD  Preanesthetic Checklist  Completed: patient identified, IV checked, site marked, risks and benefits discussed, surgical consent, monitors and equipment checked, pre-op evaluation and timeout performed  Peripheral Block  Patient position: supine  Prep: ChloraPrep  Patient monitoring: heart rate, cardiac monitor, continuous pulse ox, continuous capnometry and frequent blood pressure checks  Block type: adductor canal  Laterality: left  Injection technique: single shot  Needle  Needle type: Stimuplex   Needle gauge: 20 G  Needle length: 4 in  Needle localization: anatomical landmarks, ultrasound guidance and nerve stimulator   -ultrasound image captured on disc.  Assessment  Injection assessment: negative aspiration, negative parasthesia and local visualized surrounding nerve  Paresthesia pain: none  Heart rate change: no  Slow fractionated injection: yes  Pain Tolerance: comfortable throughout block      Additional Notes  VSS.  [facility] RN monitoring vitals throughout procedure.  Patient tolerated procedure well.     Pt in holding area with continued pain. Complaining of pain in left foot (top), thigh over tourniquet site, and knee.  Discussed with patient and she opted to have blocks repeated for analgesia.  Fentanyl IV administered for relief during procedure.     Lidocaine 1% 1 ML for skin injection   Ropivacaine 0.25% 15 mL perineural      Nerve stimulator used with response in thigh that decreased upon needle movement. Incremental injections  occurred at the lateral border of the sartorius muscle.      At the conclusion of the case, pt reported relief of pain in her knee. Tourniquet pain will likely resolve throughout the evening and next day.

## 2023-03-24 NOTE — ANESTHESIA PROCEDURE NOTES
Left Adductor Peripheral Block    Patient location during procedure: pre-op   Block not for primary anesthetic.  Reason for block: at surgeon's request and post-op pain management   Post-op Pain Location: Left foot   Start time: 3/24/2023 8:28 AM  Timeout: 3/24/2023 8:23 AM   End time: 3/24/2023 8:56 AM    Staffing  Authorizing Provider: Johnathan Bah MD  Performing Provider: Johnathan Bah MD    Preanesthetic Checklist  Completed: patient identified, IV checked, site marked, risks and benefits discussed, surgical consent, monitors and equipment checked, pre-op evaluation and timeout performed  Peripheral Block  Patient position: supine  Prep: ChloraPrep  Patient monitoring: heart rate, cardiac monitor, continuous pulse ox, continuous capnometry and frequent blood pressure checks  Block type: adductor canal  Laterality: left  Injection technique: single shot  Needle  Needle type: Stimuplex   Needle gauge: 20 G  Needle length: 4 in  Needle localization: anatomical landmarks and ultrasound guidance   -ultrasound image captured on disc.  Assessment  Injection assessment: negative aspiration, negative parasthesia and local visualized surrounding nerve  Paresthesia pain: none  Heart rate change: no  Slow fractionated injection: yes    Medications:    Medications: bupivacaine (pf) (MARCAINE) injection 0.5% - Perineural   15 mL - 3/24/2023 8:35:00 AM    Additional Notes  VSS.  [facility] RN monitoring vitals throughout procedure.  Patient tolerated procedure well.

## 2023-03-24 NOTE — ADDENDUM NOTE
Addendum  created 03/24/23 1434 by Johnathan Bah MD    Order list changed, Pharmacy for encounter modified

## 2023-03-24 NOTE — PLAN OF CARE
Jeremiah Bah and Delta at bedside for post op nerve block placement.         1445Time out completed  1450 Procedure started  1509 Procedure completed.

## 2023-03-24 NOTE — ADDENDUM NOTE
Addendum  created 03/24/23 1327 by Johnathan Bah MD    Attestation recorded in Intraprocedure, Intraprocedure Attestations filed, Intraprocedure Event edited, Order list changed, Pharmacy for encounter modified       66
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
## 2023-03-25 LAB
GRAM STN SPEC: NORMAL
GRAM STN SPEC: NORMAL

## 2023-03-25 NOTE — OP NOTE
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 Dense hypertrophic synovial tissue, adipose and fat bands in the sinus tarsi were debrided. Cartilage from posterior and middle facets directly visualized and appeared healthy. Intraosseous ligament appeared frayed and split.     Description of Technical Procedures:     The patient was brought to the operating room on a stretcher and was transferred to the OR table in supine position. Anesthesia was induced.  A tourniquet was applied to the left thigh. Regional block per anesthesia. The left lower limb was prepped and draped in a sterile manner. Tourniquet(s) inflated to 300 mmHg.      Anatomical landmarks were marked with a skin marker prior to insufflation of tourniquet. A 15 blade was used to creat a 0.5cm vertical incision overlying the proximal aspect of the sinus tarsi slightly anterior to the lateral malleolus. Curved hemostat deepened the incision down to the joint. A blunt obturator and cannular was used to camargo and enter the joint capsule. The obturator was removed and 2.9 mm 30 deg scope secured into the cannula. Dense synovial tissue and  scar bands were noted in the sinus tarsi  and distal aspect of the lateral gutter. Anterior portal was identified with 18 gauge needle. Another 0.5cm vertical incision was created through the skin followed by blunt dissection through the capsule with a hemostat. A 2.9 mm and 3.5 mm full radius shaver was entered into the anterior portal and used to debride the hypertrophic synovial tissue and scar bands. Intraosseous ligament appeared frayed and split, however intact. Instrumentation was exchanged between portals for further debridement. The sinus tarsi was further inspected for pathology and none found. The posterior facet and middle facets appeared to have healthy cartilage without surrounding osteophytic bone. Adjacent bones was noted to glide easily within the sinus tarsi without obstruction. Instrumentation was removed. The skin overlying the portals was repaired with 4-0 nylon in simple interrupted technique.     Attention was directed over the lateral aspect of the calcaneus. Fluoroscopy was used to locate the bone cyst. A linear incision was made over the lateral calcaneus following the previous surgical scar, followed by sharp dissection and sub periosteal dissection exposing the cyst in the calcaneus. The peroneal tendons were mobilized and retracted inferiorly. A cortical bone window was cut out with sagittal saw exposing the pathological bone. Meticulous curettage of cyst and suspect appearing bone down to healthy bleeding bone was preformed. Pathologic bone was sent for micro and path.     A linear incision was made over the distal medial and anterior aspect of the tibia superior to the medial malleolus, near the tibial crest. Sharp dissection through thick fat layer down to periosteum, follow by subperiosteal dissection. Sagittal saw was used to remove a rectangular portion of autologous corticocancellous bone graft which was packed into the debrided cyst deficit in the calcaneus. 4 mL DBX was  implanted into the tibia harvest site, periosteum and superficial layers re approximated with 3-0 vicryl and nylon. PRP was injected into the Sinus tarsi at procedures end.     The surgical site was irrigated with normal saline solution via bulb syringe.  The surgical site was dressed with xeroform, 4x4 gauze, cast padding, and ACE wrap. Tourniquet(s) deflated.     The patient was transferred to the recovery room with vital signs stable. Following a period of post op monitoring, the patient will be discharged home with the following postop instructions:   1. Keep dressing clean, dry, and intact until next seen by podiatry.   2. Weightbearing status: nonweightbearing.   3. All necessary prescriptions were ordered and medical management will continue.   4. Contact podiatry with any postop questions or concerns.     Significant Surgical Tasks Conducted by the Assistant(s), if Applicable: Retraction, Suture closure of tibial harvest site.     Estimated Blood Loss (EBL): 60 mL            Implants:   Implant Name Type Inv. Item Serial No.  Lot No. LRB No. 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 Left Calcaneous Bone- biopsy     References:    Click here for ordering Quick Tip   Question Answer Comment   Procedure Type: General Surgery    Specimen Class: Routine/Screening    Which provider would you like to cc? JUAN ANTONIO ABDUL    Release to patient Immediate        03/24/23 1202                            Condition: Good    Disposition: PACU - hemodynamically stable.    Attestation: I was present and scrubbed for the  entire procedure.    Discharge Note    OUTCOME: Patient tolerated treatment/procedure well without complication and is now ready for discharge.    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  Elías Aranda DPM, FACFAS

## 2023-03-27 ENCOUNTER — PATIENT MESSAGE (OUTPATIENT)
Dept: PODIATRY | Facility: CLINIC | Age: 53
End: 2023-03-27
Payer: OTHER GOVERNMENT

## 2023-03-27 ENCOUNTER — TELEPHONE (OUTPATIENT)
Dept: PODIATRY | Facility: CLINIC | Age: 53
End: 2023-03-27
Payer: OTHER GOVERNMENT

## 2023-03-27 VITALS
OXYGEN SATURATION: 99 % | TEMPERATURE: 98 F | BODY MASS INDEX: 35.36 KG/M2 | HEART RATE: 99 BPM | SYSTOLIC BLOOD PRESSURE: 132 MMHG | RESPIRATION RATE: 16 BRPM | WEIGHT: 220 LBS | DIASTOLIC BLOOD PRESSURE: 78 MMHG | HEIGHT: 66 IN

## 2023-03-27 NOTE — TELEPHONE ENCOUNTER
Pt's FMLA forms were scanned into chart under  and faxed to [employer] [phone]. Confirmation was received. Pt notified.

## 2023-03-29 LAB — BACTERIA SPEC AEROBE CULT: NO GROWTH

## 2023-03-31 ENCOUNTER — TELEPHONE (OUTPATIENT)
Dept: PODIATRY | Facility: CLINIC | Age: 53
End: 2023-03-31
Payer: OTHER GOVERNMENT

## 2023-03-31 ENCOUNTER — OFFICE VISIT (OUTPATIENT)
Dept: PODIATRY | Facility: CLINIC | Age: 53
End: 2023-03-31
Payer: OTHER GOVERNMENT

## 2023-03-31 VITALS
DIASTOLIC BLOOD PRESSURE: 67 MMHG | HEART RATE: 97 BPM | HEIGHT: 66 IN | WEIGHT: 220 LBS | BODY MASS INDEX: 35.36 KG/M2 | SYSTOLIC BLOOD PRESSURE: 121 MMHG

## 2023-03-31 DIAGNOSIS — Z98.890 POSTOPERATIVE STATE: Primary | ICD-10-CM

## 2023-03-31 PROCEDURE — 99214 OFFICE O/P EST MOD 30 MIN: CPT | Mod: PBBFAC,PN | Performed by: PODIATRIST

## 2023-03-31 PROCEDURE — 99024 PR POST-OP FOLLOW-UP VISIT: ICD-10-PCS | Mod: ,,, | Performed by: PODIATRIST

## 2023-03-31 PROCEDURE — 99999 PR PBB SHADOW E&M-EST. PATIENT-LVL IV: CPT | Mod: PBBFAC,,, | Performed by: PODIATRIST

## 2023-03-31 PROCEDURE — 99024 POSTOP FOLLOW-UP VISIT: CPT | Mod: ,,, | Performed by: PODIATRIST

## 2023-03-31 PROCEDURE — 99999 PR PBB SHADOW E&M-EST. PATIENT-LVL IV: ICD-10-PCS | Mod: PBBFAC,,, | Performed by: PODIATRIST

## 2023-03-31 RX ORDER — FLUCONAZOLE 150 MG/1
150 TABLET ORAL DAILY
Qty: 1 TABLET | Refills: 0 | Status: SHIPPED | OUTPATIENT
Start: 2023-03-31 | End: 2023-04-01

## 2023-03-31 RX ORDER — TRAMADOL HYDROCHLORIDE 50 MG/1
50 TABLET ORAL EVERY 8 HOURS PRN
Qty: 30 TABLET | Refills: 0 | Status: SHIPPED | OUTPATIENT
Start: 2023-03-31 | End: 2023-05-12 | Stop reason: SDUPTHER

## 2023-03-31 NOTE — TELEPHONE ENCOUNTER
Pt's FMLA forms were scanned into chart under  and faxed to [employer] [phone]. Confirmation was received. Pt was notified.
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
## 2023-04-03 LAB — BACTERIA SPEC ANAEROBE CULT: NORMAL

## 2023-04-04 LAB
FINAL PATHOLOGIC DIAGNOSIS: NORMAL
GROSS: NORMAL
Lab: NORMAL

## 2023-04-14 ENCOUNTER — OFFICE VISIT (OUTPATIENT)
Dept: PODIATRY | Facility: CLINIC | Age: 53
End: 2023-04-14
Payer: OTHER GOVERNMENT

## 2023-04-14 VITALS
WEIGHT: 220 LBS | SYSTOLIC BLOOD PRESSURE: 124 MMHG | DIASTOLIC BLOOD PRESSURE: 79 MMHG | BODY MASS INDEX: 35.36 KG/M2 | HEIGHT: 66 IN | HEART RATE: 108 BPM

## 2023-04-14 DIAGNOSIS — Z98.890 POSTOPERATIVE STATE: Primary | ICD-10-CM

## 2023-04-14 PROCEDURE — 99999 PR PBB SHADOW E&M-EST. PATIENT-LVL IV: CPT | Mod: PBBFAC,,, | Performed by: PODIATRIST

## 2023-04-14 PROCEDURE — 99024 PR POST-OP FOLLOW-UP VISIT: ICD-10-PCS | Mod: ,,, | Performed by: PODIATRIST

## 2023-04-14 PROCEDURE — 99214 OFFICE O/P EST MOD 30 MIN: CPT | Mod: PBBFAC,PN | Performed by: PODIATRIST

## 2023-04-14 PROCEDURE — 99999 PR PBB SHADOW E&M-EST. PATIENT-LVL IV: ICD-10-PCS | Mod: PBBFAC,,, | Performed by: PODIATRIST

## 2023-04-14 PROCEDURE — 99024 POSTOP FOLLOW-UP VISIT: CPT | Mod: ,,, | Performed by: PODIATRIST

## 2023-04-14 RX ORDER — MUPIROCIN 20 MG/G
OINTMENT TOPICAL 2 TIMES DAILY
Qty: 22 G | Refills: 0 | Status: SHIPPED | OUTPATIENT
Start: 2023-04-14
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
## 2023-04-27 LAB — FUNGUS SPEC CULT: NORMAL

## 2023-05-08 DIAGNOSIS — M54.12 CERVICAL RADICULOPATHY: ICD-10-CM

## 2023-05-08 RX ORDER — GABAPENTIN 300 MG/1
CAPSULE ORAL
Qty: 30 CAPSULE | Refills: 1 | Status: SHIPPED | OUTPATIENT
Start: 2023-05-08 | End: 2024-02-16 | Stop reason: SDUPTHER

## 2023-05-08 NOTE — TELEPHONE ENCOUNTER
No care due was identified.  [facility] Embedded Care Due Messages. Reference number: [id number].   5/08/2023 12:25:44 PM CDT

## 2023-05-08 NOTE — TELEPHONE ENCOUNTER
Refill Routing Note   Medication(s) are not appropriate for processing by [facility] for the following reason(s):      Medication outside of protocol    [facility] action(s):  Route None identified          Appointments  past 12m or future 3m with PCP    Date Provider   Last Visit   Visit date not found Jeremiah Washburn III, MD   Next Visit   Visit date not found Jeremiah Washburn III, MD   ED visits in past 90 days: 0        Note composed:2:37 PM 05/08/2023

## 2023-05-12 ENCOUNTER — HOSPITAL ENCOUNTER (OUTPATIENT)
Dept: RADIOLOGY | Facility: HOSPITAL | Age: 53
Discharge: HOME | End: 2023-05-12
Attending: PODIATRIST
Payer: OTHER GOVERNMENT

## 2023-05-12 ENCOUNTER — OFFICE VISIT (OUTPATIENT)
Dept: PODIATRY | Facility: CLINIC | Age: 53
End: 2023-05-12
Payer: OTHER GOVERNMENT

## 2023-05-12 VITALS
DIASTOLIC BLOOD PRESSURE: 69 MMHG | HEART RATE: 98 BPM | SYSTOLIC BLOOD PRESSURE: 108 MMHG | HEIGHT: 66 IN | BODY MASS INDEX: 35.51 KG/M2

## 2023-05-12 DIAGNOSIS — Z98.890 POSTOPERATIVE STATE: ICD-10-CM

## 2023-05-12 DIAGNOSIS — M62.81 MUSCLE WEAKNESS OF LOWER EXTREMITY: ICD-10-CM

## 2023-05-12 DIAGNOSIS — Z98.890 POSTOPERATIVE STATE: Primary | ICD-10-CM

## 2023-05-12 LAB
ACID FAST MOD KINY STN SPEC: NORMAL
MYCOBACTERIUM SPEC QL CULT: NORMAL

## 2023-05-12 PROCEDURE — 99999 PR PBB SHADOW E&M-EST. PATIENT-LVL IV: CPT | Mod: PBBFAC,,, | Performed by: PODIATRIST

## 2023-05-12 PROCEDURE — 99999 PR PBB SHADOW E&M-EST. PATIENT-LVL IV: ICD-10-PCS | Mod: PBBFAC,,, | Performed by: PODIATRIST

## 2023-05-12 PROCEDURE — 73630 X-RAY EXAM OF FOOT: CPT | Mod: TC,PN,LT

## 2023-05-12 PROCEDURE — 99214 OFFICE O/P EST MOD 30 MIN: CPT | Mod: PBBFAC,PN | Performed by: PODIATRIST

## 2023-05-12 PROCEDURE — 99024 PR POST-OP FOLLOW-UP VISIT: ICD-10-PCS | Mod: ,,, | Performed by: PODIATRIST

## 2023-05-12 PROCEDURE — 73630 XR FOOT COMPLETE 3 VIEW LEFT: ICD-10-PCS | Mod: 26,LT,, | Performed by: RADIOLOGY

## 2023-05-12 PROCEDURE — 73630 X-RAY EXAM OF FOOT: CPT | Mod: 26,LT,, | Performed by: RADIOLOGY

## 2023-05-12 PROCEDURE — 99024 POSTOP FOLLOW-UP VISIT: CPT | Mod: ,,, | Performed by: PODIATRIST

## 2023-05-12 RX ORDER — TRAMADOL HYDROCHLORIDE 50 MG/1
50 TABLET ORAL EVERY 8 HOURS PRN
Qty: 30 TABLET | Refills: 0 | Status: SHIPPED | OUTPATIENT
Start: 2023-05-12 | End: 2023-08-18
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
## 2023-05-15 ENCOUNTER — PATIENT MESSAGE (OUTPATIENT)
Dept: FAMILY MEDICINE | Facility: CLINIC | Age: 53
End: 2023-05-15
Payer: OTHER GOVERNMENT

## 2023-05-18 ENCOUNTER — PATIENT MESSAGE (OUTPATIENT)
Dept: PODIATRY | Facility: CLINIC | Age: 53
End: 2023-05-18
Payer: OTHER GOVERNMENT

## 2023-05-18 DIAGNOSIS — Z98.890 HISTORY OF FOOT SURGERY: ICD-10-CM

## 2023-05-18 DIAGNOSIS — M79.672 CHRONIC FOOT PAIN, LEFT: Primary | ICD-10-CM

## 2023-05-18 DIAGNOSIS — G89.29 CHRONIC FOOT PAIN, LEFT: Primary | ICD-10-CM

## 2023-05-22 ENCOUNTER — TELEPHONE (OUTPATIENT)
Dept: PODIATRY | Facility: CLINIC | Age: 53
End: 2023-05-22
Payer: OTHER GOVERNMENT

## 2023-05-22 ENCOUNTER — PATIENT MESSAGE (OUTPATIENT)
Dept: PODIATRY | Facility: CLINIC | Age: 53
End: 2023-05-22
Payer: OTHER GOVERNMENT

## 2023-05-22 NOTE — TELEPHONE ENCOUNTER
Spoke to patient about return to work date and restriction. [facility] paperwork completed and will be faxed by staff.

## 2023-05-22 NOTE — TELEPHONE ENCOUNTER
Pt's RTW form was scanned into chart under [facility] and faxed to Aparna Colin [phone]. Confirmation was received. Pt notified.

## 2023-06-09 ENCOUNTER — CLINICAL SUPPORT (OUTPATIENT)
Dept: REHABILITATION | Facility: HOSPITAL | Age: 53
End: 2023-06-09
Attending: PODIATRIST
Payer: OTHER GOVERNMENT

## 2023-06-09 DIAGNOSIS — M62.81 MUSCLE WEAKNESS OF LOWER EXTREMITY: ICD-10-CM

## 2023-06-09 DIAGNOSIS — Z98.890 POSTOPERATIVE STATE: ICD-10-CM

## 2023-06-09 DIAGNOSIS — M25.672 DECREASED RANGE OF MOTION OF LEFT ANKLE: Primary | ICD-10-CM

## 2023-06-09 PROCEDURE — 97110 THERAPEUTIC EXERCISES: CPT | Mod: PN

## 2023-06-09 PROCEDURE — 97162 PT EVAL MOD COMPLEX 30 MIN: CPT | Mod: PN

## 2023-06-09 NOTE — PLAN OF CARE
[facility]  Physical Therapy Initial Evaluation    Date: 6/9/2023   Name: Hillary Cotton  Clinic Number: [id number]    Therapy Diagnosis:   Encounter Diagnoses   Name Primary?    Postoperative state     Muscle weakness of lower extremity     Decreased range of motion of left ankle Yes     Physician: Elías Aranda DPM    Physician Orders: PT Eval and Treat   Medical Diagnosis from Referral:   Z98.890 (ICD-10-CM) - Postoperative state   M62.81 (ICD-10-CM) - Muscle weakness of lower extremity   Evaluation Date: 6/9/2023  Authorization Period Expiration: 12/31/2023  Plan of Care Expiration: 8/9/2023  Visit # / Visits authorized: 1/20    Time In: 8:20 am - patient arrived late   Time Out: 9:00 am  Total Appointment Time (timed & untimed codes): 40 minutes    Precautions: Standard    Subjective   Date of onset: 3/24/2023  ARTHROSCOPY, SUBTALAR JOINT WITH SYNOVECTOMY(Left)   EXCISION AND CURETTAGE OF BONE CYST LEFT CALCANEUS  HARVESTED MINOR BONE GRAFT FROM LEFT TIBIA    History of current condition - Hillary reports: she had her surgery on 3/24/2023 and she was 8 weeks non-weight bearing. 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 She is currently ambulating with 1 axillary crutch and considered weight bearing as tolerated. Currently working full time with primarily desk duty. Patient would like to get back to walking and normal activities. Prior to surgery, she would have swelling and pain with extended walking.      Medical History:   Past Medical History:   Diagnosis Date    Abnormal Pap smear     Allergy     Asthma     Sinusitis, chronic        Surgical History:   Hillary Cotton  has a past surgical history that includes Salivary gland surgery; Tonsillectomy; Epidural steroid injection (N/A, 6/5/2020); Epidural steroid injection (N/A, 9/18/2020); Foot mass excision (Left, 11/17/2021); Colonoscopy (N/A, 11/11/2022); and  Arthroscopy of foot (Left, 3/24/2023).    Medications:   Hillary has a current medication list which includes the following prescription(s): celecoxib, cephalexin, cetirizine, cyclobenzaprine, gabapentin, glucosamine-chondroitin, ipratropium, ipratropium, multivitamin, mupirocin, polyethylene glycol, and tramadol, and the following Facility-Administered Medications: sodium chloride 0.9%, sodium chloride 0.9%, and sodium chloride 0.9%.    Allergies:   Review of patient's allergies indicates:   Allergen Reactions    Prednisone Rash     Hx of delayed onset rash on 2 occasion. Tolerates medrol, IM steroids, topical, and intranasal steroids w/o problem        Imaging: see EMR    Prior Therapy: yes, twice for her foot and for her right arm   Social History: lives with family   Occupation: [employer] work at [employer]   Prior Level of Function: able to walk ~ 1 mile   Current Level of Function: ambulating with 1 axillary crutch and CAM boot on left lower extremity     Pain:  Current 2/10, worst 6/10, best 2/10   Location: left foot   Description: aching, sharp at times   Aggravating Factors: walking; weight bearing;   Easing Factors: rest, ice     Pt's goals:   Patient would like to improve her current level of function and return to walking pain-free.     Objective     Observation: ambulating with CAM boot on the left lower extremity; crutch in right upper extremity     Gait: ambulating with partial weight bearing as tolerated, cam boot on left lower extremity               Range of Motion: AROM:  Ankle Right  Left    Dorsiflexion  3 degrees  1 degrees   Plantarflexion  65 degrees  48 degrees   Inversion  55 degrees  40 degrees   Eversion  10 degrees  0 degrees     Strength:  Hip Right Left    Supine flexion 5/5 5/5   Abduction 4+/5 4+/5   Extension 4+/5 4+/5     Ankle Right Left    Dorsiflexion 5/5 5/5   Plantarflexion 5/5 4/5   Inversion 5/5 4+/5   Eversion 5/5 3+/5      Sensation: WNL     Flexibility: gastroc/soleus  tightness    Limitation/Restriction for FOTO Foot Survey    Therapist reviewed FOTO scores for Hillary Cotton on 6/9/2023.   FOTO documents entered into [employer] - see Media section.    Limitation Score: 53%  Predicted Limitation Score: 39%         TREATMENT   Treatment Time In: 8:50 am  Treatment Time Out: 9:00 am  Total Treatment time (time-based codes) separate from Evaluation: 10 minutes    Hillary received therapeutic exercises to develop strength, endurance, ROM, and flexibility for 10 minutes including:  Patient education on home exercise program   Ankle circles  Ankle pumps  Long sitting gastroc stretch       Home Exercises and Patient Education Provided    Education provided:   - HEP  - POC/prognosis     Written Home Exercises Provided: yes.  Exercises were reviewed and Hillary was able to demonstrate them prior to the end of the session.  Hillary demonstrated good  understanding of the education provided.     See EMR under Patient Instructions for exercises provided 6/9/2023.    Assessment   Hillary is a 52 y.o. female referred to outpatient Physical Therapy with a medical diagnosis of post-operative state following left ankle arthroscopy. Patient is 11 weeks status post left ankle arthroscopy on 3/24/2023 and considered weight bearing as tolerated in tall CAM boot at this time. With objective tests and measures, significant left ankle eversion and dorsiflexion limitations along with left ankle strength deficits. Patient would benefit from skilled PT in order to regain left ankle mobility/strength and return to higher level of function.     Pt to be seen 2x/week for 8 weeks     Pt prognosis is Good.   Pt will benefit from skilled outpatient Physical Therapy to address the deficits stated above and in the chart below, provide pt/family education, and to maximize pt's level of independence.     Plan of care discussed with patient: Yes  Pt's spiritual, cultural and educational needs considered and  patient is agreeable to the plan of care and goals as stated below:     Anticipated Barriers for therapy: chronicity     Medical Necessity is demonstrated by the following  History  Co-morbidities and personal factors that may impact the plan of care Co-morbidities:   COPD/asthma and high BMI    Personal Factors:   lifestyle     high   Examination  Body Structures and Functions, activity limitations and participation restrictions that may impact the plan of care Body Regions:   lower extremities    Body Systems:    gross symmetry  ROM  strength  gross coordinated movement  balance  gait  transfers  transitions  motor control  motor learning    Participation Restrictions:   Walking     Activity limitations:   Learning and applying knowledge  no deficits    General Tasks and Commands  no deficits    Communication  no deficits    Mobility  lifting and carrying objects  walking    Self care  no deficits    Domestic Life  shopping  doing house work (cleaning house, washing dishes, laundry)  assisting others    Interactions/Relationships  no deficits    Life Areas  no deficits    Community and Social Life  no deficits         high   Clinical Presentation evolving clinical presentation with changing clinical characteristics moderate   Decision Making/ Complexity Score: moderate     Goals:  Short Term Goals: 3 weeks   1. Maintain compliance and understanding of HEP. - PROGRESSING, NOT MET  2. Decrease subjective pain rating from a 6/10 pain with standing/walking to a 2/10 pain with standing/walking. - PROGRESSING, NOT MET  3. Increase bilateral hip strength from a 4+/5 to a 5/5 with manual muscle testing to offload plantar fascia. - PROGRESSING, NOT MET        Long Term Goals: 6 weeks   1. Decrease FOTO limitation score from 53% limitation to </=39% limitation for better functional outcomes. - PROGRESSING, NOT MET  2. Increase ankle dorsiflexion and plantar flexion active range of motion to within normal limits in order to  offload plantar fascia. - PROGRESSING, NOT MET  3. Patient will be able to walk 1 mile without reproduction of (L) ankle pain. - PROGRESSING, NOT MET  4. Decrease subjective pain rating from a 6/10 pain with standing/walking to a 0/10 pain with standing/walking. - PROGRESSING, NOT MET    Plan   Plan of care Certification: 6/9/2023 to 8/9/2023.    Outpatient Physical Therapy 2 times weekly for 8 weeks to include the following interventions: Gait Training, Manual Therapy, Moist Heat/ Ice, Neuromuscular Re-ed, Patient Education, Self Care, Therapeutic Activities, and Therapeutic Exercise.     Jim Walter, PT

## 2023-06-12 ENCOUNTER — TELEPHONE (OUTPATIENT)
Dept: PODIATRY | Facility: CLINIC | Age: 53
End: 2023-06-12
Payer: OTHER GOVERNMENT

## 2023-06-12 NOTE — TELEPHONE ENCOUNTER
Spoke with Ms Cotton to inquire if she could come in earlier this Friday, 6/16/23. Accepted new appt time for xray at 8:15 am with her appt with Dr Aranda to follow at 8:30 am. No other needs voiced. Encouraged call back if needed.

## 2023-06-12 NOTE — TELEPHONE ENCOUNTER
----- Message from Alaina Faye sent at 6/12/2023 10:55 AM CDT -----  Type:  Patient Returning Call    Who Called: Pt  Who Left Message for Patient: Mayuri  Does the patient know what this is regarding?: Earlier time for her 3:00 pm appt on 6/16/23  Would the patient rather a call back or a response via [facility]?  call  Best Call Back Number: [phone]  Additional Information:

## 2023-06-14 PROBLEM — Z98.890 POSTOPERATIVE STATE: Status: ACTIVE | Noted: 2023-06-14

## 2023-06-14 PROBLEM — M62.81 MUSCLE WEAKNESS OF LOWER EXTREMITY: Status: ACTIVE | Noted: 2023-06-14

## 2023-06-16 ENCOUNTER — HOSPITAL ENCOUNTER (OUTPATIENT)
Dept: RADIOLOGY | Facility: HOSPITAL | Age: 53
Discharge: HOME | End: 2023-06-16
Attending: PODIATRIST
Payer: OTHER GOVERNMENT

## 2023-06-16 ENCOUNTER — OFFICE VISIT (OUTPATIENT)
Dept: PODIATRY | Facility: CLINIC | Age: 53
End: 2023-06-16
Payer: OTHER GOVERNMENT

## 2023-06-16 VITALS
SYSTOLIC BLOOD PRESSURE: 138 MMHG | BODY MASS INDEX: 35.36 KG/M2 | DIASTOLIC BLOOD PRESSURE: 85 MMHG | HEIGHT: 66 IN | HEART RATE: 92 BPM | WEIGHT: 220 LBS

## 2023-06-16 DIAGNOSIS — Z98.890 POSTOPERATIVE STATE: ICD-10-CM

## 2023-06-16 DIAGNOSIS — Z98.890 POSTOPERATIVE STATE: Primary | ICD-10-CM

## 2023-06-16 PROCEDURE — 99024 PR POST-OP FOLLOW-UP VISIT: ICD-10-PCS | Mod: ,,, | Performed by: PODIATRIST

## 2023-06-16 PROCEDURE — 73630 XR FOOT COMPLETE 3 VIEW LEFT: ICD-10-PCS | Mod: 26,LT,, | Performed by: RADIOLOGY

## 2023-06-16 PROCEDURE — 73630 X-RAY EXAM OF FOOT: CPT | Mod: 26,LT,, | Performed by: RADIOLOGY

## 2023-06-16 PROCEDURE — 99999 PR PBB SHADOW E&M-EST. PATIENT-LVL III: ICD-10-PCS | Mod: PBBFAC,,, | Performed by: PODIATRIST

## 2023-06-16 PROCEDURE — 73630 X-RAY EXAM OF FOOT: CPT | Mod: TC,PN,LT

## 2023-06-16 PROCEDURE — 99024 POSTOP FOLLOW-UP VISIT: CPT | Mod: ,,, | Performed by: PODIATRIST

## 2023-06-16 PROCEDURE — 99213 OFFICE O/P EST LOW 20 MIN: CPT | Mod: PBBFAC,PN | Performed by: PODIATRIST

## 2023-06-16 PROCEDURE — 99999 PR PBB SHADOW E&M-EST. PATIENT-LVL III: CPT | Mod: PBBFAC,,, | Performed by: PODIATRIST

## 2023-06-16 NOTE — PATIENT INSTRUCTIONS
In 2 weeks May begin gradual 30 minute daily increments to transition to a tennis shoe.    May participate in low impact exercise such as stationary bike, elliptical and swimming.     Avoid high impact activities such as running, jumping and squatting.
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
## 2023-06-19 ENCOUNTER — CLINICAL SUPPORT (OUTPATIENT)
Dept: REHABILITATION | Facility: HOSPITAL | Age: 53
End: 2023-06-19
Payer: OTHER GOVERNMENT

## 2023-06-19 DIAGNOSIS — M62.81 MUSCLE WEAKNESS OF LOWER EXTREMITY: ICD-10-CM

## 2023-06-19 DIAGNOSIS — Z98.890 POSTOPERATIVE STATE: Primary | ICD-10-CM

## 2023-06-19 DIAGNOSIS — M25.672 DECREASED RANGE OF MOTION OF LEFT ANKLE: ICD-10-CM

## 2023-06-19 PROCEDURE — 97140 MANUAL THERAPY 1/> REGIONS: CPT | Mod: PN

## 2023-06-19 PROCEDURE — 97110 THERAPEUTIC EXERCISES: CPT | Mod: PN

## 2023-06-19 NOTE — PROGRESS NOTES
"[facility]   Physical Therapy Treatment Note      Name: Hillary Cotton  Clinic Number: [id number]    Therapy Diagnosis:   Encounter Diagnoses   Name Primary?    Postoperative state Yes    Muscle weakness of lower extremity     Decreased range of motion of left ankle      Physician: Elías Aranda DPM    Visit Date: 6/19/2023    Physician Orders: PT Eval and Treat   Medical Diagnosis from Referral:   Z98.890 (ICD-10-CM) - Postoperative state   M62.81 (ICD-10-CM) - Muscle weakness of lower extremity   Evaluation Date: 6/9/2023  Authorization Period Expiration: 12/31/2023  Plan of Care Expiration: 8/9/2023  Visit # / Visits authorized: 2/20     Time In: 3:40 pm  Time Out: 4:35 pm  Total Appointment Time (timed & untimed codes): 55 minutes (3 TE) (1 MT)     Precautions: Standard    Subjective     Pt reports: pain level has been up and down depending on activity levels. Saw Dr. Aranda recently who said everything is healing well.   She was compliant with home exercise program.  Response to previous treatment: no change  Functional change: no change     Pain: 3/10  Location: left ankle    Objective      Objective Measures updated at progress report unless specified.     Treatment     Hillary received the treatments listed below:      therapeutic exercises to develop strength, endurance, ROM, and flexibility for 45 minutes including:  Long sitting gastroc stretch: 5x20"  Ankle plantarflexion: green theraband - 3x10   Ankle inversion: red theraband - 3x10  Ankle eversion AROM: 30x  Straight leg raises: 3x10 - left   Seated heel raises: 3x10   Seated heel slides: 20x for dorsiflexion/plantarflexion   Seated inversion/eversion towel sweeps: 20x  Seated BAPS board: plantarflexion/dorsiflexion - 30x  Seated BAPS board: inversion/eversion - 30x    manual therapy techniques: Joint mobilizations and Soft tissue Mobilization were applied to the: left ankle for 10 minutes, including:  Ankle " PROM - emphasis on dorsiflexion and eversion   Calcaneal arc glides     Patient Education and Home Exercises       Education provided:   - home exercise program   - POC/Prognosis     Written Home Exercises Provided: yes. Exercises were reviewed and Hillary was able to demonstrate them prior to the end of the session.  Hillary demonstrated good  understanding of the education provided. See EMR under Patient Instructions for exercises provided during therapy sessions    Assessment   Hillary is a 52 y.o. female referred to outpatient Physical Therapy with a medical diagnosis of post-operative state following left ankle arthroscopy. Patient is 12 weeks status post left ankle arthroscopy on 3/24/2023 and considered weight bearing as tolerated in tall CAM boot at this time. Weaning from CAM boot to weight bearing as tolerated in tennis shoes on 6/30/2023. First visit following initial evaluation consisted of ankle AROM/PROM and non-weight bearing gastroc/soleus strengthening.     Hillary Is progressing well towards her goals.   Pt prognosis is Good.     Pt will continue to benefit from skilled outpatient physical therapy to address the deficits listed in the problem list box on initial evaluation, provide pt/family education and to maximize pt's level of independence in the home and community environment.     Pt's spiritual, cultural and educational needs considered and pt agreeable to plan of care and goals.     Anticipated barriers to physical therapy: chronicity    Goals:   Short Term Goals: 4 weeks   1. Maintain compliance and understanding of HEP. - PROGRESSING, NOT MET  2. Decrease subjective pain rating from a 6/10 pain with standing/walking to a 2/10 pain with standing/walking. - PROGRESSING, NOT MET  3. Increase bilateral hip strength from a 4+/5 to a 5/5 with manual muscle testing to offload plantar fascia. - PROGRESSING, NOT MET        Long Term Goals: 8 weeks   1. Decrease FOTO limitation score from 53%  limitation to </=39% limitation for better functional outcomes. - PROGRESSING, NOT MET  2. Increase ankle dorsiflexion and plantar flexion active range of motion to within normal limits in order to offload plantar fascia. - PROGRESSING, NOT MET  3. Patient will be able to walk 1 mile without reproduction of (L) ankle pain. - PROGRESSING, NOT MET  4. Decrease subjective pain rating from a 6/10 pain with standing/walking to a 0/10 pain with standing/walking. - PROGRESSING, NOT MET    Plan   Left ankle mobility and flexibility  Gastroc/soleus strengthening   Ankle proprioception     Jim Walter, PT

## 2023-06-29 ENCOUNTER — TELEPHONE (OUTPATIENT)
Dept: REHABILITATION | Facility: HOSPITAL | Age: 53
End: 2023-06-29
Payer: OTHER GOVERNMENT

## 2023-07-10 ENCOUNTER — PATIENT OUTREACH (OUTPATIENT)
Dept: ADMINISTRATIVE | Facility: HOSPITAL | Age: 53
End: 2023-07-10
Payer: OTHER GOVERNMENT

## 2023-07-12 ENCOUNTER — TELEPHONE (OUTPATIENT)
Dept: REHABILITATION | Facility: HOSPITAL | Age: 53
End: 2023-07-12
Payer: OTHER GOVERNMENT

## 2023-07-12 NOTE — TELEPHONE ENCOUNTER
Contacted patient regarding past two no shows. Patient reported she has been overwhelmed studying for an exam. Reminded patient of next appointment and she confirmed.

## 2023-07-31 ENCOUNTER — TELEPHONE (OUTPATIENT)
Dept: REHABILITATION | Facility: HOSPITAL | Age: 53
End: 2023-07-31
Payer: OTHER GOVERNMENT

## 2023-07-31 NOTE — TELEPHONE ENCOUNTER
LVM to patient regarding missed appointments and attendance policy. Patient to be taken off of schedule due to missing >3 consecutive appointments.

## 2023-08-18 ENCOUNTER — OFFICE VISIT (OUTPATIENT)
Dept: PODIATRY | Facility: CLINIC | Age: 53
End: 2023-08-18
Payer: OTHER GOVERNMENT

## 2023-08-18 ENCOUNTER — HOSPITAL ENCOUNTER (OUTPATIENT)
Dept: RADIOLOGY | Facility: HOSPITAL | Age: 53
Discharge: HOME | End: 2023-08-18
Attending: PODIATRIST
Payer: OTHER GOVERNMENT

## 2023-08-18 VITALS
SYSTOLIC BLOOD PRESSURE: 132 MMHG | HEIGHT: 66 IN | HEART RATE: 86 BPM | DIASTOLIC BLOOD PRESSURE: 74 MMHG | WEIGHT: 220 LBS | BODY MASS INDEX: 35.36 KG/M2

## 2023-08-18 DIAGNOSIS — M79.672 LEFT FOOT PAIN: ICD-10-CM

## 2023-08-18 DIAGNOSIS — Z98.890 HISTORY OF FOOT SURGERY: Primary | ICD-10-CM

## 2023-08-18 DIAGNOSIS — Z98.890 POSTOPERATIVE STATE: ICD-10-CM

## 2023-08-18 PROCEDURE — 99214 OFFICE O/P EST MOD 30 MIN: CPT | Mod: PBBFAC,PN | Performed by: PODIATRIST

## 2023-08-18 PROCEDURE — 73630 XR FOOT COMPLETE 3 VIEW LEFT: ICD-10-PCS | Mod: 26,LT,, | Performed by: RADIOLOGY

## 2023-08-18 PROCEDURE — 99999 PR PBB SHADOW E&M-EST. PATIENT-LVL IV: ICD-10-PCS | Mod: PBBFAC,,, | Performed by: PODIATRIST

## 2023-08-18 PROCEDURE — 73630 X-RAY EXAM OF FOOT: CPT | Mod: TC,PN,LT

## 2023-08-18 PROCEDURE — 99213 PR OFFICE/OUTPT VISIT, EST, LEVL III, 20-29 MIN: ICD-10-PCS | Mod: S$PBB,,, | Performed by: PODIATRIST

## 2023-08-18 PROCEDURE — 99999 PR PBB SHADOW E&M-EST. PATIENT-LVL IV: CPT | Mod: PBBFAC,,, | Performed by: PODIATRIST

## 2023-08-18 PROCEDURE — 73630 X-RAY EXAM OF FOOT: CPT | Mod: 26,LT,, | Performed by: RADIOLOGY

## 2023-08-18 PROCEDURE — 99213 OFFICE O/P EST LOW 20 MIN: CPT | Mod: S$PBB,,, | Performed by: PODIATRIST
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
## 2023-08-22 ENCOUNTER — DOCUMENTATION ONLY (OUTPATIENT)
Dept: REHABILITATION | Facility: HOSPITAL | Age: 53
End: 2023-08-22
Payer: OTHER GOVERNMENT

## 2023-08-22 PROBLEM — M62.81 MUSCLE WEAKNESS OF LOWER EXTREMITY: Status: RESOLVED | Noted: 2023-06-14 | Resolved: 2023-08-22

## 2023-08-22 PROBLEM — Z98.890 POSTOPERATIVE STATE: Status: RESOLVED | Noted: 2023-06-14 | Resolved: 2023-08-22

## 2023-08-22 PROBLEM — M25.672 DECREASED RANGE OF MOTION OF LEFT ANKLE: Status: RESOLVED | Noted: 2019-12-19 | Resolved: 2023-08-22

## 2023-09-07 ENCOUNTER — OFFICE VISIT (OUTPATIENT)
Dept: URGENT CARE | Facility: CLINIC | Age: 53
End: 2023-09-07
Payer: OTHER GOVERNMENT

## 2023-09-07 VITALS
HEIGHT: 66 IN | OXYGEN SATURATION: 95 % | WEIGHT: 220 LBS | TEMPERATURE: 98 F | SYSTOLIC BLOOD PRESSURE: 126 MMHG | BODY MASS INDEX: 35.36 KG/M2 | DIASTOLIC BLOOD PRESSURE: 74 MMHG | RESPIRATION RATE: 19 BRPM | HEART RATE: 92 BPM

## 2023-09-07 DIAGNOSIS — J02.9 SORE THROAT: ICD-10-CM

## 2023-09-07 DIAGNOSIS — R05.9 COUGH, UNSPECIFIED TYPE: Primary | ICD-10-CM

## 2023-09-07 DIAGNOSIS — H92.09 OTALGIA, UNSPECIFIED LATERALITY: ICD-10-CM

## 2023-09-07 DIAGNOSIS — J01.90 ACUTE NON-RECURRENT SINUSITIS, UNSPECIFIED LOCATION: ICD-10-CM

## 2023-09-07 DIAGNOSIS — R09.82 POSTNASAL DRIP: ICD-10-CM

## 2023-09-07 LAB
CTP QC/QA: YES
SARS-COV-2 AG RESP QL IA.RAPID: NEGATIVE

## 2023-09-07 PROCEDURE — 87811 SARS-COV-2 COVID19 W/OPTIC: CPT | Mod: QW,S$GLB,, | Performed by: FAMILY MEDICINE

## 2023-09-07 PROCEDURE — 99214 PR OFFICE/OUTPT VISIT, EST, LEVL IV, 30-39 MIN: ICD-10-PCS | Mod: S$GLB,,, | Performed by: FAMILY MEDICINE

## 2023-09-07 PROCEDURE — 99214 OFFICE O/P EST MOD 30 MIN: CPT | Mod: S$GLB,,, | Performed by: FAMILY MEDICINE

## 2023-09-07 PROCEDURE — 87811 SARS CORONAVIRUS 2 ANTIGEN POCT, MANUAL READ: ICD-10-PCS | Mod: QW,S$GLB,, | Performed by: FAMILY MEDICINE

## 2023-09-07 RX ORDER — METHYLPREDNISOLONE 4 MG/1
TABLET ORAL
Qty: 21 TABLET | Refills: 0 | Status: SHIPPED | OUTPATIENT
Start: 2023-09-07 | End: 2024-02-22

## 2023-09-07 RX ORDER — AZITHROMYCIN 250 MG/1
TABLET, FILM COATED ORAL
Qty: 6 TABLET | Refills: 0 | Status: SHIPPED | OUTPATIENT
Start: 2023-09-07 | End: 2024-02-22

## 2023-09-07 RX ORDER — BETAMETHASONE SODIUM PHOSPHATE AND BETAMETHASONE ACETATE 3; 3 MG/ML; MG/ML
6 INJECTION, SUSPENSION INTRA-ARTICULAR; INTRALESIONAL; INTRAMUSCULAR; SOFT TISSUE
Status: DISCONTINUED | OUTPATIENT
Start: 2023-09-07 | End: 2023-09-07

## 2023-09-07 NOTE — PROGRESS NOTES
"Subjective:      Patient ID: Hillary Cotton is a 52 y.o. female.    Vitals:  height is 5' 6" (1.676 m) and weight is 99.8 kg (220 lb). Her oral temperature is 98 °F (36.7 °C). Her blood pressure is 126/74 and her pulse is 92. Her respiration is 19 and oxygen saturation is 95%.     Chief Complaint: Sinus Problem    Patient presents with c/o cough, sore throat, running nose, ear pain, sinus congestion for 3 days. Pt denies fever, chills, CP, SOB, weakness/dizziness, N/V, diarrhea, abdominal pain, dysuria, loss of smell or taste.        Sinus Problem  This is a new problem. Episode onset: Sunday. The problem has been gradually worsening since onset. There has been no fever. Her pain is at a severity of 2/10. The pain is mild. Associated symptoms include congestion, coughing, ear pain, headaches, a hoarse voice, sinus pressure, sneezing and a sore throat. Pertinent negatives include no chills, diaphoresis, neck pain, shortness of breath or swollen glands. Past treatments include oral decongestants, nasal decongestants and acetaminophen. Improvement on treatment: a little.       Constitution: Negative for chills and sweating.   HENT:  Positive for ear pain, congestion, sinus pressure and sore throat.    Neck: Negative for neck pain.   Respiratory:  Positive for cough. Negative for shortness of breath.    Allergic/Immunologic: Positive for sneezing.   Neurological:  Positive for headaches.      Objective:     Physical Exam   Constitutional: She is oriented to person, place, and time. She appears well-developed. She is cooperative.  Non-toxic appearance. She does not appear ill. No distress.   HENT:   Head: Normocephalic and atraumatic.   Ears:   Right Ear: Hearing, tympanic membrane, external ear and ear canal normal. impacted cerumen  Left Ear: Hearing, tympanic membrane, external ear and ear canal normal. impacted cerumen  Nose: Congestion present. No mucosal edema, rhinorrhea or nasal deformity. No epistaxis. " Right sinus exhibits no maxillary sinus tenderness and no frontal sinus tenderness. Left sinus exhibits no maxillary sinus tenderness and no frontal sinus tenderness.   Mouth/Throat: Uvula is midline, oropharynx is clear and moist and mucous membranes are normal. No trismus in the jaw. Normal dentition. No uvula swelling. No oropharyngeal exudate, posterior oropharyngeal edema or posterior oropharyngeal erythema.   Eyes: Conjunctivae and lids are normal. No scleral icterus.   Neck: Trachea normal and phonation normal. Neck supple. No edema present. No erythema present. No neck rigidity present.   Cardiovascular: Normal rate, regular rhythm, normal heart sounds and normal pulses.   No murmur heard.  Pulmonary/Chest: Effort normal and breath sounds normal. No stridor. No respiratory distress. She has no decreased breath sounds. She has no wheezes. She has no rhonchi. She has no rales.   Abdominal: Normal appearance. She exhibits no distension. There is no abdominal tenderness. There is no left CVA tenderness and no right CVA tenderness.   Musculoskeletal: Normal range of motion.         General: No deformity. Normal range of motion.   Neurological: She is alert and oriented to person, place, and time. She exhibits normal muscle tone. Coordination normal.   Skin: Skin is warm, dry, intact, not diaphoretic and not pale.   Psychiatric: Her speech is normal and behavior is normal. Judgment and thought content normal.   Nursing note and vitals reviewed.      Assessment:     1. Cough, unspecified type    2. Acute non-recurrent sinusitis, unspecified location    3. Otalgia, unspecified laterality    4. Postnasal drip    5. Sore throat        Plan:   Discussed exam findings/results/diagnosis/plan with patient in clinic.  Sinus precautions advised.  Advised to f/u with PCP within 2-5 days. ER precautions given if symptoms get any worse. All questions answered. Patient verbally understood and agreed with treatment plan.    Cough, unspecified type  -     SARS Coronavirus 2 Antigen, POCT Manual Read    Acute non-recurrent sinusitis, unspecified location    Otalgia, unspecified laterality    Postnasal drip    Sore throat    Other orders  -     Discontinue: betamethasone acetate-betamethasone sodium phosphate injection 6 mg  -     azithromycin (ZITHROMAX Z-ANTONIA) 250 MG tablet; Take 2 tablets (500 mg) on  Day 1,  followed by 1 tablet (250 mg) once daily on Days 2 through 5.  Dispense: 6 tablet; Refill: 0  -     methylPREDNISolone (MEDROL DOSEPACK) 4 mg tablet; Take as directed  Dispense: 21 tablet; Refill: 0

## 2023-09-15 ENCOUNTER — TELEPHONE (OUTPATIENT)
Dept: FAMILY MEDICINE | Facility: CLINIC | Age: 53
End: 2023-09-15
Payer: OTHER GOVERNMENT

## 2023-09-19 NOTE — PROGRESS NOTES
"TIME RECORD    Date:  08/31/2017    Start Time:  3:00  Stop Time:  3:55    PROCEDURES:    TIMED  Procedure Min.   TE 15   TE sup 20NC   MT 10             UNTIMED  Procedure Min.   CP 10NC         Total Timed Minutes:  25  Total Timed Units:  2  Total Untimed Units:  0  Charges Billed/# of units:  2      Progress/Current Status    Subjective:     Patient ID: Hillary Cotton is a 46 y.o. female.  Diagnosis:   1. Decreased range of motion of neck     2. Poor posture     3. Weakness of shoulder     4. Pain aggravated by activities of daily living       Pain: 2 /10  She reports doing good today, feeling better. After her eval she was flared up that night.     Objective:     Patient was educated and performed therapeutic exercises as per log, 1:1 with PT x 15 minutes and supervsied by PT x 20 minutes to imrpove her strength, flexibility, and ROM. STM/MFR was performed in sitting x 10 minutes to B upper traps and cervical paraspinals by PT. She received cold packs to B shoulders in sitting x 10 minutes at the end of the session.   Date 08/31/17   Visit 2/16   POC exp 10/28/17   FOTO 2/5           CP 10'   MT 10'   Sh flex - wand 1 x 10   Sh abd - wand 1 x 10   Sh ER - wand  1 x 10   scap squeeze --   Wall walk --   pulleys Next?   UT stretch 5 x 5"     Snow angles Towel roll  1 x 10       Seen by DG        Assessment:     Patient required frequent cues with all exercises to stay in a pain free range and to avoid substitutions. She was able to begin making progress towards her goals as she was able to perform all of today's activities with no increase in symptoms prior to leaving the clinic.     Patient Education/Response:     Patient given handouts of today's exercises for her HEP and to perform her HEP twice a day. She was also educated on how to use a tennis ball for self deep tissue massage. She verbalized understanding instructions.     Plans and Goals:     Continue with the plan of care and progress as the " YOB: 1957  REFERRING PROVIDER:  Silvia Galaviz NP (PCP)   CHIEF COMPLAINT: concern for demyelinating disease     HISTORY OF PRESENT ILLNESS:     is a 66 year old right-handed male with medical history of HTN, HLD, and GERD with Hooper's esophagus who was referred to the neurology clinic on consultation from Silvia Galaviz NP for evaluation of an abnormal brain MRI with concern for demyelinating disease.    Jesse reports experiencing, since 2016 patient tolerates.     Short Term Goals (4 Weeks):   1. This patient will be independent with a basic HEP.  2. This patient will increase cervical AROM to WNL and pain free in order to drive safely.  3. This patient will increase B UE strength by 1 grade in order to be able to bathe with no increase in symptoms.   4. This patient will have a pain rating of 4/10 at worst with ADLs.  5. Patient able to score greater than or equal to 70 on the FOTO Neck Survey placing patient in 20%<40% impaired, limited, or restricted category demonstrating overall decreased neck pain with functional activities  6. Patient will be able to achieve greater than or equal to 65 on the FOTO Shoulder Survey placing patient in 20%<40% impaired, limited, or restricted category demonstrating overall improved functional ability with upper extremity.   Long Term Goals (8 Weeks):   1. This patient will be independent with an updated HEP.  2. This patient will have B UE strength of 5/5 in order to be able to perform her usual ADLs  with no increase in symptoms.   3. This patient will have a pain rating of 2/10 at worst with ADLs.  5. Patient able to score greater than or equal to 85 on the FOTO Neck Survey placing patient in 1%<20% impaired, limited, or restricted category demonstrating overall decreased neck pain with functional activities  6. Patient will be able to achieve greater than or equal to 85 on the FOTO Shoulder Survey placing patient in 1%<20% impaired, limited, or restricted category demonstrating overall improved functional ability with upper extremity.      [facility]  9/19/2023 11:16 AM

## 2023-10-10 ENCOUNTER — OFFICE VISIT (OUTPATIENT)
Dept: FAMILY MEDICINE | Facility: CLINIC | Age: 53
End: 2023-10-10
Payer: OTHER GOVERNMENT

## 2023-10-10 VITALS
SYSTOLIC BLOOD PRESSURE: 102 MMHG | DIASTOLIC BLOOD PRESSURE: 84 MMHG | OXYGEN SATURATION: 97 % | WEIGHT: 220.88 LBS | HEART RATE: 95 BPM | BODY MASS INDEX: 35.5 KG/M2 | HEIGHT: 66 IN

## 2023-10-10 DIAGNOSIS — E66.01 SEVERE OBESITY (BMI 35.0-39.9) WITH COMORBIDITY: ICD-10-CM

## 2023-10-10 DIAGNOSIS — Z23 NEEDS FLU SHOT: Primary | ICD-10-CM

## 2023-10-10 DIAGNOSIS — Z23 NEED FOR SHINGLES VACCINE: ICD-10-CM

## 2023-10-10 DIAGNOSIS — E16.2 HYPOGLYCEMIA: ICD-10-CM

## 2023-10-10 DIAGNOSIS — Z12.31 ENCOUNTER FOR SCREENING MAMMOGRAM FOR BREAST CANCER: ICD-10-CM

## 2023-10-10 DIAGNOSIS — N95.1 PERIMENOPAUSAL: ICD-10-CM

## 2023-10-10 PROCEDURE — 99999PBSHW ZOSTER RECOMBINANT VACCINE: Mod: PBBFAC,,,

## 2023-10-10 PROCEDURE — 99999 PR PBB SHADOW E&M-EST. PATIENT-LVL V: CPT | Mod: PBBFAC,,, | Performed by: FAMILY MEDICINE

## 2023-10-10 PROCEDURE — 99215 OFFICE O/P EST HI 40 MIN: CPT | Mod: PBBFAC,25,PO | Performed by: FAMILY MEDICINE

## 2023-10-10 PROCEDURE — 99999PBSHW FLU VACCINE (QUAD) GREATER THAN OR EQUAL TO 3YO PRESERVATIVE FREE IM: Mod: PBBFAC,,,

## 2023-10-10 PROCEDURE — 99215 OFFICE O/P EST HI 40 MIN: CPT | Mod: S$PBB,,, | Performed by: FAMILY MEDICINE

## 2023-10-10 PROCEDURE — 99999PBSHW FLU VACCINE (QUAD) GREATER THAN OR EQUAL TO 3YO PRESERVATIVE FREE IM: ICD-10-PCS | Mod: PBBFAC,,,

## 2023-10-10 PROCEDURE — 99215 PR OFFICE/OUTPT VISIT, EST, LEVL V, 40-54 MIN: ICD-10-PCS | Mod: S$PBB,,, | Performed by: FAMILY MEDICINE

## 2023-10-10 PROCEDURE — 99999 PR PBB SHADOW E&M-EST. PATIENT-LVL V: ICD-10-PCS | Mod: PBBFAC,,, | Performed by: FAMILY MEDICINE

## 2023-10-10 PROCEDURE — G0008 ADMIN INFLUENZA VIRUS VAC: HCPCS | Mod: 59,PBBFAC

## 2023-10-10 PROCEDURE — 90472 IMMUNIZATION ADMIN EACH ADD: CPT | Mod: PBBFAC,PO

## 2023-10-10 PROCEDURE — 90471 IMMUNIZATION ADMIN: CPT | Mod: PBBFAC,PO

## 2023-10-10 RX ORDER — CONJUGATED ESTROGENS AND MEDROXYPROGESTERONE ACETATE .3; 1.5 MG/1; MG/1
1 TABLET, SUGAR COATED ORAL DAILY
Qty: 30 TABLET | Refills: 2 | Status: SHIPPED | OUTPATIENT
Start: 2023-10-10 | End: 2023-12-13 | Stop reason: SDUPTHER

## 2023-10-10 NOTE — PROGRESS NOTES
Subjective     Patient ID: Hillary Cotton is a 52 y.o. female.    Chief Complaint: Follow-up    52 years old female came to the clinic with significant anxiety associated with hot flashes and irregular menstrual cycles.  Patient is concerned menopause.  She is requesting a medicine to help her with the symptoms.  Patient with a BMI of 35 currently trying to lose weight.  She also reports episodes of hypoglycemia sometimes.  She is due for her mammogram.  Patient due for her flu shot and shingles.    Follow-up      Review of Systems   Constitutional: Negative.    HENT: Negative.     Eyes: Negative.    Respiratory: Negative.     Cardiovascular: Negative.    Gastrointestinal: Negative.    Genitourinary: Negative.    Musculoskeletal: Negative.    Neurological: Negative.    Psychiatric/Behavioral:  The patient is nervous/anxious.           Objective     Physical Exam  Constitutional:       General: She is not in acute distress.     Appearance: She is well-developed. She is not diaphoretic.   HENT:      Head: Normocephalic and atraumatic.      Right Ear: External ear normal.      Left Ear: External ear normal.      Nose: Nose normal.      Mouth/Throat:      Pharynx: No oropharyngeal exudate.   Eyes:      General: No scleral icterus.        Right eye: No discharge.         Left eye: No discharge.      Conjunctiva/sclera: Conjunctivae normal.      Pupils: Pupils are equal, round, and reactive to light.   Neck:      Thyroid: No thyromegaly.      Vascular: No JVD.      Trachea: No tracheal deviation.   Cardiovascular:      Rate and Rhythm: Normal rate and regular rhythm.      Heart sounds: Normal heart sounds. No murmur heard.     No friction rub. No gallop.   Pulmonary:      Effort: Pulmonary effort is normal. No respiratory distress.      Breath sounds: Normal breath sounds. No stridor. No wheezing or rales.   Chest:      Chest wall: No tenderness.   Abdominal:      General: Bowel sounds are normal. There is no  distension.      Palpations: Abdomen is soft. There is no mass.      Tenderness: There is no abdominal tenderness. There is no guarding or rebound.   Musculoskeletal:         General: No tenderness. Normal range of motion.      Cervical back: Normal range of motion and neck supple.   Lymphadenopathy:      Cervical: No cervical adenopathy.   Skin:     General: Skin is warm and dry.      Coloration: Skin is not pale.      Findings: No erythema or rash.   Neurological:      Mental Status: She is alert and oriented to person, place, and time.      Cranial Nerves: No cranial nerve deficit.      Motor: No abnormal muscle tone.      Coordination: Coordination normal.      Deep Tendon Reflexes: Reflexes are normal and symmetric.   Psychiatric:         Behavior: Behavior normal.         Thought Content: Thought content normal.         Judgment: Judgment normal.            Assessment and Plan     1. Needs flu shot  -     Influenza - Quadrivalent *Preferred* (6 months+) (PF)    2. Perimenopausal  -     estrogen, conjugated,-medroxyprogesterone 0.3-1.5 mg (PREMPRO) 0.3-1.5 mg per tablet; Take 1 tablet by mouth once daily.  Dispense: 30 tablet; Refill: 2  -     Ambulatory referral/consult to Gynecology; Future; Expected date: 10/17/2023    3. Encounter for screening mammogram for breast cancer  -     Mammo Digital Screening Bilat w/ Nick; Future; Expected date: 10/10/2023    4. Severe obesity (BMI 35.0-39.9) with comorbidity  -     Urinalysis; Future  -     Comprehensive Metabolic Panel; Future; Expected date: 10/10/2023  -     Lipid Panel; Future; Expected date: 10/10/2023  -     TSH; Future; Expected date: 10/10/2023  -     CBC Auto Differential; Future; Expected date: 10/10/2023    5. Need for shingles vaccine  -     (In Office Administered) Zoster Recombinant Vaccine    6. Hypoglycemia  -     Comprehensive Metabolic Panel; Future; Expected date: 10/10/2023  -     Hemoglobin A1C; Future; Expected date: 10/10/2023  -      Insulin, random; Future; Expected date: 10/10/2023         Diet and physical activity to promote weight loss.      We discuss alternatives like natural supplements and antidepressants.  Patient prefers hormonal replacement therapy.  She was oriented about possible side effects like breast cancer and cardiovascular problems.    Follow up in about 6 months (around 4/10/2024), or if symptoms worsen or fail to improve.

## 2023-10-12 ENCOUNTER — LAB VISIT (OUTPATIENT)
Dept: LAB | Facility: HOSPITAL | Age: 53
End: 2023-10-12
Attending: FAMILY MEDICINE
Payer: OTHER GOVERNMENT

## 2023-10-12 DIAGNOSIS — E16.2 HYPOGLYCEMIA: ICD-10-CM

## 2023-10-12 DIAGNOSIS — E66.01 SEVERE OBESITY (BMI 35.0-39.9) WITH COMORBIDITY: ICD-10-CM

## 2023-10-12 LAB
ALBUMIN SERPL BCP-MCNC: 3.6 G/DL (ref 3.5–5.2)
ALP SERPL-CCNC: 64 U/L (ref 55–135)
ALT SERPL W/O P-5'-P-CCNC: 20 U/L (ref 10–44)
ANION GAP SERPL CALC-SCNC: 11 MMOL/L (ref 8–16)
AST SERPL-CCNC: 21 U/L (ref 10–40)
BASOPHILS # BLD AUTO: 0.06 K/UL (ref 0–0.2)
BASOPHILS NFR BLD: 0.8 % (ref 0–1.9)
BILIRUB SERPL-MCNC: 0.3 MG/DL (ref 0.1–1)
BUN SERPL-MCNC: 9 MG/DL (ref 6–20)
CALCIUM SERPL-MCNC: 9.3 MG/DL (ref 8.7–10.5)
CHLORIDE SERPL-SCNC: 99 MMOL/L (ref 95–110)
CHOLEST SERPL-MCNC: 228 MG/DL (ref 120–199)
CHOLEST/HDLC SERPL: 4.8 {RATIO} (ref 2–5)
CO2 SERPL-SCNC: 26 MMOL/L (ref 23–29)
CREAT SERPL-MCNC: 0.8 MG/DL (ref 0.5–1.4)
DIFFERENTIAL METHOD: ABNORMAL
EOSINOPHIL # BLD AUTO: 0.2 K/UL (ref 0–0.5)
EOSINOPHIL NFR BLD: 2.5 % (ref 0–8)
ERYTHROCYTE [DISTWIDTH] IN BLOOD BY AUTOMATED COUNT: 13.5 % (ref 11.5–14.5)
EST. GFR  (NO RACE VARIABLE): >60 ML/MIN/1.73 M^2
ESTIMATED AVG GLUCOSE: 114 MG/DL (ref 68–131)
GLUCOSE SERPL-MCNC: 112 MG/DL (ref 70–110)
HBA1C MFR BLD: 5.6 % (ref 4–5.6)
HCT VFR BLD AUTO: 42.8 % (ref 37–48.5)
HDLC SERPL-MCNC: 48 MG/DL (ref 40–75)
HDLC SERPL: 21.1 % (ref 20–50)
HGB BLD-MCNC: 13.4 G/DL (ref 12–16)
IMM GRANULOCYTES # BLD AUTO: 0.03 K/UL (ref 0–0.04)
IMM GRANULOCYTES NFR BLD AUTO: 0.4 % (ref 0–0.5)
INSULIN COLLECTION INTERVAL: ABNORMAL
INSULIN SERPL-ACNC: 35.9 UU/ML
LDLC SERPL CALC-MCNC: 137.4 MG/DL (ref 63–159)
LYMPHOCYTES # BLD AUTO: 2.2 K/UL (ref 1–4.8)
LYMPHOCYTES NFR BLD: 29.7 % (ref 18–48)
MCH RBC QN AUTO: 29.8 PG (ref 27–31)
MCHC RBC AUTO-ENTMCNC: 31.3 G/DL (ref 32–36)
MCV RBC AUTO: 95 FL (ref 82–98)
MONOCYTES # BLD AUTO: 0.7 K/UL (ref 0.3–1)
MONOCYTES NFR BLD: 8.8 % (ref 4–15)
NEUTROPHILS # BLD AUTO: 4.4 K/UL (ref 1.8–7.7)
NEUTROPHILS NFR BLD: 57.8 % (ref 38–73)
NONHDLC SERPL-MCNC: 180 MG/DL
NRBC BLD-RTO: 0 /100 WBC
PLATELET # BLD AUTO: 277 K/UL (ref 150–450)
PMV BLD AUTO: 11.2 FL (ref 9.2–12.9)
POTASSIUM SERPL-SCNC: 4.4 MMOL/L (ref 3.5–5.1)
PROT SERPL-MCNC: 7.3 G/DL (ref 6–8.4)
RBC # BLD AUTO: 4.49 M/UL (ref 4–5.4)
SODIUM SERPL-SCNC: 136 MMOL/L (ref 136–145)
TRIGL SERPL-MCNC: 213 MG/DL (ref 30–150)
TSH SERPL DL<=0.005 MIU/L-ACNC: 1.99 UIU/ML (ref 0.4–4)
WBC # BLD AUTO: 7.53 K/UL (ref 3.9–12.7)

## 2023-10-12 PROCEDURE — 80061 LIPID PANEL: CPT | Performed by: FAMILY MEDICINE

## 2023-10-12 PROCEDURE — 80053 COMPREHEN METABOLIC PANEL: CPT | Performed by: FAMILY MEDICINE

## 2023-10-12 PROCEDURE — 83525 ASSAY OF INSULIN: CPT | Performed by: FAMILY MEDICINE

## 2023-10-12 PROCEDURE — 36415 COLL VENOUS BLD VENIPUNCTURE: CPT | Mod: PO | Performed by: FAMILY MEDICINE

## 2023-10-12 PROCEDURE — 85025 COMPLETE CBC W/AUTO DIFF WBC: CPT | Performed by: FAMILY MEDICINE

## 2023-10-12 PROCEDURE — 83036 HEMOGLOBIN GLYCOSYLATED A1C: CPT | Performed by: FAMILY MEDICINE

## 2023-10-12 PROCEDURE — 84443 ASSAY THYROID STIM HORMONE: CPT | Performed by: FAMILY MEDICINE

## 2023-10-16 ENCOUNTER — HOSPITAL ENCOUNTER (OUTPATIENT)
Dept: RADIOLOGY | Facility: HOSPITAL | Age: 53
Discharge: HOME | End: 2023-10-16
Attending: FAMILY MEDICINE
Payer: OTHER GOVERNMENT

## 2023-10-16 DIAGNOSIS — Z12.31 ENCOUNTER FOR SCREENING MAMMOGRAM FOR BREAST CANCER: ICD-10-CM

## 2023-10-16 PROCEDURE — 77067 SCR MAMMO BI INCL CAD: CPT | Mod: 26,,, | Performed by: RADIOLOGY

## 2023-10-16 PROCEDURE — 77067 MAMMO DIGITAL SCREENING BILAT WITH TOMO: ICD-10-PCS | Mod: 26,,, | Performed by: RADIOLOGY

## 2023-10-16 PROCEDURE — 77067 SCR MAMMO BI INCL CAD: CPT | Mod: TC

## 2023-10-16 PROCEDURE — 77063 BREAST TOMOSYNTHESIS BI: CPT | Mod: 26,,, | Performed by: RADIOLOGY

## 2023-10-16 PROCEDURE — 77063 MAMMO DIGITAL SCREENING BILAT WITH TOMO: ICD-10-PCS | Mod: 26,,, | Performed by: RADIOLOGY

## 2023-12-12 DIAGNOSIS — Z23 NEED FOR SHINGLES VACCINE: Primary | ICD-10-CM

## 2023-12-13 DIAGNOSIS — M47.812 CERVICAL SPONDYLOSIS: ICD-10-CM

## 2023-12-13 DIAGNOSIS — N95.1 PERIMENOPAUSAL: ICD-10-CM

## 2023-12-13 DIAGNOSIS — M79.18 MYOFASCIAL PAIN: ICD-10-CM

## 2023-12-13 DIAGNOSIS — M54.12 CERVICAL RADICULOPATHY: ICD-10-CM

## 2023-12-13 DIAGNOSIS — J30.2 CHRONIC SEASONAL ALLERGIC RHINITIS: ICD-10-CM

## 2023-12-13 RX ORDER — CELECOXIB 200 MG/1
200 CAPSULE ORAL DAILY PRN
Qty: 30 CAPSULE | Refills: 5 | Status: SHIPPED | OUTPATIENT
Start: 2023-12-13 | End: 2024-01-25

## 2023-12-13 RX ORDER — CETIRIZINE HYDROCHLORIDE 10 MG/1
10 TABLET ORAL DAILY
Qty: 90 TABLET | Refills: 0 | Status: SHIPPED | OUTPATIENT
Start: 2023-12-13

## 2023-12-13 RX ORDER — CONJUGATED ESTROGENS AND MEDROXYPROGESTERONE ACETATE .3; 1.5 MG/1; MG/1
1 TABLET, SUGAR COATED ORAL DAILY
Qty: 30 TABLET | Refills: 5 | Status: SHIPPED | OUTPATIENT
Start: 2023-12-13 | End: 2024-02-22

## 2023-12-13 NOTE — TELEPHONE ENCOUNTER
No care due was identified.  [facility] Embedded Care Due Messages. Reference number: [id number].   12/13/2023 4:33:54 PM CST

## 2024-01-16 ENCOUNTER — TELEPHONE (OUTPATIENT)
Dept: PODIATRY | Facility: CLINIC | Age: 54
End: 2024-01-16
Payer: OTHER GOVERNMENT

## 2024-01-16 ENCOUNTER — HOSPITAL ENCOUNTER (OUTPATIENT)
Dept: RADIOLOGY | Facility: HOSPITAL | Age: 54
Discharge: HOME | End: 2024-01-16
Attending: PODIATRIST
Payer: OTHER GOVERNMENT

## 2024-01-16 DIAGNOSIS — Z98.890 HISTORY OF FOOT SURGERY: ICD-10-CM

## 2024-01-16 PROCEDURE — 73630 X-RAY EXAM OF FOOT: CPT | Mod: 26,LT,, | Performed by: RADIOLOGY

## 2024-01-16 PROCEDURE — 73630 X-RAY EXAM OF FOOT: CPT | Mod: TC,FY,PN,LT

## 2024-01-16 NOTE — TELEPHONE ENCOUNTER
Patient is going to need a follow up. I am also ordering a CT of the left foot and follow up foot x-ray.

## 2024-01-16 NOTE — TELEPHONE ENCOUNTER
Spoke with Ms Cotton who requested if possible to have her xray scheduled today in [place]. Let her know that I will schedule it, but did advise that she call the department due to weather conditions and that she may have to move her xray to 1/17/24. Per pt request scheduled for 1:15 pm on 1/16/24 at [facility], but again advised that she call the department to double check to ensure that it can be done due to the weather. Phone number provided to patient over the patient portal.Scheduled her CT in [place] for 1/19/24 at 1:45 pm (reinforced that she will need to get her xray prior to this) with her I follow up appt with Dr Rosi hayes for 1/25/24 at 1:15 pm. No other need voiced. Encouraged call back if needed

## 2024-01-19 ENCOUNTER — HOSPITAL ENCOUNTER (OUTPATIENT)
Dept: RADIOLOGY | Facility: HOSPITAL | Age: 54
Discharge: HOME | End: 2024-01-19
Attending: PODIATRIST
Payer: OTHER GOVERNMENT

## 2024-01-19 DIAGNOSIS — G89.29 CHRONIC FOOT PAIN, LEFT: ICD-10-CM

## 2024-01-19 DIAGNOSIS — Z98.890 HISTORY OF FOOT SURGERY: ICD-10-CM

## 2024-01-19 DIAGNOSIS — M79.672 CHRONIC FOOT PAIN, LEFT: ICD-10-CM

## 2024-01-19 PROCEDURE — 73700 CT LOWER EXTREMITY W/O DYE: CPT | Mod: 26,LT,, | Performed by: RADIOLOGY

## 2024-01-19 PROCEDURE — 73700 CT LOWER EXTREMITY W/O DYE: CPT | Mod: TC,PN,LT

## 2024-01-25 ENCOUNTER — LAB VISIT (OUTPATIENT)
Dept: LAB | Facility: HOSPITAL | Age: 54
End: 2024-01-25
Attending: PODIATRIST
Payer: OTHER GOVERNMENT

## 2024-01-25 ENCOUNTER — OFFICE VISIT (OUTPATIENT)
Dept: PODIATRY | Facility: CLINIC | Age: 54
End: 2024-01-25
Payer: OTHER GOVERNMENT

## 2024-01-25 VITALS
DIASTOLIC BLOOD PRESSURE: 87 MMHG | HEIGHT: 66 IN | WEIGHT: 220 LBS | SYSTOLIC BLOOD PRESSURE: 135 MMHG | HEART RATE: 96 BPM | BODY MASS INDEX: 35.36 KG/M2

## 2024-01-25 DIAGNOSIS — M79.18 MYOFASCIAL PAIN: ICD-10-CM

## 2024-01-25 DIAGNOSIS — M79.672 CHRONIC FOOT PAIN, LEFT: ICD-10-CM

## 2024-01-25 DIAGNOSIS — M54.12 CERVICAL RADICULOPATHY: ICD-10-CM

## 2024-01-25 DIAGNOSIS — M89.9 DISORDER OF BONE: ICD-10-CM

## 2024-01-25 DIAGNOSIS — M19.079 ARTHRITIS OF SUBTALAR JOINT: ICD-10-CM

## 2024-01-25 DIAGNOSIS — D17.79 INTRAOSSEOUS LIPOMA: Primary | ICD-10-CM

## 2024-01-25 DIAGNOSIS — M47.812 CERVICAL SPONDYLOSIS: ICD-10-CM

## 2024-01-25 DIAGNOSIS — G89.29 CHRONIC FOOT PAIN, LEFT: ICD-10-CM

## 2024-01-25 PROCEDURE — 20605 DRAIN/INJ JOINT/BURSA W/O US: CPT | Mod: S$PBB,LT,, | Performed by: PODIATRIST

## 2024-01-25 PROCEDURE — 99214 OFFICE O/P EST MOD 30 MIN: CPT | Mod: 25,S$PBB,, | Performed by: PODIATRIST

## 2024-01-25 PROCEDURE — 99999 PR PBB SHADOW E&M-EST. PATIENT-LVL IV: CPT | Mod: PBBFAC,,, | Performed by: PODIATRIST

## 2024-01-25 PROCEDURE — 82306 VITAMIN D 25 HYDROXY: CPT | Performed by: PODIATRIST

## 2024-01-25 PROCEDURE — 20605 DRAIN/INJ JOINT/BURSA W/O US: CPT | Mod: PBBFAC,PN | Performed by: PODIATRIST

## 2024-01-25 PROCEDURE — 99214 OFFICE O/P EST MOD 30 MIN: CPT | Mod: PBBFAC,PN,25 | Performed by: PODIATRIST

## 2024-01-25 PROCEDURE — 36415 COLL VENOUS BLD VENIPUNCTURE: CPT | Performed by: PODIATRIST

## 2024-01-25 RX ORDER — CELECOXIB 200 MG/1
CAPSULE ORAL
Qty: 30 CAPSULE | Refills: 5 | Status: SHIPPED | OUTPATIENT
Start: 2024-01-25

## 2024-01-25 NOTE — TELEPHONE ENCOUNTER
Refill Routing Note   Medication(s) are not appropriate for processing by [facility] for the following reason(s):        Outside of protocol    [facility] action(s):  Route             Appointments  past 12m or future 3m with PCP    Date Provider   Last Visit   10/10/2023 Arnulfo Barboza MD   Next Visit   4/10/2024 Arnulfo Barboza MD   ED visits in past 90 days: 0        Note composed:8:09 AM 01/25/2024

## 2024-01-25 NOTE — TELEPHONE ENCOUNTER
No care due was identified.  [facility] Embedded Care Due Messages. Reference number: [id number].   1/25/2024 12:11:52 AM CST
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
## 2024-01-26 ENCOUNTER — PATIENT MESSAGE (OUTPATIENT)
Dept: PODIATRY | Facility: CLINIC | Age: 54
End: 2024-01-26
Payer: OTHER GOVERNMENT

## 2024-01-26 LAB — 25(OH)D3+25(OH)D2 SERPL-MCNC: 34 NG/ML (ref 30–96)

## 2024-01-26 RX ORDER — ASPIRIN 325 MG
50000 TABLET, DELAYED RELEASE (ENTERIC COATED) ORAL
Qty: 12 CAPSULE | Refills: 3 | Status: SHIPPED | OUTPATIENT
Start: 2024-01-26

## 2024-01-26 RX ORDER — TRAMADOL HYDROCHLORIDE 50 MG/1
50 TABLET ORAL EVERY 6 HOURS PRN
Qty: 30 TABLET | Refills: 0 | Status: SHIPPED | OUTPATIENT
Start: 2024-01-26 | End: 2024-04-29 | Stop reason: SDUPTHER

## 2024-02-16 DIAGNOSIS — M54.12 CERVICAL RADICULOPATHY: ICD-10-CM

## 2024-02-16 RX ORDER — GABAPENTIN 300 MG/1
300 CAPSULE ORAL NIGHTLY
Qty: 90 CAPSULE | Refills: 1 | Status: SHIPPED | OUTPATIENT
Start: 2024-02-16

## 2024-02-16 NOTE — TELEPHONE ENCOUNTER
Refill Routing Note   Medication(s) are not appropriate for processing by [facility] for the following reason(s):        Outside of protocol    [facility] action(s):  Route               Appointments  past 12m or future 3m with PCP    Date Provider   Last Visit   10/10/2023 Arnulfo Barboza MD   Next Visit   4/10/2024 Arnulfo Barboza MD   ED visits in past 90 days: 0        Note composed:11:37 AM 02/16/2024

## 2024-02-16 NOTE — TELEPHONE ENCOUNTER
No care due was identified.  [facility] Embedded Care Due Messages. Reference number: [id number].   2/16/2024 11:14:36 AM CST

## 2024-02-22 ENCOUNTER — OFFICE VISIT (OUTPATIENT)
Dept: OBSTETRICS AND GYNECOLOGY | Facility: CLINIC | Age: 54
End: 2024-02-22
Payer: OTHER GOVERNMENT

## 2024-02-22 VITALS — DIASTOLIC BLOOD PRESSURE: 90 MMHG | BODY MASS INDEX: 35.51 KG/M2 | HEIGHT: 66 IN | SYSTOLIC BLOOD PRESSURE: 130 MMHG

## 2024-02-22 DIAGNOSIS — Z11.51 SCREENING FOR HPV (HUMAN PAPILLOMAVIRUS): ICD-10-CM

## 2024-02-22 DIAGNOSIS — N93.8 DUB (DYSFUNCTIONAL UTERINE BLEEDING): ICD-10-CM

## 2024-02-22 DIAGNOSIS — N95.1 PERIMENOPAUSAL: ICD-10-CM

## 2024-02-22 DIAGNOSIS — Z01.419 ENCOUNTER FOR GYNECOLOGICAL EXAMINATION: Primary | ICD-10-CM

## 2024-02-22 DIAGNOSIS — Z12.4 SCREENING FOR CERVICAL CANCER: ICD-10-CM

## 2024-02-22 PROCEDURE — 88175 CYTOPATH C/V AUTO FLUID REDO: CPT | Performed by: OBSTETRICS & GYNECOLOGY

## 2024-02-22 PROCEDURE — 99459 PELVIC EXAMINATION: CPT | Mod: PBBFAC | Performed by: OBSTETRICS & GYNECOLOGY

## 2024-02-22 PROCEDURE — 99999 PR PBB SHADOW E&M-EST. PATIENT-LVL III: CPT | Mod: PBBFAC,,, | Performed by: OBSTETRICS & GYNECOLOGY

## 2024-02-22 PROCEDURE — 99213 OFFICE O/P EST LOW 20 MIN: CPT | Mod: PBBFAC | Performed by: OBSTETRICS & GYNECOLOGY

## 2024-02-22 PROCEDURE — 99459 PELVIC EXAMINATION: CPT | Mod: S$PBB,,, | Performed by: OBSTETRICS & GYNECOLOGY

## 2024-02-22 PROCEDURE — 99386 PREV VISIT NEW AGE 40-64: CPT | Mod: S$PBB,,, | Performed by: OBSTETRICS & GYNECOLOGY

## 2024-02-22 PROCEDURE — 87624 HPV HI-RISK TYP POOLED RSLT: CPT | Performed by: OBSTETRICS & GYNECOLOGY

## 2024-02-22 NOTE — PROGRESS NOTES
Subjective:       Patient ID: Hillary Cotton is a 53 y.o. female.    Chief Complaint:  Annual Exam (Last pap/hpv: 7/2020 Normal; Last mmg: 10/16/2023 Birads: 1 )      History of Present Illness  - patient presents for annual. Reports has been bleeding (lightly) for the last two weeks. Has monthly periods that usually start out light then become very heavy.  - was started on PremPro a few years ago and didn't like it so she stopped it.    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  rayray notified cc    COLONOSCOPY N/A 11/11/2022    Procedure: COLONOSCOPY Moviprep;  Surgeon: John Bonner MD;  Location: [facility] ENDO;  Service: Endoscopy;  Laterality: N/A;    EPIDURAL STEROID INJECTION N/A 6/5/2020    Procedure: INJECTION, STEROID, EPIDURAL,C7-T1;  Surgeon: Augusto Hussein MD;  Location: [facility] PAIN MGT;  Service: Pain Management;  Laterality: N/A;    EPIDURAL STEROID INJECTION N/A 9/18/2020    Procedure: INJECTION, STEROID, EPIDURAL C7/T1;  Surgeon: Augusto Hussein MD;  Location: [facility] PAIN MGT;  Service: Pain Management;  Laterality: N/A;  INJECTION, STEROID, EPIDURAL C7/T1    FOOT MASS EXCISION Left 11/17/2021    Procedure: EXCISION, MASS, FOOT;  Surgeon: Elías Aranda DPM;  Location: [facility] OR;  Service: Podiatry;  Laterality: Left;  mini c-arm, Allograft bone Arthrex  Arthrex notified 11/8 CC  Biomet notified 11/16/21 AM    SALIVARY GLAND SURGERY      TONSILLECTOMY          Family History   Problem Relation Age of Onset    Diabetes Mother     Hypertension Mother  "    Asthma Mother     Sinus disease Mother     Allergies Father     Diabetes Maternal Grandmother     Hypertension Maternal Grandmother     Sinus disease Sister     Sinus disease Brother     Sinus disease Sister         Social History     Socioeconomic History    Marital status:    Occupational History     Employer: [employer]   Tobacco Use    Smoking status: Former     Current packs/day: 0.00     Types: Cigarettes     Quit date: 10/1/2015     Years since quittin.4    Smokeless tobacco: Never   Substance and Sexual Activity    Alcohol use: No    Drug use: No    Sexual activity: Yes     Partners: Male           Objective:     Vitals:    24 1256   BP: (!) 130/90   Height: 5' 6" (1.676 m)       Physical Exam:   Constitutional: She is oriented to person, place, and time. She appears well-developed and well-nourished.        Pulmonary/Chest: Right breast exhibits no mass, no nipple discharge, no skin change, no tenderness and no swelling. Left breast exhibits no mass, no nipple discharge, no skin change, no tenderness and no swelling. Breasts are symmetrical.        Abdominal: Soft. She exhibits no distension. There is no abdominal tenderness.     Genitourinary:    Uterus normal.   There is no tenderness or lesion on the right labia. There is no tenderness or lesion on the left labia. Cervix is normal. Right adnexum displays no mass, no tenderness and no fullness. Left adnexum displays no mass, no tenderness and no fullness. There is vaginal discharge in the vagina.    pap smear completed   Genitourinary Comments: Scant brown d/c c/w spotting             Musculoskeletal: Moves all extremeties.       Neurological: She is alert and oriented to person, place, and time.     Psychiatric: She has a normal mood and affect.        A female chaperone was present for exam.    Assessment/ Plan:     Orders Placed This Encounter    HPV High Risk Genotypes, PCR    US Pelvis Comp with Transvag NON-OB (xpd    " Liquid-Based Pap Smear, Screening       Hillary was seen today for annual exam.    Diagnoses and all orders for this visit:    Encounter for gynecological examination    Perimenopausal  -     Ambulatory referral/consult to Gynecology  -     US Pelvis Comp with Transvag NON-OB (xpd; Future    Screening for cervical cancer  -     Liquid-Based Pap Smear, Screening    Screening for HPV (human papillomavirus)  -     HPV High Risk Genotypes, PCR    DUB (dysfunctional uterine bleeding)  -     US Pelvis Comp with Transvag NON-OB (xpd; Future    - will schedule u/s and visit with me to evaluate etiology for abnormal bleeding. Is likely due to perimenopause but would like to rule out something structural like a polyp. Discussed considering an endometrial biopsy if warranted by u/s. May want to start daily progesterone vs low dose ocps.    Follow up in about 1 year (around 2/22/2025) for annual exam.    As of April 1, 2021, the Cures Act has been passed nationally. This new law requires that all doctors progress notes, lab results, pathology reports and radiology reports be released IMMEDIATELY to the patient in the patient portal. That means that the results are released to you at the EXACT same time they are released to me. Therefore, with all of the patients that I have I am not able to reply to each patient exactly when the results come in. So there will be a delay from when you see the results to when I see them and have time to come up with a response to send you. Also I only see these results when I am on the computer at work. So if the results come in over the weekend or after 5 pm of a work day, I will not see them until the next business day. As you can tell, this is a challenge as a physician to give every patient the quick response they hope for and deserve. So please be patient!   Thanks for your understanding and patience.

## 2024-02-23 ENCOUNTER — OFFICE VISIT (OUTPATIENT)
Dept: PODIATRY | Facility: CLINIC | Age: 54
End: 2024-02-23
Payer: OTHER GOVERNMENT

## 2024-02-23 VITALS
WEIGHT: 220 LBS | DIASTOLIC BLOOD PRESSURE: 80 MMHG | HEIGHT: 66 IN | SYSTOLIC BLOOD PRESSURE: 131 MMHG | HEART RATE: 101 BPM | BODY MASS INDEX: 35.36 KG/M2

## 2024-02-23 DIAGNOSIS — D17.79: ICD-10-CM

## 2024-02-23 DIAGNOSIS — M81.0 OSTEOPOROSIS WITHOUT CURRENT PATHOLOGICAL FRACTURE, UNSPECIFIED OSTEOPOROSIS TYPE: Primary | ICD-10-CM

## 2024-02-23 DIAGNOSIS — M79.672 CHRONIC FOOT PAIN, LEFT: ICD-10-CM

## 2024-02-23 DIAGNOSIS — G89.29 CHRONIC FOOT PAIN, LEFT: ICD-10-CM

## 2024-02-23 DIAGNOSIS — M19.079 ARTHRITIS OF SUBTALAR JOINT: ICD-10-CM

## 2024-02-23 DIAGNOSIS — D17.79 INTRAOSSEOUS LIPOMA: ICD-10-CM

## 2024-02-23 PROCEDURE — 99214 OFFICE O/P EST MOD 30 MIN: CPT | Mod: PBBFAC,PN | Performed by: PODIATRIST

## 2024-02-23 PROCEDURE — 99999 PR PBB SHADOW E&M-EST. PATIENT-LVL IV: CPT | Mod: PBBFAC,,, | Performed by: PODIATRIST

## 2024-02-23 PROCEDURE — 99214 OFFICE O/P EST MOD 30 MIN: CPT | Mod: S$PBB,,, | Performed by: PODIATRIST
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
## 2024-02-27 LAB
FINAL PATHOLOGIC DIAGNOSIS: NORMAL
Lab: NORMAL

## 2024-03-01 ENCOUNTER — HOSPITAL ENCOUNTER (OUTPATIENT)
Dept: RADIOLOGY | Facility: HOSPITAL | Age: 54
Discharge: HOME | End: 2024-03-01
Attending: PODIATRIST
Payer: OTHER GOVERNMENT

## 2024-03-01 DIAGNOSIS — M81.0 OSTEOPOROSIS WITHOUT CURRENT PATHOLOGICAL FRACTURE, UNSPECIFIED OSTEOPOROSIS TYPE: ICD-10-CM

## 2024-03-01 DIAGNOSIS — D17.79: ICD-10-CM

## 2024-03-01 PROCEDURE — 77080 DXA BONE DENSITY AXIAL: CPT | Mod: 26,,, | Performed by: RADIOLOGY

## 2024-03-01 PROCEDURE — 77080 DXA BONE DENSITY AXIAL: CPT | Mod: TC

## 2024-03-07 ENCOUNTER — PATIENT MESSAGE (OUTPATIENT)
Dept: OBSTETRICS AND GYNECOLOGY | Facility: CLINIC | Age: 54
End: 2024-03-07

## 2024-03-07 ENCOUNTER — OFFICE VISIT (OUTPATIENT)
Dept: OBSTETRICS AND GYNECOLOGY | Facility: CLINIC | Age: 54
End: 2024-03-07
Payer: OTHER GOVERNMENT

## 2024-03-07 VITALS — DIASTOLIC BLOOD PRESSURE: 74 MMHG | SYSTOLIC BLOOD PRESSURE: 100 MMHG | HEIGHT: 66 IN | BODY MASS INDEX: 35.51 KG/M2

## 2024-03-07 DIAGNOSIS — N95.1 PERIMENOPAUSAL: Primary | ICD-10-CM

## 2024-03-07 DIAGNOSIS — N84.0 ENDOMETRIAL POLYP: ICD-10-CM

## 2024-03-07 PROCEDURE — 99999 PR PBB SHADOW E&M-EST. PATIENT-LVL IV: CPT | Mod: PBBFAC,,, | Performed by: OBSTETRICS & GYNECOLOGY

## 2024-03-07 PROCEDURE — 99213 OFFICE O/P EST LOW 20 MIN: CPT | Mod: 57,S$PBB,, | Performed by: OBSTETRICS & GYNECOLOGY

## 2024-03-07 PROCEDURE — 99214 OFFICE O/P EST MOD 30 MIN: CPT | Mod: PBBFAC | Performed by: OBSTETRICS & GYNECOLOGY

## 2024-03-07 NOTE — PROGRESS NOTES
Subjective:       Patient ID: Hillary Cotton is a 53 y.o. female.    Chief Complaint:  Dysfunctional Uterine Bleeding      History of Present Illness  - patient presents with continued bleeding. Ultrasound today.  - this is the first time this has happened.  - c/o PMS    Past Medical History:   Diagnosis Date    Abnormal Pap smear     Allergy     Asthma     Sinusitis, chronic        Past Surgical History:   Procedure Laterality Date    ARTHROSCOPY OF FOOT Left 3/24/2023    Procedure: ARTHROSCOPY, FOOT;  Surgeon: Elías Aarnda DPM;  Location: [facility] OR;  Service: Podiatry;  Laterality: Left;  Right lateral decubitus, 2.9 mm 30 deg angle scope, 2.7 mm shaver, Avitus bone graft harvester(Rayray),   brie notified cc  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  rayray notified cc    COLONOSCOPY N/A 11/11/2022    Procedure: COLONOSCOPY Moviprep;  Surgeon: John Bonner MD;  Location: [facility] ENDO;  Service: Endoscopy;  Laterality: N/A;    EPIDURAL STEROID INJECTION N/A 6/5/2020    Procedure: INJECTION, STEROID, EPIDURAL,C7-T1;  Surgeon: Augusto Hussein MD;  Location: [facility] PAIN MGT;  Service: Pain Management;  Laterality: N/A;    EPIDURAL STEROID INJECTION N/A 9/18/2020    Procedure: INJECTION, STEROID, EPIDURAL C7/T1;  Surgeon: Augusto Hussein MD;  Location: [facility] PAIN MGT;  Service: Pain Management;  Laterality: N/A;  INJECTION, STEROID, EPIDURAL C7/T1    FOOT MASS EXCISION Left 11/17/2021    Procedure: EXCISION, MASS, FOOT;  Surgeon: Elías Aranda DPM;  Location: [facility] OR;  Service: Podiatry;  Laterality: Left;  mini c-arm, Allograft bone Arthrex  Arthrex notified 11/8 CC  Biomet notified 11/16/21 AM    SALIVARY GLAND SURGERY      TONSILLECTOMY          Family History   Problem Relation Age of Onset    Diabetes Mother     Hypertension Mother     Asthma Mother     Sinus disease Mother     Allergies Father     Diabetes Maternal Grandmother     Hypertension Maternal Grandmother     Sinus disease Sister      "Sinus disease Brother     Sinus disease Sister         Social History     Socioeconomic History    Marital status:    Occupational History     Employer: [employer]   Tobacco Use    Smoking status: Former     Current packs/day: 0.00     Types: Cigarettes     Quit date: 10/1/2015     Years since quittin.4    Smokeless tobacco: Never   Substance and Sexual Activity    Alcohol use: No    Drug use: No    Sexual activity: Yes     Partners: Male           Objective:     Vitals:    24 1003   BP: 100/74   Height: 5' 6" (1.676 m)       Physical Exam:   Constitutional: She appears well-developed and well-nourished. She is cooperative. No distress.                           Neurological: She is alert.          Assessment/ Plan:     Orders Placed This Encounter    Case Request Operating Room: POLYPECTOMY, UTERUS, HYSTEROSCOPIC       Hillary was seen today for dysfunctional uterine bleeding.    Diagnoses and all orders for this visit:    Perimenopausal    Endometrial polyp  -     Case Request Operating Room: POLYPECTOMY, UTERUS, HYSTEROSCOPIC    - reviewed u/s. Small polyp.  - discussed hysteroscopy D&C/ polypectomy. Patient wishes to proceed. Consent signed. Procedure discussed in detail.    Follow up in 19 days (on 3/26/2024) for hysteroscopic polypectomy.    As of 2021, the Cures Act has been passed nationally. This new law requires that all doctors progress notes, lab results, pathology reports and radiology reports be released IMMEDIATELY to the patient in the patient portal. That means that the results are released to you at the EXACT same time they are released to me. Therefore, with all of the patients that I have I am not able to reply to each patient exactly when the results come in. So there will be a delay from when you see the results to when I see them and have time to come up with a response to send you. Also I only see these results when I am on the computer at work. So if the results " come in over the weekend or after 5 pm of a work day, I will not see them until the next business day. As you can tell, this is a challenge as a physician to give every patient the quick response they hope for and deserve. So please be patient!   Thanks for your understanding and patience.

## 2024-03-11 ENCOUNTER — TELEPHONE (OUTPATIENT)
Dept: OBSTETRICS AND GYNECOLOGY | Facility: CLINIC | Age: 54
End: 2024-03-11
Payer: OTHER GOVERNMENT

## 2024-03-11 NOTE — TELEPHONE ENCOUNTER
Pt calling to confirm that her sx on 3/26 is scheduled.  Inquiring time, etc.    Advised she will receive a call from me to confirm time and schedule sx appts as soon as Dr. Wadsworth sends me the information.

## 2024-03-12 ENCOUNTER — LAB VISIT (OUTPATIENT)
Dept: LAB | Facility: HOSPITAL | Age: 54
End: 2024-03-12
Attending: SURGERY
Payer: OTHER GOVERNMENT

## 2024-03-12 ENCOUNTER — OFFICE VISIT (OUTPATIENT)
Dept: ORTHOPEDICS | Facility: CLINIC | Age: 54
End: 2024-03-12
Payer: OTHER GOVERNMENT

## 2024-03-12 VITALS — WEIGHT: 220 LBS | BODY MASS INDEX: 35.36 KG/M2 | HEIGHT: 66 IN

## 2024-03-12 DIAGNOSIS — D17.79: ICD-10-CM

## 2024-03-12 DIAGNOSIS — M81.0 OSTEOPOROSIS WITHOUT CURRENT PATHOLOGICAL FRACTURE, UNSPECIFIED OSTEOPOROSIS TYPE: Primary | ICD-10-CM

## 2024-03-12 LAB
BASOPHILS # BLD AUTO: 0.08 K/UL (ref 0–0.2)
BASOPHILS NFR BLD: 1.1 % (ref 0–1.9)
CRP SERPL-MCNC: 10.4 MG/L (ref 0–8.2)
DIFFERENTIAL METHOD BLD: ABNORMAL
EOSINOPHIL # BLD AUTO: 0.6 K/UL (ref 0–0.5)
EOSINOPHIL NFR BLD: 8.7 % (ref 0–8)
ERYTHROCYTE [DISTWIDTH] IN BLOOD BY AUTOMATED COUNT: 13.1 % (ref 11.5–14.5)
ERYTHROCYTE [SEDIMENTATION RATE] IN BLOOD BY PHOTOMETRIC METHOD: 41 MM/HR (ref 0–36)
HCT VFR BLD AUTO: 41.8 % (ref 37–48.5)
HGB BLD-MCNC: 13.7 G/DL (ref 12–16)
IMM GRANULOCYTES # BLD AUTO: 0.01 K/UL (ref 0–0.04)
IMM GRANULOCYTES NFR BLD AUTO: 0.1 % (ref 0–0.5)
LYMPHOCYTES # BLD AUTO: 2.8 K/UL (ref 1–4.8)
LYMPHOCYTES NFR BLD: 38.6 % (ref 18–48)
MCH RBC QN AUTO: 28.8 PG (ref 27–31)
MCHC RBC AUTO-ENTMCNC: 32.8 G/DL (ref 32–36)
MCV RBC AUTO: 88 FL (ref 82–98)
MONOCYTES # BLD AUTO: 0.7 K/UL (ref 0.3–1)
MONOCYTES NFR BLD: 9.1 % (ref 4–15)
NEUTROPHILS # BLD AUTO: 3 K/UL (ref 1.8–7.7)
NEUTROPHILS NFR BLD: 42.4 % (ref 38–73)
NRBC BLD-RTO: 0 /100 WBC
PLATELET # BLD AUTO: 294 K/UL (ref 150–450)
PMV BLD AUTO: 10.8 FL (ref 9.2–12.9)
RBC # BLD AUTO: 4.75 M/UL (ref 4–5.4)
WBC # BLD AUTO: 7.15 K/UL (ref 3.9–12.7)

## 2024-03-12 PROCEDURE — 99204 OFFICE O/P NEW MOD 45 MIN: CPT | Mod: S$PBB,,, | Performed by: SURGERY

## 2024-03-12 PROCEDURE — 99213 OFFICE O/P EST LOW 20 MIN: CPT | Mod: PBBFAC,PN | Performed by: SURGERY

## 2024-03-12 PROCEDURE — 85025 COMPLETE CBC W/AUTO DIFF WBC: CPT | Performed by: SURGERY

## 2024-03-12 PROCEDURE — 86140 C-REACTIVE PROTEIN: CPT | Performed by: SURGERY

## 2024-03-12 PROCEDURE — 85652 RBC SED RATE AUTOMATED: CPT | Performed by: SURGERY

## 2024-03-12 PROCEDURE — 99999 PR PBB SHADOW E&M-EST. PATIENT-LVL III: CPT | Mod: PBBFAC,,, | Performed by: SURGERY

## 2024-03-12 PROCEDURE — 36415 COLL VENOUS BLD VENIPUNCTURE: CPT | Performed by: SURGERY

## 2024-03-12 NOTE — PRE ADMISSION SCREENING
Pt reviewed by JESSI RN on 3/12/24. OK to proceed at [facility].

## 2024-03-13 NOTE — PROGRESS NOTES
Patient ID: Hillary Cotton is a 53 y.o. female.    Chief Complaint: Pain of the Right Ankle    HPI     Hillary Cotton has experienced problems with the left foot. She underwent External neurolysis of sural nerve ankle/hindfoot (Left) as well as   Excision and curettage of benign bone cyst left calcaneus with implantation of allograft bone with Dr. Aranda.     She then underwent Left foot and excision/curettage of bone cyst left calcaneus with autologous bone graft from the left tibia implanted into the left calcaneus on 03/24/2023.    Unfortunately the zone of possible lipoma of the calcaneus has expanded as of her last CT. She continues to have pain. Dr. Aranda referred her to me for a second opinion. She presents here today in a boot for further management.     ROS      Objective:      Ortho/SPM Exam        There were no vitals filed for this visit.    The patient is not in acute distress.   Body habitus is normal.  The skin over the foot and ankle is has a healed scar.  Effusion 0  Palpation- tender about the sinus tarsi and previous well healed incision.  Minimal pain on eversion.  Range of motion- limited by pain, however all planes of motion about the foot are intact  Ligament laxity exam: anterior drawer, talar tilt are both stable  Flatfoot negative. Corrects not applicable  Pulses DP present, PT present.  Motor normal 5/5 strength in all tested muscle groups.   Sensory normal.    IMAGING- reviewed previous x-rays and CT scan  These images were independently reviewed and discussed with the patient.  Patient is status post bone graft of lipoma of the left calcaneus. Zone of lucency related to lipoma involving the calcaneus has increased in size compared to previous exam. It now measures 2.4 x 1.5 x 1.7 cm.      Assessment:       Encounter Diagnoses   Name Primary?    Osteoporosis without current pathological fracture, unspecified osteoporosis type Yes    Intracortical lipoma of bone                  Plan:   She has a very complicated clinical picture.  Prior to any additional procedures I had recommend an MRI to evaluate the bone and surrounding lesion as well as inflammatory markers to rule out a potential infection.  Follow up after these studies.  Case discussed with the patient as well as Dr. Aranda.    Patient expressed understanding of this plan and all questions were answered.    Bhargav Urbina MD  [facility]  Department of Orthopedic Surgery    This note is dictated using the M*Modal Fluency Direct word recognition program. There are word recognition mistakes that are occasionally missed on review.

## 2024-03-13 NOTE — TELEPHONE ENCOUNTER
Requested Date:  3/26    Requested Time:  0915    Case Length:30 minutes    Surgeon: Gris Wadsworth      Assistant Surgeon: None   Visit Type: Outpatient    LOCATION: [facility]    PROCEDURE: hysteroscopy D&C/polypectomy  Diagnosis:endometrial polyp  Anesthesia type: General   Comments/Special Equipment Needed:  Rep needed: No  Does the patient need a PreOp appointment with MD: No  U/S needed at pre-op: No  PCP clearance: Not Needed  When does she need her Post op appointment: 2 weeks virtual   Do you need additional time blocked out from clinic? No  If so, what time do you want to be back in clinic? N/a  When can the patient go back to work? 2 days         Can sign consents the day of procedure.

## 2024-03-15 ENCOUNTER — PATIENT MESSAGE (OUTPATIENT)
Dept: PODIATRY | Facility: CLINIC | Age: 54
End: 2024-03-15
Payer: OTHER GOVERNMENT

## 2024-03-15 ENCOUNTER — PATIENT MESSAGE (OUTPATIENT)
Dept: ORTHOPEDICS | Facility: CLINIC | Age: 54
End: 2024-03-15
Payer: OTHER GOVERNMENT

## 2024-03-23 NOTE — H&P
[facility] ([facility])  Obstetrics & Gynecology  History & Physical    Patient Name: Hillary Cotton  MRN: [id number]  Admission Date: (Not on file)  Primary Care Provider: Arnulfo Barboza MD    Subjective:     Chief Complaint/Reason for Admission: endometrial polyp    History of Present Illness: Patient presents with perimenopausal bleeding. Ultrasound shows a small polyp. Patient desires definitive treatment with a hysteroscopy D&C/polypectomy.    No current facility-administered medications on file prior to encounter.     Current Outpatient Medications on File Prior to Encounter   Medication Sig    celecoxib (CELEBREX) 200 MG capsule TAKE ONE CAPSULE BY MOUTH DAILY AS NEEDED FOR PAIN    cetirizine (ZYRTEC) 10 MG tablet Take 1 tablet (10 mg total) by mouth once daily.    cholecalciferol, vitamin D3, 1,250 mcg (50,000 unit) capsule Take 1 capsule (50,000 Units total) by mouth every 7 days.    gabapentin (NEURONTIN) 300 MG capsule Take 1 capsule (300 mg total) by mouth every evening.    glucosamine-chondroitin 500-400 mg tablet Take 1 tablet by mouth 3 (three) times daily.    ipratropium (ATROVENT) 21 mcg (0.03 %) nasal spray 2 sprays by Each Nostril route 2 (two) times daily as needed.    multivitamin capsule Take 1 capsule by mouth once daily.    mupirocin (BACTROBAN) 2 % ointment Apply topically 2 (two) times daily.    traMADoL (ULTRAM) 50 mg tablet Take 1 tablet (50 mg total) by mouth every 6 (six) hours as needed for Pain.       Review of patient's allergies indicates:   Allergen Reactions    Prednisone Rash     Hx of delayed onset rash on 2 occasion. Tolerates medrol, IM steroids, topical, and intranasal steroids w/o problem       Past Medical History:   Diagnosis Date    Abnormal Pap smear     Allergy     Asthma     Sinusitis, chronic      OB History    Para Term  AB Living   2   0   2     SAB IAB Ectopic Multiple Live Births   2              # Outcome Date GA  Lbr Rahat/2nd Weight Sex Delivery Anes PTL Lv   2 SAB            1 SAB              Past Surgical History:   Procedure Laterality Date    ARTHROSCOPY OF FOOT Left 3/24/2023    Procedure: ARTHROSCOPY, FOOT;  Surgeon: Elías Aranda DPM;  Location: [facility] OR;  Service: Podiatry;  Laterality: Left;  Right lateral decubitus, 2.9 mm 30 deg angle scope, 2.7 mm shaver, Avitus bone graft harvester(Rayray),   brie notified cc  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  rayray notified cc    COLONOSCOPY N/A 2022    Procedure: COLONOSCOPY Moviprep;  Surgeon: John Bonner MD;  Location: [facility] ENDO;  Service: Endoscopy;  Laterality: N/A;    EPIDURAL STEROID INJECTION N/A 2020    Procedure: INJECTION, STEROID, EPIDURAL,C7-T1;  Surgeon: Augusto Hussein MD;  Location: [facility] PAIN MGT;  Service: Pain Management;  Laterality: N/A;    EPIDURAL STEROID INJECTION N/A 2020    Procedure: INJECTION, STEROID, EPIDURAL C7/T1;  Surgeon: Augusto Hussein MD;  Location: [facility] PAIN MGT;  Service: Pain Management;  Laterality: N/A;  INJECTION, STEROID, EPIDURAL C7/T1    FOOT MASS EXCISION Left 2021    Procedure: EXCISION, MASS, FOOT;  Surgeon: Elías Aranda DPM;  Location: [facility] OR;  Service: Podiatry;  Laterality: Left;  mini c-arm, Allograft bone Arthrex  Arthrex notified  CC  Biomet notified 21 AM    SALIVARY GLAND SURGERY      TONSILLECTOMY       Family History       Problem Relation (Age of Onset)    Allergies Father    Asthma Mother    Diabetes Mother, Maternal Grandmother    Hypertension Mother, Maternal Grandmother    Sinus disease Mother, Sister, Brother, Sister          Tobacco Use    Smoking status: Former     Current packs/day: 0.00     Types: Cigarettes     Quit date: 10/1/2015     Years since quittin.4    Smokeless tobacco: Never   Substance and Sexual Activity    Alcohol use: No    Drug use: No    Sexual activity: Yes     Partners: Male     Review of Systems  Review of Systems - General ROS:  negative  Psychological ROS: negative  Respiratory ROS: no cough, shortness of breath, or wheezing  Cardiovascular ROS: no chest pain or dyspnea on exertion  Gastrointestinal ROS: no abdominal pain, change in bowel habits, or black/bloody stools  Genito-Urinary ROS: no dysuria, trouble voiding, or hematuria  Neurological ROS: no TIA or stroke symptoms  Skin ROS: warm, dry, no rashes  Musculoskeletal ROS: no deformities  Allergy ROS: no sneezing or coughing  Breasts ROS: no masses or nipple discharge      Objective:     Vital Signs (Most Recent):    Vital Signs (24h Range):           There is no height or weight on file to calculate BMI.  Patient's last menstrual period was 02/05/2024.    Physical Exam  Gen: AAO x 3, NAD  VS: see admit vitals  Heart: RRR  Lungs: CTA bilaterally  Abdomen: soft, NT/ND  Pelvic: deferred  Ext: no CCE    Laboratory:  None    Diagnostic Results:  US: Reviewed     Uterus:     Size: 7.5 x 4.9 x 6.0 cm     Masses: There are multiple uterine fibroids present including an intramural uterine fibroid measuring 1.3 x 1.0 x 1.1 cm, an intramural fibroid measuring 0.9 x 1.1 x 1.0 cm, and a pedunculated subserosal fibroid measuring 1.7 x 1.5 x 1.5 cm.     Endometrium: Normal in this pre menopausal patient, measuring 8.8 mm.  There is a suggestion of a possible hyperechoic nodule within the endometrium measuring 0.8 x 0.6 x 1.2 cm.  An endometrial polyp should be considered.     Right ovary:     Size: 1.3 x 2.0 x 1.1 cm     Appearance: Normal     Vascular flow: Normal.     Left ovary:     Size: 3.0 x 2.4 x 2.1 cm     Appearance: Normal     Vascular Flow: Normal.     Free Fluid:     None.     Impression:     Multiple uterine fibroids measuring up to 1.7 cm in size.     Possible subtle hyperechoic nodule within the endometrium measuring 0.8 x 0.6 x 1.2 cm in size.  An endometrial polyp should be considered in this patient with dysfunctional uterine bleeding.    Assessment/Plan:     Endometrial  polyp    - hysteroscopy D&C on 3/26  - discussed procedure in detail.  - consents are in chart.      Gris Wadsworth MD  Obstetrics & Gynecology  [facility] ([facility])

## 2024-03-25 ENCOUNTER — TELEPHONE (OUTPATIENT)
Dept: FAMILY MEDICINE | Facility: CLINIC | Age: 54
End: 2024-03-25
Payer: OTHER GOVERNMENT

## 2024-03-25 ENCOUNTER — HOSPITAL ENCOUNTER (OUTPATIENT)
Dept: RADIOLOGY | Facility: HOSPITAL | Age: 54
Discharge: HOME | End: 2024-03-25
Attending: SURGERY
Payer: OTHER GOVERNMENT

## 2024-03-25 DIAGNOSIS — M81.0 OSTEOPOROSIS WITHOUT CURRENT PATHOLOGICAL FRACTURE, UNSPECIFIED OSTEOPOROSIS TYPE: ICD-10-CM

## 2024-03-25 PROCEDURE — A9585 GADOBUTROL INJECTION: HCPCS | Performed by: SURGERY

## 2024-03-25 PROCEDURE — 73720 MRI LWR EXTREMITY W/O&W/DYE: CPT | Mod: 26,LT,, | Performed by: RADIOLOGY

## 2024-03-25 PROCEDURE — 73720 MRI LWR EXTREMITY W/O&W/DYE: CPT | Mod: TC,LT

## 2024-03-25 PROCEDURE — 25500020 PHARM REV CODE 255: Performed by: SURGERY

## 2024-03-25 RX ORDER — GADOBUTROL 604.72 MG/ML
10 INJECTION INTRAVENOUS
Status: COMPLETED | OUTPATIENT
Start: 2024-03-25 | End: 2024-03-25

## 2024-03-25 RX ADMIN — GADOBUTROL 10 ML: 604.72 INJECTION INTRAVENOUS at 05:03

## 2024-03-25 NOTE — PRE-PROCEDURE INSTRUCTIONS
Unable to reach pt via phone. Left message with instructions along with a request for a call back. In the message, RN also stated that pt will need to be accompanied by a responsible adult on day of surgery. The following was sent to pt portal.    Dear Hillary ,    You are scheduled for a procedure with Dr. Wadsworth on 3/26/2024. Your scheduled arrival time is 6:00am.  This arrival time is roughly 2 hours before your anticipated procedure time to allow sufficient time for pre-op.  Please wear comfortable clothes.  This procedure will take place at the [facility] at the corner of [facility] and [facility].  It is in the [facility] next to [facility].  The address is:    [address] [place] [address].  [place] [id number]    After entering the building, you will proceed to the second floor where you can check in with registration. You should take any medications that you routinely take for blood pressure (other than those listed below), heart medications, thyroid, cholesterol, etc.     If you wear contact lenses, please wear glasses to your procedure.    Your fasting instructions are as follow:  Nothing to eat after midnight the evening before your surgery. You may drink clear liquids up until 2 hours prior to your arrival time. You MUST have a responsible adult to bring you home.      The evening before and morning of your procedure, please hold the following medications:  -Aspirin and Aspirin-containing products (Goody's powder, Excedrin)  -NSAIDs (Advil, Ibuprofen, Aleve, Diclofenac)  -Vitamins/Supplements  -Herbal remedies/Teas  -Stimulants (Adderall, Vyvanse, Adipex)  -Diabetic medication (Please bring with you day of procedure)  -CELECOXIB/CELEBREX  -VITAMINS/SUPPLEMENTS  -MUPIROCIN OINTMENT    -May take Tylenol      The evening before and morning of your procedure, take a shower using antibacterial soap (ex: Hibiclens or Dial antibacterial soap). DO NOT apply deodorant, lotion,  cologne, or anything else to the skin. Wear loose, comfortable fitting clothing. Do not wear jewelry or bring any valuables with you. If you wear dentures or contacts, please bring your case with you or leave them at home. Use and bring any inhalers that you may have.    If you have any procedure-specific questions, please call your surgeon's office. Any other questions, don't hesitate to call at [phone].    Thanks,  STEFANO Erickson  Pre-Admit Testing  Anesthesia Dept [facility]

## 2024-03-25 NOTE — TELEPHONE ENCOUNTER
Left voice message for patient stating to call back to rescheduled appointment due to provider being out of the office.

## 2024-03-26 ENCOUNTER — HOSPITAL ENCOUNTER (OUTPATIENT)
Facility: HOSPITAL | Age: 54
Discharge: HOME | End: 2024-03-26
Attending: OBSTETRICS & GYNECOLOGY | Admitting: OBSTETRICS & GYNECOLOGY
Payer: OTHER GOVERNMENT

## 2024-03-26 ENCOUNTER — TELEPHONE (OUTPATIENT)
Dept: FAMILY MEDICINE | Facility: CLINIC | Age: 54
End: 2024-03-26
Payer: OTHER GOVERNMENT

## 2024-03-26 ENCOUNTER — ANESTHESIA (OUTPATIENT)
Dept: SURGERY | Facility: HOSPITAL | Age: 54
End: 2024-03-26
Payer: OTHER GOVERNMENT

## 2024-03-26 ENCOUNTER — ANESTHESIA EVENT (OUTPATIENT)
Dept: SURGERY | Facility: HOSPITAL | Age: 54
End: 2024-03-26
Payer: OTHER GOVERNMENT

## 2024-03-26 VITALS
SYSTOLIC BLOOD PRESSURE: 131 MMHG | RESPIRATION RATE: 16 BRPM | OXYGEN SATURATION: 96 % | TEMPERATURE: 98 F | HEIGHT: 66 IN | DIASTOLIC BLOOD PRESSURE: 71 MMHG | BODY MASS INDEX: 34.55 KG/M2 | WEIGHT: 215 LBS | HEART RATE: 71 BPM

## 2024-03-26 DIAGNOSIS — N84.0 ENDOMETRIAL POLYP: Primary | ICD-10-CM

## 2024-03-26 LAB
B-HCG UR QL: NEGATIVE
CTP QC/QA: YES

## 2024-03-26 PROCEDURE — 27201423 OPTIME MED/SURG SUP & DEVICES STERILE SUPPLY: Performed by: OBSTETRICS & GYNECOLOGY

## 2024-03-26 PROCEDURE — 63600175 PHARM REV CODE 636 W HCPCS: Performed by: ANESTHESIOLOGY

## 2024-03-26 PROCEDURE — 88305 TISSUE EXAM BY PATHOLOGIST: CPT | Performed by: PATHOLOGY

## 2024-03-26 PROCEDURE — D9220A PRA ANESTHESIA: Mod: ,,, | Performed by: NURSE ANESTHETIST, CERTIFIED REGISTERED

## 2024-03-26 PROCEDURE — 37000008 HC ANESTHESIA 1ST 15 MINUTES: Performed by: OBSTETRICS & GYNECOLOGY

## 2024-03-26 PROCEDURE — 99900035 HC TECH TIME PER 15 MIN (STAT)

## 2024-03-26 PROCEDURE — 36000706: Performed by: OBSTETRICS & GYNECOLOGY

## 2024-03-26 PROCEDURE — C1782 MORCELLATOR: HCPCS | Performed by: OBSTETRICS & GYNECOLOGY

## 2024-03-26 PROCEDURE — 71000016 HC POSTOP RECOV ADDL HR: Performed by: OBSTETRICS & GYNECOLOGY

## 2024-03-26 PROCEDURE — 36000707: Performed by: OBSTETRICS & GYNECOLOGY

## 2024-03-26 PROCEDURE — 81025 URINE PREGNANCY TEST: CPT | Performed by: OBSTETRICS & GYNECOLOGY

## 2024-03-26 PROCEDURE — 94799 UNLISTED PULMONARY SVC/PX: CPT

## 2024-03-26 PROCEDURE — 25000003 PHARM REV CODE 250: Performed by: NURSE ANESTHETIST, CERTIFIED REGISTERED

## 2024-03-26 PROCEDURE — 37000009 HC ANESTHESIA EA ADD 15 MINS: Performed by: OBSTETRICS & GYNECOLOGY

## 2024-03-26 PROCEDURE — 25000003 PHARM REV CODE 250: Performed by: ANESTHESIOLOGY

## 2024-03-26 PROCEDURE — 58558 HYSTEROSCOPY BIOPSY: CPT | Mod: ,,, | Performed by: OBSTETRICS & GYNECOLOGY

## 2024-03-26 PROCEDURE — 63600175 PHARM REV CODE 636 W HCPCS: Performed by: NURSE ANESTHETIST, CERTIFIED REGISTERED

## 2024-03-26 PROCEDURE — 25000003 PHARM REV CODE 250: Performed by: OBSTETRICS & GYNECOLOGY

## 2024-03-26 PROCEDURE — 71000015 HC POSTOP RECOV 1ST HR: Performed by: OBSTETRICS & GYNECOLOGY

## 2024-03-26 PROCEDURE — 71000033 HC RECOVERY, INTIAL HOUR: Performed by: OBSTETRICS & GYNECOLOGY

## 2024-03-26 PROCEDURE — 94761 N-INVAS EAR/PLS OXIMETRY MLT: CPT

## 2024-03-26 PROCEDURE — 88305 TISSUE EXAM BY PATHOLOGIST: CPT | Mod: 26,,, | Performed by: PATHOLOGY

## 2024-03-26 RX ORDER — DIPHENHYDRAMINE HYDROCHLORIDE 50 MG/ML
25 INJECTION INTRAMUSCULAR; INTRAVENOUS EVERY 4 HOURS PRN
Status: DISCONTINUED | OUTPATIENT
Start: 2024-03-26 | End: 2024-03-26 | Stop reason: HOSPADM

## 2024-03-26 RX ORDER — OXYCODONE HYDROCHLORIDE 5 MG/1
5 TABLET ORAL
Status: DISCONTINUED | OUTPATIENT
Start: 2024-03-26 | End: 2024-03-26 | Stop reason: HOSPADM

## 2024-03-26 RX ORDER — CIPROFLOXACIN 500 MG/1
500 TABLET ORAL
Status: DISCONTINUED | OUTPATIENT
Start: 2024-03-26 | End: 2024-03-26 | Stop reason: HOSPADM

## 2024-03-26 RX ORDER — MIDAZOLAM HYDROCHLORIDE 1 MG/ML
INJECTION INTRAMUSCULAR; INTRAVENOUS
Status: DISCONTINUED | OUTPATIENT
Start: 2024-03-26 | End: 2024-03-26

## 2024-03-26 RX ORDER — OXYCODONE AND ACETAMINOPHEN 5; 325 MG/1; MG/1
1 TABLET ORAL EVERY 4 HOURS PRN
Qty: 5 TABLET | Refills: 0 | Status: SHIPPED | OUTPATIENT
Start: 2024-03-26 | End: 2024-04-16 | Stop reason: CLARIF

## 2024-03-26 RX ORDER — ONDANSETRON HYDROCHLORIDE 2 MG/ML
INJECTION, SOLUTION INTRAVENOUS
Status: DISCONTINUED | OUTPATIENT
Start: 2024-03-26 | End: 2024-03-26

## 2024-03-26 RX ORDER — LIDOCAINE HYDROCHLORIDE 20 MG/ML
INJECTION INTRAVENOUS
Status: DISCONTINUED | OUTPATIENT
Start: 2024-03-26 | End: 2024-03-26

## 2024-03-26 RX ORDER — HYDROCODONE BITARTRATE AND ACETAMINOPHEN 10; 325 MG/1; MG/1
1 TABLET ORAL EVERY 4 HOURS PRN
Status: DISCONTINUED | OUTPATIENT
Start: 2024-03-26 | End: 2024-03-26

## 2024-03-26 RX ORDER — IBUPROFEN 200 MG
600 TABLET ORAL EVERY 6 HOURS PRN
Status: DISCONTINUED | OUTPATIENT
Start: 2024-03-26 | End: 2024-03-26 | Stop reason: HOSPADM

## 2024-03-26 RX ORDER — FENTANYL CITRATE 50 UG/ML
INJECTION, SOLUTION INTRAMUSCULAR; INTRAVENOUS
Status: DISCONTINUED | OUTPATIENT
Start: 2024-03-26 | End: 2024-03-26

## 2024-03-26 RX ORDER — SODIUM CHLORIDE 9 MG/ML
INJECTION, SOLUTION INTRAVENOUS CONTINUOUS
Status: DISCONTINUED | OUTPATIENT
Start: 2024-03-26 | End: 2024-03-26 | Stop reason: HOSPADM

## 2024-03-26 RX ORDER — HYDROMORPHONE HYDROCHLORIDE 1 MG/ML
0.5 INJECTION, SOLUTION INTRAMUSCULAR; INTRAVENOUS; SUBCUTANEOUS EVERY 5 MIN PRN
Status: DISCONTINUED | OUTPATIENT
Start: 2024-03-26 | End: 2024-03-26 | Stop reason: HOSPADM

## 2024-03-26 RX ORDER — PROPOFOL 10 MG/ML
INJECTION, EMULSION INTRAVENOUS
Status: DISCONTINUED | OUTPATIENT
Start: 2024-03-26 | End: 2024-03-26

## 2024-03-26 RX ORDER — HYDROCODONE BITARTRATE AND ACETAMINOPHEN 5; 325 MG/1; MG/1
1 TABLET ORAL EVERY 4 HOURS PRN
Status: DISCONTINUED | OUTPATIENT
Start: 2024-03-26 | End: 2024-03-26

## 2024-03-26 RX ORDER — IBUPROFEN 600 MG/1
600 TABLET ORAL 3 TIMES DAILY
Qty: 30 TABLET | Refills: 0 | Status: SHIPPED | OUTPATIENT
Start: 2024-03-26 | End: 2024-04-16

## 2024-03-26 RX ORDER — FAMOTIDINE 20 MG/1
20 TABLET, FILM COATED ORAL
Status: COMPLETED | OUTPATIENT
Start: 2024-03-26 | End: 2024-03-26

## 2024-03-26 RX ORDER — ONDANSETRON 8 MG/1
8 TABLET, ORALLY DISINTEGRATING ORAL EVERY 8 HOURS PRN
Status: DISCONTINUED | OUTPATIENT
Start: 2024-03-26 | End: 2024-03-26 | Stop reason: HOSPADM

## 2024-03-26 RX ORDER — ONDANSETRON HYDROCHLORIDE 2 MG/ML
4 INJECTION, SOLUTION INTRAVENOUS DAILY PRN
Status: DISCONTINUED | OUTPATIENT
Start: 2024-03-26 | End: 2024-03-26 | Stop reason: HOSPADM

## 2024-03-26 RX ORDER — DIPHENHYDRAMINE HCL 25 MG
25 CAPSULE ORAL EVERY 4 HOURS PRN
Status: DISCONTINUED | OUTPATIENT
Start: 2024-03-26 | End: 2024-03-26 | Stop reason: HOSPADM

## 2024-03-26 RX ADMIN — FENTANYL CITRATE 50 MCG: 50 INJECTION, SOLUTION INTRAMUSCULAR; INTRAVENOUS at 08:03

## 2024-03-26 RX ADMIN — CIPROFLOXACIN HYDROCHLORIDE 500 MG: 500 TABLET, FILM COATED ORAL at 06:03

## 2024-03-26 RX ADMIN — LIDOCAINE HYDROCHLORIDE 100 MG: 20 INJECTION INTRAVENOUS at 08:03

## 2024-03-26 RX ADMIN — SODIUM CHLORIDE: 9 INJECTION, SOLUTION INTRAVENOUS at 06:03

## 2024-03-26 RX ADMIN — HYDROMORPHONE HYDROCHLORIDE 0.5 MG: 1 INJECTION, SOLUTION INTRAMUSCULAR; INTRAVENOUS; SUBCUTANEOUS at 09:03

## 2024-03-26 RX ADMIN — MIDAZOLAM HYDROCHLORIDE 2 MG: 1 INJECTION, SOLUTION INTRAMUSCULAR; INTRAVENOUS at 08:03

## 2024-03-26 RX ADMIN — SODIUM CHLORIDE, SODIUM GLUCONATE, SODIUM ACETATE, POTASSIUM CHLORIDE, MAGNESIUM CHLORIDE, SODIUM PHOSPHATE, DIBASIC, AND POTASSIUM PHOSPHATE: .53; .5; .37; .037; .03; .012; .00082 INJECTION, SOLUTION INTRAVENOUS at 08:03

## 2024-03-26 RX ADMIN — FAMOTIDINE 20 MG: 20 TABLET ORAL at 06:03

## 2024-03-26 RX ADMIN — OXYCODONE HYDROCHLORIDE 5 MG: 5 TABLET ORAL at 09:03

## 2024-03-26 RX ADMIN — PROPOFOL 200 MG: 10 INJECTION, EMULSION INTRAVENOUS at 08:03

## 2024-03-26 RX ADMIN — ONDANSETRON 4 MG: 2 INJECTION INTRAMUSCULAR; INTRAVENOUS at 08:03

## 2024-03-26 NOTE — TELEPHONE ENCOUNTER
Spoke with patient's  informing him appointment will need to be rescheduled due to provider being out of office. He stated she is resting right now because she just had a procedure done. He stated she will call back to reschedule.

## 2024-03-26 NOTE — BRIEF OP NOTE
Chief Complaint   Patient presents with   • Well Child     room 9     91 %ile (Z= 1.34) based on CDC 2-20 Years weight-for-age data using vitals from 7/24/2018.  97 %ile (Z= 1.87) based on CDC 2-20 Years stature-for-age data using vitals from 7/24/2018.  81 %ile (Z= 0.87) based on CDC 2-20 Years BMI-for-age data using vitals from 7/24/2018.  Wt Readings from Last 2 Encounters:   07/24/18 42.5 kg (91 %, Z= 1.34)*   07/20/17 34.6 kg (86 %, Z= 1.06)*     * Growth percentiles are based on CDC 2-20 Years data.     Ht Readings from Last 2 Encounters:   07/24/18 4' 10.75\" (1.492 m) (97 %, Z= 1.87)*   07/20/17 4' 7\" (1.397 m) (91 %, Z= 1.32)*     * Growth percentiles are based on CDC 2-20 Years data.      Hearing Screening    125Hz 250Hz 500Hz 1000Hz 2000Hz 3000Hz 4000Hz 6000Hz 8000Hz   Right ear:   Pass Pass Pass  Pass     Left ear:   Pass Pass Pass  Pass        Visual Acuity Screening    Right eye Left eye Both eyes   Without correction: 20/20 20/20    With correction:        No current outpatient prescriptions on file.     No current facility-administered medications for this visit.      ALLERGIES:  No Known Allergies  No past medical history on file.  Patient Active Problem List   Diagnosis   • Hypertrophy of tonsils and adenoids       Child accompanied by mother.    CONCERNS:  none    Education:  Grade in school:  3  Types of grades:  great  Behavior:  y    Diet:  Breakfast:  y  Fruit/Veggies:  y  Cups of milk per day:  y  Juice daily:  n  Water:  n  Soda consumption:  n  Sleeping:  good  Constipation:  n    Screen time-more than 2 hrs per day- y    Social History:  Smoke exposure:  NO  Household members:  mom, dad and sister  GUNS: LOCKED UP    PHYSICAL EXAM:    Visit Vitals  /64   Pulse 80   Temp 97.9 °F (36.6 °C) (Temporal Artery)   Resp 16   Ht 4' 10.75\" (1.492 m)   Wt 42.5 kg   BMI 19.10 kg/m²     GENERAL:  Well appearing 9 year old female, nontoxic, no acute distress.  Alert and oriented.  SKIN:  Warm,  [facility]  Brief Operative Note    Surgery Date: 3/26/2024     Surgeon(s) and Role:     * Gris Wadsworth MD - Primary    Assisting Surgeon: None    Pre-op Diagnosis:  Endometrial polyp [N84.0]    Post-op Diagnosis:  Post-Op Diagnosis Codes:     * Endometrial polyp [N84.0]    Procedure(s) (LRB):  POLYPECTOMY, UTERUS, HYSTEROSCOPIC (N/A)    Anesthesia: General    Operative Findings: endometrial polyp    Estimated Blood Loss: 15 mL         Specimens:   Specimen (24h ago, onward)       Start     Ordered    03/26/24 0830  Specimen to Pathology, Surgery Gynecology and Obstetrics  Once        Comments: Pre-op Diagnosis: Endometrial polyp [N84.0]Procedure(s):POLYPECTOMY, UTERUS, HYSTEROSCOPIC Number of specimens: 1Name of specimens: 1. Endometrial scrapings     References:    Click here for ordering Quick Tip   Question Answer Comment   Procedure Type: Gynecology and Obstetrics    Which provider would you like to cc? GRIS WADSWORTH    Release to patient Immediate        03/26/24 0850                  Underwent above procedure without difficulty. Tolerated well. Transferred to PACU in stable condition. Uncomplicated postop course. Discharged home in stable condition.      Discharge Note    OUTCOME: Patient tolerated treatment/procedure well without complication and is now ready for discharge.    DISPOSITION: Home or Self Care    FINAL DIAGNOSIS:  Endometrial polyp    FOLLOWUP:  virtual visit in 2 weeks    DISCHARGE INSTRUCTIONS:    Discharge Procedure Orders   Diet general     Call MD for:  temperature >100.4     Call MD for:  persistent nausea and vomiting     Call MD for:  severe uncontrolled pain     Pelvic Rest        normal turgor.  No cyanosis.  No bruises or lesions.  HEAD:  Normocephalic, atraumatic.    EYES:  Conjunctivae appear normal, non-injected, non-icteric.  PERRL (Pupils equal, round, reactive to light), EOMI (extraocular movements intact).  COVER, UNCOVER TEST FINDINGS: n  NOSE:  Appears normal, no flaring.  EARS:  Normal pinnae, normal canals, TMs (tympanic membranes) are transparent with good landmarks.  THROAT:  Oropharynx with moist mucous membranes and no lesions.  Tonsils 1+ without erythema or exudate.  NECK:  Supple, no lymphadenopathy or masses.  HEART:  Regular rate and rhythm.  Normal S1, S2.  No murmurs, rubs, or gallops.  Pulses 2+ bilaterally.  LUNGS:  Clear to auscultation bilaterally.  No wheezes, rales, rhonchi.  Non-labored breathing.  ABDOMEN:  Soft, non-tender, non-distended.  No organomegaly or masses.  GENITOURINARY:  normal female genitalia, no labial adhesions or lesions, arleen stage 1.  MUSCULOSKELETAL:  FROM (Full range of motion), no deformities, strength 5/5 bilaterally, no scoliosis.  EXTREMITIES:  Warm, dry, without abnormalities.  NEUROLOGIC:  Alert, oriented, DTR's (deep tendon reflexes) 2+ bilaterally.  Age 10 and Age 12: Scoliosis screening: neg  ASSESSMENT AND PLAN:    9 year old female here for a well-child exam.   1.  Growth and development:  good  2.  Discussed with parent:  y  ·  Increased responsibility  ·   Healthy diet/snacks  ·  Appropriate screen use  ·  Sleep habits  ·  Exercise  ·  Oral hygiene  ·  Bike helmet use  ·  Sunscreen use  ·  Adequate vitamin D intake/supplementation:  3.  Immunizations:  utd  4.  If 10-11years- Recommend \"The Care and Keeping of You\" or \"The Boys      Body Book\" and [employer] books for girls.  5. intermittant toe pain. Better after chiropracter.   Follow-up in one year for well-child exam.

## 2024-03-26 NOTE — ANESTHESIA POSTPROCEDURE EVALUATION
Anesthesia Post Evaluation    Patient: Hillary Cotton    Procedure(s) Performed: Procedure(s) (LRB):  POLYPECTOMY, UTERUS, HYSTEROSCOPIC (N/A)    Final Anesthesia Type: general      Patient location during evaluation: PACU  Patient participation: Yes- Able to Participate  Level of consciousness: awake and alert  Post-procedure vital signs: reviewed and stable  Pain management: adequate  Airway patency: patent    PONV status at discharge: No PONV  Anesthetic complications: no      Cardiovascular status: blood pressure returned to baseline  Respiratory status: unassisted  Hydration status: euvolemic                Vitals Value Taken Time   /74 03/26/24 1002   Temp 36.4 °C (97.5 °F) 03/26/24 0905   Pulse 70 03/26/24 1004   Resp 12 03/26/24 1004   SpO2 94 % 03/26/24 1004   Vitals shown include unvalidated device data.      No case tracking events are documented in the log.      Pain/Erin Score: Pain Rating Prior to Med Admin: 7 (3/26/2024  9:38 AM)  Erin Score: 9 (3/26/2024  9:20 AM)

## 2024-03-26 NOTE — ANESTHESIA PREPROCEDURE EVALUATION
03/26/2024    Hillary Cotton is a 53 y.o., female.    Past Medical History:   Diagnosis Date    Abnormal Pap smear     Allergy     Asthma     Sinusitis, chronic      Past Surgical History:   Procedure Laterality Date    ARTHROSCOPY OF FOOT Left 3/24/2023    Procedure: ARTHROSCOPY, FOOT;  Surgeon: Elías Aranda DPM;  Location: [facility] OR;  Service: Podiatry;  Laterality: Left;  Right lateral decubitus, 2.9 mm 30 deg angle scope, 2.7 mm shaver, Avitus bone graft harvester(Rayray),   brie notified cc  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  rayray notified cc    COLONOSCOPY N/A 11/11/2022    Procedure: COLONOSCOPY Moviprep;  Surgeon: John Bonner MD;  Location: [facility];  Service: Endoscopy;  Laterality: N/A;    EPIDURAL STEROID INJECTION N/A 6/5/2020    Procedure: INJECTION, STEROID, EPIDURAL,C7-T1;  Surgeon: Augusto Hussein MD;  Location: [facility] PAIN MGT;  Service: Pain Management;  Laterality: N/A;    EPIDURAL STEROID INJECTION N/A 9/18/2020    Procedure: INJECTION, STEROID, EPIDURAL C7/T1;  Surgeon: Augusto Hussein MD;  Location: [facility] PAIN MGT;  Service: Pain Management;  Laterality: N/A;  INJECTION, STEROID, EPIDURAL C7/T1    FOOT MASS EXCISION Left 11/17/2021    Procedure: EXCISION, MASS, FOOT;  Surgeon: Elías Aranda DPM;  Location: [facility] OR;  Service: Podiatry;  Laterality: Left;  mini c-arm, Allograft bone Arthrex  Arthrex notified 11/8 CC  Biomet notified 11/16/21 AM    SALIVARY GLAND SURGERY      TONSILLECTOMY             Pre-op Assessment    I have reviewed the Patient Summary Reports.     I have reviewed the Nursing Notes. I have reviewed the NPO Status.      Review of Systems  Anesthesia Hx:   History of prior surgery of interest to airway management or planning:             Cardiovascular:  Exercise tolerance: good                                           Pulmonary:    Asthma                     Musculoskeletal:  Arthritis               Endocrine:        Obesity / BMI > 30      Physical Exam  General: Well nourished    Airway:  Mallampati: II   Mouth Opening: Normal  Neck ROM: Normal ROM    Dental:  Intact        Anesthesia Plan  Type of Anesthesia, risks & benefits discussed:    Anesthesia Type: Gen Natural Airway  Informed Consent: Informed consent signed with the Patient and all parties understand the risks and agree with anesthesia plan.  All questions answered.   ASA Score: 2    Ready For Surgery From Anesthesia Perspective.     .

## 2024-03-26 NOTE — PLAN OF CARE
Pt in preop bay 42, VSS and IV inserted. Pt denies any open wounds on body or the use of any weight loss injections. Pt needs an updated H&P, procedural consents, an admit order and anesthesia consents, otherwise ready to roll.

## 2024-03-26 NOTE — OP NOTE
[facility] ([facility])  Gynecology  Operative Note    SUMMARY     Date of Procedure: 3/26/2024     Procedure: Procedure(s) (LRB):  POLYPECTOMY, UTERUS, HYSTEROSCOPIC (N/A)       Surgeon(s) and Role:     * Gris Wadsworth MD - Primary    Assisting Surgeon: None    Pre-Operative Diagnosis: Endometrial polyp [N84.0]    Post-Operative Diagnosis: Post-Op Diagnosis Codes:     * Endometrial polyp [N84.0]    Anesthesia: General    Technical Procedures Used: Myosure    Description of the Findings of the Procedure: endometrial polyp    Complications: No    Estimated Blood Loss (EBL): 15 mL           Specimens:   Specimen (24h ago, onward)       Start     Ordered    03/26/24 0830  Specimen to Pathology, Surgery Gynecology and Obstetrics  Once        Comments: Pre-op Diagnosis: Endometrial polyp [N84.0]Procedure(s):POLYPECTOMY, UTERUS, HYSTEROSCOPIC Number of specimens: 1Name of specimens: 1. Endometrial scrapings     References:    Click here for ordering Quick Tip   Question Answer Comment   Procedure Type: Gynecology and Obstetrics    Which provider would you like to cc? GRIS WADSWORTH    Release to patient Immediate        03/26/24 0850                            Condition: Good    Disposition: PACU - hemodynamically stable.    Attestation: A qualified resident physician was not available.    Procedure in detail:    After informed consent was obtained, patient was taken to the operating room and placed under general anesthesia. She was placed in the dorsal lithotomy position and the vagina was prepped and draped in the usual sterile fashion. A straight catheter was used to drain the bladder. A weighted speculum was placed in the vagina and a single tooth tenaculum was used to grasp the cervix. The cervix was easily and progressively dilated to a # 8 Perla dilator. The hysteroscope was placed. An endometrial polyp was noted. The Myosure device was placed through the hysteroscope. The  device was easily used to shave off the polyp without difficulty. The device was then removed. The hysteroscope was removed. A vigorous sharp curettage was performed until a gritty texture was noted in all four quadrants. The sharp curette was removed. The tenaculum was removed. Excellent hemostasis was achieved on the anterior lip of the cervix with a 2-0 chromic suture. The weighted speculum was removed. The patient was placed in the supine position and awakened. All counts were correct x 2 and she was taken to the recovery room in stable condition.

## 2024-03-26 NOTE — TRANSFER OF CARE
"Anesthesia Transfer of Care Note    Patient: Hillary Cotton    Procedure(s) Performed: Procedure(s) (LRB):  POLYPECTOMY, UTERUS, HYSTEROSCOPIC (N/A)    Patient location: PACU    Anesthesia Type: general    Transport from OR: Transported from OR on 6-10 L/min O2 by face mask with adequate spontaneous ventilation    Post pain: adequate analgesia    Post assessment: no apparent anesthetic complications and tolerated procedure well    Post vital signs: stable    Level of consciousness: alert and awake    Nausea/Vomiting: no nausea/vomiting    Complications: none    Transfer of care protocol was followed      Last vitals: Visit Vitals  /82 (BP Location: Right arm, Patient Position: Lying)   Pulse 91   Temp 36.4 °C (97.5 °F) (Temporal)   Resp 16   Ht 5' 6" (1.676 m)   Wt 97.5 kg (215 lb)   LMP 03/12/2024   SpO2 99%   Breastfeeding No   BMI 34.70 kg/m²     "

## 2024-03-26 NOTE — ANESTHESIA PROCEDURE NOTES
Intubation    Date/Time: 3/26/2024 8:18 AM    Performed by: Ashanti Nielsen CRNA  Authorized by: Francois Gilman MD    Intubation:     Induction:  Intravenous    Intubated:  Postinduction    Mask Ventilation:  Easy mask    Attempts:  1    Attempted By:  CRNA    Difficult Airway Encountered?: No      Complications:  None    Airway Device:  Supraglottic airway/LMA (AirQ)    Airway Device Size:  4.0    Style/Cuff Inflation:  Cuffed    Secured at:  The lips    Placement Verified By:  Capnometry    Complicating Factors:  None    Findings Post-Intubation:  BS equal bilateral and atraumatic/condition of teeth unchanged

## 2024-03-28 ENCOUNTER — TELEPHONE (OUTPATIENT)
Dept: SURGERY | Facility: HOSPITAL | Age: 54
End: 2024-03-28
Payer: OTHER GOVERNMENT

## 2024-04-01 ENCOUNTER — OFFICE VISIT (OUTPATIENT)
Dept: ORTHOPEDICS | Facility: CLINIC | Age: 54
End: 2024-04-01
Payer: OTHER GOVERNMENT

## 2024-04-01 ENCOUNTER — TELEPHONE (OUTPATIENT)
Dept: FAMILY MEDICINE | Facility: CLINIC | Age: 54
End: 2024-04-01
Payer: OTHER GOVERNMENT

## 2024-04-01 VITALS — BODY MASS INDEX: 34.55 KG/M2 | WEIGHT: 214.94 LBS | HEIGHT: 66 IN

## 2024-04-01 DIAGNOSIS — D17.79: Primary | ICD-10-CM

## 2024-04-01 PROCEDURE — 99213 OFFICE O/P EST LOW 20 MIN: CPT | Mod: S$PBB,,, | Performed by: SURGERY

## 2024-04-01 PROCEDURE — 99999 PR PBB SHADOW E&M-EST. PATIENT-LVL III: CPT | Mod: PBBFAC,,, | Performed by: SURGERY

## 2024-04-01 PROCEDURE — 99213 OFFICE O/P EST LOW 20 MIN: CPT | Mod: PBBFAC,PN | Performed by: SURGERY

## 2024-04-01 NOTE — TELEPHONE ENCOUNTER
Left voice message for patient informing appointment needs to be rescheduled due to provider being out. I formed patient I rescheduled appointment 5/9 because it was the soonest appointment available. I informed patient if appointment time doesn't please call back to reschedule.

## 2024-04-01 NOTE — PROGRESS NOTES
"SUBJECTIVE:    Ms. Cotton is here today for a follow up visit.  She has previously had a mass excised and revision procedure.  Recent CT and MRI show the mass remains in place, however does not have any features of osteomyelitis per the MRI report.  Her inflammatory markers are mildly elevated, her white count is normal.  Her pain is the same.        OBJECTIVE:      Vitals:    04/01/24 1144   Weight: 97.5 kg (214 lb 15.2 oz)   Height: 5' 6" (1.676 m)       Lower Extremity Exam  Well healed incisions about the hindfoot of the left lower extremity.  Tenderness to palpation about the anterior aspect of the incision consistent with possible lipoma versus subcutaneous mass.  Tenderness with inversion and eversion of the hindfoot, minimal tenderness with plantar flexion dorsiflexion of the ankle.  Neurovascularly intact.     DIAGNOSTIC STUDIES:  MRI of the left ankle reveals well corticated lipomatous like mass of the anterior aspect of the calcaneus.  No stranding or increased signal intensity surrounding the mass.    ASSESSMENT:   1. Arthritis of the subtalar joint, left  2. Interosseous lipoma      PLAN:  There are no diagnoses linked to this encounter.    Discussed operative and nonoperative management.  Operatively I believe she has a candidate for revision lipoma excision, bone grafting, and possible subtalar fusion.  We discussed the risks, benefits, and alternatives of this procedure.  She is going to follow up with Dr. Aranda and we are available for continued consultation.    Bhargav Urbina MD  [facility]  Orthopedic Surgery      This note was done with voice recognition software. Please excuse any errors missed in proof reading.      "

## 2024-04-03 LAB
COMMENT: NORMAL
FINAL PATHOLOGIC DIAGNOSIS: NORMAL
GROSS: NORMAL
Lab: NORMAL
MICROSCOPIC EXAM: NORMAL

## 2024-04-04 ENCOUNTER — PATIENT MESSAGE (OUTPATIENT)
Dept: OBSTETRICS AND GYNECOLOGY | Facility: CLINIC | Age: 54
End: 2024-04-04
Payer: OTHER GOVERNMENT

## 2024-04-04 DIAGNOSIS — N85.02 COMPLEX ENDOMETRIAL HYPERPLASIA WITH ATYPIA: Primary | ICD-10-CM

## 2024-04-05 ENCOUNTER — TELEPHONE (OUTPATIENT)
Dept: OBSTETRICS AND GYNECOLOGY | Facility: CLINIC | Age: 54
End: 2024-04-05
Payer: OTHER GOVERNMENT

## 2024-04-05 NOTE — TELEPHONE ENCOUNTER
----- Message from Gris Wadsworth MD sent at 4/4/2024  4:25 PM CDT -----    Requested Date: 4/24   Requested Time: 0800   Case Length:60 minutes    Surgeon: Gris Wadsworth      Assistant Surgeon: Akiko Washburn   Visit Type: Outpatient    LOCATION: [facility]    PROCEDURE: DVH/BSO  Diagnosis: complex endometrial hyperplasia with atypia  Anesthesia type: General   Comments/Special Equipment Needed:  Rep needed: No  Does the patient need a PreOp appointment with MD: Yes  U/S needed at pre-op: No  PCP clearance: Not Needed  When does she need her Post op appointment: 2 weeks in person and 6 weeks  Do you need additional time blocked out from clinic? Yes  If so, what time do you want to be back in clinic? I've already messaged Anali about what to do. Keep the 9:30 and we'll move others down and double book. Please check with her.  When can the patient go back to work? four weeks

## 2024-04-08 ENCOUNTER — OFFICE VISIT (OUTPATIENT)
Dept: OBSTETRICS AND GYNECOLOGY | Facility: CLINIC | Age: 54
End: 2024-04-08
Payer: OTHER GOVERNMENT

## 2024-04-08 DIAGNOSIS — Z09 POSTOP CHECK: Primary | ICD-10-CM

## 2024-04-08 PROCEDURE — 99024 POSTOP FOLLOW-UP VISIT: CPT | Mod: 95,,, | Performed by: OBSTETRICS & GYNECOLOGY

## 2024-04-08 NOTE — PROGRESS NOTES
The patient location is: work  The chief complaint leading to consultation is: postop    Visit type: audio only    Face to Face time with patient: 5  10 minutes of total time spent on the encounter, which includes face to face time and non-face to face time preparing to see the patient (eg, review of tests), Obtaining and/or reviewing separately obtained history, Documenting clinical information in the electronic or other health record, Independently interpreting results (not separately reported) and communicating results to the patient/family/caregiver, or Care coordination (not separately reported).         Each patient to whom he or she provides medical services by telemedicine is:  (1) informed of the relationship between the physician and patient and the respective role of any other health care provider with respect to management of the patient; and (2) notified that he or she may decline to receive medical services by telemedicine and may withdraw from such care at any time.    Notes:      Subjective:       Patient ID: Hillary Cotton is a 53 y.o. female.    Chief Complaint:  Post-op Evaluation      History of Present Illness  - patient presents for a virtual visit to discuss postop hyst/D&C. Had back spasms and cramps; now resolved.     Past Medical History:   Diagnosis Date    Abnormal Pap smear     Allergy     Asthma     Sinusitis, chronic        Past Surgical History:   Procedure Laterality Date    ARTHROSCOPY OF FOOT Left 3/24/2023    Procedure: ARTHROSCOPY, FOOT;  Surgeon: Elías Aranda DPM;  Location: [facility] OR;  Service: Podiatry;  Laterality: Left;  Right lateral decubitus, 2.9 mm 30 deg angle scope, 2.7 mm shaver, [employer] bone graft harvester(Rayray)brie notified cc  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  rayray notified cc    COLONOSCOPY N/A 11/11/2022    Procedure: COLONOSCOPY Moviprep;  Surgeon: John Bonner MD;  Location: [facility] ENDO;  Service: Endoscopy;  Laterality: N/A;     EPIDURAL STEROID INJECTION N/A 2020    Procedure: INJECTION, STEROID, EPIDURAL,C7-T1;  Surgeon: Augusto Hussein MD;  Location: [facility] PAIN MGT;  Service: Pain Management;  Laterality: N/A;    EPIDURAL STEROID INJECTION N/A 2020    Procedure: INJECTION, STEROID, EPIDURAL C7/T1;  Surgeon: Augusto Hussein MD;  Location: [facility] PAIN MGT;  Service: Pain Management;  Laterality: N/A;  INJECTION, STEROID, EPIDURAL C7/T1    FOOT MASS EXCISION Left 2021    Procedure: EXCISION, MASS, FOOT;  Surgeon: Elías Aranda DPM;  Location: [facility] OR;  Service: Podiatry;  Laterality: Left;  mini c-arm, Allograft bone Arthrex  Arthrex notified  CC  Biomet notified 21 AM    HYSTEROSCOPIC POLYPECTOMY OF UTERUS N/A 3/26/2024    Procedure: POLYPECTOMY, UTERUS, HYSTEROSCOPIC;  Surgeon: Gris Wadsworth MD;  Location: [facility] OR;  Service: OB/GYN;  Laterality: N/A;  400mL fluid deficit    SALIVARY GLAND SURGERY      TONSILLECTOMY          Family History   Problem Relation Age of Onset    Diabetes Mother     Hypertension Mother     Asthma Mother     Sinus disease Mother     Allergies Father     Diabetes Maternal Grandmother     Hypertension Maternal Grandmother     Sinus disease Sister     Sinus disease Brother     Sinus disease Sister         Social History     Socioeconomic History    Marital status:    Occupational History     Employer: [employer]   Tobacco Use    Smoking status: Former     Current packs/day: 0.00     Types: Cigarettes     Quit date: 10/1/2015     Years since quittin.5    Smokeless tobacco: Never   Substance and Sexual Activity    Alcohol use: No    Drug use: No    Sexual activity: Yes     Partners: Male           Objective:     Gen: AAO x 3, NAD    Virtual visit.    Assessment/ Plan:          Hillary was seen today for post-op evaluation.    Diagnoses and all orders for this visit:    Postop check    - DVH/BSO planned on . Has preop appts scheduled.  - will call with any issues  before then.    Follow up for preop visit.    As of April 1, 2021, the Cures Act has been passed nationally. This new law requires that all doctors progress notes, lab results, pathology reports and radiology reports be released IMMEDIATELY to the patient in the patient portal. That means that the results are released to you at the EXACT same time they are released to me. Therefore, with all of the patients that I have I am not able to reply to each patient exactly when the results come in. So there will be a delay from when you see the results to when I see them and have time to come up with a response to send you. Also I only see these results when I am on the computer at work. So if the results come in over the weekend or after 5 pm of a work day, I will not see them until the next business day. As you can tell, this is a challenge as a physician to give every patient the quick response they hope for and deserve. So please be patient!   Thanks for your understanding and patience.

## 2024-04-12 ENCOUNTER — TELEPHONE (OUTPATIENT)
Dept: PODIATRY | Facility: CLINIC | Age: 54
End: 2024-04-12
Payer: OTHER GOVERNMENT

## 2024-04-12 ENCOUNTER — PATIENT MESSAGE (OUTPATIENT)
Dept: PODIATRY | Facility: CLINIC | Age: 54
End: 2024-04-12
Payer: OTHER GOVERNMENT

## 2024-04-12 NOTE — TELEPHONE ENCOUNTER
Spoke with Ms Cotton to assist her with rescheduling her appt with Dr Aranda due to an upcoming procedure. Accepted new appt date and time of 5/17/24 at 1 pm. No other needs voiced at this time. Call back encouraged call back if needed.

## 2024-04-13 ENCOUNTER — ANESTHESIA EVENT (OUTPATIENT)
Dept: SURGERY | Facility: OTHER | Age: 54
End: 2024-04-13
Payer: OTHER GOVERNMENT

## 2024-04-13 RX ORDER — ACETAMINOPHEN 500 MG
1000 TABLET ORAL
Status: CANCELLED | OUTPATIENT
Start: 2024-04-13 | End: 2024-04-13

## 2024-04-13 RX ORDER — SODIUM CHLORIDE, SODIUM LACTATE, POTASSIUM CHLORIDE, CALCIUM CHLORIDE 600; 310; 30; 20 MG/100ML; MG/100ML; MG/100ML; MG/100ML
INJECTION, SOLUTION INTRAVENOUS CONTINUOUS
Status: CANCELLED | OUTPATIENT
Start: 2024-04-13

## 2024-04-13 RX ORDER — PREGABALIN 75 MG/1
75 CAPSULE ORAL ONCE
Status: CANCELLED | OUTPATIENT
Start: 2024-04-13 | End: 2024-04-13

## 2024-04-13 RX ORDER — LIDOCAINE HYDROCHLORIDE 10 MG/ML
0.5 INJECTION, SOLUTION EPIDURAL; INFILTRATION; INTRACAUDAL; PERINEURAL ONCE
Status: CANCELLED | OUTPATIENT
Start: 2024-04-13 | End: 2024-04-13

## 2024-04-16 ENCOUNTER — HOSPITAL ENCOUNTER (OUTPATIENT)
Dept: PREADMISSION TESTING | Facility: OTHER | Age: 54
Discharge: HOME | End: 2024-04-16
Attending: OBSTETRICS & GYNECOLOGY
Payer: OTHER GOVERNMENT

## 2024-04-16 ENCOUNTER — OFFICE VISIT (OUTPATIENT)
Dept: OBSTETRICS AND GYNECOLOGY | Facility: CLINIC | Age: 54
End: 2024-04-16
Attending: OBSTETRICS & GYNECOLOGY
Payer: OTHER GOVERNMENT

## 2024-04-16 VITALS
TEMPERATURE: 98 F | HEIGHT: 66 IN | RESPIRATION RATE: 17 BRPM | BODY MASS INDEX: 34.39 KG/M2 | OXYGEN SATURATION: 96 % | HEART RATE: 92 BPM | DIASTOLIC BLOOD PRESSURE: 73 MMHG | SYSTOLIC BLOOD PRESSURE: 122 MMHG | WEIGHT: 214 LBS

## 2024-04-16 VITALS — DIASTOLIC BLOOD PRESSURE: 86 MMHG | BODY MASS INDEX: 34.54 KG/M2 | SYSTOLIC BLOOD PRESSURE: 118 MMHG | HEIGHT: 66 IN

## 2024-04-16 DIAGNOSIS — Z01.818 PREOP TESTING: Primary | ICD-10-CM

## 2024-04-16 DIAGNOSIS — N85.02 COMPLEX ENDOMETRIAL HYPERPLASIA WITH ATYPIA: Primary | ICD-10-CM

## 2024-04-16 LAB
ABO + RH BLD: NORMAL
BLD GP AB SCN CELLS X3 SERPL QL: NORMAL
SPECIMEN OUTDATE: NORMAL

## 2024-04-16 PROCEDURE — 99499 UNLISTED E&M SERVICE: CPT | Mod: S$PBB,,, | Performed by: OBSTETRICS & GYNECOLOGY

## 2024-04-16 PROCEDURE — 36415 COLL VENOUS BLD VENIPUNCTURE: CPT | Performed by: ANESTHESIOLOGY

## 2024-04-16 PROCEDURE — 86850 RBC ANTIBODY SCREEN: CPT | Performed by: ANESTHESIOLOGY

## 2024-04-16 PROCEDURE — 99213 OFFICE O/P EST LOW 20 MIN: CPT | Mod: PBBFAC | Performed by: OBSTETRICS & GYNECOLOGY

## 2024-04-16 PROCEDURE — 99999 PR PBB SHADOW E&M-EST. PATIENT-LVL III: CPT | Mod: PBBFAC,,, | Performed by: OBSTETRICS & GYNECOLOGY

## 2024-04-16 RX ORDER — SCOLOPAMINE TRANSDERMAL SYSTEM 1 MG/1
1 PATCH, EXTENDED RELEASE TRANSDERMAL ONCE
Status: CANCELLED | OUTPATIENT
Start: 2024-04-16 | End: 2024-04-16

## 2024-04-16 RX ORDER — LIDOCAINE HYDROCHLORIDE 10 MG/ML
1 INJECTION, SOLUTION EPIDURAL; INFILTRATION; INTRACAUDAL; PERINEURAL ONCE
Status: CANCELLED | OUTPATIENT
Start: 2024-04-16 | End: 2024-04-16

## 2024-04-16 NOTE — PROGRESS NOTES
Preop visit     [facility]  Obstetrics & Gynecology  History & Physical     Patient Name: Hillary Cotton  MRN: [id number]  Admission Date: (Not on file)  Primary Care Provider: Arnulfo Barboza MD     Subjective:      Chief Complaint/Reason for Admission: complex endometrial hyperplasia with atypia     History of Present Illness: patient presents for robotic-assisted hysterectomy and bilateral salpingo-oophorectomy after complex endometrial hyperplasia with atypia was diagnosed in an endometrial polyp. Patient had postmenopausal bleeding and had a hysteroscopy D&C which resulted in this diagnosis. No complaints at present.     Medications Ordered Prior to the Current Encounter without Dose   No current facility-administered medications for this encounter.             Current Outpatient Medications   Medication Sig Dispense Refill    celecoxib (CELEBREX) 200 MG capsule TAKE ONE CAPSULE BY MOUTH DAILY AS NEEDED FOR PAIN 30 capsule 5    cetirizine (ZYRTEC) 10 MG tablet Take 1 tablet (10 mg total) by mouth once daily. 90 tablet 0    cholecalciferol, vitamin D3, 1,250 mcg (50,000 unit) capsule Take 1 capsule (50,000 Units total) by mouth every 7 days. 12 capsule 3    gabapentin (NEURONTIN) 300 MG capsule Take 1 capsule (300 mg total) by mouth every evening. 90 capsule 1    glucosamine-chondroitin 500-400 mg tablet Take 1 tablet by mouth 3 (three) times daily.        ibuprofen (ADVIL,MOTRIN) 600 MG tablet Take 1 tablet (600 mg total) by mouth 3 (three) times daily. 30 tablet 0    ipratropium (ATROVENT) 21 mcg (0.03 %) nasal spray 2 sprays by Each Nostril route 2 (two) times daily as needed. 30 mL 0    Lactobacillus rhamnosus GG (CULTURELLE) 10 billion cell capsule Take 1 capsule by mouth once daily.        multivitamin capsule Take 1 capsule by mouth once daily.        mupirocin (BACTROBAN) 2 % ointment Apply topically 2 (two) times daily. 22 g 0    traMADoL (ULTRAM) 50 mg tablet Take 1  tablet (50 mg total) by mouth every 6 (six) hours as needed for Pain. 30 tablet 0                  Review of patient's allergies indicates:   Allergen Reactions    Prednisone Rash       Hx of delayed onset rash on 2 occasion. Tolerates medrol, IM steroids, topical, and intranasal steroids w/o problem         History - Past Medical History        Past Medical History:   Diagnosis Date    Abnormal Pap smear      Allergy      Asthma      Intraosseous lipoma       left foot    PONV (postoperative nausea and vomiting)      Sinusitis, chronic                            OB History    Para Term  AB Living   2   0   2     SAB IAB Ectopic Multiple Live Births      2                  # Outcome Date GA Lbr Rahat/2nd Weight Sex Type Anes PTL Lv   2 SAB                     1 SAB                        History - Past Surgical History         Past Surgical History:   Procedure Laterality Date    ARTHROSCOPY OF FOOT Left 3/24/2023     Procedure: ARTHROSCOPY, FOOT;  Surgeon: Elías Aranda DPM;  Location: [facility] OR;  Service: Podiatry;  Laterality: Left;  Right lateral decubitus, 2.9 mm 30 deg angle scope, 2.7 mm shaver, [employer] bone graft harvester(Rayray),   brie notified cc  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  rayray notified cc    COLONOSCOPY N/A 2022     Procedure: COLONOSCOPY Moviprep;  Surgeon: John Bonner MD;  Location: [facility] ENDO;  Service: Endoscopy;  Laterality: N/A;    EPIDURAL STEROID INJECTION N/A 2020     Procedure: INJECTION, STEROID, EPIDURAL,C7-T1;  Surgeon: Augusto Hussein MD;  Location: [facility] PAIN MGT;  Service: Pain Management;  Laterality: N/A;    EPIDURAL STEROID INJECTION N/A 2020     Procedure: INJECTION, STEROID, EPIDURAL C7/T1;  Surgeon: Augusto Hussein MD;  Location: [facility] PAIN MGT;  Service: Pain Management;  Laterality: N/A;  INJECTION, STEROID, EPIDURAL C7/T1    FOOT MASS EXCISION Left 2021     Procedure: EXCISION, MASS, FOOT;  Surgeon: Elías Aranda DPM;   Location: [facility] OR;  Service: Podiatry;  Laterality: Left;  mini c-arm, Allograft bone Arthrex  Arthrex notified  CC  Biomet notified 21 AM    HYSTEROSCOPIC POLYPECTOMY OF UTERUS N/A 3/26/2024     Procedure: POLYPECTOMY, UTERUS, HYSTEROSCOPIC;  Surgeon: Gris Wadsworth MD;  Location: [facility] OR;  Service: OB/GYN;  Laterality: N/A;  400mL fluid deficit    SALIVARY GLAND SURGERY        TONSILLECTOMY             Family History         Problem Relation (Age of Onset)     Allergies Father     Asthma Mother     Diabetes Mother, Maternal Grandmother     Hypertension Mother, Maternal Grandmother     Sinus disease Mother, Sister, Brother, Sister                   Tobacco Use    Smoking status: Former       Current packs/day: 0.00       Types: Cigarettes       Quit date: 10/1/2015       Years since quittin.5    Smokeless tobacco: Never   Substance and Sexual Activity    Alcohol use: No    Drug use: No    Sexual activity: Yes       Partners: Male      Review of Systems  Review of Systems - General ROS: negative  Psychological ROS: negative  Respiratory ROS: no cough, shortness of breath, or wheezing  Cardiovascular ROS: no chest pain or dyspnea on exertion  Gastrointestinal ROS: no abdominal pain, change in bowel habits, or black/bloody stools  Genito-Urinary ROS: no dysuria, trouble voiding, or hematuria  Neurological ROS: no TIA or stroke symptoms  Skin ROS: warm, dry, no rashes  Musculoskeletal ROS: no deformities  Allergy ROS: no sneezing or coughing  Breasts ROS: no masses or nipple discharge           Objective:      Vital Signs (Most Recent): Vital Signs (24h Range):  Temp:  [97.7 °F (36.5 °C)] 97.7 °F (36.5 °C)  Pulse:  [92] 92  Resp:  [17] 17  SpO2:  [96 %] 96 %  BP: (122)/(73) 122/73      There is no height or weight on file to calculate BMI.  Patient's last menstrual period was 2024 (approximate).     Physical Exam  Gen: AAO x 3, NAD  VS: see admit vitals  Heart: RRR  Lungs: CTA  bilaterally  Abdomen: soft, NT/ND  Pelvic: deferred  Ext: no CCE     Laboratory:  none     Diagnostic Results:  US: Reviewed     Assessment/Plan:      Complex endometrial hyperplasia with atypia     - signed consents and answered questions.  - DVH/BSO on 4/24 at 8 am.      Gris Wadsworth MD  Obstetrics & Gynecology  [facility] ([place])

## 2024-04-16 NOTE — DISCHARGE INSTRUCTIONS
Information to Prepare you for your Surgery    PRE-ADMIT TESTING -  [phone]    [address]  [facility]          Your surgery has been scheduled at [facility]. We are pleased to have the opportunity to serve you. For Further Information please call [phone].    On the day of surgery please report to the Information Desk on the 1st floor.    CONTACT YOUR PHYSICIAN'S OFFICE THE DAY PRIOR TO YOUR SURGERY TO OBTAIN YOUR ARRIVAL TIME.     The evening before surgery do not eat anything after 9 p.m. ( this includes hard candy, chewing gum and mints).  You may only have GATORADE, POWERADE AND WATER  from 9 p.m. until you leave your home.   DO NOT DRINK ANY LIQUIDS ON THE WAY TO THE HOSPITAL.      Why does your anesthesiologist allow you to drink Gatorade/Powerade before surgery?  Gatorade/Powerade helps to increase your comfort before surgery and to decrease your nausea after surgery. The carbohydrates in Gatorade/Powerade help reduce your body's stress response to surgery.  If you are a diabetic-drink only water prior to surgery.    Outpatient Surgery- May allow 2 adult (18 and older) Support Persons (1 being the designated ) for all surgical/procedural patients. A breastfeeding mother will be allowed her infant and 2 adult Support Persons. No one under the age of 18 will be allowed in the building. No swapping out of visitors in the [facility].      SPECIAL MEDICATION INSTRUCTIONS: TAKE medications checked off by the Anesthesiologist on your Medication List.    Angiogram Patients: Take medications as instructed by your physician, including aspirin.     Surgery Patients:    If you take ASPIRIN - Your PHYSICIAN/SURGEON will need to inform you IF/OR when you need to stop taking aspirin prior to your surgery.     The week prior to surgery do not ot take any medications containing IBUPROFEN or NSAIDS ( Advil, Motrin, Goodys, BC, Aleve, Naproxen etc)  If you are not sure if you should take a medicine please call your surgeon's office.  Ok to take Tylenol    Do Not Wear any make-up (especially eye make-up) to surgery. Please remove any false eyelashes or eyelash extensions. If you arrive the day of surgery with makeup/eyelashes on you will be required to remove prior to surgery. (There is a risk of corneal abrasions if eye makeup/eyelash extensions are not removed)      Leave all valuables at home.   Do Not wear any jewelry or watches, including any metal in body piercings. Jewelry must be removed prior to coming to the hospital.  There is a possibility that rings that are unable to be removed may be cut off if they are on the surgical extremity.    Please remove all hair extensions, wigs, clips and any other metal accessories/ ornaments from your hair.  These items may pose a flammable/fire risk in Surgery and must be removed.    Do not shave your surgical area at least 5 days prior to your surgery. The surgical prep will be performed at the hospital according to Infection Control regulations.    Contact Lens must be removed before surgery. Either do not wear the contact lens or bring a case and solution for storage.  Please bring a container for eyeglasses or dentures as required.  Bring any paperwork your physician has provided, such as consent forms,  history and physicals, doctor's orders, etc.   Bring comfortable clothes that are loose fitting to wear upon discharge. Take into consideration the type of surgery being performed.  Maintain your diet as advised per your physician the day prior to surgery.      Adequate rest the night before surgery is advised.   Park in the Parking lot behind the hospital or in the [facility] across the street from the parking lot. Parking is complimentary.  If you will be discharged the same day as your procedure, please arrange for a responsible adult to drive you home or to accompany you if traveling by taxi.    YOU WILL NOT BE PERMITTED TO DRIVE OR TO LEAVE THE HOSPITAL ALONE AFTER SURGERY.   If you are being discharged the same day, it is strongly recommended that you arrange for someone to remain with you for the first 24 hrs following your surgery.    The Surgeon will speak to your family/visitor after your surgery regarding the outcome of your surgery and post op care.  The Surgeon may speak to you after your surgery, but there is a possibility you may not remember the details.  Please check with your family members regarding the conversation with the Surgeon.    We strongly recommend whoever is bringing you home be present for discharge instructions.  This will ensure a thorough understanding for your post op home care.          Thank you for your cooperation.  The Staff of [facility].            Bathing Instructions with Hibiclens    Shower the evening before and morning of your procedure with Chlorhexidine (Hibiclens)  do not use Chlorhexidine on your face or genitals. Do not get in your eyes.  Wash your face with water and your regular face wash/soap  Use your regular shampoo  Apply Chlorhexidine (Hibiclens) directly on your skin or on a wet washcloth and wash gently. When showering: Move away from the shower stream when applying Chlorhexidine (Hibiclens) to avoid rinsing off too soon.  Rinse thoroughly with warm water  Do not dilute Chlorhexidine (Hibiclens)   Dry off as usual, do not use any deodorant, powder, body lotions, perfume, after shave or cologne.

## 2024-04-16 NOTE — ANESTHESIA PREPROCEDURE EVALUATION
04/16/2024  Hillary Cotton is a 53 y.o., female.      Pre-op Assessment    I have reviewed the Patient Summary Reports.     I have reviewed the Nursing Notes. I have reviewed the NPO Status.   I have reviewed the Medications.     Review of Systems  Anesthesia Hx:    PONV History of prior surgery of interest to airway management or planning:  Previous anesthesia: General        Denies Family Hx of Anesthesia complications.   Personal Hx of Anesthesia complications, Post-Operative Nausea/Vomiting                    Social:  Non-Smoker       Hematology/Oncology:  Hematology Normal   Oncology Normal                                   EENT/Dental:  chronic allergic rhinitis           Cardiovascular:  Cardiovascular Normal                                            Pulmonary:    Asthma asymptomatic                   Renal/:  Renal/ Normal                 Hepatic/GI:  Hepatic/GI Normal                 Musculoskeletal:  Arthritis          Spine Disorders: cervical Degenerative disease and Disc disease           Neurological:    Neuromuscular Disease,             Chronic Pain Syndrome                         Endocrine:  Endocrine Normal          Obesity / BMI > 30  Dermatological:  Skin Normal    Psych:  Psychiatric Normal                  Physical Exam  General: Cooperative, Alert and Oriented    Airway:  Mallampati: II   Mouth Opening: Normal  Neck ROM: Normal ROM    Dental:  Retainer, Intact      Anesthesia Plan  Type of Anesthesia, risks & benefits discussed:    Anesthesia Type: Gen ETT  Intra-op Monitoring Plan: Standard ASA Monitors  Post Op Pain Control Plan: multimodal analgesia  Induction:  IV  Informed Consent: Informed consent signed with the Patient and all parties understand the risks and agree with anesthesia plan.  All questions answered.   ASA Score: 2  Anesthesia Plan Notes: CBC ok in  epic  T and S today    Ready For Surgery From Anesthesia Perspective.     .

## 2024-04-16 NOTE — H&P
[facility]  Obstetrics & Gynecology  History & Physical    Patient Name: Hillary Cotton  MRN: [id number]  Admission Date: (Not on file)  Primary Care Provider: Arnulfo Barboza MD    Subjective:     Chief Complaint/Reason for Admission: complex endometrial hyperplasia with atypia    History of Present Illness: patient presents for robotic-assisted hysterectomy and bilateral salpingo-oophorectomy after complex endometrial hyperplasia with atypia was diagnosed in an endometrial polyp. Patient had postmenopausal bleeding and had a hysteroscopy D&C which resulted in this diagnosis. No complaints at present.    No current facility-administered medications for this encounter.     Current Outpatient Medications   Medication Sig Dispense Refill    celecoxib (CELEBREX) 200 MG capsule TAKE ONE CAPSULE BY MOUTH DAILY AS NEEDED FOR PAIN 30 capsule 5    cetirizine (ZYRTEC) 10 MG tablet Take 1 tablet (10 mg total) by mouth once daily. 90 tablet 0    cholecalciferol, vitamin D3, 1,250 mcg (50,000 unit) capsule Take 1 capsule (50,000 Units total) by mouth every 7 days. 12 capsule 3    gabapentin (NEURONTIN) 300 MG capsule Take 1 capsule (300 mg total) by mouth every evening. 90 capsule 1    glucosamine-chondroitin 500-400 mg tablet Take 1 tablet by mouth 3 (three) times daily.      ibuprofen (ADVIL,MOTRIN) 600 MG tablet Take 1 tablet (600 mg total) by mouth 3 (three) times daily. 30 tablet 0    ipratropium (ATROVENT) 21 mcg (0.03 %) nasal spray 2 sprays by Each Nostril route 2 (two) times daily as needed. 30 mL 0    Lactobacillus rhamnosus GG (CULTURELLE) 10 billion cell capsule Take 1 capsule by mouth once daily.      multivitamin capsule Take 1 capsule by mouth once daily.      mupirocin (BACTROBAN) 2 % ointment Apply topically 2 (two) times daily. 22 g 0    traMADoL (ULTRAM) 50 mg tablet Take 1 tablet (50 mg total) by mouth every 6 (six) hours as needed for Pain. 30 tablet 0       Review of  patient's allergies indicates:   Allergen Reactions    Prednisone Rash     Hx of delayed onset rash on 2 occasion. Tolerates medrol, IM steroids, topical, and intranasal steroids w/o problem       Past Medical History:   Diagnosis Date    Abnormal Pap smear     Allergy     Asthma     Intraosseous lipoma     left foot    PONV (postoperative nausea and vomiting)     Sinusitis, chronic      OB History    Para Term  AB Living   2   0   2     SAB IAB Ectopic Multiple Live Births   2              # Outcome Date GA Lbr Rahat/2nd Weight Sex Type Anes PTL Lv   2 SAB            1 SAB              Past Surgical History:   Procedure Laterality Date    ARTHROSCOPY OF FOOT Left 3/24/2023    Procedure: ARTHROSCOPY, FOOT;  Surgeon: Elías Aranda DPM;  Location: [facility] OR;  Service: Podiatry;  Laterality: Left;  Right lateral decubitus, 2.9 mm 30 deg angle scope, 2.7 mm shaver, Avitus bone graft harvester(Rayray),   brie notified cc  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  rayray notified cc    COLONOSCOPY N/A 2022    Procedure: COLONOSCOPY Moviprep;  Surgeon: John Bonner MD;  Location: [facility] ENDO;  Service: Endoscopy;  Laterality: N/A;    EPIDURAL STEROID INJECTION N/A 2020    Procedure: INJECTION, STEROID, EPIDURAL,C7-T1;  Surgeon: Augusto Hussein MD;  Location: [facility] PAIN MGT;  Service: Pain Management;  Laterality: N/A;    EPIDURAL STEROID INJECTION N/A 2020    Procedure: INJECTION, STEROID, EPIDURAL C7/T1;  Surgeon: Augusto Hussein MD;  Location: [facility] PAIN MGT;  Service: Pain Management;  Laterality: N/A;  INJECTION, STEROID, EPIDURAL C7/T1    FOOT MASS EXCISION Left 2021    Procedure: EXCISION, MASS, FOOT;  Surgeon: Elías Aranda DPM;  Location: [facility] OR;  Service: Podiatry;  Laterality: Left;  mini c-arm, Allograft bone Arthrex  Arthrex notified  CC  Biomet notified 21 AM    HYSTEROSCOPIC POLYPECTOMY OF UTERUS N/A 3/26/2024    Procedure: POLYPECTOMY, UTERUS, HYSTEROSCOPIC;   Surgeon: Gris Wadsworth MD;  Location: [facility];  Service: OB/GYN;  Laterality: N/A;  400mL fluid deficit    SALIVARY GLAND SURGERY      TONSILLECTOMY       Family History       Problem Relation (Age of Onset)    Allergies Father    Asthma Mother    Diabetes Mother, Maternal Grandmother    Hypertension Mother, Maternal Grandmother    Sinus disease Mother, Sister, Brother, Sister          Tobacco Use    Smoking status: Former     Current packs/day: 0.00     Types: Cigarettes     Quit date: 10/1/2015     Years since quittin.5    Smokeless tobacco: Never   Substance and Sexual Activity    Alcohol use: No    Drug use: No    Sexual activity: Yes     Partners: Male     Review of Systems  Review of Systems - General ROS: negative  Psychological ROS: negative  Respiratory ROS: no cough, shortness of breath, or wheezing  Cardiovascular ROS: no chest pain or dyspnea on exertion  Gastrointestinal ROS: no abdominal pain, change in bowel habits, or black/bloody stools  Genito-Urinary ROS: no dysuria, trouble voiding, or hematuria  Neurological ROS: no TIA or stroke symptoms  Skin ROS: warm, dry, no rashes  Musculoskeletal ROS: no deformities  Allergy ROS: no sneezing or coughing  Breasts ROS: no masses or nipple discharge        Objective:     Vital Signs (Most Recent):    Vital Signs (24h Range):  Temp:  [97.7 °F (36.5 °C)] 97.7 °F (36.5 °C)  Pulse:  [92] 92  Resp:  [17] 17  SpO2:  [96 %] 96 %  BP: (122)/(73) 122/73        There is no height or weight on file to calculate BMI.  Patient's last menstrual period was 2024 (approximate).    Physical Exam  Gen: AAO x 3, NAD  VS: see admit vitals  Heart: RRR  Lungs: CTA bilaterally  Abdomen: soft, NT/ND  Pelvic: deferred  Ext: no CCE    Laboratory:  none    Diagnostic Results:  US: Reviewed    Assessment/Plan:     Complex endometrial hyperplasia with atypia    - signed consents and answered questions.  - DVH/BSO on  at 8 am.     Gris Wadsworth,  MD  Obstetrics & Gynecology  [facility] Surgery ([place])

## 2024-04-19 ENCOUNTER — TELEPHONE (OUTPATIENT)
Dept: OBSTETRICS AND GYNECOLOGY | Facility: CLINIC | Age: 54
End: 2024-04-19
Payer: OTHER GOVERNMENT

## 2024-04-24 ENCOUNTER — HOSPITAL ENCOUNTER (OUTPATIENT)
Facility: OTHER | Age: 54
Discharge: HOME | End: 2024-04-24
Attending: OBSTETRICS & GYNECOLOGY | Admitting: OBSTETRICS & GYNECOLOGY
Payer: OTHER GOVERNMENT

## 2024-04-24 ENCOUNTER — ANESTHESIA (OUTPATIENT)
Dept: SURGERY | Facility: OTHER | Age: 54
End: 2024-04-24
Payer: OTHER GOVERNMENT

## 2024-04-24 DIAGNOSIS — N85.02 COMPLEX ENDOMETRIAL HYPERPLASIA WITH ATYPIA: Primary | ICD-10-CM

## 2024-04-24 DIAGNOSIS — N85.01 COMPLEX ENDOMETRIAL HYPERPLASIA WITHOUT ATYPIA: ICD-10-CM

## 2024-04-24 LAB
B-HCG UR QL: NEGATIVE
CTP QC/QA: YES
POCT GLUCOSE: 98 MG/DL (ref 70–110)

## 2024-04-24 PROCEDURE — 25000003 PHARM REV CODE 250: Performed by: OBSTETRICS & GYNECOLOGY

## 2024-04-24 PROCEDURE — D9220A PRA ANESTHESIA: Mod: CRNA,,, | Performed by: NURSE ANESTHETIST, CERTIFIED REGISTERED

## 2024-04-24 PROCEDURE — 37000009 HC ANESTHESIA EA ADD 15 MINS: Performed by: OBSTETRICS & GYNECOLOGY

## 2024-04-24 PROCEDURE — 63600175 PHARM REV CODE 636 W HCPCS: Performed by: OBSTETRICS & GYNECOLOGY

## 2024-04-24 PROCEDURE — 71000016 HC POSTOP RECOV ADDL HR: Performed by: OBSTETRICS & GYNECOLOGY

## 2024-04-24 PROCEDURE — 63600175 PHARM REV CODE 636 W HCPCS: Performed by: NURSE ANESTHETIST, CERTIFIED REGISTERED

## 2024-04-24 PROCEDURE — 82962 GLUCOSE BLOOD TEST: CPT | Performed by: OBSTETRICS & GYNECOLOGY

## 2024-04-24 PROCEDURE — D9220A PRA ANESTHESIA: Mod: ANES,,, | Performed by: ANESTHESIOLOGY

## 2024-04-24 PROCEDURE — 36000712 HC OR TIME LEV V 1ST 15 MIN: Performed by: OBSTETRICS & GYNECOLOGY

## 2024-04-24 PROCEDURE — 25000003 PHARM REV CODE 250: Performed by: ANESTHESIOLOGY

## 2024-04-24 PROCEDURE — 27201423 OPTIME MED/SURG SUP & DEVICES STERILE SUPPLY: Performed by: OBSTETRICS & GYNECOLOGY

## 2024-04-24 PROCEDURE — 71000033 HC RECOVERY, INTIAL HOUR: Performed by: OBSTETRICS & GYNECOLOGY

## 2024-04-24 PROCEDURE — C2628 CATHETER, OCCLUSION: HCPCS | Performed by: OBSTETRICS & GYNECOLOGY

## 2024-04-24 PROCEDURE — 58571 TLH W/T/O 250 G OR LESS: CPT | Mod: ,,, | Performed by: OBSTETRICS & GYNECOLOGY

## 2024-04-24 PROCEDURE — 88309 TISSUE EXAM BY PATHOLOGIST: CPT | Performed by: PATHOLOGY

## 2024-04-24 PROCEDURE — 71000015 HC POSTOP RECOV 1ST HR: Performed by: OBSTETRICS & GYNECOLOGY

## 2024-04-24 PROCEDURE — 58571 TLH W/T/O 250 G OR LESS: CPT | Mod: AS,,, | Performed by: NURSE PRACTITIONER

## 2024-04-24 PROCEDURE — 36000713 HC OR TIME LEV V EA ADD 15 MIN: Performed by: OBSTETRICS & GYNECOLOGY

## 2024-04-24 PROCEDURE — 71000039 HC RECOVERY, EACH ADD'L HOUR: Performed by: OBSTETRICS & GYNECOLOGY

## 2024-04-24 PROCEDURE — 37000008 HC ANESTHESIA 1ST 15 MINUTES: Performed by: OBSTETRICS & GYNECOLOGY

## 2024-04-24 PROCEDURE — 25000003 PHARM REV CODE 250: Performed by: NURSE ANESTHETIST, CERTIFIED REGISTERED

## 2024-04-24 PROCEDURE — 88309 TISSUE EXAM BY PATHOLOGIST: CPT | Mod: 26,,, | Performed by: PATHOLOGY

## 2024-04-24 PROCEDURE — 81025 URINE PREGNANCY TEST: CPT | Performed by: OBSTETRICS & GYNECOLOGY

## 2024-04-24 RX ORDER — HYDROCODONE BITARTRATE AND ACETAMINOPHEN 10; 325 MG/1; MG/1
1 TABLET ORAL EVERY 4 HOURS PRN
Status: DISCONTINUED | OUTPATIENT
Start: 2024-04-24 | End: 2024-04-24 | Stop reason: HOSPADM

## 2024-04-24 RX ORDER — LIDOCAINE HYDROCHLORIDE 20 MG/ML
INJECTION INTRAVENOUS
Status: DISCONTINUED | OUTPATIENT
Start: 2024-04-24 | End: 2024-04-24

## 2024-04-24 RX ORDER — AMOXICILLIN 250 MG
1 CAPSULE ORAL 2 TIMES DAILY
Status: DISCONTINUED | OUTPATIENT
Start: 2024-04-24 | End: 2024-04-24 | Stop reason: HOSPADM

## 2024-04-24 RX ORDER — PREGABALIN 50 MG/1
50 CAPSULE ORAL
Status: COMPLETED | OUTPATIENT
Start: 2024-04-24 | End: 2024-04-24

## 2024-04-24 RX ORDER — MIDAZOLAM HYDROCHLORIDE 1 MG/ML
1 INJECTION INTRAMUSCULAR; INTRAVENOUS
Status: DISCONTINUED | OUTPATIENT
Start: 2024-04-24 | End: 2024-04-24 | Stop reason: HOSPADM

## 2024-04-24 RX ORDER — FAMOTIDINE 20 MG/1
20 TABLET, FILM COATED ORAL
Status: COMPLETED | OUTPATIENT
Start: 2024-04-24 | End: 2024-04-24

## 2024-04-24 RX ORDER — PROCHLORPERAZINE EDISYLATE 5 MG/ML
5 INJECTION INTRAMUSCULAR; INTRAVENOUS EVERY 6 HOURS PRN
Status: DISCONTINUED | OUTPATIENT
Start: 2024-04-24 | End: 2024-04-24 | Stop reason: HOSPADM

## 2024-04-24 RX ORDER — SCOLOPAMINE TRANSDERMAL SYSTEM 1 MG/1
1 PATCH, EXTENDED RELEASE TRANSDERMAL ONCE
Status: COMPLETED | OUTPATIENT
Start: 2024-04-24 | End: 2024-04-24

## 2024-04-24 RX ORDER — CYCLOBENZAPRINE HCL 5 MG
10 TABLET ORAL ONCE
Status: COMPLETED | OUTPATIENT
Start: 2024-04-24 | End: 2024-04-24

## 2024-04-24 RX ORDER — DEXAMETHASONE SODIUM PHOSPHATE 4 MG/ML
INJECTION, SOLUTION INTRA-ARTICULAR; INTRALESIONAL; INTRAMUSCULAR; INTRAVENOUS; SOFT TISSUE
Status: DISCONTINUED | OUTPATIENT
Start: 2024-04-24 | End: 2024-04-24

## 2024-04-24 RX ORDER — SODIUM CHLORIDE, SODIUM LACTATE, POTASSIUM CHLORIDE, CALCIUM CHLORIDE 600; 310; 30; 20 MG/100ML; MG/100ML; MG/100ML; MG/100ML
INJECTION, SOLUTION INTRAVENOUS CONTINUOUS
Status: DISCONTINUED | OUTPATIENT
Start: 2024-04-24 | End: 2024-04-24 | Stop reason: HOSPADM

## 2024-04-24 RX ORDER — HYDROCODONE BITARTRATE AND ACETAMINOPHEN 5; 325 MG/1; MG/1
1 TABLET ORAL EVERY 4 HOURS PRN
Status: DISCONTINUED | OUTPATIENT
Start: 2024-04-24 | End: 2024-04-24 | Stop reason: HOSPADM

## 2024-04-24 RX ORDER — PREGABALIN 75 MG/1
75 CAPSULE ORAL ONCE
Status: DISCONTINUED | OUTPATIENT
Start: 2024-04-24 | End: 2024-04-24

## 2024-04-24 RX ORDER — LIDOCAINE HYDROCHLORIDE 10 MG/ML
0.5 INJECTION, SOLUTION EPIDURAL; INFILTRATION; INTRACAUDAL; PERINEURAL ONCE
Status: DISCONTINUED | OUTPATIENT
Start: 2024-04-24 | End: 2024-04-24

## 2024-04-24 RX ORDER — ONDANSETRON HYDROCHLORIDE 2 MG/ML
INJECTION, SOLUTION INTRAVENOUS
Status: DISCONTINUED | OUTPATIENT
Start: 2024-04-24 | End: 2024-04-24

## 2024-04-24 RX ORDER — PROCHLORPERAZINE EDISYLATE 5 MG/ML
5 INJECTION INTRAMUSCULAR; INTRAVENOUS EVERY 10 MIN PRN
Status: DISCONTINUED | OUTPATIENT
Start: 2024-04-24 | End: 2024-04-24 | Stop reason: HOSPADM

## 2024-04-24 RX ORDER — SODIUM CHLORIDE 0.9 % (FLUSH) 0.9 %
3 SYRINGE (ML) INJECTION
Status: DISCONTINUED | OUTPATIENT
Start: 2024-04-24 | End: 2024-04-24 | Stop reason: HOSPADM

## 2024-04-24 RX ORDER — LIDOCAINE HYDROCHLORIDE 10 MG/ML
0.5 INJECTION, SOLUTION EPIDURAL; INFILTRATION; INTRACAUDAL; PERINEURAL
Status: DISCONTINUED | OUTPATIENT
Start: 2024-04-24 | End: 2024-04-24 | Stop reason: HOSPADM

## 2024-04-24 RX ORDER — DIPHENHYDRAMINE HYDROCHLORIDE 50 MG/ML
25 INJECTION INTRAMUSCULAR; INTRAVENOUS EVERY 4 HOURS PRN
Status: DISCONTINUED | OUTPATIENT
Start: 2024-04-24 | End: 2024-04-24 | Stop reason: HOSPADM

## 2024-04-24 RX ORDER — HYDROMORPHONE HYDROCHLORIDE 2 MG/ML
0.2 INJECTION, SOLUTION INTRAMUSCULAR; INTRAVENOUS; SUBCUTANEOUS EVERY 5 MIN PRN
Status: DISCONTINUED | OUTPATIENT
Start: 2024-04-24 | End: 2024-04-24 | Stop reason: HOSPADM

## 2024-04-24 RX ORDER — ONDANSETRON 8 MG/1
8 TABLET, ORALLY DISINTEGRATING ORAL EVERY 8 HOURS PRN
Status: DISCONTINUED | OUTPATIENT
Start: 2024-04-24 | End: 2024-04-24 | Stop reason: HOSPADM

## 2024-04-24 RX ORDER — MEPERIDINE HYDROCHLORIDE 25 MG/ML
12.5 INJECTION INTRAMUSCULAR; INTRAVENOUS; SUBCUTANEOUS ONCE AS NEEDED
Status: DISCONTINUED | OUTPATIENT
Start: 2024-04-24 | End: 2024-04-24 | Stop reason: HOSPADM

## 2024-04-24 RX ORDER — PROPOFOL 10 MG/ML
VIAL (ML) INTRAVENOUS
Status: DISCONTINUED | OUTPATIENT
Start: 2024-04-24 | End: 2024-04-24

## 2024-04-24 RX ORDER — ONDANSETRON HYDROCHLORIDE 2 MG/ML
4 INJECTION, SOLUTION INTRAVENOUS DAILY PRN
Status: DISCONTINUED | OUTPATIENT
Start: 2024-04-24 | End: 2024-04-24 | Stop reason: HOSPADM

## 2024-04-24 RX ORDER — CELECOXIB 200 MG/1
400 CAPSULE ORAL
Status: COMPLETED | OUTPATIENT
Start: 2024-04-24 | End: 2024-04-24

## 2024-04-24 RX ORDER — DIPHENHYDRAMINE HYDROCHLORIDE 50 MG/ML
25 INJECTION INTRAMUSCULAR; INTRAVENOUS EVERY 4 HOURS PRN
Status: DISCONTINUED | OUTPATIENT
Start: 2024-04-24 | End: 2024-04-24

## 2024-04-24 RX ORDER — SODIUM CHLORIDE, SODIUM LACTATE, POTASSIUM CHLORIDE, CALCIUM CHLORIDE 600; 310; 30; 20 MG/100ML; MG/100ML; MG/100ML; MG/100ML
INJECTION, SOLUTION INTRAVENOUS CONTINUOUS
Status: DISCONTINUED | OUTPATIENT
Start: 2024-04-24 | End: 2024-04-24

## 2024-04-24 RX ORDER — ROCURONIUM BROMIDE 10 MG/ML
INJECTION, SOLUTION INTRAVENOUS
Status: DISCONTINUED | OUTPATIENT
Start: 2024-04-24 | End: 2024-04-24

## 2024-04-24 RX ORDER — POLYETHYLENE GLYCOL 3350 17 G/17G
17 POWDER, FOR SOLUTION ORAL DAILY
Status: DISCONTINUED | OUTPATIENT
Start: 2024-04-24 | End: 2024-04-24 | Stop reason: HOSPADM

## 2024-04-24 RX ORDER — BUPIVACAINE HYDROCHLORIDE 2.5 MG/ML
INJECTION, SOLUTION EPIDURAL; INFILTRATION; INTRACAUDAL
Status: DISCONTINUED | OUTPATIENT
Start: 2024-04-24 | End: 2024-04-24 | Stop reason: HOSPADM

## 2024-04-24 RX ORDER — OXYCODONE AND ACETAMINOPHEN 5; 325 MG/1; MG/1
1 TABLET ORAL EVERY 4 HOURS PRN
Qty: 20 TABLET | Refills: 0 | Status: SHIPPED | OUTPATIENT
Start: 2024-04-24 | End: 2024-06-17

## 2024-04-24 RX ORDER — HYDROCODONE BITARTRATE AND ACETAMINOPHEN 5; 325 MG/1; MG/1
1 TABLET ORAL EVERY 4 HOURS PRN
Status: DISCONTINUED | OUTPATIENT
Start: 2024-04-24 | End: 2024-04-24

## 2024-04-24 RX ORDER — ACETAMINOPHEN 500 MG
1000 TABLET ORAL
Status: COMPLETED | OUTPATIENT
Start: 2024-04-24 | End: 2024-04-24

## 2024-04-24 RX ORDER — MUPIROCIN 20 MG/G
OINTMENT TOPICAL
Status: COMPLETED | OUTPATIENT
Start: 2024-04-24 | End: 2024-04-24

## 2024-04-24 RX ORDER — FENTANYL CITRATE 50 UG/ML
INJECTION, SOLUTION INTRAMUSCULAR; INTRAVENOUS
Status: DISCONTINUED | OUTPATIENT
Start: 2024-04-24 | End: 2024-04-24

## 2024-04-24 RX ORDER — HYDROMORPHONE HYDROCHLORIDE 2 MG/ML
1 INJECTION, SOLUTION INTRAMUSCULAR; INTRAVENOUS; SUBCUTANEOUS EVERY 4 HOURS PRN
Status: DISCONTINUED | OUTPATIENT
Start: 2024-04-24 | End: 2024-04-24 | Stop reason: HOSPADM

## 2024-04-24 RX ORDER — HYDROCODONE BITARTRATE AND ACETAMINOPHEN 10; 325 MG/1; MG/1
1 TABLET ORAL EVERY 4 HOURS PRN
Status: DISCONTINUED | OUTPATIENT
Start: 2024-04-24 | End: 2024-04-24

## 2024-04-24 RX ORDER — DIPHENHYDRAMINE HCL 25 MG
25 CAPSULE ORAL EVERY 4 HOURS PRN
Status: DISCONTINUED | OUTPATIENT
Start: 2024-04-24 | End: 2024-04-24 | Stop reason: HOSPADM

## 2024-04-24 RX ORDER — DIPHENHYDRAMINE HCL 25 MG
25 CAPSULE ORAL EVERY 4 HOURS PRN
Status: DISCONTINUED | OUTPATIENT
Start: 2024-04-24 | End: 2024-04-24

## 2024-04-24 RX ORDER — ACETAMINOPHEN 325 MG/1
650 TABLET ORAL EVERY 4 HOURS PRN
Status: DISCONTINUED | OUTPATIENT
Start: 2024-04-24 | End: 2024-04-24 | Stop reason: HOSPADM

## 2024-04-24 RX ORDER — MIDAZOLAM HYDROCHLORIDE 1 MG/ML
INJECTION INTRAMUSCULAR; INTRAVENOUS
Status: DISCONTINUED | OUTPATIENT
Start: 2024-04-24 | End: 2024-04-24

## 2024-04-24 RX ORDER — IBUPROFEN 600 MG/1
600 TABLET ORAL 3 TIMES DAILY
Qty: 30 TABLET | Refills: 0 | Status: SHIPPED | OUTPATIENT
Start: 2024-04-24 | End: 2024-05-09

## 2024-04-24 RX ORDER — KETOROLAC TROMETHAMINE 30 MG/ML
30 INJECTION, SOLUTION INTRAMUSCULAR; INTRAVENOUS
Status: DISCONTINUED | OUTPATIENT
Start: 2024-04-24 | End: 2024-04-24 | Stop reason: HOSPADM

## 2024-04-24 RX ORDER — LIDOCAINE HYDROCHLORIDE 10 MG/ML
1 INJECTION, SOLUTION EPIDURAL; INFILTRATION; INTRACAUDAL; PERINEURAL ONCE
Status: DISCONTINUED | OUTPATIENT
Start: 2024-04-24 | End: 2024-04-24 | Stop reason: HOSPADM

## 2024-04-24 RX ORDER — IBUPROFEN 600 MG/1
600 TABLET ORAL EVERY 6 HOURS PRN
Status: DISCONTINUED | OUTPATIENT
Start: 2024-04-24 | End: 2024-04-24 | Stop reason: HOSPADM

## 2024-04-24 RX ORDER — ACETAMINOPHEN 500 MG
1000 TABLET ORAL
Status: DISCONTINUED | OUTPATIENT
Start: 2024-04-24 | End: 2024-04-24

## 2024-04-24 RX ADMIN — HYDROCODONE BITARTRATE AND ACETAMINOPHEN 1 TABLET: 5; 325 TABLET ORAL at 10:04

## 2024-04-24 RX ADMIN — MIDAZOLAM HYDROCHLORIDE 1 MG: 1 INJECTION, SOLUTION INTRAMUSCULAR; INTRAVENOUS at 10:04

## 2024-04-24 RX ADMIN — MUPIROCIN: 20 OINTMENT TOPICAL at 06:04

## 2024-04-24 RX ADMIN — HYDROMORPHONE HYDROCHLORIDE 0.2 MG: 2 INJECTION, SOLUTION INTRAMUSCULAR; INTRAVENOUS; SUBCUTANEOUS at 10:04

## 2024-04-24 RX ADMIN — LIDOCAINE HYDROCHLORIDE 60 MG: 20 INJECTION, SOLUTION INTRAVENOUS at 08:04

## 2024-04-24 RX ADMIN — CELECOXIB 400 MG: 200 CAPSULE ORAL at 06:04

## 2024-04-24 RX ADMIN — PREGABALIN 50 MG: 50 CAPSULE ORAL at 06:04

## 2024-04-24 RX ADMIN — ROCURONIUM BROMIDE 20 MG: 10 INJECTION INTRAVENOUS at 08:04

## 2024-04-24 RX ADMIN — CEFAZOLIN 2 G: 2 INJECTION, POWDER, FOR SOLUTION INTRAMUSCULAR; INTRAVENOUS at 08:04

## 2024-04-24 RX ADMIN — FENTANYL CITRATE 100 MCG: 50 INJECTION, SOLUTION INTRAMUSCULAR; INTRAVENOUS at 08:04

## 2024-04-24 RX ADMIN — CYCLOBENZAPRINE HYDROCHLORIDE 10 MG: 5 TABLET, FILM COATED ORAL at 12:04

## 2024-04-24 RX ADMIN — SUGAMMADEX 200 MG: 100 INJECTION, SOLUTION INTRAVENOUS at 09:04

## 2024-04-24 RX ADMIN — SODIUM CHLORIDE, SODIUM LACTATE, POTASSIUM CHLORIDE, AND CALCIUM CHLORIDE: 600; 310; 30; 20 INJECTION, SOLUTION INTRAVENOUS at 07:04

## 2024-04-24 RX ADMIN — ONDANSETRON HYDROCHLORIDE 4 MG: 2 INJECTION INTRAMUSCULAR; INTRAVENOUS at 09:04

## 2024-04-24 RX ADMIN — PROPOFOL 200 MG: 10 INJECTION, EMULSION INTRAVENOUS at 08:04

## 2024-04-24 RX ADMIN — SODIUM CHLORIDE, SODIUM LACTATE, POTASSIUM CHLORIDE, AND CALCIUM CHLORIDE: 600; 310; 30; 20 INJECTION, SOLUTION INTRAVENOUS at 08:04

## 2024-04-24 RX ADMIN — FAMOTIDINE 20 MG: 20 TABLET ORAL at 06:04

## 2024-04-24 RX ADMIN — FENTANYL CITRATE 25 MCG: 50 INJECTION, SOLUTION INTRAMUSCULAR; INTRAVENOUS at 08:04

## 2024-04-24 RX ADMIN — ACETAMINOPHEN 1000 MG: 500 TABLET ORAL at 06:04

## 2024-04-24 RX ADMIN — SCOPOLAMINE 1 PATCH: 1.5 PATCH, EXTENDED RELEASE TRANSDERMAL at 06:04

## 2024-04-24 RX ADMIN — DEXAMETHASONE SODIUM PHOSPHATE 8 MG: 4 INJECTION, SOLUTION INTRAMUSCULAR; INTRAVENOUS at 08:04

## 2024-04-24 RX ADMIN — ROCURONIUM BROMIDE 50 MG: 10 INJECTION INTRAVENOUS at 08:04

## 2024-04-24 RX ADMIN — MIDAZOLAM HYDROCHLORIDE 2 MG: 1 INJECTION, SOLUTION INTRAMUSCULAR; INTRAVENOUS at 08:04

## 2024-04-24 RX ADMIN — FENTANYL CITRATE 75 MCG: 50 INJECTION, SOLUTION INTRAMUSCULAR; INTRAVENOUS at 09:04

## 2024-04-24 NOTE — BRIEF OP NOTE
[facility] Surgery ([place])  Brief Operative Note    Surgery Date: 4/24/2024     Surgeons and Role:     * Gris Wadsworth MD - Primary    Assistant: Akiko Washburn NP    Pre-op Diagnosis:  Complex endometrial hyperplasia with atypia [N85.02]    Post-op Diagnosis:  Post-Op Diagnosis Codes:     * Complex endometrial hyperplasia with atypia [N85.02]    Procedure(s) (LRB):  XI ROBOTIC HYSTERECTOMY,WITH SALPINGO-OOPHORECTOMY (Bilateral)    Anesthesia: General    Operative Findings: fibroid uterus, normal tubes and ovaries    Estimated Blood Loss: 50 mL         Specimens:   Specimen (24h ago, onward)       Start     Ordered    04/24/24 0848  Specimen to Pathology, Surgery Gynecology and Obstetrics  Once        Comments: Pre-op Diagnosis: Complex endometrial hyperplasia with atypia [N85.02]Procedure(s):XI ROBOTIC HYSTERECTOMY,WITH SALPINGO-OOPHORECTOMY Number of specimens: 1Name of specimens: 1. Uterus, cervix, Bilateral fallopian tubes and ovaries     References:    Click here for ordering Quick Tip   Question Answer Comment   Procedure Type: Gynecology and Obstetrics    Specimen Class: Known or suspected malignancy    Release to patient Immediate        04/24/24 0919                  Underwent above procedure without difficulty. Tolerated well. Transferred to PACU in stable condition. Uncomplicated postop course. Discharged home in stable condition.      Discharge Note    OUTCOME: Patient tolerated treatment/procedure well without complication and is now ready for discharge.    DISPOSITION: Home or Self Care    FINAL DIAGNOSIS:  <principal problem not specified>    FOLLOWUP: In clinic    DISCHARGE INSTRUCTIONS:    Discharge Procedure Orders   Diet Adult Regular     Diet general     Lifting restrictions - no heavy lifting for 6 weeks     No driving until pain free and no longer taking narcotics for 48 hours     Pelvic Rest   Order Comments: No intercourse for 12 weeks     Discharge dressing - no dressing change      Notify your health care provider if you experience any of the following:  temperature >100.4     Notify your health care provider if you experience any of the following:  persistent nausea and vomiting or diarrhea     Notify your health care provider if you experience any of the following:  severe uncontrolled pain     Notify your health care provider if you experience any of the following:  redness, tenderness, or signs of infection (pain, swelling, redness, odor or green/yellow discharge around incision site)     Notify your health care provider if you experience any of the following:  difficulty breathing or increased cough     Notify your health care provider if you experience any of the following:  severe persistent headache     Notify your health care provider if you experience any of the following:  worsening rash     Notify your health care provider if you experience any of the following:  persistent dizziness, light-headedness, or visual disturbances     Notify your health care provider if you experience any of the following:  increased confusion or weakness     Call MD for:  temperature >100.4     Call MD for:  persistent nausea and vomiting     Call MD for:  severe uncontrolled pain     Activity as tolerated     Shower ok after 24 hours

## 2024-04-24 NOTE — OPERATIVE NOTE ADDENDUM
Certification of Assistant at Surgery       Surgery Date: 4/24/2024     Participating Surgeons:  Surgeons and Role:     * Gris Wadsworth MD - Primary    Procedures:  Procedure(s) (LRB):  XI ROBOTIC HYSTERECTOMY,WITH SALPINGO-OOPHORECTOMY (Bilateral)    Assistant Surgeon's Certification of Necessity:  I understand that section 1842 (b) (6) (d) of the Social Security Act generally prohibits Medicare Part B reasonable charge payment for the services of assistants at surgery in teaching hospitals when qualified residents are available to furnish such services. I certify that the services for which payment is claimed were medically necessary, and that no qualified resident was available to perform the services. I further understand that these services are subject to post-payment review by the Medicare carrier.      ANDREEA Ness    04/24/2024  9:41 AM

## 2024-04-24 NOTE — DISCHARGE INSTRUCTIONS
Remove Scopolamine patch on post op day 2.    Ex (surgery on Mon, remove on Wed)    Wash hands thoroughly after removing patch and wash area where patch was placed.    Fold in half and discard in garbage.

## 2024-04-24 NOTE — TRANSFER OF CARE
Anesthesia Transfer of Care Note    Patient: Hillary Cotton    Procedure(s) Performed: Procedure(s) (LRB):  XI ROBOTIC HYSTERECTOMY,WITH SALPINGO-OOPHORECTOMY (Bilateral)    Patient location: PACU    Anesthesia Type: general    Transport from OR: Transported from OR on 6-10 L/min O2 by face mask with adequate spontaneous ventilation    Post pain: adequate analgesia    Post assessment: no apparent anesthetic complications and tolerated procedure well    Post vital signs: stable    Level of consciousness: awake, oriented and alert    Nausea/Vomiting: no nausea/vomiting    Complications: none    Transfer of care protocol was followed      Last vitals: Visit Vitals  /61 (BP Location: Left arm, Patient Position: Lying)   Pulse 83   Temp 36.4 °C (97.6 °F) (Oral)   Resp 18   LMP 03/12/2024 (Approximate)   SpO2 96%   Breastfeeding No

## 2024-04-24 NOTE — ANESTHESIA PROCEDURE NOTES
Intubation    Date/Time: 4/24/2024 8:16 AM    Performed by: Dina Ludwig CRNA  Authorized by: Vinay Ortiz MD    Intubation:     Induction:  Intravenous    Intubated:  Postinduction    Mask Ventilation:  Easy with oral airway    Attempts:  1    Attempted By:  CRNA    Method of Intubation:  Video laryngoscopy    Blade:  Bee 3    Laryngeal View Grade: Grade I - full view of cords      Difficult Airway Encountered?: No      Complications:  None    Airway Device:  Oral endotracheal tube    Airway Device Size:  7.5    Style/Cuff Inflation:  Cuffed (inflated to minimal occlusive pressure)    Tube secured:  22    Secured at:  The lips    Placement Verified By:  Capnometry    Complicating Factors:  None    Findings Post-Intubation:  BS equal bilateral and atraumatic/condition of teeth unchanged

## 2024-04-24 NOTE — OP NOTE
OBGYN  Operative Note    SUMMARY     Date of Procedure: 4/24/2024    Procedure:     Procedure(s) (LRB):  XI ROBOTIC HYSTERECTOMY,WITH SALPINGO-OOPHORECTOMY (Bilateral)       Surgeons and Role:     * Gris Wadsworth MD - Primary    Assistant: Akiko Washburn NP    Pre-Operative Diagnosis: Complex endometrial hyperplasia with atypia [N85.02]    Post-Operative Diagnosis: Post-Op Diagnosis Codes:     * Complex endometrial hyperplasia with atypia [N85.02]    Anesthesia: General    Description of the Findings of the Procedure: fibroid uterus, normal tubes and ovaries.    Technical Procedures Used: The patient was taken to the operating room where general anesthesia was performed. She was then prepped and draped in the normal sterile fashion. A westfall was placed to gravity. A EDMUNDO 1 manipulator was placed in the normal fashion using the medium cervical cup. And the uterine manipulator placed without difficulty.     Attention was then turned to the abdomen. Towel clamps were used to elevate the abdomen. A scalpel was used to make an 8 mm supraumbilical skin incision. The Veress needle was used to enter the abdomen. With a starting pressure of 3 mm Hg the abdomen was then insufflated to 15 mm Hg without difficulty.     A 8mm bladeless Davinci Trocar was then placed in the umbilical incision. The patient was placed in trendelenburg position. The upper abdomen appeared normal. Survey of the pelvis demonstrated fibroid uterus, normal tubes and ovaries. Bilateral ureters were located and noted to be peristalsing. No adhesions were noted.  A 8 mm bladeless trocar was placed in the right lateral abdomen with visualization with the camera. Another 8 mm bladeless trocar was placed in the left lateral abdomen with visualization with the camera. A left upper quadrant Airseal 12 mm trocar was placed with visualization with the camera without difficulty. The Airseal was then activated. The patient was then positioned in trendelenburg  position.    The Robot was then docked in the usual fashion with the [place] bipolar in the left hand and the monopolar endoshears in the right hand. Bilateral ureters were located and noted to be peristalsing. Attention was turned to the left infundibulopelvic ligament which was cauterized with the bipolar [place] and ligated with the monopolar endoshears. This was carried to the level of the left uterine artery. The right infundibulopelvic ligament was cauterized and ligated in a similar fashion. Bilateral round ligaments were cauterized and ligated. The bladder flap was created with monopolar endoshears and the bladder was gently pushed away from the lower uterine segment and cervix.     An anterior colpotomy was made using the monopolar endoshears around the blue EDMUNDO cup. Attention was then turned posteriorly and an posterior colpotomy was made at the level of the uterosacral ligaments with the endoshears. This was carried around the blue EDMUNDO cup. The left uterine artery was then ligated using the [place] bipolar with excellent hemostasis.  The right uterine artery was ligated using the Maryland bipolar with excellent hemostasis. The uterus was now free and removed through the vagina without difficulty. A sterile lap and glove were placed to maintain pneumoperitoneum.    The abdomen was then irrigated and cleared of all clots and debris. The vaginal cuff was noted to have excellent hemostasis. A 0 Vicryl figure of eight suture was placed at the left and right vaginal cuff angles and secured with laparoties. 0 Vicryl figure of eight sutures were then used to close the vaginal cuff. The abdomen was then irrigated and cleared of all clots and debris. The pressure was then brought down to 5 mm HG and no active bleeding was noted. Bilateral ureters were located and noted to be peristalsing. The LUQ fascial incision with closed with 0 Vicryl interrupted suture using the Ashok-Arzola device. The trocars were  then removed without difficulty. The incisions were closed using 4-0 Monocryl with excellent hemostasis. 10 cc of 0.5% marcaine was injected subcutaneously in the closed incisions. The sterile lap and glove were removed from the vagina.     A qualified resident was not available.      Complications: No    Estimated Blood Loss (EBL): 50 mL    Specimens:   Specimen       Uterus, cervix, bilateral fallopian tubes and ovaries                    Condition: Good    Disposition: PACU - hemodynamically stable.

## 2024-04-24 NOTE — ANESTHESIA POSTPROCEDURE EVALUATION
Anesthesia Post Evaluation    Patient: Hillary Cotton    Procedure(s) Performed: Procedure(s) (LRB):  XI ROBOTIC HYSTERECTOMY,WITH SALPINGO-OOPHORECTOMY (Bilateral)    Final Anesthesia Type: general      Patient location during evaluation: PACU  Patient participation: Yes- Able to Participate  Level of consciousness: awake and alert  Post-procedure vital signs: reviewed and stable  Pain management: adequate  Airway patency: patent    PONV status at discharge: No PONV  Anesthetic complications: no      Cardiovascular status: blood pressure returned to baseline  Respiratory status: unassisted  Hydration status: euvolemic  Follow-up not needed.              Vitals Value Taken Time   /66 04/24/24 1115   Temp 36.2 °C (97.1 °F) 04/24/24 1115   Pulse 88 04/24/24 1115   Resp 16 04/24/24 1115   SpO2 98 % 04/24/24 1115   Vitals shown include unfiled device data.      Event Time   Out of Recovery 11:16:18         Pain/Erin Score: Pain Rating Prior to Med Admin: 6 (4/24/2024 10:50 AM)  Pain Rating Post Med Admin: 5 (4/24/2024 11:15 AM)  Erin Score: 9 (4/24/2024 10:50 AM)

## 2024-04-24 NOTE — PLAN OF CARE
Hillary Cotton has met all discharge criteria from Phase II. Vital Signs are stable, ambulating  without difficulty.Pain is now under control and tolerable for the pt. Pain score 5 at this time.  Discharge instructions given, patient verbalized understanding. Discharged from facility via wheelchair in stable condition.

## 2024-04-25 VITALS
HEART RATE: 90 BPM | RESPIRATION RATE: 17 BRPM | DIASTOLIC BLOOD PRESSURE: 70 MMHG | OXYGEN SATURATION: 95 % | SYSTOLIC BLOOD PRESSURE: 139 MMHG | TEMPERATURE: 98 F

## 2024-04-26 ENCOUNTER — PATIENT MESSAGE (OUTPATIENT)
Dept: OBSTETRICS AND GYNECOLOGY | Facility: CLINIC | Age: 54
End: 2024-04-26
Payer: OTHER GOVERNMENT

## 2024-04-29 RX ORDER — TRAMADOL HYDROCHLORIDE 50 MG/1
50 TABLET ORAL EVERY 6 HOURS PRN
Qty: 30 TABLET | Refills: 0 | Status: SHIPPED | OUTPATIENT
Start: 2024-04-29

## 2024-05-06 LAB
FINAL PATHOLOGIC DIAGNOSIS: NORMAL
GROSS: NORMAL
Lab: NORMAL

## 2024-05-09 ENCOUNTER — OFFICE VISIT (OUTPATIENT)
Dept: OBSTETRICS AND GYNECOLOGY | Facility: CLINIC | Age: 54
End: 2024-05-09
Payer: OTHER GOVERNMENT

## 2024-05-09 VITALS — SYSTOLIC BLOOD PRESSURE: 130 MMHG | HEIGHT: 66 IN | DIASTOLIC BLOOD PRESSURE: 80 MMHG | BODY MASS INDEX: 34.54 KG/M2

## 2024-05-09 DIAGNOSIS — Z09 POSTOP CHECK: Primary | ICD-10-CM

## 2024-05-09 PROCEDURE — 99999 PR PBB SHADOW E&M-EST. PATIENT-LVL III: CPT | Mod: PBBFAC,,, | Performed by: OBSTETRICS & GYNECOLOGY

## 2024-05-09 PROCEDURE — 99024 POSTOP FOLLOW-UP VISIT: CPT | Mod: ,,, | Performed by: OBSTETRICS & GYNECOLOGY

## 2024-05-09 PROCEDURE — 99213 OFFICE O/P EST LOW 20 MIN: CPT | Mod: PBBFAC | Performed by: OBSTETRICS & GYNECOLOGY

## 2024-05-09 RX ORDER — CYCLOBENZAPRINE HCL 10 MG
10 TABLET ORAL 3 TIMES DAILY PRN
Qty: 30 TABLET | Refills: 1 | Status: SHIPPED | OUTPATIENT
Start: 2024-05-09 | End: 2024-05-29

## 2024-05-09 RX ORDER — ESTRADIOL 1 MG/1
1 TABLET ORAL DAILY
Qty: 90 TABLET | Refills: 0 | Status: SHIPPED | OUTPATIENT
Start: 2024-05-09 | End: 2025-05-09

## 2024-05-09 NOTE — PROGRESS NOTES
"  54 yo female presents for postoperative visit s/p DVH/BSO on 4/24. She reports soreness on left side and muscle spasms. Worse when coughing.  Gen: AAO x 3, NAD  Vitals:    05/09/24 1345   BP: 130/80   Height: 5' 6" (1.676 m)   PainSc: 0-No pain     Incision: healed x 4  Pelvic: deferred    A/P postoperative visit  - f/u for postop visit    - flexeril prn  - start estradiol 1 mg daily for vasomotor symptoms.  - will try flexeril for muscle soreness/spasm.  "

## 2024-05-20 ENCOUNTER — TELEPHONE (OUTPATIENT)
Dept: OBSTETRICS AND GYNECOLOGY | Facility: CLINIC | Age: 54
End: 2024-05-20
Payer: OTHER GOVERNMENT

## 2024-05-22 ENCOUNTER — TELEPHONE (OUTPATIENT)
Dept: OBSTETRICS AND GYNECOLOGY | Facility: CLINIC | Age: 54
End: 2024-05-22
Payer: OTHER GOVERNMENT

## 2024-05-22 NOTE — TELEPHONE ENCOUNTER
Spoke with pt, she will send me all paperwork through the portal for return to work. All questions answered.     LM for [facility] life to return call. We have not received any forms to be completed

## 2024-05-23 ENCOUNTER — TELEPHONE (OUTPATIENT)
Dept: OBSTETRICS AND GYNECOLOGY | Facility: CLINIC | Age: 54
End: 2024-05-23
Payer: OTHER GOVERNMENT

## 2024-05-24 ENCOUNTER — OFFICE VISIT (OUTPATIENT)
Dept: PODIATRY | Facility: CLINIC | Age: 54
End: 2024-05-24
Payer: OTHER GOVERNMENT

## 2024-05-24 VITALS
BODY MASS INDEX: 34.4 KG/M2 | WEIGHT: 214.06 LBS | SYSTOLIC BLOOD PRESSURE: 136 MMHG | DIASTOLIC BLOOD PRESSURE: 89 MMHG | HEART RATE: 107 BPM | HEIGHT: 66 IN

## 2024-05-24 DIAGNOSIS — M19.079 ARTHRITIS OF SUBTALAR JOINT: ICD-10-CM

## 2024-05-24 DIAGNOSIS — G89.29 CHRONIC FOOT PAIN, LEFT: ICD-10-CM

## 2024-05-24 DIAGNOSIS — Z01.818 PREOP TESTING: ICD-10-CM

## 2024-05-24 DIAGNOSIS — D17.79 INTRAOSSEOUS LIPOMA: Primary | ICD-10-CM

## 2024-05-24 DIAGNOSIS — M79.672 CHRONIC FOOT PAIN, LEFT: ICD-10-CM

## 2024-05-24 DIAGNOSIS — M76.72 PERONEAL TENDONITIS, LEFT: ICD-10-CM

## 2024-05-24 PROCEDURE — 99215 OFFICE O/P EST HI 40 MIN: CPT | Mod: 57,S$PBB,, | Performed by: PODIATRIST

## 2024-05-24 PROCEDURE — 99215 OFFICE O/P EST HI 40 MIN: CPT | Mod: PBBFAC,PN | Performed by: PODIATRIST

## 2024-05-24 PROCEDURE — 99999 PR PBB SHADOW E&M-EST. PATIENT-LVL V: CPT | Mod: PBBFAC,,, | Performed by: PODIATRIST

## 2024-05-24 RX ORDER — SODIUM CHLORIDE 0.9 % (FLUSH) 0.9 %
10 SYRINGE (ML) INJECTION
Status: SHIPPED | OUTPATIENT
Start: 2024-05-24

## 2024-05-24 RX ORDER — CEFAZOLIN SODIUM 2 G/50ML
2 SOLUTION INTRAVENOUS
OUTPATIENT
Start: 2024-05-24
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
## 2024-05-24 NOTE — PATIENT INSTRUCTIONS
08/09/2024 for left tenosynovectomy of common peroneal sheath with excision and currettage of intra-osseous lipoma with possible STJ fusion

## 2024-06-17 ENCOUNTER — OFFICE VISIT (OUTPATIENT)
Dept: OBSTETRICS AND GYNECOLOGY | Facility: CLINIC | Age: 54
End: 2024-06-17
Payer: OTHER GOVERNMENT

## 2024-06-17 VITALS — SYSTOLIC BLOOD PRESSURE: 124 MMHG | HEIGHT: 66 IN | DIASTOLIC BLOOD PRESSURE: 88 MMHG | BODY MASS INDEX: 34.55 KG/M2

## 2024-06-17 DIAGNOSIS — Z09 POSTOP CHECK: Primary | ICD-10-CM

## 2024-06-17 PROCEDURE — 99024 POSTOP FOLLOW-UP VISIT: CPT | Mod: ,,, | Performed by: OBSTETRICS & GYNECOLOGY

## 2024-06-17 PROCEDURE — 99213 OFFICE O/P EST LOW 20 MIN: CPT | Mod: PBBFAC | Performed by: OBSTETRICS & GYNECOLOGY

## 2024-06-17 PROCEDURE — 99999 PR PBB SHADOW E&M-EST. PATIENT-LVL III: CPT | Mod: PBBFAC,,, | Performed by: OBSTETRICS & GYNECOLOGY

## 2024-06-17 NOTE — PROGRESS NOTES
"  54 yo female presents for postoperative visit s/p DVH/BSO on 4/24. She denies complaints.     Gen: AAO x 3, NAD  Vitals:    06/17/24 1445   BP: 124/88   Height: 5' 6" (1.676 m)   PainSc: 0-No pain     Incision: healed x 4  Pelvic: normal bimanual exam, no adnexal masses, nontender. Cuff intact.      A/P postoperative visit  - f/u for annual visit.   "

## 2024-07-18 ENCOUNTER — OFFICE VISIT (OUTPATIENT)
Dept: FAMILY MEDICINE | Facility: CLINIC | Age: 54
End: 2024-07-18
Payer: OTHER GOVERNMENT

## 2024-07-18 ENCOUNTER — LAB VISIT (OUTPATIENT)
Dept: LAB | Facility: HOSPITAL | Age: 54
End: 2024-07-18
Attending: FAMILY MEDICINE
Payer: OTHER GOVERNMENT

## 2024-07-18 VITALS
BODY MASS INDEX: 35.61 KG/M2 | DIASTOLIC BLOOD PRESSURE: 84 MMHG | HEART RATE: 84 BPM | WEIGHT: 221.56 LBS | HEIGHT: 66 IN | SYSTOLIC BLOOD PRESSURE: 120 MMHG | OXYGEN SATURATION: 97 %

## 2024-07-18 DIAGNOSIS — Z01.818 PREOP TESTING: ICD-10-CM

## 2024-07-18 DIAGNOSIS — D17.79 INTRAOSSEOUS LIPOMA: ICD-10-CM

## 2024-07-18 DIAGNOSIS — Z01.818 PREOP EXAMINATION: ICD-10-CM

## 2024-07-18 DIAGNOSIS — Z01.818 PREOP EXAMINATION: Primary | ICD-10-CM

## 2024-07-18 DIAGNOSIS — M19.079 ARTHRITIS OF SUBTALAR JOINT: ICD-10-CM

## 2024-07-18 PROCEDURE — 81003 URINALYSIS AUTO W/O SCOPE: CPT | Performed by: FAMILY MEDICINE

## 2024-07-18 PROCEDURE — 99214 OFFICE O/P EST MOD 30 MIN: CPT | Mod: PBBFAC,PO | Performed by: FAMILY MEDICINE

## 2024-07-18 PROCEDURE — 99999 PR PBB SHADOW E&M-EST. PATIENT-LVL IV: CPT | Mod: PBBFAC,,, | Performed by: FAMILY MEDICINE

## 2024-07-18 PROCEDURE — 99214 OFFICE O/P EST MOD 30 MIN: CPT | Mod: S$PBB,,, | Performed by: FAMILY MEDICINE

## 2024-07-18 NOTE — PROGRESS NOTES
Subjective     Patient ID: Hillary Cotton is a 53 y.o. female.    Chief Complaint: No chief complaint on file.    53 years old female came to the clinic for preoperative evaluation for foot surgery.  No chest pain, palpitation, orthopnea or PND.      Review of Systems   Constitutional: Negative.    HENT: Negative.     Eyes: Negative.    Respiratory: Negative.     Cardiovascular: Negative.    Gastrointestinal: Negative.    Genitourinary: Negative.    Musculoskeletal: Negative.    Neurological: Negative.    Psychiatric/Behavioral: Negative.            Objective     Physical Exam  Constitutional:       General: She is not in acute distress.     Appearance: She is well-developed. She is not diaphoretic.   HENT:      Head: Normocephalic and atraumatic.      Right Ear: External ear normal.      Left Ear: External ear normal.      Nose: Nose normal.      Mouth/Throat:      Pharynx: No oropharyngeal exudate.   Eyes:      General: No scleral icterus.        Right eye: No discharge.         Left eye: No discharge.      Conjunctiva/sclera: Conjunctivae normal.      Pupils: Pupils are equal, round, and reactive to light.   Neck:      Thyroid: No thyromegaly.      Vascular: No JVD.      Trachea: No tracheal deviation.   Cardiovascular:      Rate and Rhythm: Normal rate and regular rhythm.      Heart sounds: Normal heart sounds. No murmur heard.     No friction rub. No gallop.   Pulmonary:      Effort: Pulmonary effort is normal. No respiratory distress.      Breath sounds: Normal breath sounds. No stridor. No wheezing or rales.   Chest:      Chest wall: No tenderness.   Abdominal:      General: Bowel sounds are normal. There is no distension.      Palpations: Abdomen is soft. There is no mass.      Tenderness: There is no abdominal tenderness. There is no guarding or rebound.   Musculoskeletal:         General: No tenderness. Normal range of motion.      Cervical back: Normal range of motion and neck supple.    Lymphadenopathy:      Cervical: No cervical adenopathy.   Skin:     General: Skin is warm and dry.      Coloration: Skin is not pale.      Findings: No erythema or rash.   Neurological:      Mental Status: She is alert and oriented to person, place, and time.      Cranial Nerves: No cranial nerve deficit.      Motor: No abnormal muscle tone.      Coordination: Coordination normal.      Deep Tendon Reflexes: Reflexes are normal and symmetric.   Psychiatric:         Behavior: Behavior normal.         Thought Content: Thought content normal.         Judgment: Judgment normal.            Assessment and Plan     1. Preop examination  -     CBC Auto Differential; Future; Expected date: 07/18/2024  -     Comprehensive Metabolic Panel; Future; Expected date: 07/18/2024  -     Urinalysis; Future  -     EKG 12-lead; Future  -     X-Ray Chest PA And Lateral; Future; Expected date: 07/18/2024    2. Intraosseous lipoma  -     Ambulatory referral/consult to Family Practice    3. Arthritis of subtalar joint  -     Ambulatory referral/consult to Family Practice    4. Preop testing  -     Ambulatory referral/consult to Family Practice      I spent a total of 32 minutes on the day of the visit.This includes face to face time and non-face to face time preparing to see the patient (eg, review of tests), obtaining and/or reviewing separately obtained history, documenting clinical information in the electronic or other health record, independently interpreting results and communicating results to the patient/family/caregiver, or care coordinator.            Follow up if symptoms worsen or fail to improve.

## 2024-07-25 ENCOUNTER — TELEPHONE (OUTPATIENT)
Dept: PREADMISSION TESTING | Facility: HOSPITAL | Age: 54
End: 2024-07-25
Payer: OTHER GOVERNMENT

## 2024-07-29 ENCOUNTER — HOSPITAL ENCOUNTER (OUTPATIENT)
Dept: RADIOLOGY | Facility: HOSPITAL | Age: 54
Discharge: HOME | End: 2024-07-29
Attending: FAMILY MEDICINE
Payer: OTHER GOVERNMENT

## 2024-07-29 ENCOUNTER — CLINICAL SUPPORT (OUTPATIENT)
Dept: LAB | Facility: HOSPITAL | Age: 54
End: 2024-07-29
Attending: FAMILY MEDICINE
Payer: OTHER GOVERNMENT

## 2024-07-29 DIAGNOSIS — Z01.818 PREOP EXAMINATION: ICD-10-CM

## 2024-07-29 PROCEDURE — 93010 ELECTROCARDIOGRAM REPORT: CPT | Mod: ,,, | Performed by: INTERNAL MEDICINE

## 2024-07-29 PROCEDURE — 71046 X-RAY EXAM CHEST 2 VIEWS: CPT | Mod: 26,,, | Performed by: RADIOLOGY

## 2024-07-29 PROCEDURE — 93005 ELECTROCARDIOGRAM TRACING: CPT

## 2024-07-29 PROCEDURE — 71046 X-RAY EXAM CHEST 2 VIEWS: CPT | Mod: TC,FY

## 2024-07-30 LAB
OHS QRS DURATION: 74 MS
OHS QTC CALCULATION: 453 MS

## 2024-07-31 ENCOUNTER — PATIENT MESSAGE (OUTPATIENT)
Dept: PODIATRY | Facility: CLINIC | Age: 54
End: 2024-07-31
Payer: OTHER GOVERNMENT

## 2024-08-01 ENCOUNTER — TELEPHONE (OUTPATIENT)
Dept: PODIATRY | Facility: CLINIC | Age: 54
End: 2024-08-01
Payer: OTHER GOVERNMENT

## 2024-08-01 NOTE — TELEPHONE ENCOUNTER
Pt's FMLA forms were scanned into chart under  and faxed to Rosio Valdez [phone]. Confirmation was received. Pt notified.

## 2024-08-05 RX ORDER — ESTRADIOL 1 MG/1
1 TABLET ORAL
Qty: 90 TABLET | Refills: 3 | Status: SHIPPED | OUTPATIENT
Start: 2024-08-05

## 2024-08-08 ENCOUNTER — ANESTHESIA EVENT (OUTPATIENT)
Dept: SURGERY | Facility: HOSPITAL | Age: 54
End: 2024-08-08
Payer: OTHER GOVERNMENT

## 2024-08-09 ENCOUNTER — ANESTHESIA (OUTPATIENT)
Dept: SURGERY | Facility: HOSPITAL | Age: 54
End: 2024-08-09
Payer: OTHER GOVERNMENT

## 2024-08-09 ENCOUNTER — HOSPITAL ENCOUNTER (OUTPATIENT)
Facility: HOSPITAL | Age: 54
Discharge: HOME | End: 2024-08-09
Attending: PODIATRIST | Admitting: PODIATRIST
Payer: OTHER GOVERNMENT

## 2024-08-09 VITALS
TEMPERATURE: 97 F | OXYGEN SATURATION: 97 % | HEIGHT: 66 IN | WEIGHT: 219 LBS | DIASTOLIC BLOOD PRESSURE: 77 MMHG | RESPIRATION RATE: 18 BRPM | HEART RATE: 80 BPM | SYSTOLIC BLOOD PRESSURE: 124 MMHG | BODY MASS INDEX: 35.2 KG/M2

## 2024-08-09 DIAGNOSIS — D17.79 INTRAOSSEOUS LIPOMA: ICD-10-CM

## 2024-08-09 DIAGNOSIS — M79.672 CHRONIC FOOT PAIN, LEFT: ICD-10-CM

## 2024-08-09 DIAGNOSIS — M76.72 PERONEAL TENDONITIS, LEFT: ICD-10-CM

## 2024-08-09 DIAGNOSIS — M19.079 ARTHRITIS OF SUBTALAR JOINT: ICD-10-CM

## 2024-08-09 DIAGNOSIS — G89.29 CHRONIC FOOT PAIN, LEFT: ICD-10-CM

## 2024-08-09 PROCEDURE — 64447 NJX AA&/STRD FEMORAL NRV IMG: CPT

## 2024-08-09 PROCEDURE — 36000709 HC OR TIME LEV III EA ADD 15 MIN: Performed by: PODIATRIST

## 2024-08-09 PROCEDURE — 25000003 PHARM REV CODE 250

## 2024-08-09 PROCEDURE — 63600175 PHARM REV CODE 636 W HCPCS

## 2024-08-09 PROCEDURE — 88307 TISSUE EXAM BY PATHOLOGIST: CPT | Performed by: PATHOLOGY

## 2024-08-09 PROCEDURE — 63600175 PHARM REV CODE 636 W HCPCS: Mod: JZ,JG | Performed by: STUDENT IN AN ORGANIZED HEALTH CARE EDUCATION/TRAINING PROGRAM

## 2024-08-09 PROCEDURE — 64445 NJX AA&/STRD SCIATIC NRV IMG: CPT | Performed by: STUDENT IN AN ORGANIZED HEALTH CARE EDUCATION/TRAINING PROGRAM

## 2024-08-09 PROCEDURE — 27619 EXC LEG/ANKLE TUM DEEP <5 CM: CPT | Mod: 51,LT,, | Performed by: PODIATRIST

## 2024-08-09 PROCEDURE — 37000008 HC ANESTHESIA 1ST 15 MINUTES: Performed by: PODIATRIST

## 2024-08-09 PROCEDURE — 36000708 HC OR TIME LEV III 1ST 15 MIN: Performed by: PODIATRIST

## 2024-08-09 PROCEDURE — 71000016 HC POSTOP RECOV ADDL HR: Performed by: PODIATRIST

## 2024-08-09 PROCEDURE — C1713 ANCHOR/SCREW BN/BN,TIS/BN: HCPCS | Performed by: PODIATRIST

## 2024-08-09 PROCEDURE — 28725 ARTHRODESIS SUBTALAR: CPT | Mod: LT,,, | Performed by: PODIATRIST

## 2024-08-09 PROCEDURE — 71000015 HC POSTOP RECOV 1ST HR: Performed by: PODIATRIST

## 2024-08-09 PROCEDURE — 27800903 OPTIME MED/SURG SUP & DEVICES OTHER IMPLANTS: Performed by: PODIATRIST

## 2024-08-09 PROCEDURE — 27201423 OPTIME MED/SURG SUP & DEVICES STERILE SUPPLY: Performed by: PODIATRIST

## 2024-08-09 PROCEDURE — 63600175 PHARM REV CODE 636 W HCPCS: Performed by: PODIATRIST

## 2024-08-09 PROCEDURE — 25000003 PHARM REV CODE 250: Performed by: PODIATRIST

## 2024-08-09 PROCEDURE — 88304 TISSUE EXAM BY PATHOLOGIST: CPT | Performed by: PATHOLOGY

## 2024-08-09 PROCEDURE — 37000009 HC ANESTHESIA EA ADD 15 MINS: Performed by: PODIATRIST

## 2024-08-09 PROCEDURE — 27658 REPAIR OF LEG TENDON EACH: CPT | Mod: 51,LT,, | Performed by: PODIATRIST

## 2024-08-09 PROCEDURE — C1769 GUIDE WIRE: HCPCS | Performed by: PODIATRIST

## 2024-08-09 PROCEDURE — 20999 UNLISTED PX MUSCSKEL GENERAL: CPT | Mod: ,,, | Performed by: PODIATRIST

## 2024-08-09 DEVICE — IMPLANTABLE DEVICE: Type: IMPLANTABLE DEVICE | Site: FOOT | Status: FUNCTIONAL

## 2024-08-09 RX ORDER — DOXYCYCLINE HYCLATE 100 MG
100 TABLET ORAL 2 TIMES DAILY
Qty: 14 TABLET | Refills: 0 | Status: SHIPPED | OUTPATIENT
Start: 2024-08-09

## 2024-08-09 RX ORDER — HEPARIN SODIUM 1000 [USP'U]/ML
INJECTION, SOLUTION INTRAVENOUS; SUBCUTANEOUS
Status: DISCONTINUED | OUTPATIENT
Start: 2024-08-09 | End: 2024-08-09 | Stop reason: HOSPADM

## 2024-08-09 RX ORDER — FENTANYL CITRATE 50 UG/ML
INJECTION, SOLUTION INTRAMUSCULAR; INTRAVENOUS
Status: DISCONTINUED | OUTPATIENT
Start: 2024-08-09 | End: 2024-08-09

## 2024-08-09 RX ORDER — SCOLOPAMINE TRANSDERMAL SYSTEM 1 MG/1
1 PATCH, EXTENDED RELEASE TRANSDERMAL
Status: DISCONTINUED | OUTPATIENT
Start: 2024-08-09 | End: 2024-08-09 | Stop reason: HOSPADM

## 2024-08-09 RX ORDER — SODIUM CHLORIDE 0.9 % (FLUSH) 0.9 %
10 SYRINGE (ML) INJECTION
Status: DISCONTINUED | OUTPATIENT
Start: 2024-08-09 | End: 2024-08-09 | Stop reason: HOSPADM

## 2024-08-09 RX ORDER — OXYCODONE HYDROCHLORIDE 5 MG/1
5 TABLET ORAL
Status: DISCONTINUED | OUTPATIENT
Start: 2024-08-09 | End: 2024-08-09 | Stop reason: HOSPADM

## 2024-08-09 RX ORDER — HYDROMORPHONE HYDROCHLORIDE 2 MG/ML
0.2 INJECTION, SOLUTION INTRAMUSCULAR; INTRAVENOUS; SUBCUTANEOUS EVERY 5 MIN PRN
Status: DISCONTINUED | OUTPATIENT
Start: 2024-08-09 | End: 2024-08-09 | Stop reason: HOSPADM

## 2024-08-09 RX ORDER — CEFAZOLIN SODIUM 1 G/3ML
INJECTION, POWDER, FOR SOLUTION INTRAMUSCULAR; INTRAVENOUS
Status: DISCONTINUED | OUTPATIENT
Start: 2024-08-09 | End: 2024-08-09

## 2024-08-09 RX ORDER — BUPIVACAINE HYDROCHLORIDE 5 MG/ML
INJECTION, SOLUTION EPIDURAL; INTRACAUDAL
Status: COMPLETED
Start: 2024-08-09 | End: 2024-08-09

## 2024-08-09 RX ORDER — ONDANSETRON HYDROCHLORIDE 2 MG/ML
4 INJECTION, SOLUTION INTRAVENOUS DAILY PRN
Status: DISCONTINUED | OUTPATIENT
Start: 2024-08-09 | End: 2024-08-09 | Stop reason: HOSPADM

## 2024-08-09 RX ORDER — ACETAMINOPHEN 500 MG
1000 TABLET ORAL
Status: COMPLETED | OUTPATIENT
Start: 2024-08-09 | End: 2024-08-09

## 2024-08-09 RX ORDER — LIDOCAINE HYDROCHLORIDE 10 MG/ML
INJECTION, SOLUTION INFILTRATION; PERINEURAL
Status: DISCONTINUED | OUTPATIENT
Start: 2024-08-09 | End: 2024-08-09 | Stop reason: HOSPADM

## 2024-08-09 RX ORDER — PROPOFOL 10 MG/ML
VIAL (ML) INTRAVENOUS
Status: DISCONTINUED | OUTPATIENT
Start: 2024-08-09 | End: 2024-08-09

## 2024-08-09 RX ORDER — HYDROCODONE BITARTRATE AND ACETAMINOPHEN 5; 325 MG/1; MG/1
1 TABLET ORAL EVERY 4 HOURS PRN
Status: DISCONTINUED | OUTPATIENT
Start: 2024-08-09 | End: 2024-08-09 | Stop reason: HOSPADM

## 2024-08-09 RX ORDER — BUPIVACAINE HYDROCHLORIDE 2.5 MG/ML
INJECTION, SOLUTION EPIDURAL; INFILTRATION; INTRACAUDAL
Status: DISCONTINUED | OUTPATIENT
Start: 2024-08-09 | End: 2024-08-09 | Stop reason: HOSPADM

## 2024-08-09 RX ORDER — BUPIVACAINE HYDROCHLORIDE 5 MG/ML
INJECTION, SOLUTION EPIDURAL; INTRACAUDAL
Status: COMPLETED | OUTPATIENT
Start: 2024-08-09 | End: 2024-08-09

## 2024-08-09 RX ORDER — MIDAZOLAM HYDROCHLORIDE 1 MG/ML
INJECTION INTRAMUSCULAR; INTRAVENOUS
Status: DISCONTINUED | OUTPATIENT
Start: 2024-08-09 | End: 2024-08-09

## 2024-08-09 RX ORDER — KETAMINE HCL IN 0.9 % NACL 50 MG/5 ML
SYRINGE (ML) INTRAVENOUS
Status: DISCONTINUED | OUTPATIENT
Start: 2024-08-09 | End: 2024-08-09

## 2024-08-09 RX ORDER — CEFAZOLIN SODIUM 2 G/50ML
2 SOLUTION INTRAVENOUS
Status: DISCONTINUED | OUTPATIENT
Start: 2024-08-09 | End: 2024-08-09 | Stop reason: HOSPADM

## 2024-08-09 RX ORDER — MIDAZOLAM HYDROCHLORIDE 1 MG/ML
2 INJECTION, SOLUTION INTRAMUSCULAR; INTRAVENOUS ONCE
Status: DISCONTINUED | OUTPATIENT
Start: 2024-08-09 | End: 2024-08-09 | Stop reason: HOSPADM

## 2024-08-09 RX ORDER — ONDANSETRON HYDROCHLORIDE 2 MG/ML
INJECTION, SOLUTION INTRAVENOUS
Status: DISCONTINUED | OUTPATIENT
Start: 2024-08-09 | End: 2024-08-09

## 2024-08-09 RX ORDER — ONDANSETRON HYDROCHLORIDE 8 MG/1
8 TABLET, FILM COATED ORAL EVERY 8 HOURS PRN
Qty: 10 TABLET | Refills: 0 | Status: SHIPPED | OUTPATIENT
Start: 2024-08-09

## 2024-08-09 RX ORDER — EPINEPHRINE 1 MG/ML
INJECTION, SOLUTION, CONCENTRATE INTRAVENOUS
Status: DISCONTINUED
Start: 2024-08-09 | End: 2024-08-09 | Stop reason: HOSPADM

## 2024-08-09 RX ORDER — ASPIRIN 81 MG/1
81 TABLET ORAL 2 TIMES DAILY
Qty: 60 TABLET | Refills: 0 | Status: SHIPPED | OUTPATIENT
Start: 2024-08-09

## 2024-08-09 RX ORDER — PROPOFOL 10 MG/ML
VIAL (ML) INTRAVENOUS CONTINUOUS PRN
Status: DISCONTINUED | OUTPATIENT
Start: 2024-08-09 | End: 2024-08-09

## 2024-08-09 RX ORDER — GLUCAGON 1 MG
1 KIT INJECTION
Status: DISCONTINUED | OUTPATIENT
Start: 2024-08-09 | End: 2024-08-09 | Stop reason: HOSPADM

## 2024-08-09 RX ORDER — OXYCODONE AND ACETAMINOPHEN 10; 325 MG/1; MG/1
1 TABLET ORAL EVERY 4 HOURS PRN
Qty: 30 TABLET | Refills: 0 | Status: SHIPPED | OUTPATIENT
Start: 2024-08-09 | End: 2024-08-13 | Stop reason: ALTCHOICE

## 2024-08-09 RX ADMIN — BUPIVACAINE HYDROCHLORIDE 15 ML: 5 INJECTION, SOLUTION EPIDURAL; INTRACAUDAL; PERINEURAL at 07:08

## 2024-08-09 RX ADMIN — CEFAZOLIN 2 G: 330 INJECTION, POWDER, FOR SOLUTION INTRAMUSCULAR; INTRAVENOUS at 07:08

## 2024-08-09 RX ADMIN — SODIUM CHLORIDE, SODIUM LACTATE, POTASSIUM CHLORIDE, AND CALCIUM CHLORIDE: .6; .31; .03; .02 INJECTION, SOLUTION INTRAVENOUS at 08:08

## 2024-08-09 RX ADMIN — PROPOFOL 40 MG: 10 INJECTION, EMULSION INTRAVENOUS at 07:08

## 2024-08-09 RX ADMIN — MIDAZOLAM HYDROCHLORIDE 2 MG: 1 INJECTION, SOLUTION INTRAMUSCULAR; INTRAVENOUS at 06:08

## 2024-08-09 RX ADMIN — Medication 20 MG: at 08:08

## 2024-08-09 RX ADMIN — PROPOFOL 30 MG: 10 INJECTION, EMULSION INTRAVENOUS at 07:08

## 2024-08-09 RX ADMIN — ONDANSETRON 4 MG: 2 INJECTION INTRAMUSCULAR; INTRAVENOUS at 10:08

## 2024-08-09 RX ADMIN — SODIUM CHLORIDE, SODIUM LACTATE, POTASSIUM CHLORIDE, AND CALCIUM CHLORIDE: .6; .31; .03; .02 INJECTION, SOLUTION INTRAVENOUS at 07:08

## 2024-08-09 RX ADMIN — BUPIVACAINE HYDROCHLORIDE 22 ML: 5 INJECTION, SOLUTION EPIDURAL; INTRACAUDAL at 07:08

## 2024-08-09 RX ADMIN — PROPOFOL 20 MG: 10 INJECTION, EMULSION INTRAVENOUS at 07:08

## 2024-08-09 RX ADMIN — Medication 10 MG: at 09:08

## 2024-08-09 RX ADMIN — FENTANYL CITRATE 25 MCG: 50 INJECTION INTRAMUSCULAR; INTRAVENOUS at 07:08

## 2024-08-09 RX ADMIN — OXYCODONE 5 MG: 5 TABLET ORAL at 10:08

## 2024-08-09 RX ADMIN — PROPOFOL 50 MG: 10 INJECTION, EMULSION INTRAVENOUS at 07:08

## 2024-08-09 RX ADMIN — PROPOFOL 200 MCG/KG/MIN: 10 INJECTION, EMULSION INTRAVENOUS at 07:08

## 2024-08-09 RX ADMIN — ONDANSETRON 4 MG: 2 INJECTION, SOLUTION INTRAMUSCULAR; INTRAVENOUS at 08:08

## 2024-08-09 RX ADMIN — PROPOFOL 20 MG: 10 INJECTION, EMULSION INTRAVENOUS at 09:08

## 2024-08-09 RX ADMIN — SCOPALAMINE 1 PATCH: 1 PATCH, EXTENDED RELEASE TRANSDERMAL at 06:08

## 2024-08-09 RX ADMIN — FENTANYL CITRATE 50 MCG: 50 INJECTION INTRAMUSCULAR; INTRAVENOUS at 07:08

## 2024-08-09 RX ADMIN — PROPOFOL 175 MCG/KG/MIN: 10 INJECTION, EMULSION INTRAVENOUS at 07:08

## 2024-08-09 RX ADMIN — ACETAMINOPHEN 1000 MG: 500 TABLET ORAL at 06:08

## 2024-08-09 RX ADMIN — FENTANYL CITRATE 25 MCG: 50 INJECTION INTRAMUSCULAR; INTRAVENOUS at 08:08

## 2024-08-09 RX ADMIN — PROPOFOL 40 MG: 10 INJECTION, EMULSION INTRAVENOUS at 08:08

## 2024-08-09 RX ADMIN — PROPOFOL 150 MCG/KG/MIN: 10 INJECTION, EMULSION INTRAVENOUS at 07:08
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
## 2024-08-09 NOTE — BRIEF OP NOTE
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 No tears and normal appearance of peroneal tendons with mild synovial tissue but there was stenosis of the peroneus longus sheath.    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 Left foot mass- perm     References:    Click here for ordering Quick Tip   Question Answer Comment   Procedure Type: Orthopedics    Release to patient Immediate        08/09/24 0858                      Discharge Note    OUTCOME: Patient tolerated treatment/procedure well without complication and is now ready for discharge.    DISPOSITION: Home or Self Care    FINAL DIAGNOSIS:  <principal problem not specified>    FOLLOWUP: In clinic    DISCHARGE INSTRUCTIONS:    Discharge Procedure  Orders   Diet general     Keep surgical extremity elevated   Order Comments: Above heart level at rest.     Ice to affected area   Order Comments: Apply behind left knee throughout the day as needed for 30 minute intervals x 2-3 days after surgery     Call MD for:  temperature >100.4     Call MD for:  persistent nausea and vomiting     Call MD for:  severe uncontrolled pain     Call MD for:  difficulty breathing, headache or visual disturbances     Leave dressing on - Keep it clean, dry, and intact until clinic visit     Weight bearing restrictions (specify)   Order Comments: Non-weightbearing left foot.

## 2024-08-10 NOTE — OP NOTE
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 No tears and normal appearance of peroneal tendons with mild synovial tissue but there was stenosis of the peroneus longus sheath.     Description of Technical Procedures:   The patient was brought to the operating room on a stretcher and was transferred to the OR table in supine position. Anesthesia was induced.  A tourniquet was applied to the left thigh. The left lower limb was prepped and draped in a sterile manner. Tourniquet(s) inflated to 300 mmHg.      Attention was 1st directed to the left anterior tibia where a small incision using a 15. Blade and mosquitos was made carried down to bone.  Using the [employer] BMAC system 50 mL of bone marrow aspirate of the tibia was obtained.  This incision was closed using 3-0 nylon.      Attention was directed to the patient's lateral ankle where a previous scar was noted. After skin marker demarcation, a 15 blade was utilized to incise the skin in a controlled depth manner through the skin to subcutaneous extending via sharp and  blunt dissection through superficial and deep fascia.  The common peroneal tendon sheath was identified in addition to the superior and lower peroneal retinaculum which were also incised in order to identify and inspect the peroneal tendons.  The lower tendon sheath for the peroneus longus was noted to be heavily scarred down to the wall of the calcaneus and stenosed preventing movement.  The sheath was released from the site of the calcaneus and preserved in order for to be maintained around the tendon.  The tendons were then retracted inferiorly exposing the lateral wall of the calcaneus and subtalar joint.  Of note a soft tissue mass most likely a lipoma was encountered and excised during the previous dissection.  This lipoma was effectively freed and excised from this area and sent to pathology for further evaluation.  Upon examining the peroneal tendons no discernible tear or hypertrophy was noted.  Decision to not make any repairs was made.    This dissection was then further carried down to the STJ exposing the posterior facet which was extremely arthritic noting moderate osteophytic lipping and loss of the cartilage.  A hutchison elevator was used to fully release the STJ.  Under the posterior facet was noted to be very very arthritic Joint prep completed via curettes a followed by fenestration of the fusion surfaces with a 2 mm drill bit.  The subtalar joint was reduced in an appropriate position and 2 guide wires were driven percutaneously from the posterior aspect of the calcaneus to the talus with fluoroscopic guidance.  4 mL of the previous obtained BMAC was mixed with Arthrocell allograft bone substitue and packed into the STJ fusion site.  Then using proper AO principles 6.5 mm cannulated arthrex screws were effectively driven across the guide wires.  Proper placement of the hardware was confirmed via fluoroscopy.     Final verification of all proper hardware placement were obtained using fluoroscopy at  this time.  The foot was seen to be in a more rectus position at this time both clinically and with fluoroscopy.  All surgical sites were thoroughly irrigated using saline via bulb syringe.  The common peroneal tendon sheath was repaired with 3-0 Vicryl suture in addition to superior peroneal retinaculum with 2-0 Vicryl suture.  The tendons were noted to glide well with range motion testing.  The subcutaneous tissue was then reapproximated 4-0 Vicryl suture followed by the skin with 3-0 nylon suture utilizing horizontal mattress suturing technique.  Of note the tourniquet was deflated before skin was closed.  The surgical sites were dressed using jumpstart 4x4 gauze and the left lower extremity was dressed using 6 L of cast padding 4 layers of Ace wrap and a cast was applied which was secured by 4 layers of fiberglass cast 4 in rolls.  This cast was later bivalved in postop and secured by 2 Ace wraps.     Estimated Blood Loss (EBL): 50ml           Implants:   Implant Name Type Inv. Item Serial No.  Lot No. LRB No. 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 Left foot mass- perm     References:    Click here for ordering Quick Tip   Question Answer Comment   Procedure Type: Orthopedics    Release to patient Immediate        08/09/24 0858                            Condition: Good    Disposition: PACU - hemodynamically  stable.    Attestation: I was present and scrubbed for the entire procedure.    Discharge Note    OUTCOME: Patient tolerated treatment/procedure well without complication and is now ready for discharge.    DISPOSITION: Home or Self Care    FINAL DIAGNOSIS:  <principal problem not specified>    FOLLOWUP: In clinic    DISCHARGE INSTRUCTIONS:    Discharge Procedure Orders   Diet general     Keep surgical extremity elevated   Order Comments: Above heart level at rest.     Ice to affected area   Order Comments: Apply behind left knee throughout the day as needed for 30 minute intervals x 2-3 days after surgery     Call MD for:  temperature >100.4     Call MD for:  persistent nausea and vomiting     Call MD for:  severe uncontrolled pain     Call MD for:  difficulty breathing, headache or visual disturbances     Leave dressing on - Keep it clean, dry, and intact until clinic visit     Weight bearing restrictions (specify)   Order Comments: Non-weightbearing left foot.        Clinical Reference Documents Added to Patient Instructions         Document    ARTHRODESIS OF FOOT AND ANKLE, OPEN SURGERY (ENGLISH)          I directly supervised the above resident and was scrubbed for the entire surgical case.  Elías Aranda DPM, FACFAS

## 2024-08-12 LAB
FINAL PATHOLOGIC DIAGNOSIS: NORMAL
GROSS: NORMAL
Lab: NORMAL

## 2024-08-13 ENCOUNTER — OFFICE VISIT (OUTPATIENT)
Dept: PODIATRY | Facility: CLINIC | Age: 54
End: 2024-08-13
Payer: OTHER GOVERNMENT

## 2024-08-13 VITALS
HEIGHT: 66 IN | HEART RATE: 100 BPM | WEIGHT: 219 LBS | BODY MASS INDEX: 35.2 KG/M2 | DIASTOLIC BLOOD PRESSURE: 86 MMHG | SYSTOLIC BLOOD PRESSURE: 137 MMHG

## 2024-08-13 DIAGNOSIS — G89.18 POSTOPERATIVE PAIN: Primary | ICD-10-CM

## 2024-08-13 DIAGNOSIS — Z98.890 POSTOPERATIVE STATE: ICD-10-CM

## 2024-08-13 PROCEDURE — 29405 APPL SHORT LEG CAST: CPT | Mod: PBBFAC,PN | Performed by: PODIATRIST

## 2024-08-13 PROCEDURE — 99999 PR PBB SHADOW E&M-EST. PATIENT-LVL IV: CPT | Mod: PBBFAC,,, | Performed by: PODIATRIST

## 2024-08-13 PROCEDURE — 99024 POSTOP FOLLOW-UP VISIT: CPT | Mod: ,,, | Performed by: PODIATRIST

## 2024-08-13 PROCEDURE — 29405 APPL SHORT LEG CAST: CPT | Mod: 58,S$PBB,LT, | Performed by: PODIATRIST

## 2024-08-13 PROCEDURE — 99214 OFFICE O/P EST MOD 30 MIN: CPT | Mod: PBBFAC,PN,25 | Performed by: PODIATRIST

## 2024-08-13 RX ORDER — TRAMADOL HYDROCHLORIDE 50 MG/1
50 TABLET ORAL EVERY 6 HOURS PRN
Qty: 30 TABLET | Refills: 0 | Status: SHIPPED | OUTPATIENT
Start: 2024-08-13 | End: 2024-08-23
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
## 2024-08-15 DIAGNOSIS — M54.12 CERVICAL RADICULOPATHY: ICD-10-CM

## 2024-08-15 DIAGNOSIS — M79.18 MYOFASCIAL PAIN: ICD-10-CM

## 2024-08-15 DIAGNOSIS — M47.812 CERVICAL SPONDYLOSIS: ICD-10-CM

## 2024-08-15 RX ORDER — GABAPENTIN 300 MG/1
300 CAPSULE ORAL NIGHTLY
Qty: 90 CAPSULE | Refills: 1 | Status: SHIPPED | OUTPATIENT
Start: 2024-08-15

## 2024-08-15 NOTE — TELEPHONE ENCOUNTER
Refill Routing Note   Medication(s) are not appropriate for processing by [facility] for the following reason(s):        Non-participating provider    [facility] action(s):  Route               Appointments  past 12m or future 3m with PCP    Date Provider   Last Visit   11/4/2021 Katelin French NP   Next Visit   Visit date not found Katelin French NP   ED visits in past 90 days: 0        Note composed:1:49 PM 08/15/2024

## 2024-08-15 NOTE — TELEPHONE ENCOUNTER
Refill Routing Note   Medication(s) are not appropriate for processing by [facility] for the following reason(s):        Outside of protocol    [facility] action(s):  Route               Appointments  past 12m or future 3m with PCP    Date Provider   Last Visit   7/18/2024 Arnulfo Barboza MD   Next Visit   1/21/2025 Arnulfo Barboza MD   ED visits in past 90 days: 0        Note composed:7:20 AM 08/15/2024

## 2024-08-15 NOTE — TELEPHONE ENCOUNTER
No care due was identified.  [facility] Embedded Care Due Messages. Reference number: [id number].   8/15/2024 1:13:57 AM CDT

## 2024-08-16 RX ORDER — CELECOXIB 200 MG/1
CAPSULE ORAL
Qty: 30 CAPSULE | Refills: 5 | Status: SHIPPED | OUTPATIENT
Start: 2024-08-16

## 2024-08-27 ENCOUNTER — OFFICE VISIT (OUTPATIENT)
Dept: PODIATRY | Facility: CLINIC | Age: 54
End: 2024-08-27
Payer: OTHER GOVERNMENT

## 2024-08-27 ENCOUNTER — HOSPITAL ENCOUNTER (OUTPATIENT)
Dept: RADIOLOGY | Facility: HOSPITAL | Age: 54
Discharge: HOME | End: 2024-08-27
Attending: PODIATRIST
Payer: OTHER GOVERNMENT

## 2024-08-27 ENCOUNTER — TELEPHONE (OUTPATIENT)
Dept: PODIATRY | Facility: CLINIC | Age: 54
End: 2024-08-27
Payer: OTHER GOVERNMENT

## 2024-08-27 VITALS
BODY MASS INDEX: 35.19 KG/M2 | SYSTOLIC BLOOD PRESSURE: 138 MMHG | HEART RATE: 112 BPM | HEIGHT: 66 IN | DIASTOLIC BLOOD PRESSURE: 75 MMHG | WEIGHT: 218.94 LBS

## 2024-08-27 DIAGNOSIS — Z98.890 POSTOPERATIVE STATE: Primary | ICD-10-CM

## 2024-08-27 DIAGNOSIS — Z98.890 POSTOPERATIVE STATE: ICD-10-CM

## 2024-08-27 PROCEDURE — 99214 OFFICE O/P EST MOD 30 MIN: CPT | Mod: PBBFAC,25,PN | Performed by: PODIATRIST

## 2024-08-27 PROCEDURE — 73630 X-RAY EXAM OF FOOT: CPT | Mod: TC,PN,LT

## 2024-08-27 PROCEDURE — 73630 X-RAY EXAM OF FOOT: CPT | Mod: 26,LT,, | Performed by: RADIOLOGY

## 2024-08-27 PROCEDURE — 99024 POSTOP FOLLOW-UP VISIT: CPT | Mod: ,,, | Performed by: PODIATRIST

## 2024-08-27 PROCEDURE — 99999 PR PBB SHADOW E&M-EST. PATIENT-LVL IV: CPT | Mod: PBBFAC,,, | Performed by: PODIATRIST

## 2024-08-27 NOTE — TELEPHONE ENCOUNTER
----- Message from Franci Ramirez sent at 8/27/2024 11:42 AM CDT -----  Type:  Fax Status     Who Called: [employer]   Does the patient know what this is regarding?: checking status of paperwork sent over on 08/19 , about attending physician statement pt had & did not received forms back to make decision on pt's claim   Would the patient rather a call back or a response via [employer]? Call   Best Call Back Number: [phone]  fax: [phone]  Additional Information: make sure cover sheet has claim # [id number]

## 2024-08-27 NOTE — TELEPHONE ENCOUNTER
Called number given from [facility] [employer] with voice mail left stating that we have not received anyforms from 8/19/24 and to please resend to [phone] or to our [facility] coordinator  Fax: [phone]  to expedite the process. Call back encouraged if needed.      [facility] [employer] Phone: [phone]  fax: [phone]  claim # [id number]

## 2024-08-28 NOTE — PROGRESS NOTES
Subjective:      Patient ID: Hillary Cotton is a 53 y.o. female.    Chief Complaint: Ankle Problem (swelling, left) and Ankle Pain (left )    50 y.o female presents to clinic as a referral from Dr. Null with chief complaint of pain and swelling along the lateral aspect of the ankle. Patient points to the lateral aspect of the ankle overlying the sinus tarsi and peroneal tendons. Pain is aggravated with prolonged standing and walking. Symptoms have been present for approximately 1 year. She has been ambulating in tall orthopedic boot for the past 2 months with no improvement. She was previously diagnosed with peroneal tendonitis 1 year ago and completed physical therapy with no improvement. Reports to multiple left ankle sprains in the past.     10/23/2021:  Follow-up from diagnostic injection to left sinus tarsi region.  Related no relief in pain whatsoever.  She complains of mild-to-moderate pain when she is on her feet for extended periods of time with the boot.  She attempts to walk without the boot the pain is moderate to severe.  She points to the lateral hindfoot/ankle region.    11/11/2021:  Scheduled for surgical intervention on 11/17/2021.  Relates she would like to review the procedures scheduled since it was scheduled prior to [place] and capsule secondary to COVID 19.    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  Denies any slips, trips or falls.  Dressing remained clean, dry and intact.  Using a rolling knee scooter and is nonweightbearing to left foot.    12/13/2021:  Approximately 4 weeks postop.  Relates intermittent burning and shooting pain to left foot.  She has generalized aching that will wax and wane.  Pain alleviated by applying ice intermittently.  Denies any slips, trips or falls.  She has remain nonweightbearing to left foot.  She is perform range-of-motion exercises at rest as  discussed.    02/14/2022:  Approximately 3 months postop.  Relates no pain at rest.  She gets some generalized aching a day or 2 after she has been on her feet a considerable amount.  Overall she feels as though her conditioning is progressing especially since she was initially in a boot since April 2021 prior to surgical intervention.  She has been ambulating with a tennis shoe and is doing well overall.  Attending physical therapy as prescribed.    10/06/2022:  Lasting approximately 8 months ago relates that overall she will have swelling and pain to left lower extremity that will wax and wane depending on her activity.  She states that when she is very active wearing certain types of shoe gear that is not a tennis shoe that she will have an increase in swelling and discomfort around the left ankle.  Will resolve with rest.  Also relates that prior to surgery she was getting swelling to left lower extremity.  Also reports some mild residual numbness to the lateral left ankle.    10/24/2022:  Approximately 2.5 weeks since she received injection to lipoma along the anterior lateral left ankle.  Significant overall reduction in size.  She still complains of aching pain to the area which worsens with increased periods of standing and walking.  Notes the pain is much less when she wears tennis shoes.  Pain aggravated by wearing flip-flops.    11/17/2022:  Follow-up from lidocaine injection to the sinus tarsi left foot.  She reported that the pain had stopped at rest following the injection however when she performed a moderate amount of walking to the parking lot she began to get some pain and aching that returned.  Relates that she has been off Celebrex since a procedure last week and was instructed to remain off of Celebrex for 10 days.  She has had a moderate amount of aching throughout her body but also reports some pain along the top of the left foot and lateral left ankle.  She also points to a previous mass that  was injected and gave her a significant amount of relief in the past.    01/13/2023:  Complains of acute onset flare-up in pain that occurred 6 days ago when she was walking.  She is not sure if her ankle gave out but she developed moderate sharp pain with discoloration and swelling to the left hindfoot/ankle area.  She also described pain pointing to the peroneal tendon distribution along the previous surgical scar lateral left hindfoot/ankle.  She is been taking Celebrex for osteoarthritis which also helps with some of the pain.  2 days ago she was getting out of the shower, slipped and stubbed her toes on the left foot and is complaining of moderate aching pain.  Scheduled for arthroscopy of the left subtalar joint next month however is inquiring about rescheduling.    02/07/2023:  Seen today as audio visual virtual visit while at work.  Ambulating with tall Cam boot as needed.  States that her pain is still persistent when she is been standing and walking for extended periods of time.  No pain at rest.  CT scan of the left hindfoot/ankle completed.    03/31/2023: Post arthroscopic debridement of the subtalar joint via sinus tarsi approach left foot and excision/curettage of bone cyst left calcaneus with autologous bone graft from the left tibia implanted into the left calcaneus on 03/24/2023.  She is remain nonweightbearing to the left foot.  Complains of intermittent burning along the front of the left ankle region.  Patient has left the boot on serving as a cast.  Relates she is getting a yeast infection from the antibiotic and that the Percocet is too strong.    04/14/2023:  3 weeks postop.  Reports intermittent burning and itching to the incision sites.  No significant pain reported today.  Has remain nonweightbearing with rolling knee scooter.    05/12/2023:  7 weeks postop.  Complains of increased swelling when she puts her foot down the ground as well as some mild rubor/redness that resolves with  elevation.  She complains of aching that is intermittent to left lower extremity and stiffness.  Also relates that she gets some discomfort at the distal posterior lateral aspect of the left leg that is intermittent.    06/16/2023:  Approximately 11 weeks postop.  She complains of intermittent pain and swelling depending on her activity.  She tried to transition to a tennis shoe last week and felt okay however when she was on her foot for extended period time she had moderate pain.  She received initial PT evaluation on 06/09/2023 and has pending follow-up on 06/19/2023.    In 2023: Nearly 5 months postop.  Relates the pain and swelling has been steadily improving.  She wears a compression sock to left foot which helps significantly.  She is unable to ambulate without a tennis shoe stating that the tennis shoe helps with shock absorption to the foot.  She has been attempting to walk around her neighborhood for exercise.  Unable to wear flats or high heels at this time.      01/25/2024:  Recently had CT scan of the left hindfoot completed secondary to worsening pain.  She has resumed wearing her tall Cam boot and utilizing crutches secondary to pain.  Also states very stressful time due to [place] and parades. Moderate pain with applying pressure on the left heel which is somewhat alleviated by wearing a cushioned shoe.    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  Her pain has improved since she has use the orthopedic boot and crutches.  Accompanied by her .  She expresses there was some confusion as to what I had previously recommended.  She thought I had recommended an ankle fusion.  In the past injections to the subtalar joint give her some limited relief.    05/24/2024:  Follow-up from 2nd opinion for chronic pain to left foot due to recurrent intraosseous lipoma.   "She demonstrates today pictures that she is taken when she has been on her feet for extended periods of time noting some bruising and increased swelling to the lateral ankle region/hindfoot.  This usually resolves with rest.  She does not have any significant pain at rest.  She is utilizing a Cam boot which helps reduce a lot of her discomfort.    08/13/2024:  Post arthrodesis of the subtalar joint with tenosynovectomy of the peroneal tendons left foot on 08/09/2024.  Reports that the Percocet is causing her to feel flushed.  Inquiring about returning on tramadol to help with the postop pain.  Denies any slips, trips or falls.  Cast intact.  Nonweightbearing with rolling knee scooter.  Accompanied by her .    08/28/2024:  Nearly 3 weeks postop.  Mild pain reported mostly from cast irritation.  Weight-bearing left lower extremity.  Denies any slips, trips or falls.    Vitals:    08/27/24 1622   BP: 138/75   Pulse: (!) 112   Weight: 99.3 kg (218 lb 14.7 oz)   Height: 5' 6" (1.676 m)   PainSc:   2        Past Medical History:   Diagnosis Date    Abnormal Pap smear     Allergy     Asthma     Intraosseous lipoma     left foot    PONV (postoperative nausea and vomiting)     Sinusitis, chronic        Past Surgical History:   Procedure Laterality Date    ARTHROSCOPY OF FOOT Left 3/24/2023    Procedure: ARTHROSCOPY, FOOT;  Surgeon: Elías Aranda DPM;  Location: [facility] OR;  Service: Podiatry;  Laterality: Left;  Right lateral decubitus, 2.9 mm 30 deg angle scope, 2.7 mm shaverRia bone graft harvester(Rayray)brie notified cc  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  rayray notified cc    BONE MARROW ASPIRATION Left 8/9/2024    Procedure: ASPIRATION, BONE MARROW;  Surgeon: Elías Aranda DPM;  Location: [facility] OR;  Service: Podiatry;  Laterality: Left;    COLONOSCOPY N/A 11/11/2022    Procedure: COLONOSCOPY Moviprep;  Surgeon: John Bonner MD;  Location: [facility] ENDO;  Service: Endoscopy;  Laterality: N/A; "    EPIDURAL STEROID INJECTION N/A 6/5/2020    Procedure: INJECTION, STEROID, EPIDURAL,C7-T1;  Surgeon: Augusto Hussein MD;  Location: [facility] PAIN MGT;  Service: Pain Management;  Laterality: N/A;    EPIDURAL STEROID INJECTION N/A 9/18/2020    Procedure: INJECTION, STEROID, EPIDURAL C7/T1;  Surgeon: Augusto Hussein MD;  Location: [facility] PAIN MGT;  Service: Pain Management;  Laterality: N/A;  INJECTION, STEROID, EPIDURAL C7/T1    FOOT MASS EXCISION Left 11/17/2021    Procedure: EXCISION, MASS, FOOT;  Surgeon: Elías Aranda DPM;  Location: [facility] OR;  Service: Podiatry;  Laterality: Left;  mini c-arm, Allograft bone Arthrex  Arthrex notified 11/8 CC  Biomet notified 11/16/21 AM    FUSION OF SUBTALAR JOINT Left 8/9/2024    Procedure: FUSION, SUBTALAR JOINT;  Surgeon: Elías Aranda DPM;  Location: [facility] OR;  Service: Podiatry;  Laterality: Left;  c-arm, Arthrex screws    HYSTEROSCOPIC POLYPECTOMY OF UTERUS N/A 3/26/2024    Procedure: POLYPECTOMY, UTERUS, HYSTEROSCOPIC;  Surgeon: Gris Wadsworth MD;  Location: [facility] OR;  Service: OB/GYN;  Laterality: N/A;  400mL fluid deficit    SALIVARY GLAND SURGERY      TENOSYNOVECTOMY Left 8/9/2024    Procedure: TENOSYNOVECTOMY;  Surgeon: Elías Aranda DPM;  Location: [facility] OR;  Service: Podiatry;  Laterality: Left;    TONSILLECTOMY      XI ROBOTIC HYSTERECTOMY, WITH SALPINGO-OOPHORECTOMY Bilateral 4/24/2024    Procedure: XI ROBOTIC HYSTERECTOMY,WITH SALPINGO-OOPHORECTOMY;  Surgeon: Gris Wadsworth MD;  Location: [facility];  Service: OB/GYN;  Laterality: Bilateral;       Family History   Problem Relation Name Age of Onset    Diabetes Mother      Hypertension Mother      Asthma Mother      Sinus disease Mother      Allergies Father      Diabetes Maternal Grandmother      Hypertension Maternal Grandmother      Sinus disease Sister      Sinus disease Brother      Sinus disease Sister         Social History     Socioeconomic History    Marital status:     Occupational History     Employer: [employer]   Tobacco Use    Smoking status: Former     Current packs/day: 0.00     Types: Cigarettes     Quit date: 10/1/2015     Years since quittin.9    Smokeless tobacco: Never   Substance and Sexual Activity    Alcohol use: No    Drug use: No    Sexual activity: Yes     Partners: Male       Current Outpatient Medications   Medication Sig Dispense Refill    aspirin (ECOTRIN) 81 MG EC tablet Take 1 tablet (81 mg total) by mouth 2 (two) times a day. 60 tablet 0    celecoxib (CELEBREX) 200 MG capsule TAKE 1 CAPSULE BY MOUTH EVERY DAY AS NEEDED FOR PAIN 30 capsule 5    cetirizine (ZYRTEC) 10 MG tablet Take 1 tablet (10 mg total) by mouth once daily. 90 tablet 0    cholecalciferol, vitamin D3, 1,250 mcg (50,000 unit) capsule Take 1 capsule (50,000 Units total) by mouth every 7 days. 12 capsule 3    doxycycline (VIBRA-TABS) 100 MG tablet Take 1 tablet (100 mg total) by mouth 2 (two) times daily. 14 tablet 0    estradioL (ESTRACE) 1 MG tablet TAKE 1 TABLET BY MOUTH EVERY DAY 90 tablet 3    gabapentin (NEURONTIN) 300 MG capsule TAKE 1 CAPSULE BY MOUTH EVERY EVENING 90 capsule 1    glucosamine-chondroitin 500-400 mg tablet Take 1 tablet by mouth 3 (three) times daily.      ipratropium (ATROVENT) 21 mcg (0.03 %) nasal spray 2 sprays by Each Nostril route 2 (two) times daily as needed. 30 mL 0    Lactobacillus rhamnosus GG (CULTURELLE) 10 billion cell capsule Take 1 capsule by mouth once daily.      multivitamin capsule Take 1 capsule by mouth once daily.      mupirocin (BACTROBAN) 2 % ointment Apply topically 2 (two) times daily. 22 g 0    ondansetron (ZOFRAN) 8 MG tablet Take 1 tablet (8 mg total) by mouth every 8 (eight) hours as needed for Nausea. 10 tablet 0     Current Facility-Administered Medications   Medication Dose Route Frequency Provider Last Rate Last Admin    sodium chloride 0.9% flush 10 mL  10 mL Intravenous PRN Elías Aranda DPM           Review of  patient's allergies indicates:   Allergen Reactions    Prednisone Rash     Hx of delayed onset rash on 2 occasion. Tolerates medrol, IM steroids, topical, and intranasal steroids w/o problem           Review of Systems   Constitutional: Negative for chills, decreased appetite, fever and malaise/fatigue.   HENT:  Negative for congestion, ear discharge and sore throat.    Eyes:  Negative for discharge and pain.   Cardiovascular:  Positive for leg swelling. Negative for chest pain and claudication.   Respiratory:  Negative for cough and shortness of breath.    Skin:  Negative for color change, nail changes and rash.   Musculoskeletal:  Positive for stiffness. Negative for arthritis, joint pain, joint swelling and muscle weakness.        Left ankle pain   Gastrointestinal:  Negative for bloating, abdominal pain, diarrhea, nausea and vomiting.   Genitourinary:  Negative for flank pain and hematuria.   Neurological:  Negative for headaches, numbness and weakness.   Psychiatric/Behavioral:  Negative for altered mental status.            Objective:      Physical Exam  Constitutional:       General: She is not in acute distress.     Appearance: She is well-developed. She is not diaphoretic.   Cardiovascular:      Pulses:           Dorsalis pedis pulses are 2+ on the right side and 2+ on the left side.        Posterior tibial pulses are 2+ on the right side and 2+ on the left side.      Comments: Note mild pitting edema to left lower extremity and none noted to the right lower extremity.  Musculoskeletal:         General: Tenderness present.      Comments: Mild localized pain on palpation along the surgical incision left lateral hindfoot.  No palpable fluctuance or crepitance left foot.  Reduced edema left foot compared to last visit.   Feet:      Right foot:      Skin integrity: Dry skin present. No ulcer, blister, erythema or warmth.      Left foot:      Skin integrity: Dry skin present. No ulcer, blister, erythema or  warmth.   Lymphadenopathy:      Comments: Negative lymphadenopathy bilateral popliteal fossa and tarsal tunnel.    Skin:     General: Skin is warm and dry.      Findings: No lesion.      Nails: There is no clubbing.      Comments: Incision site lateral left hindfoot approximated sutures however there is some mild superficial necrosis of the skin along the incision site without any signs of infection or drainage.      The incision posterior left heel also with some mild bruising and superficial necrosis of the skin.  No signs infection.   Neurological:      Mental Status: She is alert and oriented to person, place, and time.      Sensory: No sensory deficit.      Motor: No abnormal muscle tone.      Comments: Light touch within normal limits.    Psychiatric:         Behavior: Behavior normal.         Thought Content: Thought content normal.         Judgment: Judgment normal.         Assessment:       Encounter Diagnosis   Name Primary?    Postoperative state Yes         Plan:       Hillary was seen today for follow-up.    Diagnoses and all orders for this visit:    Postoperative state  -     X-Ray Calcaneus 2 View Left; Future  -     Walking Boot For Home Use      I counseled the patient on her conditions, their implications and medical management.    Cast and dressing to the left lower extremity removed.  Left lower extremity cleansed Hibiclens scrub.  Sutures removed.  No significant dehiscence noted.  Betadine applied along the incisions followed by Mepilex border.  Home care instructions reviewed in detail.    Dispensed, applied tall Cam boot to left lower extremity.  Patient was to begin active range motion exercises to the left ankle mostly focusing on plantar flexion and dorsiflexion as discussed.  She may leave the boot off at night while sleeping however should wear it for protection when moving around.    In 3 weeks she will begin transitioning to weight-bearing as tolerated with a tall Cam boot to left  lower extremity.    Continue elevation at rest.    RTC within 4 weeks for follow-up left foot x-ray or p.r.n. as discussed.    Assisted by Jonah Eason DPM PGY 3    A portion of this note was generated by voice recognition software and may contain spelling and grammar errors.    .

## 2024-08-29 ENCOUNTER — PATIENT MESSAGE (OUTPATIENT)
Dept: PODIATRY | Facility: CLINIC | Age: 54
End: 2024-08-29
Payer: OTHER GOVERNMENT

## 2024-09-06 DIAGNOSIS — G89.18 POSTOPERATIVE PAIN: ICD-10-CM

## 2024-09-09 ENCOUNTER — TELEPHONE (OUTPATIENT)
Dept: PODIATRY | Facility: CLINIC | Age: 54
End: 2024-09-09
Payer: OTHER GOVERNMENT

## 2024-09-09 ENCOUNTER — PATIENT MESSAGE (OUTPATIENT)
Dept: FAMILY MEDICINE | Facility: CLINIC | Age: 54
End: 2024-09-09
Payer: OTHER GOVERNMENT

## 2024-09-09 ENCOUNTER — PATIENT MESSAGE (OUTPATIENT)
Dept: PODIATRY | Facility: CLINIC | Age: 54
End: 2024-09-09
Payer: OTHER GOVERNMENT

## 2024-09-09 NOTE — TELEPHONE ENCOUNTER
Called patient let her know Dr. Aranda is put of town so we forwarded her message to Dr. Dozier & she advised her to go to urgent care to get refills

## 2024-09-10 ENCOUNTER — TELEPHONE (OUTPATIENT)
Dept: PODIATRY | Facility: CLINIC | Age: 54
End: 2024-09-10
Payer: OTHER GOVERNMENT

## 2024-09-10 RX ORDER — TRAMADOL HYDROCHLORIDE 50 MG/1
50 TABLET ORAL EVERY 6 HOURS PRN
Qty: 30 TABLET | Refills: 0 | Status: SHIPPED | OUTPATIENT
Start: 2024-09-10 | End: 2024-09-20

## 2024-09-24 ENCOUNTER — OFFICE VISIT (OUTPATIENT)
Dept: PODIATRY | Facility: CLINIC | Age: 54
End: 2024-09-24
Payer: OTHER GOVERNMENT

## 2024-09-24 ENCOUNTER — HOSPITAL ENCOUNTER (OUTPATIENT)
Dept: RADIOLOGY | Facility: HOSPITAL | Age: 54
Discharge: HOME | End: 2024-09-24
Attending: PODIATRIST
Payer: OTHER GOVERNMENT

## 2024-09-24 VITALS — HEIGHT: 66 IN | WEIGHT: 218.94 LBS | BODY MASS INDEX: 35.19 KG/M2

## 2024-09-24 DIAGNOSIS — G57.52 TARSAL TUNNEL SYNDROME, LEFT: ICD-10-CM

## 2024-09-24 DIAGNOSIS — T81.31XA POSTOPERATIVE WOUND DEHISCENCE, INITIAL ENCOUNTER: ICD-10-CM

## 2024-09-24 DIAGNOSIS — Z98.890 POSTOPERATIVE STATE: ICD-10-CM

## 2024-09-24 DIAGNOSIS — M76.72 PERONEAL TENDONITIS, LEFT: ICD-10-CM

## 2024-09-24 DIAGNOSIS — R29.898 WEAKNESS OF LEFT LOWER EXTREMITY: ICD-10-CM

## 2024-09-24 DIAGNOSIS — Z98.890 POSTOPERATIVE STATE: Primary | ICD-10-CM

## 2024-09-24 PROCEDURE — 99024 POSTOP FOLLOW-UP VISIT: CPT | Mod: ,,, | Performed by: PODIATRIST

## 2024-09-24 PROCEDURE — 73650 X-RAY EXAM OF HEEL: CPT | Mod: 26,LT,, | Performed by: RADIOLOGY

## 2024-09-24 PROCEDURE — 99999 PR PBB SHADOW E&M-EST. PATIENT-LVL IV: CPT | Mod: PBBFAC,,, | Performed by: PODIATRIST

## 2024-09-24 PROCEDURE — 73650 X-RAY EXAM OF HEEL: CPT | Mod: TC,PN,LT

## 2024-09-24 PROCEDURE — 99214 OFFICE O/P EST MOD 30 MIN: CPT | Mod: PBBFAC,25,PN | Performed by: PODIATRIST

## 2024-09-24 NOTE — PROGRESS NOTES
Subjective:      Patient ID: Hillary Cotton is a 53 y.o. female.    Chief Complaint: Ankle Problem (swelling, left) and Ankle Pain (left )    50 y.o female presents to clinic as a referral from Dr. Null with chief complaint of pain and swelling along the lateral aspect of the ankle. Patient points to the lateral aspect of the ankle overlying the sinus tarsi and peroneal tendons. Pain is aggravated with prolonged standing and walking. Symptoms have been present for approximately 1 year. She has been ambulating in tall orthopedic boot for the past 2 months with no improvement. She was previously diagnosed with peroneal tendonitis 1 year ago and completed physical therapy with no improvement. Reports to multiple left ankle sprains in the past.     10/23/2021:  Follow-up from diagnostic injection to left sinus tarsi region.  Related no relief in pain whatsoever.  She complains of mild-to-moderate pain when she is on her feet for extended periods of time with the boot.  She attempts to walk without the boot the pain is moderate to severe.  She points to the lateral hindfoot/ankle region.    11/11/2021:  Scheduled for surgical intervention on 11/17/2021.  Relates she would like to review the procedures scheduled since it was scheduled prior to [place] and capsule secondary to COVID 19.    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  Denies any slips, trips or falls.  Dressing remained clean, dry and intact.  Using a rolling knee scooter and is nonweightbearing to left foot.    12/13/2021:  Approximately 4 weeks postop.  Relates intermittent burning and shooting pain to left foot.  She has generalized aching that will wax and wane.  Pain alleviated by applying ice intermittently.  Denies any slips, trips or falls.  She has remain nonweightbearing to left foot.  She is perform range-of-motion exercises at rest as  discussed.    02/14/2022:  Approximately 3 months postop.  Relates no pain at rest.  She gets some generalized aching a day or 2 after she has been on her feet a considerable amount.  Overall she feels as though her conditioning is progressing especially since she was initially in a boot since April 2021 prior to surgical intervention.  She has been ambulating with a tennis shoe and is doing well overall.  Attending physical therapy as prescribed.    10/06/2022:  Lasting approximately 8 months ago relates that overall she will have swelling and pain to left lower extremity that will wax and wane depending on her activity.  She states that when she is very active wearing certain types of shoe gear that is not a tennis shoe that she will have an increase in swelling and discomfort around the left ankle.  Will resolve with rest.  Also relates that prior to surgery she was getting swelling to left lower extremity.  Also reports some mild residual numbness to the lateral left ankle.    10/24/2022:  Approximately 2.5 weeks since she received injection to lipoma along the anterior lateral left ankle.  Significant overall reduction in size.  She still complains of aching pain to the area which worsens with increased periods of standing and walking.  Notes the pain is much less when she wears tennis shoes.  Pain aggravated by wearing flip-flops.    11/17/2022:  Follow-up from lidocaine injection to the sinus tarsi left foot.  She reported that the pain had stopped at rest following the injection however when she performed a moderate amount of walking to the parking lot she began to get some pain and aching that returned.  Relates that she has been off Celebrex since a procedure last week and was instructed to remain off of Celebrex for 10 days.  She has had a moderate amount of aching throughout her body but also reports some pain along the top of the left foot and lateral left ankle.  She also points to a previous mass that  was injected and gave her a significant amount of relief in the past.    01/13/2023:  Complains of acute onset flare-up in pain that occurred 6 days ago when she was walking.  She is not sure if her ankle gave out but she developed moderate sharp pain with discoloration and swelling to the left hindfoot/ankle area.  She also described pain pointing to the peroneal tendon distribution along the previous surgical scar lateral left hindfoot/ankle.  She is been taking Celebrex for osteoarthritis which also helps with some of the pain.  2 days ago she was getting out of the shower, slipped and stubbed her toes on the left foot and is complaining of moderate aching pain.  Scheduled for arthroscopy of the left subtalar joint next month however is inquiring about rescheduling.    02/07/2023:  Seen today as audio visual virtual visit while at work.  Ambulating with tall Cam boot as needed.  States that her pain is still persistent when she is been standing and walking for extended periods of time.  No pain at rest.  CT scan of the left hindfoot/ankle completed.    03/31/2023: Post arthroscopic debridement of the subtalar joint via sinus tarsi approach left foot and excision/curettage of bone cyst left calcaneus with autologous bone graft from the left tibia implanted into the left calcaneus on 03/24/2023.  She is remain nonweightbearing to the left foot.  Complains of intermittent burning along the front of the left ankle region.  Patient has left the boot on serving as a cast.  Relates she is getting a yeast infection from the antibiotic and that the Percocet is too strong.    04/14/2023:  3 weeks postop.  Reports intermittent burning and itching to the incision sites.  No significant pain reported today.  Has remain nonweightbearing with rolling knee scooter.    05/12/2023:  7 weeks postop.  Complains of increased swelling when she puts her foot down the ground as well as some mild rubor/redness that resolves with  elevation.  She complains of aching that is intermittent to left lower extremity and stiffness.  Also relates that she gets some discomfort at the distal posterior lateral aspect of the left leg that is intermittent.    06/16/2023:  Approximately 11 weeks postop.  She complains of intermittent pain and swelling depending on her activity.  She tried to transition to a tennis shoe last week and felt okay however when she was on her foot for extended period time she had moderate pain.  She received initial PT evaluation on 06/09/2023 and has pending follow-up on 06/19/2023.    In 2023: Nearly 5 months postop.  Relates the pain and swelling has been steadily improving.  She wears a compression sock to left foot which helps significantly.  She is unable to ambulate without a tennis shoe stating that the tennis shoe helps with shock absorption to the foot.  She has been attempting to walk around her neighborhood for exercise.  Unable to wear flats or high heels at this time.      01/25/2024:  Recently had CT scan of the left hindfoot completed secondary to worsening pain.  She has resumed wearing her tall Cam boot and utilizing crutches secondary to pain.  Also states very stressful time due to [place] and parades. Moderate pain with applying pressure on the left heel which is somewhat alleviated by wearing a cushioned shoe.    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  Her pain has improved since she has use the orthopedic boot and crutches.  Accompanied by her .  She expresses there was some confusion as to what I had previously recommended.  She thought I had recommended an ankle fusion.  In the past injections to the subtalar joint give her some limited relief.    05/24/2024:  Follow-up from 2nd opinion for chronic pain to left foot due to recurrent intraosseous lipoma.   "She demonstrates today pictures that she is taken when she has been on her feet for extended periods of time noting some bruising and increased swelling to the lateral ankle region/hindfoot.  This usually resolves with rest.  She does not have any significant pain at rest.  She is utilizing a Cam boot which helps reduce a lot of her discomfort.    08/13/2024:  Post arthrodesis of the subtalar joint with tenosynovectomy of the peroneal tendons left foot on 08/09/2024.  Reports that the Percocet is causing her to feel flushed.  Inquiring about returning on tramadol to help with the postop pain.  Denies any slips, trips or falls.  Cast intact.  Nonweightbearing with rolling knee scooter.  Accompanied by her .    08/28/2024:  Nearly 3 weeks postop.  Mild pain reported mostly from cast irritation.  Weight-bearing left lower extremity.  Denies any slips, trips or falls.    09/24/2024:  7 weeks postop.  Reports that she gets some burning sensation along the bottom of the foot that is intermittent and also noted at rest.  She feels stiffness when she moves her foot up and down.  She is able to walk around her home without a shoe noting that it feels okay without any significant discomfort.  The boot causes her discomfort.  Has been applying Neosporin over her incision site lateral left heel.    Vitals:    09/24/24 1628   Weight: 99.3 kg (218 lb 14.7 oz)   Height: 5' 6" (1.676 m)   PainSc:   4        Past Medical History:   Diagnosis Date    Abnormal Pap smear     Allergy     Asthma     Intraosseous lipoma     left foot    PONV (postoperative nausea and vomiting)     Sinusitis, chronic        Past Surgical History:   Procedure Laterality Date    ARTHROSCOPY OF FOOT Left 3/24/2023    Procedure: ARTHROSCOPY, FOOT;  Surgeon: Elías Aranda DPM;  Location: [facility] OR;  Service: Podiatry;  Laterality: Left;  Right lateral decubitus, 2.9 mm 30 deg angle scope, 2.7 mm shaver, Avit bone graft harvester(Julianne,   brie" notified cc  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  keyanna notified cc    BONE MARROW ASPIRATION Left 8/9/2024    Procedure: ASPIRATION, BONE MARROW;  Surgeon: Elías Aranda DPM;  Location: [facility] OR;  Service: Podiatry;  Laterality: Left;    COLONOSCOPY N/A 11/11/2022    Procedure: COLONOSCOPY Moviprep;  Surgeon: John Bonner MD;  Location: [facility] ENDO;  Service: Endoscopy;  Laterality: N/A;    EPIDURAL STEROID INJECTION N/A 6/5/2020    Procedure: INJECTION, STEROID, EPIDURAL,C7-T1;  Surgeon: Augusto Hussein MD;  Location: [facility] PAIN MGT;  Service: Pain Management;  Laterality: N/A;    EPIDURAL STEROID INJECTION N/A 9/18/2020    Procedure: INJECTION, STEROID, EPIDURAL C7/T1;  Surgeon: Augusto Hussein MD;  Location: [facility] PAIN MGT;  Service: Pain Management;  Laterality: N/A;  INJECTION, STEROID, EPIDURAL C7/T1    FOOT MASS EXCISION Left 11/17/2021    Procedure: EXCISION, MASS, FOOT;  Surgeon: Elías Aranda DPM;  Location: [facility] OR;  Service: Podiatry;  Laterality: Left;  mini c-arm, Allograft bone Arthrex  Arthrex notified 11/8 CC  Biomet notified 11/16/21 AM    FUSION OF SUBTALAR JOINT Left 8/9/2024    Procedure: FUSION, SUBTALAR JOINT;  Surgeon: Elías Aranda DPM;  Location: [facility] OR;  Service: Podiatry;  Laterality: Left;  c-arm, Arthrex screws    HYSTEROSCOPIC POLYPECTOMY OF UTERUS N/A 3/26/2024    Procedure: POLYPECTOMY, UTERUS, HYSTEROSCOPIC;  Surgeon: Gris Wadsworth MD;  Location: [facility] OR;  Service: OB/GYN;  Laterality: N/A;  400mL fluid deficit    SALIVARY GLAND SURGERY      TENOSYNOVECTOMY Left 8/9/2024    Procedure: TENOSYNOVECTOMY;  Surgeon: Elías Aranda DPM;  Location: [facility] OR;  Service: Podiatry;  Laterality: Left;    TONSILLECTOMY      XI ROBOTIC HYSTERECTOMY, WITH SALPINGO-OOPHORECTOMY Bilateral 4/24/2024    Procedure: XI ROBOTIC HYSTERECTOMY,WITH SALPINGO-OOPHORECTOMY;  Surgeon: Gris Wadsworth MD;  Location: [facility];  Service: OB/GYN;  Laterality: Bilateral;        Family History   Problem Relation Name Age of Onset    Diabetes Mother      Hypertension Mother      Asthma Mother      Sinus disease Mother      Allergies Father      Diabetes Maternal Grandmother      Hypertension Maternal Grandmother      Sinus disease Sister      Sinus disease Brother      Sinus disease Sister         Social History     Socioeconomic History    Marital status:    Occupational History     Employer: [employer]   Tobacco Use    Smoking status: Former     Current packs/day: 0.00     Types: Cigarettes     Quit date: 10/1/2015     Years since quittin.9    Smokeless tobacco: Never   Substance and Sexual Activity    Alcohol use: No    Drug use: No    Sexual activity: Yes     Partners: Male       Current Outpatient Medications   Medication Sig Dispense Refill    aspirin (ECOTRIN) 81 MG EC tablet Take 1 tablet (81 mg total) by mouth 2 (two) times a day. 60 tablet 0    celecoxib (CELEBREX) 200 MG capsule TAKE 1 CAPSULE BY MOUTH EVERY DAY AS NEEDED FOR PAIN 30 capsule 5    cetirizine (ZYRTEC) 10 MG tablet Take 1 tablet (10 mg total) by mouth once daily. 90 tablet 0    cholecalciferol, vitamin D3, 1,250 mcg (50,000 unit) capsule Take 1 capsule (50,000 Units total) by mouth every 7 days. 12 capsule 3    doxycycline (VIBRA-TABS) 100 MG tablet Take 1 tablet (100 mg total) by mouth 2 (two) times daily. 14 tablet 0    estradioL (ESTRACE) 1 MG tablet TAKE 1 TABLET BY MOUTH EVERY DAY 90 tablet 3    gabapentin (NEURONTIN) 300 MG capsule TAKE 1 CAPSULE BY MOUTH EVERY EVENING 90 capsule 1    glucosamine-chondroitin 500-400 mg tablet Take 1 tablet by mouth 3 (three) times daily.      ipratropium (ATROVENT) 21 mcg (0.03 %) nasal spray 2 sprays by Each Nostril route 2 (two) times daily as needed. 30 mL 0    Lactobacillus rhamnosus GG (CULTURELLE) 10 billion cell capsule Take 1 capsule by mouth once daily.      multivitamin capsule Take 1 capsule by mouth once daily.      mupirocin (BACTROBAN) 2 %  ointment Apply topically 2 (two) times daily. 22 g 0    ondansetron (ZOFRAN) 8 MG tablet Take 1 tablet (8 mg total) by mouth every 8 (eight) hours as needed for Nausea. 10 tablet 0    collagenase (SANTYL) ointment Apply topically once daily. 30 g 0     Current Facility-Administered Medications   Medication Dose Route Frequency Provider Last Rate Last Admin    sodium chloride 0.9% flush 10 mL  10 mL Intravenous PRN Elías Aranda, TANM           Review of patient's allergies indicates:   Allergen Reactions    Prednisone Rash     Hx of delayed onset rash on 2 occasion. Tolerates medrol, IM steroids, topical, and intranasal steroids w/o problem           Review of Systems   Constitutional: Negative for chills, decreased appetite, fever and malaise/fatigue.   HENT:  Negative for congestion, ear discharge and sore throat.    Eyes:  Negative for discharge and pain.   Cardiovascular:  Positive for leg swelling. Negative for chest pain and claudication.   Respiratory:  Negative for cough and shortness of breath.    Skin:  Negative for color change, nail changes and rash.   Musculoskeletal:  Positive for stiffness. Negative for arthritis, joint pain, joint swelling and muscle weakness.        Left ankle pain   Gastrointestinal:  Negative for bloating, abdominal pain, diarrhea, nausea and vomiting.   Genitourinary:  Negative for flank pain and hematuria.   Neurological:  Negative for headaches, numbness and weakness.   Psychiatric/Behavioral:  Negative for altered mental status.            Objective:      Physical Exam  Constitutional:       General: She is not in acute distress.     Appearance: She is well-developed. She is not diaphoretic.   Cardiovascular:      Pulses:           Dorsalis pedis pulses are 2+ on the right side and 2+ on the left side.        Posterior tibial pulses are 2+ on the right side and 2+ on the left side.      Comments: Note mild pitting edema to left lower extremity and none noted to the right  lower extremity.  Musculoskeletal:         General: Tenderness present.      Comments: There is positive provocation sign along the mid aspect 2 proximal aspect of the tarsal tunnel left medial hindfoot with pain radiating to the plantar foot.  Reduced left ankle range motion secondary to the subtalar joint arthrodesis with no available motion at the subtalar joint.  No pain with attempted subtalar joint range motion.  No pain squeezing the left heel.  No palpable fluctuance or crepitance of the left foot.  Some mild localized pain on palpation along the incision site left lateral heel.  Rectus alignment of the left foot.   Feet:      Right foot:      Skin integrity: Dry skin present. No ulcer, blister, erythema or warmth.      Left foot:      Skin integrity: Dry skin present. No ulcer, blister, erythema or warmth.   Lymphadenopathy:      Comments: Negative lymphadenopathy bilateral popliteal fossa and tarsal tunnel.    Skin:     General: Skin is warm and dry.      Findings: No lesion.      Nails: There is no clubbing.      Comments: Incision site lateral left heel with proximal 1/2 noting full-thickness skin dehiscence down to subQ with fibrous base.  Wound site measures proximally 0.6 x 5.0 x 0.1 cm.  No drainage.  No surrounding erythema or signs infection.  The distal half of the incision appears to be forming scar.    There is a resolved blister to the posterior inferior aspect of the left heel noting that the overlying hyperkeratotic tissues beginning to slough off.  No signs infection.   Neurological:      Mental Status: She is alert and oriented to person, place, and time.      Sensory: No sensory deficit.      Motor: No abnormal muscle tone.      Comments: Light touch within normal limits.    Psychiatric:         Behavior: Behavior normal.         Thought Content: Thought content normal.         Judgment: Judgment normal.         Assessment:       Encounter Diagnoses   Name Primary?    Postoperative state  Yes    Peroneal tendonitis, left     Weakness of left lower extremity     Postoperative wound dehiscence, initial encounter     Tarsal tunnel syndrome, left          Plan:       Hillary was seen today for post-op evaluation.    Diagnoses and all orders for this visit:    Postoperative state  -     Ambulatory referral/consult to Physical/Occupational Therapy; Future  -     X-Ray Foot Complete Left; Future    Peroneal tendonitis, left  -     Ambulatory referral/consult to Physical/Occupational Therapy; Future    Weakness of left lower extremity  -     Ambulatory referral/consult to Physical/Occupational Therapy; Future    Postoperative wound dehiscence, initial encounter  -     collagenase (SANTYL) ointment; Apply topically once daily.    Tarsal tunnel syndrome, left      I counseled the patient on her conditions, their implications and medical management.    Reviewed left calcaneus x-ray noting no gapping at the subtalar joint arthrodesis site with new bone growth/bone callus noted.  Fixation is intact without signs of loosening.  Clinical alignment appears to be neutral on the calcaneal axial view.    Patient may begin slowly transitioning out her orthopedic boot into a tennis shoe as lines in his not rubbing and causing irritation against the surgical incision.      Referred patient to physical therapy to begin light resistance exercises and joint mobilization.  Patient needs modalities help reduce the inflammation and pain along the tarsal tunnel region of the medial left hindfoot.    Prescribed Santyl to be applied daily to the dehisced incision site lateral left heel.      RTC within 4 weeks to re-evaluate or p.r.n. as discussed.  Patient may return to work on 10/07/2024 as scheduled.  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    Assisted by Lavelle MADDOXM PGY 2    A portion of this note was generated by voice recognition software and may contain spelling and grammar errors.    .

## 2024-09-25 ENCOUNTER — PATIENT MESSAGE (OUTPATIENT)
Dept: PODIATRY | Facility: CLINIC | Age: 54
End: 2024-09-25
Payer: OTHER GOVERNMENT

## 2024-09-25 DIAGNOSIS — Z98.890 POSTOPERATIVE STATE: ICD-10-CM

## 2024-09-25 DIAGNOSIS — M79.2 NEUROPATHIC PAIN OF LEFT LOWER EXTREMITY: Primary | ICD-10-CM

## 2024-10-04 ENCOUNTER — CLINICAL SUPPORT (OUTPATIENT)
Dept: REHABILITATION | Facility: HOSPITAL | Age: 54
End: 2024-10-04
Attending: PODIATRIST
Payer: OTHER GOVERNMENT

## 2024-10-04 ENCOUNTER — PATIENT MESSAGE (OUTPATIENT)
Dept: PODIATRY | Facility: CLINIC | Age: 54
End: 2024-10-04
Payer: OTHER GOVERNMENT

## 2024-10-04 ENCOUNTER — TELEPHONE (OUTPATIENT)
Dept: PODIATRY | Facility: CLINIC | Age: 54
End: 2024-10-04
Payer: OTHER GOVERNMENT

## 2024-10-04 DIAGNOSIS — Z98.890 POSTOPERATIVE STATE: ICD-10-CM

## 2024-10-04 DIAGNOSIS — M76.72 PERONEAL TENDONITIS, LEFT: ICD-10-CM

## 2024-10-04 DIAGNOSIS — R29.898 WEAKNESS OF LEFT LOWER EXTREMITY: ICD-10-CM

## 2024-10-04 DIAGNOSIS — M25.672 DECREASED RANGE OF MOTION OF LEFT ANKLE: Primary | ICD-10-CM

## 2024-10-04 PROCEDURE — 97116 GAIT TRAINING THERAPY: CPT | Mod: PN

## 2024-10-04 PROCEDURE — 97161 PT EVAL LOW COMPLEX 20 MIN: CPT | Mod: PN

## 2024-10-04 PROCEDURE — 97110 THERAPEUTIC EXERCISES: CPT | Mod: PN

## 2024-10-04 NOTE — PLAN OF CARE
[facility]   Physical Therapy Initial Evaluation      Name: Hillary Cotton  Clinic Number: [id number]    Therapy Diagnosis:   Encounter Diagnoses   Name Primary?    Postoperative state     Peroneal tendonitis, left     Weakness of left lower extremity     Decreased range of motion of left ankle Yes        Physician: Elías Aranda DPM    Physician Orders: PT Eval and Treat  Medical Diagnosis from Referral:   Z98.890 (ICD-10-CM) - Postoperative state   M76.72 (ICD-10-CM) - Peroneal tendonitis, left   R29.898 (ICD-10-CM) - Weakness of left lower extremity     Evaluation Date: 10/4/2024  Authorization Period Expiration: 12/31/2024   Plan of Care Expiration: 12/27/24  Progress Note Due: 11/2024  Date of Surgery: 8/9/24  Visit # / Visits authorized: 1/ 1   FOTO: 1/ 3    Precautions: Standard, Progressive WB on LLE into tennis shoe    Time In: 9:10 AM  Time Out: 10:00 AM  Total Billable Time: 50 minutes (1LCE, 1TE, 1GT)    Subjective     Date of onset: 8/9/24    History of current condition - Hillary reports: 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 Pain started in 2019, 3 total surgeries with last being 8/9/24 including subtalar fusion, and pain has always been located at posterior aspect of left lateral malleolus. Has been NWB since surgery and last MD visit she is allowed to be weight bearing in tennis shoe when ready. Started some WB, increased pain and has been staying off if it since. Some numbness tinging with burning pain at plantar surface of left foot as well since surgery. No other complaints and right leg doing well.     2023 eval: she had her surgery on 3/24/2023 and she was 8 weeks non-weight bearing. 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 She is currently ambulating with 1 axillary crutch and considered weight  bearing as tolerated. Currently working full time with primarily desk duty. Patient would like to get back to walking and normal activities. Prior to surgery, she would have swelling and pain with extended walking.     2019 eval: she started noticing some left lateral ankle swelling about 4-5 months ago but no pain. She does have swelling in both ankles but left was worse, and left swelling never went down. She also has chronic B knee pain and arthritis. Pain ws not present until she slipped steeping out the shower on October 18th that resulted in sharp pain along lateral left ankle, significant swelling, and limping on left ankle. She can walk slowly with minimal pain and soreness but will have significant pain if she tries to walk quickly without any support. She received an ankle brace this morning from her MD to wear for any activities. Her ankle has been feeling better with no sharp pain and mainly soreness after standing/walking activities.    Falls: none     Surgery: 8/9/24  Procedure:  Procedure(s) (LRB): tenosynovectomy of peroneal tendons, subtalar fusion, aspiration of tibial bone marrow and implantation of allograft bone mixed with BMAC left foot on 8/9/24.  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  TENDONS: Posterior tibial, flexor digitorum longus, flexor hallucis longus,  peroneus longus, peroneus brevis and extensor tendons are normal.  Achilles tendon demonstrates normal morphology and signal intensity.  BONES: Postoperative change of excision and curettage of a calcaneal intraosseous lipoma with placement of allograft bone.  Bone graft harvesting site at the anteromedial distal tibia.  Postoperative signal change at the surgical site with surrounding marrow demonstrating fat signal intensity and decreased trabeculation.  No marrow edema.  No osseous expansion or cortical breakthrough.  Degenerative osteophyte formation along the anterior aspect of the tibial plafond.  JOINTS/CARTILAGE: Mild enhancing synovitis about the tibiotalar and subtalar joints.  Tibiotalar, subtalar, navicular-cuneiform and tarsometatarsal chondral surfaces are preserved.  No tarsal coalition.  MUSCLES: Edema signal throughout the extensor digitorum.  No accessory muscles.  MISCELLANEOUS: Enhancing heterogeneous signal intensity throughout the sinus tarsi.  Plantar aponeurosis appears within normal limits.  Sinus tarsi is unremarkable.  Subcutaneous soft tissues appear within normal limits.  Impression:  1. Postoperative change of excision and curettage of a calcaneal intraosseous lipoma with placement of allograft bone.  Heterogeneous signal at the surgical site with surrounding marrow demonstrating fat signal intensity and decreased trabeculation, possibly postoperative or related to a residual/recurrent lipoma.  No marrow edema to suggest stress reaction or osteomyelitis.  No osseous expansion or cortical breakthrough.  2. 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  3. Muscle edema throughout the extensor digitorum and extensor hallucis brevis, possibly sequela of denervation myositis.    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  Prior Level of Function:  IND  Current Level of Function: unable to WB on LLE due to pain, difficulty with all WB activities    Pain:  Current 4/10, worst 9/10, best 3/10   Location: base of left foot, anterior left TCJ  Description: burning, tightness, soreness/aching  Aggravating Factors: weight bearing activities   Easing Factors: using knee scooter, ice, gabapentin    Patients goals: Get back to recreational walking, Kajal, ADLs     Medical History:   Past Medical History:   Diagnosis Date    Abnormal Pap smear     Allergy     Asthma     Intraosseous lipoma     left foot    PONV (postoperative nausea and vomiting)     Sinusitis, chronic        Surgical History:   Hillary Cotton  has a past surgical history that includes Salivary gland surgery; Tonsillectomy; Epidural steroid injection (N/A, 6/5/2020); Epidural steroid injection (N/A, 9/18/2020); Foot mass excision (Left, 11/17/2021); Colonoscopy (N/A, 11/11/2022); Arthroscopy of foot (Left, 3/24/2023); Hysteroscopic polypectomy of uterus (N/A, 3/26/2024); xi robotic hysterectomy, with salpingo-oophorectomy (Bilateral, 4/24/2024); Tenosynovectomy (Left, 8/9/2024); Fusion of subtalar joint (Left, 8/9/2024); and Bone marrow aspiration (Left, 8/9/2024).    Medications:   Hillary has a current medication list which includes the following prescription(s): aspirin, celecoxib, cetirizine, cholecalciferol (vitamin d3), collagenase, doxycycline, estradiol, gabapentin, glucosamine-chondroitin, ipratropium, lactobacillus rhamnosus gg, multivitamin, mupirocin, ondansetron, and tramadol, and the following Facility-Administered Medications: sodium chloride 0.9%.    Allergies:   Review of patient's allergies indicates:   Allergen Reactions    Prednisone Rash     Hx of delayed onset rash on 2 occasion. Tolerates medrol, IM steroids, topical, and intranasal steroids w/o problem      Objective      Gait: partial WB on Bilateral crutches with CAM boot donned  Posture: decreased LLE WB   Sensation:  WNL  Palpation: +tender to palpation along incision and base of foot as a whole    A/PROM and MMT  * = left ankle pain with testing  NT = Not tested  AROM: LUMBAR   Flexion 100%   Extension 100%   Right side bending 100%   Left side bending 100%   Right rotation 100%   Left rotation 100%     Hip  Right   Left  Pain/Dysfunction with Movement    AROM PROM MMT AROM PROM MMT    Flexion WFL WFL 5/5 WFL WFL 5/5    Extension WFL WFL 5/5 WFL WFL 5/5    Abduction WFL WFL 5/5 WFL WFL 5/5    Adduction WFL WFL 5/5 WFL WFL 5/5    Internal rotation WFL WFL 5/5 WFL WFL 5/5    External rotation WFL WFL 5/5 WFL WFL 5/5       Knee  Right   Left  Pain/Dysfunction with Movement    AROM PROM MMT AROM PROM MMT    Flexion (140 deg) WFL WFL 5/5 WFL WFL 5/5    Extension (0-5 deg) WFL WFL 5/5 WFL WFL 5/5      Ankle  Right   Left  Pain/Dysfunction with Movement    AROM PROM MMT AROM PROM MMT    Great toe (45/70 deg) -- --  -- --  Flexion/extension MMT   Plantarflexion (50 deg 45 46 5/5 34 36 NT    Dorsiflexion (20 deg) 5 6 5/5 -6 -5 NT    Inversion 30 35 5/5 13 14 NT General ankle mobility, not subtalar   Eversion 20 25 5/5 0 1 NT General ankle mobility, not subtalar     Single leg stance: NT  DL heel raise assessment = NT  SL heel raise assessment = NT    Special tests:  Anterior Drawer Test = NT  Calcaneofibular ligament stress test = NT  Interdigital Neuroma Test = negative B  Talar Tilit Test = not tested  Navicular drop test = not tested   (Difference of >10 mm is considered significant excessive foot pronation.)  Tinel's Sign Test (tarsal tunnel syndrome) = not tested  Arzola test = no tested     CMS Impairment/Limitation/Restriction for FOTO ANKLE Survey    Therapist reviewed FOTO scores for Hillary Cotton on 10/4/2024.   FOTO documents entered into [employer] - see Media section.    Limitation Score: 57%  Predicted: 44%     Treatment     Total Treatment time (time-based codes) separate from Evaluation: 25 minutes     Hillary  received the treatments listed below:      therapeutic exercises to develop strength, endurance, ROM, flexibility, posture, and core stabilization for 15 minutes including:  Education, see above  Ankle circles: 10x each way  Calf stretch with towel: 2x20'' holds  Seated ankle pumps: 10x  Towel toe curls: 10x  Lateral weight shifts: next    Gait training for 10 minutes including:  Lateral weight shifts on LLE with boot donned and Bilateral crutches  Standing upright with 50% WB on LLE    Patient Education and Home Exercises     Education provided:   - Rehab process and prognosis  - Importance of home exercise program  - Avoidance of concordant pain(s), rest as needed  - progressive WB with Bilateral crutches in boot for this week    Written Home Exercises Provided: Yes. Exercises were reviewed and Hillary was able to demonstrate them prior to the end of the session.  Hillary demonstrated good  understanding of the education provided. See EMR under Patient Instructions for exercises provided during therapy sessions.    Assessment     Hillary is a 53 y.o. female referred to outpatient Physical Therapy with a medical diagnosis of Postoperative state, Peroneal tendonitis, left, and Weakness of left lower extremity. Pt received tenosynovectomy of peroneal tendons, subtalar fusion, aspiration of tibial bone marrow and implantation of allograft bone mixed with BMAC left foot on 8/9/24. This is her 3rd surgery since 2019 from chronic left lateral ankle pain and 3rd stint in therapy. Patient presents with decreased left ankle range of motion and strength, poor LLE WB tolerance inside boot and Bilateral crutches, paresthesias and pain along incision site and plantar surface of foot, and fear and avoidance of most left ankle and foot movements since surgery.     Patient prognosis is Good.   Patient will benefit from skilled outpatient Physical Therapy to address the deficits stated above and in the chart below, provide patient  /family education, and to maximize patientt's level of independence.     Plan of care discussed with patient: Yes  Patient's spiritual, cultural and educational needs considered and patient is agreeable to the plan of care and goals as stated below:     Anticipated Barriers for therapy: chronicity of left ankle impairments and pain    Medical Necessity is demonstrated by the following  History  Co-morbidities and personal factors that may impact the plan of care [x] LOW: no personal factors / co-morbidities  [] MODERATE: 1-2 personal factors / co-morbidities  [] HIGH: 3+ personal factors / co-morbidities    Moderate / High Support Documentation:   Co-morbidities affecting plan of care: none    Personal Factors:   no deficits     Examination  Body Structures and Functions, activity limitations and participation restrictions that may impact the plan of care [x] LOW: addressing 1-2 elements  [] MODERATE: 3+ elements  [] HIGH: 4+ elements (please support below)    Moderate / High Support Documentation: none     Clinical Presentation [x] LOW: stable  [] MODERATE: Evolving  [] HIGH: Unstable     Decision Making/ Complexity Score: low       Goals:  GOALS: Short Term Goals:  4 weeks  1. Report decreased left ankle pain </= 3/10 to increase tolerance for ADLs. - Progressing  2. Increase left ankle AROM by 5 degrees for dorsiflexion in order to walk with min to no compensation. - Progressing  3. Pt will demo good sitting and standing posture for improved spine and joint alignment for improved biomechanics. - Progressing  4. Pt to tolerate HEP to improve ROM and independence with ADL's. - Progressing    Long Term Goals: 8 weeks  1. Report decreased left ankle pain </= 2/10 in gait with tennis shoe with minimal deviations to increase tolerance for increased QoL and improved ADLs. - Progressing  2. Patient goal:  Get back to recreational walking, Kajal, ADLs - Progressing  3. Increase strength to >/= 4/5 for BLE to increase  tolerance for ADL and work activities. - Progressing  4. Pt will report at </= 44% impaired on ANKLE FOTO score to demo increased functional mobility.  - Progressing  5. Pt will be able to ambulate community distances and negotiate stairs with minimal ankle pain for increased functional mobility and QoL. - Progressing    Plan     Plan of care Certification: 10/4/2024 to 12/27/24.    Outpatient Physical Therapy 2 times weekly for 12 weeks to include the following interventions: Aquatic Therapy, Cervical/Lumbar Traction, Debridement (nonselective), Debridement (selective), Electrical Stimulation  , Gait Training, Manual Therapy, Moist Heat/ Ice, Neuromuscular Re-ed, Orthotic Management and Training, Patient Education, Self Care, Therapeutic Activities, and Therapeutic Exercise.     Curtis Danielle, PT        Physician's Signature: _________________________________________ Date: ________________

## 2024-10-04 NOTE — TELEPHONE ENCOUNTER
Sent the patient the attached [place] paperwork though the portal.  E-mailed her paperwork to Jose G Begum M.A. also.    [place]

## 2024-10-04 NOTE — TELEPHONE ENCOUNTER
Spoke with patient and she was returned to work on 10/07/2024 therefore disability paperwork completed on her behalf.

## 2024-10-07 RX ORDER — TRAMADOL HYDROCHLORIDE 50 MG/1
50 TABLET ORAL EVERY 8 HOURS PRN
Qty: 30 TABLET | Refills: 0 | Status: SHIPPED | OUTPATIENT
Start: 2024-10-07

## 2024-10-08 ENCOUNTER — CLINICAL SUPPORT (OUTPATIENT)
Dept: REHABILITATION | Facility: HOSPITAL | Age: 54
End: 2024-10-08
Payer: OTHER GOVERNMENT

## 2024-10-08 DIAGNOSIS — M76.72 PERONEAL TENDONITIS, LEFT: ICD-10-CM

## 2024-10-08 DIAGNOSIS — R29.898 WEAKNESS OF LEFT LOWER EXTREMITY: Primary | ICD-10-CM

## 2024-10-08 DIAGNOSIS — Z98.890 POSTOPERATIVE STATE: ICD-10-CM

## 2024-10-08 DIAGNOSIS — M25.672 DECREASED RANGE OF MOTION OF LEFT ANKLE: ICD-10-CM

## 2024-10-08 PROCEDURE — 97112 NEUROMUSCULAR REEDUCATION: CPT | Mod: PN

## 2024-10-08 PROCEDURE — 97110 THERAPEUTIC EXERCISES: CPT | Mod: PN

## 2024-10-08 NOTE — PROGRESS NOTES
[facility]   Physical Therapy Treatment Note      Name: Hillary Cotton  Clinic Number: [id number]    Therapy Diagnosis:   Encounter Diagnoses   Name Primary?    Weakness of left lower extremity Yes    Decreased range of motion of left ankle     Postoperative state     Peroneal tendonitis, left      Physician: Elías Aranda DPM    Visit Date: 10/8/2024    Physician Orders: PT Eval and Treat  Medical Diagnosis from Referral:   Z98.890 (ICD-10-CM) - Postoperative state   M76.72 (ICD-10-CM) - Peroneal tendonitis, left   R29.898 (ICD-10-CM) - Weakness of left lower extremity      Evaluation Date: 10/4/2024  Authorization Period Expiration: 12/31/2024   Plan of Care Expiration: 12/27/24  Progress Note Due: 11/2024  Date of Surgery: 8/9/24  Visit # / Visits authorized: 1/1, 1/15  FOTO: 1/ 3  PTA Visit #: 0/5      Precautions: Standard, Progressive WB on LLE into tennis shoe     Time In: 3:05 PM  Time Out: 4:00 PM  Total Billable Time: 55 minutes (2 TE, 2 NMR)    Subjective     Patient reports: has been able to tolerate WB on LLE with boot + crutches better since last visit and experiencing some new feelings in left foot since.   She was compliant with home exercise program.  Response to previous treatment: increased LLE WB inside boot  Functional change: walking better    Pain: 2/10  Location: left ankle     Objective      Objective Measures updated at progress report unless specified.     Treatment     Hillary received the treatments listed below:      therapeutic exercises to develop strength, endurance, ROM, flexibility, posture, and core stabilization for 30 minutes including:  Seated heel slides for dorsiflexion: 20x5'' holds  Seated ankle pumps: 2x30 each way  Seated Long arc quad: 2x20 4#, left  Passive toe flexion and extension seated: 6x15'' holds each way    manual therapy techniques: Joint mobilizations, Manual traction, Myofacial release, Manual Lymphatic Drainage, Soft  tissue Mobilization, and Friction Massage were applied to the: left ankle for 00 minutes, including:  TCJ gentle distraction  1-5 metatarsal anterior/posterior mobs, grade II-III    neuromuscular re-education activities to improve: Balance, Coordination, Kinesthetic, Sense, Proprioception, and Posture for 25 minutes. The following activities were included:  Ankle circles: 40x CW/CCW  Toe curls: 15x10'' holds  Toe splays: 12x10'' holds  Gentle Towel swipes (INV/EV): next  Weight shift with crutches left<-->right: 2x15 to left    therapeutic activities to improve functional performance for 00 minutes, including:  None     Patient Education and Home Exercises       Education provided:   - Rehab process and prognosis  - Importance of home exercise program  - Avoidance of concordant pain(s), rest as needed  - progressive WB with Bilateral crutches in boot for this week    Written Home Exercises Provided: Yes. Exercises were reviewed and Hillary was able to demonstrate them prior to the end of the session.  Hillary demonstrated good  understanding of the education provided. See Electronic Medical Record under Patient Instructions for exercises provided during therapy sessions    Assessment     Hillary is a 53 y.o. female referred to outpatient Physical Therapy with a medical diagnosis of Postoperative state, Peroneal tendonitis, left, and Weakness of left lower extremity. Pt received tenosynovectomy of peroneal tendons, subtalar fusion, aspiration of tibial bone marrow and implantation of allograft bone mixed with BMAC left foot on 8/9/24. This is her 3rd surgery since 2019 from chronic left lateral ankle pain and 3rd stint in therapy. Patient presents with decreased left ankle range of motion and strength, poor LLE WB tolerance inside boot and Bilateral crutches, paresthesias and pain along incision site and plantar surface of foot, and fear and avoidance of most left ankle and foot movements since surgery.   Pt did well  today, mild soreness after first visit but reported decreased pain after. Hypomobility throughout left ankle and education on getting out of boot when sitting to move ankle at every moment possible. Allowed to WB outside of boot and started progression to discharged crutches with boot donned and then discharge boot eventually once gait deviation return to normal.     Hillary Is progressing well towards her goals.   Patient prognosis is Good.     Patient will continue to benefit from skilled outpatient physical therapy to address the deficits listed in the problem list box on initial evaluation, provide pt/family education and to maximize pt's level of independence in the home and community environment.     Patient's spiritual, cultural and educational needs considered and pt agreeable to plan of care and goals.     Anticipated barriers to physical therapy:  chronicity of left ankle impairments and pain     Goals:   GOALS: Short Term Goals:  4 weeks  1. Report decreased left ankle pain </= 3/10 to increase tolerance for ADLs. - Progressing  2. Increase left ankle AROM by 5 degrees for dorsiflexion in order to walk with min to no compensation. - Progressing  3. Pt will demo good sitting and standing posture for improved spine and joint alignment for improved biomechanics. - Progressing  4. Pt to tolerate HEP to improve ROM and independence with ADL's. - Progressing     Long Term Goals: 8 weeks  1. Report decreased left ankle pain </= 2/10 in gait with tennis shoe with minimal deviations to increase tolerance for increased QoL and improved ADLs. - Progressing  2. Patient goal:  Get back to recreational walking, Kajal, ADLs - Progressing  3. Increase strength to >/= 4/5 for BLE to increase tolerance for ADL and work activities. - Progressing  4. Pt will report at </= 44% impaired on ANKLE FOTO score to demo increased functional mobility.  - Progressing  5. Pt will be able to ambulate community distances and negotiate  stairs with minimal ankle pain for increased functional mobility and QoL. - Progressing    Plan     Progressing WB inside boot for now, look to discharge crutches soon.     Curtis Danielle, PT

## 2024-10-10 ENCOUNTER — CLINICAL SUPPORT (OUTPATIENT)
Dept: REHABILITATION | Facility: HOSPITAL | Age: 54
End: 2024-10-10
Attending: PODIATRIST
Payer: OTHER GOVERNMENT

## 2024-10-10 DIAGNOSIS — M76.72 PERONEAL TENDONITIS, LEFT: ICD-10-CM

## 2024-10-10 DIAGNOSIS — R29.898 WEAKNESS OF LEFT LOWER EXTREMITY: Primary | ICD-10-CM

## 2024-10-10 DIAGNOSIS — Z98.890 POSTOPERATIVE STATE: ICD-10-CM

## 2024-10-10 DIAGNOSIS — M25.672 DECREASED RANGE OF MOTION OF LEFT ANKLE: ICD-10-CM

## 2024-10-10 PROCEDURE — 97112 NEUROMUSCULAR REEDUCATION: CPT | Mod: PN

## 2024-10-10 PROCEDURE — 97110 THERAPEUTIC EXERCISES: CPT | Mod: PN

## 2024-10-10 NOTE — PROGRESS NOTES
[facility]   Physical Therapy Treatment Note      Name: Hillary Cotton  Clinic Number: [id number]    Therapy Diagnosis:   Encounter Diagnoses   Name Primary?    Weakness of left lower extremity Yes    Decreased range of motion of left ankle     Postoperative state     Peroneal tendonitis, left      Physician: Elías Aranda DPSATURNINO    Visit Date: 10/10/2024    Physician Orders: PT Eval and Treat  Medical Diagnosis from Referral:   Z98.890 (ICD-10-CM) - Postoperative state   M76.72 (ICD-10-CM) - Peroneal tendonitis, left   R29.898 (ICD-10-CM) - Weakness of left lower extremity      Evaluation Date: 10/4/2024  Authorization Period Expiration: 12/31/2024   Plan of Care Expiration: 12/27/24  Progress Note Due: 11/2024  Date of Surgery: 8/9/24  Visit # / Visits authorized: 1/1, 2/15  FOTO: 1/3  PTA Visit #: 0/5      Precautions: Standard, Progressive WB on LLE into tennis shoe     Time In: 3:10 PM  Time Out: 4:05 PM  Total Billable Time: 55 minutes (2 TE, 2 NMR)    Subjective     Patient reports: continues to feel burning at base of left foot, can tell she is walking better.   She was compliant with home exercise program.  Response to previous treatment: increased LLE WB inside boot  Functional change: walking better    Pain: 2/10  Location: left ankle     Objective      Objective Measures updated at progress report unless specified.     Treatment     Hillary received the treatments listed below:      therapeutic exercises to develop strength, endurance, ROM, flexibility, posture, and core stabilization for 30 minutes including:  Seated heel slides for dorsiflexion: 20x5'' holds  Seated ankle pumps: 2x30 each way  Seated Long arc quad: 2x20 4#, left  Passive toe flexion and extension seated: 6x15'' holds each way    manual therapy techniques: Joint mobilizations, Manual traction, Myofacial release, Manual Lymphatic Drainage, Soft tissue Mobilization, and Friction Massage were applied  to the: left ankle for 00 minutes, including:  TCJ gentle distraction  1-5 metatarsal anterior/posterior mobs, grade II-III    neuromuscular re-education activities to improve: Balance, Coordination, Kinesthetic, Sense, Proprioception, and Posture for 25 minutes. The following activities were included:  Ankle circles: 40x CW/CCW  Toe curls: 15x10'' holds  Toe splays: 12x10'' holds  Gentle Towel swipes (INV/EV): 20x each way  Weight shift with crutches left<-->right: 2x15 to left    therapeutic activities to improve functional performance for 00 minutes, including:  Foot taps on 4 inch step with boot: next  Lateral walking in // bars with boot: next  Standing hip abduction: next    Patient Education and Home Exercises       Education provided:   - Rehab process and prognosis  - Importance of home exercise program  - Avoidance of concordant pain(s), rest as needed  - progressive WB with Bilateral crutches in boot for this week    Written Home Exercises Provided: Yes. Exercises were reviewed and Hillary was able to demonstrate them prior to the end of the session.  Hillary demonstrated good  understanding of the education provided. See Electronic Medical Record under Patient Instructions for exercises provided during therapy sessions    Assessment     Hillary is a 53 y.o. female referred to outpatient Physical Therapy with a medical diagnosis of Postoperative state, Peroneal tendonitis, left, and Weakness of left lower extremity. Pt received tenosynovectomy of peroneal tendons, subtalar fusion, aspiration of tibial bone marrow and implantation of allograft bone mixed with BMAC left foot on 8/9/24. This is her 3rd surgery since 2019 from chronic left lateral ankle pain and 3rd stint in therapy. Patient presents with decreased left ankle range of motion and strength, poor LLE WB tolerance inside boot and Bilateral crutches, paresthesias and pain along incision site and plantar surface of foot, and fear and avoidance of  most left ankle and foot movements since surgery.   Improving left ankle dorsiflexion, appropriate crepitus with left ankle motion and pt encouraged to do more mobility exercises during the day and take boot off when sitting every time. Pain fairly controlled and tolerance exercises better. Progress WB activities next visit.     Hillary Is progressing well towards her goals.   Patient prognosis is Good.     Patient will continue to benefit from skilled outpatient physical therapy to address the deficits listed in the problem list box on initial evaluation, provide pt/family education and to maximize pt's level of independence in the home and community environment.     Patient's spiritual, cultural and educational needs considered and pt agreeable to plan of care and goals.     Anticipated barriers to physical therapy:  chronicity of left ankle impairments and pain     Goals:   GOALS: Short Term Goals:  4 weeks  1. Report decreased left ankle pain </= 3/10 to increase tolerance for ADLs. - Progressing  2. Increase left ankle AROM by 5 degrees for dorsiflexion in order to walk with min to no compensation. - Progressing  3. Pt will demo good sitting and standing posture for improved spine and joint alignment for improved biomechanics. - Progressing  4. Pt to tolerate HEP to improve ROM and independence with ADL's. - Progressing     Long Term Goals: 8 weeks  1. Report decreased left ankle pain </= 2/10 in gait with tennis shoe with minimal deviations to increase tolerance for increased QoL and improved ADLs. - Progressing  2. Patient goal:  Get back to recreational walking, [place], ADLs - Progressing  3. Increase strength to >/= 4/5 for BLE to increase tolerance for ADL and work activities. - Progressing  4. Pt will report at </= 44% impaired on ANKLE FOTO score to demo increased functional mobility.  - Progressing  5. Pt will be able to ambulate community distances and negotiate stairs with minimal ankle pain for  increased functional mobility and QoL. - Progressing    Plan     Progressing WB inside boot for now, look to discharge crutches soon.     Curtis Danielle, PT

## 2024-10-13 PROBLEM — R53.1 DECREASED STRENGTH: Status: ACTIVE | Noted: 2023-06-14

## 2024-10-17 ENCOUNTER — CLINICAL SUPPORT (OUTPATIENT)
Dept: REHABILITATION | Facility: HOSPITAL | Age: 54
End: 2024-10-17
Payer: OTHER GOVERNMENT

## 2024-10-17 DIAGNOSIS — M25.672 DECREASED RANGE OF MOTION OF LEFT ANKLE: ICD-10-CM

## 2024-10-17 DIAGNOSIS — R29.898 WEAKNESS OF LEFT LOWER EXTREMITY: Primary | ICD-10-CM

## 2024-10-17 DIAGNOSIS — M76.72 PERONEAL TENDONITIS, LEFT: ICD-10-CM

## 2024-10-17 DIAGNOSIS — Z98.890 POSTOPERATIVE STATE: ICD-10-CM

## 2024-10-17 PROCEDURE — 97112 NEUROMUSCULAR REEDUCATION: CPT | Mod: PN

## 2024-10-17 PROCEDURE — 97530 THERAPEUTIC ACTIVITIES: CPT | Mod: PN

## 2024-10-17 PROCEDURE — 97110 THERAPEUTIC EXERCISES: CPT | Mod: PN

## 2024-10-17 NOTE — PROGRESS NOTES
[facility]   Physical Therapy Treatment Note      Name: Hillary Cotton  Clinic Number: [id number]    Therapy Diagnosis:   Encounter Diagnoses   Name Primary?    Weakness of left lower extremity Yes    Decreased range of motion of left ankle     Postoperative state     Peroneal tendonitis, left      Physician: Elías Aranda DPSATURNINO    Visit Date: 10/17/2024    Physician Orders: PT Eval and Treat  Medical Diagnosis from Referral:   Z98.890 (ICD-10-CM) - Postoperative state   M76.72 (ICD-10-CM) - Peroneal tendonitis, left   R29.898 (ICD-10-CM) - Weakness of left lower extremity      Evaluation Date: 10/4/2024  Authorization Period Expiration: 12/31/2024   Plan of Care Expiration: 12/27/24  Progress Note Due: 11/2024  Date of Surgery: 8/9/24  Visit # / Visits authorized: 1/1, 3/15  FOTO: 1/3 - Next  PTA Visit #: 0/5      Precautions: Standard, Progressive WB on LLE into tennis shoe     Time In: 3:10 PM  Time Out: 4:05 PM  Total Billable Time: 55 minutes (1TA, 1TE, 2NMR)    Subjective     Patient reports: side of left ankle was very sore after last visit for 2 days and then got better. Very busy at work recently but [employer] breaks to walk with crutches.   She was compliant with home exercise program.  Response to previous treatment: increased LLE WB inside boot  Functional change: walking better    Pain: 1-2/10  Location: left ankle     Objective      Objective Measures updated at progress report unless specified.     Treatment     Hillary received the treatments listed below:      therapeutic exercises to develop strength, endurance, ROM, flexibility, posture, and core stabilization for 15 minutes including:  Seated heel slides for dorsiflexion: 20x5'' holds  Seated ankle pumps: 2x30 each way  Passive toe flexion and extension seated: 6x15'' holds each way    manual therapy techniques: Joint mobilizations, Manual traction, Myofacial release, Manual Lymphatic Drainage, Soft tissue  Mobilization, and Friction Massage were applied to the: left ankle for 00 minutes, including:  TCJ gentle distraction  1-5 metatarsal anterior/posterior mobs, grade II-III    neuromuscular re-education activities to improve: Balance, Coordination, Kinesthetic, Sense, Proprioception, and Posture for 25 minutes. The following activities were included:  Ankle circles: 40x CW/CCW  Weight shifts onto LLE with 1 crutch: 20x  Toe curls: 15x10'' holds - D/C next  Toe splays: 12x10'' holds - D/C next  Gentle Towel swipes (INV/EV): 20x each way - HOLD    therapeutic activities to improve functional performance for 15 minutes, including:  Foot taps on 4 inch step with boot: 3x10 Bilateral on 4 inch step  Lateral walking in // bars with boot: 3 laps in // bars  Standing hip abduction: 2x10 Bilateral     Patient Education and Home Exercises       Education provided:   - Rehab process and prognosis  - Importance of home exercise program  - Avoidance of concordant pain(s), rest as needed  - progressive WB with Bilateral crutches in boot for this week    Written Home Exercises Provided: Yes. Exercises were reviewed and Hillary was able to demonstrate them prior to the end of the session.  Hillary demonstrated good  understanding of the education provided. See Electronic Medical Record under Patient Instructions for exercises provided during therapy sessions    Assessment     Hillary is a 53 y.o. female referred to outpatient Physical Therapy with a medical diagnosis of Postoperative state, Peroneal tendonitis, left, and Weakness of left lower extremity. Pt received tenosynovectomy of peroneal tendons, subtalar fusion, aspiration of tibial bone marrow and implantation of allograft bone mixed with BMAC left foot on 8/9/24. This is her 3rd surgery since 2019 from chronic left lateral ankle pain and 3rd stint in therapy. Patient presents with decreased left ankle range of motion and strength, poor LLE WB tolerance inside boot and  Bilateral crutches, paresthesias and pain along incision site and plantar surface of foot, and fear and avoidance of most left ankle and foot movements since surgery.   Progressing well, initiated weight bearing activities with fair tolerance, encouraged more WB with boot+crutches at home/work, and continue dorsiflexion ankle range of motion activities. Avoid ankle inversion especailly with plantarflexion.    Hillary Is progressing well towards her goals.   Patient prognosis is Good.     Patient will continue to benefit from skilled outpatient physical therapy to address the deficits listed in the problem list box on initial evaluation, provide pt/family education and to maximize pt's level of independence in the home and community environment.     Patient's spiritual, cultural and educational needs considered and pt agreeable to plan of care and goals.     Anticipated barriers to physical therapy:  chronicity of left ankle impairments and pain     Goals:   GOALS: Short Term Goals:  4 weeks  1. Report decreased left ankle pain </= 3/10 to increase tolerance for ADLs. - Progressing  2. Increase left ankle AROM by 5 degrees for dorsiflexion in order to walk with min to no compensation. - Progressing  3. Pt will demo good sitting and standing posture for improved spine and joint alignment for improved biomechanics. - Progressing  4. Pt to tolerate HEP to improve ROM and independence with ADL's. - Progressing     Long Term Goals: 8 weeks  1. Report decreased left ankle pain </= 2/10 in gait with tennis shoe with minimal deviations to increase tolerance for increased QoL and improved ADLs. - Progressing  2. Patient goal:  Get back to recreational walking, Kajal, ADLs - Progressing  3. Increase strength to >/= 4/5 for BLE to increase tolerance for ADL and work activities. - Progressing  4. Pt will report at </= 44% impaired on ANKLE FOTO score to demo increased functional mobility.  - Progressing  5. Pt will be able to  ambulate community distances and negotiate stairs with minimal ankle pain for increased functional mobility and QoL. - Progressing    Plan     Progressing WB inside boot for now, look to discharge crutches soon.     Curtis Danielle, PT

## 2024-10-22 ENCOUNTER — OFFICE VISIT (OUTPATIENT)
Dept: PODIATRY | Facility: CLINIC | Age: 54
End: 2024-10-22
Payer: OTHER GOVERNMENT

## 2024-10-22 ENCOUNTER — HOSPITAL ENCOUNTER (OUTPATIENT)
Dept: RADIOLOGY | Facility: HOSPITAL | Age: 54
Discharge: HOME | End: 2024-10-22
Attending: PODIATRIST
Payer: OTHER GOVERNMENT

## 2024-10-22 VITALS
SYSTOLIC BLOOD PRESSURE: 118 MMHG | HEART RATE: 93 BPM | HEIGHT: 66 IN | BODY MASS INDEX: 35.19 KG/M2 | DIASTOLIC BLOOD PRESSURE: 81 MMHG | WEIGHT: 218.94 LBS

## 2024-10-22 DIAGNOSIS — Z98.890 POSTOPERATIVE STATE: ICD-10-CM

## 2024-10-22 DIAGNOSIS — M79.2 NEUROPATHIC PAIN OF LEFT LOWER EXTREMITY: Primary | ICD-10-CM

## 2024-10-22 PROCEDURE — 99214 OFFICE O/P EST MOD 30 MIN: CPT | Mod: PBBFAC,25,PN | Performed by: PODIATRIST

## 2024-10-22 PROCEDURE — 73630 X-RAY EXAM OF FOOT: CPT | Mod: TC,PN,LT

## 2024-10-22 PROCEDURE — 73630 X-RAY EXAM OF FOOT: CPT | Mod: 26,LT,, | Performed by: RADIOLOGY

## 2024-10-22 PROCEDURE — 99999 PR PBB SHADOW E&M-EST. PATIENT-LVL IV: CPT | Mod: PBBFAC,,, | Performed by: PODIATRIST

## 2024-10-22 PROCEDURE — 99024 POSTOP FOLLOW-UP VISIT: CPT | Mod: ,,, | Performed by: PODIATRIST

## 2024-10-23 DIAGNOSIS — Z12.31 OTHER SCREENING MAMMOGRAM: ICD-10-CM

## 2024-10-23 NOTE — PROGRESS NOTES
Subjective:      Patient ID: Hillary Cotton is a 53 y.o. female.    Chief Complaint: Ankle Problem (swelling, left) and Ankle Pain (left )    50 y.o female presents to clinic as a referral from Dr. Null with chief complaint of pain and swelling along the lateral aspect of the ankle. Patient points to the lateral aspect of the ankle overlying the sinus tarsi and peroneal tendons. Pain is aggravated with prolonged standing and walking. Symptoms have been present for approximately 1 year. She has been ambulating in tall orthopedic boot for the past 2 months with no improvement. She was previously diagnosed with peroneal tendonitis 1 year ago and completed physical therapy with no improvement. Reports to multiple left ankle sprains in the past.     10/23/2021:  Follow-up from diagnostic injection to left sinus tarsi region.  Related no relief in pain whatsoever.  She complains of mild-to-moderate pain when she is on her feet for extended periods of time with the boot.  She attempts to walk without the boot the pain is moderate to severe.  She points to the lateral hindfoot/ankle region.    11/11/2021:  Scheduled for surgical intervention on 11/17/2021.  Relates she would like to review the procedures scheduled since it was scheduled prior to [place] and capsule secondary to COVID 19.    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  Denies any slips, trips or falls.  Dressing remained clean, dry and intact.  Using a rolling knee scooter and is nonweightbearing to left foot.    12/13/2021:  Approximately 4 weeks postop.  Relates intermittent burning and shooting pain to left foot.  She has generalized aching that will wax and wane.  Pain alleviated by applying ice intermittently.  Denies any slips, trips or falls.  She has remain nonweightbearing to left foot.  She is perform range-of-motion exercises at rest as  discussed.    02/14/2022:  Approximately 3 months postop.  Relates no pain at rest.  She gets some generalized aching a day or 2 after she has been on her feet a considerable amount.  Overall she feels as though her conditioning is progressing especially since she was initially in a boot since April 2021 prior to surgical intervention.  She has been ambulating with a tennis shoe and is doing well overall.  Attending physical therapy as prescribed.    10/06/2022:  Lasting approximately 8 months ago relates that overall she will have swelling and pain to left lower extremity that will wax and wane depending on her activity.  She states that when she is very active wearing certain types of shoe gear that is not a tennis shoe that she will have an increase in swelling and discomfort around the left ankle.  Will resolve with rest.  Also relates that prior to surgery she was getting swelling to left lower extremity.  Also reports some mild residual numbness to the lateral left ankle.    10/24/2022:  Approximately 2.5 weeks since she received injection to lipoma along the anterior lateral left ankle.  Significant overall reduction in size.  She still complains of aching pain to the area which worsens with increased periods of standing and walking.  Notes the pain is much less when she wears tennis shoes.  Pain aggravated by wearing flip-flops.    11/17/2022:  Follow-up from lidocaine injection to the sinus tarsi left foot.  She reported that the pain had stopped at rest following the injection however when she performed a moderate amount of walking to the parking lot she began to get some pain and aching that returned.  Relates that she has been off Celebrex since a procedure last week and was instructed to remain off of Celebrex for 10 days.  She has had a moderate amount of aching throughout her body but also reports some pain along the top of the left foot and lateral left ankle.  She also points to a previous mass that  was injected and gave her a significant amount of relief in the past.    01/13/2023:  Complains of acute onset flare-up in pain that occurred 6 days ago when she was walking.  She is not sure if her ankle gave out but she developed moderate sharp pain with discoloration and swelling to the left hindfoot/ankle area.  She also described pain pointing to the peroneal tendon distribution along the previous surgical scar lateral left hindfoot/ankle.  She is been taking Celebrex for osteoarthritis which also helps with some of the pain.  2 days ago she was getting out of the shower, slipped and stubbed her toes on the left foot and is complaining of moderate aching pain.  Scheduled for arthroscopy of the left subtalar joint next month however is inquiring about rescheduling.    02/07/2023:  Seen today as audio visual virtual visit while at work.  Ambulating with tall Cam boot as needed.  States that her pain is still persistent when she is been standing and walking for extended periods of time.  No pain at rest.  CT scan of the left hindfoot/ankle completed.    03/31/2023: Post arthroscopic debridement of the subtalar joint via sinus tarsi approach left foot and excision/curettage of bone cyst left calcaneus with autologous bone graft from the left tibia implanted into the left calcaneus on 03/24/2023.  She is remain nonweightbearing to the left foot.  Complains of intermittent burning along the front of the left ankle region.  Patient has left the boot on serving as a cast.  Relates she is getting a yeast infection from the antibiotic and that the Percocet is too strong.    04/14/2023:  3 weeks postop.  Reports intermittent burning and itching to the incision sites.  No significant pain reported today.  Has remain nonweightbearing with rolling knee scooter.    05/12/2023:  7 weeks postop.  Complains of increased swelling when she puts her foot down the ground as well as some mild rubor/redness that resolves with  elevation.  She complains of aching that is intermittent to left lower extremity and stiffness.  Also relates that she gets some discomfort at the distal posterior lateral aspect of the left leg that is intermittent.    06/16/2023:  Approximately 11 weeks postop.  She complains of intermittent pain and swelling depending on her activity.  She tried to transition to a tennis shoe last week and felt okay however when she was on her foot for extended period time she had moderate pain.  She received initial PT evaluation on 06/09/2023 and has pending follow-up on 06/19/2023.    In 2023: Nearly 5 months postop.  Relates the pain and swelling has been steadily improving.  She wears a compression sock to left foot which helps significantly.  She is unable to ambulate without a tennis shoe stating that the tennis shoe helps with shock absorption to the foot.  She has been attempting to walk around her neighborhood for exercise.  Unable to wear flats or high heels at this time.      01/25/2024:  Recently had CT scan of the left hindfoot completed secondary to worsening pain.  She has resumed wearing her tall Cam boot and utilizing crutches secondary to pain.  Also states very stressful time due to [place] and parades. Moderate pain with applying pressure on the left heel which is somewhat alleviated by wearing a cushioned shoe.    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  Her pain has improved since she has use the orthopedic boot and crutches.  Accompanied by her .  She expresses there was some confusion as to what I had previously recommended.  She thought I had recommended an ankle fusion.  In the past injections to the subtalar joint give her some limited relief.    05/24/2024:  Follow-up from 2nd opinion for chronic pain to left foot due to recurrent intraosseous lipoma.   "She demonstrates today pictures that she is taken when she has been on her feet for extended periods of time noting some bruising and increased swelling to the lateral ankle region/hindfoot.  This usually resolves with rest.  She does not have any significant pain at rest.  She is utilizing a Cam boot which helps reduce a lot of her discomfort.    08/13/2024:  Post arthrodesis of the subtalar joint with tenosynovectomy of the peroneal tendons left foot on 08/09/2024.  Reports that the Percocet is causing her to feel flushed.  Inquiring about returning on tramadol to help with the postop pain.  Denies any slips, trips or falls.  Cast intact.  Nonweightbearing with rolling knee scooter.  Accompanied by her .    08/28/2024:  Nearly 3 weeks postop.  Mild pain reported mostly from cast irritation.  Weight-bearing left lower extremity.  Denies any slips, trips or falls.    09/24/2024:  7 weeks postop.  Reports that she gets some burning sensation along the bottom of the foot that is intermittent and also noted at rest.  She feels stiffness when she moves her foot up and down.  She is able to walk around her home without a shoe noting that it feels okay without any significant discomfort.  The boot causes her discomfort.  Has been applying Neosporin over her incision site lateral left heel.    10/22/2024:  11 weeks postop.  Reports physical therapy is helping significantly.  Notes that when she attempts to transition out of her Cam boot into shoe she gets moderate swelling.  Her compression stockings are too tight.  Relates that she has some residual numbness to the bottom of the left great toe.    Vitals:    10/22/24 1620   BP: 118/81   Pulse: 93   Weight: 99.3 kg (218 lb 14.7 oz)   Height: 5' 6" (1.676 m)   PainSc:   3        Past Medical History:   Diagnosis Date    Abnormal Pap smear     Allergy     Asthma     Intraosseous lipoma     left foot    PONV (postoperative nausea and vomiting)     Sinusitis, chronic  "       Past Surgical History:   Procedure Laterality Date    ARTHROSCOPY OF FOOT Left 3/24/2023    Procedure: ARTHROSCOPY, FOOT;  Surgeon: Elías Aranda DPM;  Location: [facility] OR;  Service: Podiatry;  Laterality: Left;  Right lateral decubitus, 2.9 mm 30 deg angle scope, 2.7 mm shaver, [employer]itus bone graft harvester(Rayray),   brie notified cc  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  rayray notified cc    BONE MARROW ASPIRATION Left 8/9/2024    Procedure: ASPIRATION, BONE MARROW;  Surgeon: Elías Aranda DPM;  Location: [facility] OR;  Service: Podiatry;  Laterality: Left;    COLONOSCOPY N/A 11/11/2022    Procedure: COLONOSCOPY Moviprep;  Surgeon: John Bonner MD;  Location: [facility];  Service: Endoscopy;  Laterality: N/A;    EPIDURAL STEROID INJECTION N/A 6/5/2020    Procedure: INJECTION, STEROID, EPIDURAL,C7-T1;  Surgeon: Augusto Husesin MD;  Location: [facility]IN [facility]GT;  Service: Pain Management;  Laterality: N/A;    EPIDURAL STEROID INJECTION N/A 9/18/2020    Procedure: INJECTION, STEROID, EPIDURAL C7/T1;  Surgeon: Augusto Hussein MD;  Location: [facility];  Service: Pain Management;  Laterality: N/A;  INJECTION, STEROID, EPIDURAL C7/T1    FOOT MASS EXCISION Left 11/17/2021    Procedure: EXCISION, MASS, FOOT;  Surgeon: Elías Aranda DPM;  Location: [facility] OR;  Service: Podiatry;  Laterality: Left;  mini c-arm, Allograft bone Arthrex  Arthrex notified 11/8 CC  Biomet notified 11/16/21 AM    FUSION OF SUBTALAR JOINT Left 8/9/2024    Procedure: FUSION, SUBTALAR JOINT;  Surgeon: Elías Aranda DPM;  Location: [facility] OR;  Service: Podiatry;  Laterality: Left;  c-arm, Arthrex screws    HYSTEROSCOPIC POLYPECTOMY OF UTERUS N/A 3/26/2024    Procedure: POLYPECTOMY, UTERUS, HYSTEROSCOPIC;  Surgeon: Gris Wadsworth MD;  [facility]  Service: [facility]GYN;  Laterality: N/A;  400mL fluid deficit    SALIVARY GLAND SURGERY      TENOSYNOVECTOMY Left 8/9/2024    Procedure: TENOSYNOVECTOMY;  Surgeon: Rosi  Elías KANG DPM;  Location: [facility] OR;  Service: Podiatry;  Laterality: Left;    TONSILLECTOMY      XI ROBOTIC HYSTERECTOMY, WITH SALPINGO-OOPHORECTOMY Bilateral 2024    Procedure: XI ROBOTIC HYSTERECTOMY,WITH SALPINGO-OOPHORECTOMY;  Surgeon: Gris Wadsworth MD;  Location: [facility] OR;  Service: OB/GYN;  Laterality: Bilateral;       Family History   Problem Relation Name Age of Onset    Diabetes Mother      Hypertension Mother      Asthma Mother      Sinus disease Mother      Allergies Father      Diabetes Maternal Grandmother      Hypertension Maternal Grandmother      Sinus disease Sister      Sinus disease Brother      Sinus disease Sister         Social History     Socioeconomic History    Marital status:    Occupational History     Employer: [employer]   Tobacco Use    Smoking status: Former     Current packs/day: 0.00     Types: Cigarettes     Quit date: 10/1/2015     Years since quittin.0    Smokeless tobacco: Never   Substance and Sexual Activity    Alcohol use: No    Drug use: No    Sexual activity: Yes     Partners: Male       Current Outpatient Medications   Medication Sig Dispense Refill    aspirin (ECOTRIN) 81 MG EC tablet Take 1 tablet (81 mg total) by mouth 2 (two) times a day. 60 tablet 0    celecoxib (CELEBREX) 200 MG capsule TAKE 1 CAPSULE BY MOUTH EVERY DAY AS NEEDED FOR PAIN 30 capsule 5    cetirizine (ZYRTEC) 10 MG tablet Take 1 tablet (10 mg total) by mouth once daily. 90 tablet 0    cholecalciferol, vitamin D3, 1,250 mcg (50,000 unit) capsule Take 1 capsule (50,000 Units total) by mouth every 7 days. 12 capsule 3    collagenase (SANTYL) ointment Apply topically once daily. 30 g 0    doxycycline (VIBRA-TABS) 100 MG tablet Take 1 tablet (100 mg total) by mouth 2 (two) times daily. 14 tablet 0    estradioL (ESTRACE) 1 MG tablet TAKE 1 TABLET BY MOUTH EVERY DAY 90 tablet 3    gabapentin (NEURONTIN) 300 MG capsule TAKE 1 CAPSULE BY MOUTH EVERY EVENING 90 capsule 1     glucosamine-chondroitin 500-400 mg tablet Take 1 tablet by mouth 3 (three) times daily.      ipratropium (ATROVENT) 21 mcg (0.03 %) nasal spray 2 sprays by Each Nostril route 2 (two) times daily as needed. 30 mL 0    Lactobacillus rhamnosus GG (CULTURELLE) 10 billion cell capsule Take 1 capsule by mouth once daily.      multivitamin capsule Take 1 capsule by mouth once daily.      mupirocin (BACTROBAN) 2 % ointment Apply topically 2 (two) times daily. 22 g 0    ondansetron (ZOFRAN) 8 MG tablet Take 1 tablet (8 mg total) by mouth every 8 (eight) hours as needed for Nausea. 10 tablet 0    traMADoL (ULTRAM) 50 mg tablet Take 1 tablet (50 mg total) by mouth every 8 (eight) hours as needed for Pain. 30 tablet 0     Current Facility-Administered Medications   Medication Dose Route Frequency Provider Last Rate Last Admin    sodium chloride 0.9% flush 10 mL  10 mL Intravenous PRN Elías Aranda, DPM           Review of patient's allergies indicates:   Allergen Reactions    Prednisone Rash     Hx of delayed onset rash on 2 occasion. Tolerates medrol, IM steroids, topical, and intranasal steroids w/o problem           Review of Systems   Constitutional: Negative for chills, decreased appetite, fever and malaise/fatigue.   HENT:  Negative for congestion, ear discharge and sore throat.    Eyes:  Negative for discharge and pain.   Cardiovascular:  Positive for leg swelling. Negative for chest pain and claudication.   Respiratory:  Negative for cough and shortness of breath.    Skin:  Negative for color change, nail changes and rash.   Musculoskeletal:  Positive for stiffness. Negative for arthritis, joint pain, joint swelling and muscle weakness.        Left ankle pain   Gastrointestinal:  Negative for bloating, abdominal pain, diarrhea, nausea and vomiting.   Genitourinary:  Negative for flank pain and hematuria.   Neurological:  Negative for headaches, numbness and weakness.   Psychiatric/Behavioral:  Negative for altered  mental status.            Objective:      Physical Exam  Constitutional:       General: She is not in acute distress.     Appearance: She is well-developed. She is not diaphoretic.   Cardiovascular:      Pulses:           Dorsalis pedis pulses are 2+ on the right side and 2+ on the left side.        Posterior tibial pulses are 2+ on the right side and 2+ on the left side.      Comments: Note mild pitting edema to left lower extremity and none noted to the right lower extremity.  Musculoskeletal:         General: Tenderness present.      Comments: No pain on palpation along the tarsal tunnel medial left hindfoot.  Negative provocation sign and negative Tinel sign to the medial hindfoot.  Reduced left ankle range motion secondary to the subtalar joint arthrodesis with no available motion at the subtalar joint.  No pain with attempted subtalar joint range motion.  No pain squeezing the left heel.  No palpable fluctuance or crepitance of the left foot.      Patient with pain overlying the lateral malleolus which is dorsal/superior to the previous aspect of the incision site with mild discomfort along the previous surgical incision site lateral left hindfoot.   Feet:      Right foot:      Skin integrity: Dry skin present. No ulcer, blister, erythema or warmth.      Left foot:      Skin integrity: Dry skin present. No ulcer, blister, erythema or warmth.   Lymphadenopathy:      Comments: Negative lymphadenopathy bilateral popliteal fossa and tarsal tunnel.    Skin:     General: Skin is warm and dry.      Findings: No lesion.      Nails: There is no clubbing.      Comments: Incision site lateral left heel with proximal 1/2 noting full-thickness skin dehiscence down to subQ with fibrous base.  Wound site measures proximally 0.6 x 5.0 x 0.1 cm.  No drainage.  No surrounding erythema or signs infection.  The distal half of the incision appears to be forming scar.    There is a resolved blister to the posterior inferior aspect  of the left heel noting that the overlying hyperkeratotic tissues beginning to slough off.  No signs infection.   Neurological:      Mental Status: She is alert and oriented to person, place, and time.      Sensory: No sensory deficit.      Motor: No abnormal muscle tone.      Comments: Light touch within normal limits.    Psychiatric:         Behavior: Behavior normal.         Thought Content: Thought content normal.         Judgment: Judgment normal.         Assessment:       Encounter Diagnoses   Name Primary?    Neuropathic pain of left lower extremity Yes    Postoperative state          Plan:       Hillary was seen today for post-op evaluation.    Diagnoses and all orders for this visit:    Neuropathic pain of left lower extremity    Postoperative state  -     X-Ray Foot Complete Left; Future      I counseled the patient on her conditions, their implications and medical management.    Reviewed left foot x-ray completed today noting no significant gapping at the subtalar joint arthrodesis site with intact fixation maintained alignment.    Patient to continue transitioning out of her Cam boot into a tennis shoe as tolerated.  It appears as though the distal strap on the cam boot maybe rubbing near the lateral malleolus and causing irritation.  We discussed either wearing her ankle brace or using an Ace wrap to help give her some ankle support in addition to using crutches until she can fully tolerate weight-bearing with the shoe.  Continue activity as tolerated.      Continue physical therapy.      RTC within 6-8 weeks to re-evaluate or p.r.n. as discussed.    Assisted by Lavelle Pichardo DPM PGY 2    A portion of this note was generated by voice recognition software and may contain spelling and grammar errors.    .

## 2024-10-24 ENCOUNTER — CLINICAL SUPPORT (OUTPATIENT)
Dept: REHABILITATION | Facility: HOSPITAL | Age: 54
End: 2024-10-24
Payer: OTHER GOVERNMENT

## 2024-10-24 DIAGNOSIS — M25.672 DECREASED RANGE OF MOTION OF LEFT ANKLE: ICD-10-CM

## 2024-10-24 DIAGNOSIS — Z98.890 POSTOPERATIVE STATE: ICD-10-CM

## 2024-10-24 DIAGNOSIS — R29.898 WEAKNESS OF LEFT LOWER EXTREMITY: Primary | ICD-10-CM

## 2024-10-24 DIAGNOSIS — M76.72 PERONEAL TENDONITIS, LEFT: ICD-10-CM

## 2024-10-24 PROCEDURE — 97530 THERAPEUTIC ACTIVITIES: CPT | Mod: PN

## 2024-10-24 PROCEDURE — 97112 NEUROMUSCULAR REEDUCATION: CPT | Mod: PN

## 2024-11-05 ENCOUNTER — CLINICAL SUPPORT (OUTPATIENT)
Dept: REHABILITATION | Facility: HOSPITAL | Age: 54
End: 2024-11-05
Payer: OTHER GOVERNMENT

## 2024-11-05 DIAGNOSIS — Z98.890 POSTOPERATIVE STATE: ICD-10-CM

## 2024-11-05 DIAGNOSIS — M76.72 PERONEAL TENDONITIS, LEFT: ICD-10-CM

## 2024-11-05 DIAGNOSIS — R29.898 WEAKNESS OF LEFT LOWER EXTREMITY: Primary | ICD-10-CM

## 2024-11-05 DIAGNOSIS — M25.672 DECREASED RANGE OF MOTION OF LEFT ANKLE: ICD-10-CM

## 2024-11-05 PROCEDURE — 97530 THERAPEUTIC ACTIVITIES: CPT | Mod: PN

## 2024-11-05 PROCEDURE — 97112 NEUROMUSCULAR REEDUCATION: CPT | Mod: PN

## 2024-11-05 NOTE — PROGRESS NOTES
[facility]   Physical Therapy Treatment Note      Name: Hillary Cotton  Clinic Number: [id number]    Therapy Diagnosis:   Encounter Diagnoses   Name Primary?    Weakness of left lower extremity Yes    Decreased range of motion of left ankle     Postoperative state     Peroneal tendonitis, left      Physician: Elías Aranda DPSATURNINO    Visit Date: 11/5/2024    Physician Orders: PT Eval and Treat  Medical Diagnosis from Referral:   Z98.890 (ICD-10-CM) - Postoperative state   M76.72 (ICD-10-CM) - Peroneal tendonitis, left   R29.898 (ICD-10-CM) - Weakness of left lower extremity      Evaluation Date: 10/4/2024  Authorization Period Expiration: 12/31/2024   Plan of Care Expiration: 12/27/24  Progress Note Due: 11/2024  Date of Surgery: 8/9/24  Visit # / Visits authorized: 1/1, 5/15  FOTO: 1/3 - Next  PTA Visit #: 0/5      Precautions: Standard, Progressive WB on LLE into tennis shoe     Time In: 3:05 PM  Time Out: 4:00 PM  Total Billable Time: 30 minutes (1TA, 1NMR)    Subjective     Patient reports: went to see MD, out of the boot, and using 2 crutches currently. Extra pain but not like she was in the past trying to get out of the boot.   She was compliant with home exercise program.  Response to previous treatment: increased LLE WB inside boot  Functional change: walking better    Pain: 7/10  Location: left ankle     Objective      Objective Measures updated at progress report unless specified.     Treatment     Hillary received the treatments listed below:      therapeutic exercises to develop strength, endurance, ROM, flexibility, posture, and core stabilization for 15 minutes including:  Long sitting calf/hamstring stretch: 6x20'' holds with strap  Ankle dorsiflexion: 2x15 red theraband   Seated heel slides for dorsiflexion: 20x5'' holds  Heel raises: 2x12 double leg    manual therapy techniques: Joint mobilizations, Manual traction, Myofacial release, Manual Lymphatic Drainage,  Soft tissue Mobilization, and Friction Massage were applied to the: left ankle for 00 minutes, including:  TCJ gentle distraction  1-5 metatarsal anterior/posterior mobs, grade II-III    neuromuscular re-education activities to improve: Balance, Coordination, Kinesthetic, Sense, Proprioception, and Posture for 25 minutes. The following activities were included:  Foot taps on 4 inch step with boot doffed: 3x10 Bilateral on 4 inch step  Step ups: 2x10, 4 inch step, left    therapeutic activities to improve functional performance for 15 minutes, includin laps of 20' without AD in // bars  Lateral walking in // bars without boot: 3 laps in // bars  Standing hip abduction: 3x10 Bilateral    Patient Education and Home Exercises       Education provided:   - Rehab process and prognosis  - Importance of home exercise program  - Avoidance of concordant pain(s), rest as needed  - progressive WB with Bilateral crutches in boot for this week    Written Home Exercises Provided: Yes. Exercises were reviewed and Hillary was able to demonstrate them prior to the end of the session.  Hillary demonstrated good  understanding of the education provided. See Electronic Medical Record under Patient Instructions for exercises provided during therapy sessions    Assessment     Hillary is a 53 y.o. female referred to outpatient Physical Therapy with a medical diagnosis of Postoperative state, Peroneal tendonitis, left, and Weakness of left lower extremity. Pt received tenosynovectomy of peroneal tendons, subtalar fusion, aspiration of tibial bone marrow and implantation of allograft bone mixed with BMAC left foot on 24. This is her 3rd surgery since 2019 from chronic left lateral ankle pain and 3rd stint in therapy. Patient presents with decreased left ankle range of motion and strength, poor LLE WB tolerance inside boot and Bilateral crutches, paresthesias and pain along incision site and plantar surface of foot, and fear and  avoidance of most left ankle and foot movements since surgery.   Improved gait pattern and WB tolerance overall. Focusing on gait and weight bearing tolerance, decreased ankle irritability and paresthesias are improving as well with decreased burning as well.     Hillary Is progressing well towards her goals.   Patient prognosis is Good.     Patient will continue to benefit from skilled outpatient physical therapy to address the deficits listed in the problem list box on initial evaluation, provide pt/family education and to maximize pt's level of independence in the home and community environment.     Patient's spiritual, cultural and educational needs considered and pt agreeable to plan of care and goals.     Anticipated barriers to physical therapy:  chronicity of left ankle impairments and pain     Goals:   GOALS: Short Term Goals:  4 weeks  1. Report decreased left ankle pain </= 3/10 to increase tolerance for ADLs. - Progressing  2. Increase left ankle AROM by 5 degrees for dorsiflexion in order to walk with min to no compensation. - Progressing  3. Pt will demo good sitting and standing posture for improved spine and joint alignment for improved biomechanics. - Progressing  4. Pt to tolerate HEP to improve ROM and independence with ADL's. - Progressing     Long Term Goals: 8 weeks  1. Report decreased left ankle pain </= 2/10 in gait with tennis shoe with minimal deviations to increase tolerance for increased QoL and improved ADLs. - Progressing  2. Patient goal:  Get back to recreational walking, [place], ADLs - Progressing  3. Increase strength to >/= 4/5 for BLE to increase tolerance for ADL and work activities. - Progressing  4. Pt will report at </= 44% impaired on ANKLE FOTO score to demo increased functional mobility.  - Progressing  5. Pt will be able to ambulate community distances and negotiate stairs with minimal ankle pain for increased functional mobility and QoL. - Progressing    Plan      Progressing WB inside boot for now, look to discharge crutches soon.     Curtis Danielle, PT

## 2024-11-06 DIAGNOSIS — Z98.890 POSTOPERATIVE STATE: ICD-10-CM

## 2024-11-06 DIAGNOSIS — M79.2 NEUROPATHIC PAIN OF LEFT LOWER EXTREMITY: ICD-10-CM

## 2024-11-06 RX ORDER — TRAMADOL HYDROCHLORIDE 50 MG/1
50 TABLET ORAL EVERY 8 HOURS PRN
Qty: 30 TABLET | Refills: 0 | Status: SHIPPED | OUTPATIENT
Start: 2024-11-06

## 2024-11-18 DIAGNOSIS — M54.12 CERVICAL RADICULOPATHY: ICD-10-CM

## 2024-11-19 RX ORDER — GABAPENTIN 300 MG/1
300 CAPSULE ORAL NIGHTLY
Qty: 90 CAPSULE | Refills: 1 | Status: SHIPPED | OUTPATIENT
Start: 2024-11-19

## 2024-11-19 NOTE — TELEPHONE ENCOUNTER
Refill Routing Note   Medication(s) are not appropriate for processing by [facility] for the following reason(s):        Outside of protocol    [facility] action(s):  Route             Appointments  past 12m or future 3m with PCP    Date Provider   Last Visit   11/4/2021 Katelin French NP   Next Visit   Visit date not found Katelin Fernch NP   ED visits in past 90 days: 0        Note composed:10:51 PM 11/18/2024

## 2024-12-09 DIAGNOSIS — Z98.890 POSTOPERATIVE STATE: ICD-10-CM

## 2024-12-09 DIAGNOSIS — M79.2 NEUROPATHIC PAIN OF LEFT LOWER EXTREMITY: ICD-10-CM

## 2024-12-10 RX ORDER — TRAMADOL HYDROCHLORIDE 50 MG/1
50 TABLET ORAL EVERY 8 HOURS PRN
Qty: 30 TABLET | Refills: 0 | Status: SHIPPED | OUTPATIENT
Start: 2024-12-10

## 2024-12-23 ENCOUNTER — HOSPITAL ENCOUNTER (OUTPATIENT)
Dept: RADIOLOGY | Facility: HOSPITAL | Age: 54
Discharge: HOME | End: 2024-12-23
Attending: PODIATRIST
Payer: OTHER GOVERNMENT

## 2024-12-23 ENCOUNTER — OFFICE VISIT (OUTPATIENT)
Dept: PODIATRY | Facility: CLINIC | Age: 54
End: 2024-12-23
Payer: OTHER GOVERNMENT

## 2024-12-23 VITALS
BODY MASS INDEX: 35.19 KG/M2 | WEIGHT: 218.94 LBS | HEIGHT: 66 IN | SYSTOLIC BLOOD PRESSURE: 119 MMHG | DIASTOLIC BLOOD PRESSURE: 75 MMHG | HEART RATE: 89 BPM

## 2024-12-23 DIAGNOSIS — Z98.890 POSTOPERATIVE STATE: ICD-10-CM

## 2024-12-23 DIAGNOSIS — G57.52 TARSAL TUNNEL SYNDROME, LEFT: Primary | ICD-10-CM

## 2024-12-23 PROCEDURE — 73630 X-RAY EXAM OF FOOT: CPT | Mod: TC,PN,LT

## 2024-12-23 PROCEDURE — 99213 OFFICE O/P EST LOW 20 MIN: CPT | Mod: PBBFAC,25,PN | Performed by: PODIATRIST

## 2024-12-23 PROCEDURE — 99999 PR PBB SHADOW E&M-EST. PATIENT-LVL III: CPT | Mod: PBBFAC,,, | Performed by: PODIATRIST

## 2024-12-23 PROCEDURE — 64450 NJX AA&/STRD OTHER PN/BRANCH: CPT | Mod: PBBFAC,PN | Performed by: PODIATRIST

## 2024-12-23 PROCEDURE — 73630 X-RAY EXAM OF FOOT: CPT | Mod: 26,LT,, | Performed by: RADIOLOGY

## 2024-12-23 PROCEDURE — 99999PBSHW PR PBB SHADOW TECHNICAL ONLY FILED TO HB: Mod: PBBFAC,,,

## 2024-12-23 RX ORDER — TRIAMCINOLONE ACETONIDE 40 MG/ML
40 INJECTION, SUSPENSION INTRA-ARTICULAR; INTRAMUSCULAR
Status: COMPLETED | OUTPATIENT
Start: 2024-12-23 | End: 2024-12-23

## 2024-12-23 RX ADMIN — TRIAMCINOLONE ACETONIDE 40 MG: 40 INJECTION, SUSPENSION INTRA-ARTICULAR; INTRAMUSCULAR at 04:12
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
## 2025-01-28 RX ORDER — ASPIRIN 325 MG
50000 TABLET, DELAYED RELEASE (ENTERIC COATED) ORAL
Qty: 12 CAPSULE | Refills: 3 | Status: SHIPPED | OUTPATIENT
Start: 2025-01-28

## 2025-02-19 DIAGNOSIS — M79.2 NEUROPATHIC PAIN OF LEFT LOWER EXTREMITY: ICD-10-CM

## 2025-02-19 DIAGNOSIS — Z98.890 POSTOPERATIVE STATE: ICD-10-CM

## 2025-02-19 DIAGNOSIS — M54.12 CERVICAL RADICULOPATHY: ICD-10-CM

## 2025-02-19 RX ORDER — TRAMADOL HYDROCHLORIDE 50 MG/1
50 TABLET ORAL EVERY 8 HOURS PRN
Qty: 30 TABLET | Refills: 0 | Status: SHIPPED | OUTPATIENT
Start: 2025-02-19

## 2025-02-19 RX ORDER — GABAPENTIN 300 MG/1
300 CAPSULE ORAL NIGHTLY
Qty: 90 CAPSULE | Refills: 3 | Status: SHIPPED | OUTPATIENT
Start: 2025-02-19

## 2025-02-28 DIAGNOSIS — M54.12 CERVICAL RADICULOPATHY: ICD-10-CM

## 2025-02-28 DIAGNOSIS — M79.18 MYOFASCIAL PAIN: ICD-10-CM

## 2025-02-28 DIAGNOSIS — M47.812 CERVICAL SPONDYLOSIS: ICD-10-CM

## 2025-02-28 RX ORDER — CELECOXIB 200 MG/1
CAPSULE ORAL
Qty: 30 CAPSULE | Refills: 5 | Status: SHIPPED | OUTPATIENT
Start: 2025-02-28

## 2025-02-28 NOTE — TELEPHONE ENCOUNTER
Refill Routing Note   Medication(s) are not appropriate for processing by [facility] for the following reason(s):        Outside of protocol    [facility] action(s):  Route             Appointments  past 12m or future 3m with PCP    Date Provider   Last Visit   7/18/2024 Arnulfo Barboza MD   Next Visit   3/14/2025 Arnulfo Barboza MD   ED visits in past 90 days: 0        Note composed:7:29 AM 02/28/2025

## 2025-02-28 NOTE — TELEPHONE ENCOUNTER
No care due was identified.  [facility] Embedded Care Due Messages. Reference number: [id number].   2/28/2025 12:04:47 AM CST

## 2025-03-14 ENCOUNTER — OFFICE VISIT (OUTPATIENT)
Dept: FAMILY MEDICINE | Facility: CLINIC | Age: 55
End: 2025-03-14
Payer: OTHER GOVERNMENT

## 2025-03-14 ENCOUNTER — LAB VISIT (OUTPATIENT)
Dept: LAB | Facility: HOSPITAL | Age: 55
End: 2025-03-14
Attending: FAMILY MEDICINE
Payer: OTHER GOVERNMENT

## 2025-03-14 VITALS
HEIGHT: 66 IN | BODY MASS INDEX: 35.03 KG/M2 | DIASTOLIC BLOOD PRESSURE: 72 MMHG | WEIGHT: 218 LBS | HEART RATE: 79 BPM | SYSTOLIC BLOOD PRESSURE: 120 MMHG | OXYGEN SATURATION: 98 %

## 2025-03-14 DIAGNOSIS — Z23 NEED FOR VACCINATION AGAINST STREPTOCOCCUS PNEUMONIAE: ICD-10-CM

## 2025-03-14 DIAGNOSIS — E66.812 CLASS 2 OBESITY WITH BODY MASS INDEX (BMI) OF 35.0 TO 35.9 IN ADULT, UNSPECIFIED OBESITY TYPE, UNSPECIFIED WHETHER SERIOUS COMORBIDITY PRESENT: ICD-10-CM

## 2025-03-14 DIAGNOSIS — Z23 NEED FOR SHINGLES VACCINE: ICD-10-CM

## 2025-03-14 DIAGNOSIS — Z00.00 ANNUAL PHYSICAL EXAM: Primary | ICD-10-CM

## 2025-03-14 DIAGNOSIS — Z00.00 ANNUAL PHYSICAL EXAM: ICD-10-CM

## 2025-03-14 DIAGNOSIS — Z23 NEEDS FLU SHOT: ICD-10-CM

## 2025-03-14 LAB
ALBUMIN SERPL BCP-MCNC: 3.7 G/DL (ref 3.5–5.2)
ALP SERPL-CCNC: 59 U/L (ref 40–150)
ALT SERPL W/O P-5'-P-CCNC: 18 U/L (ref 10–44)
ANION GAP SERPL CALC-SCNC: 9 MMOL/L (ref 8–16)
AST SERPL-CCNC: 18 U/L (ref 10–40)
BASOPHILS # BLD AUTO: 0.09 K/UL (ref 0–0.2)
BASOPHILS NFR BLD: 1.1 % (ref 0–1.9)
BILIRUB SERPL-MCNC: 0.2 MG/DL (ref 0.1–1)
BUN SERPL-MCNC: 9 MG/DL (ref 6–20)
CALCIUM SERPL-MCNC: 9.6 MG/DL (ref 8.7–10.5)
CHLORIDE SERPL-SCNC: 102 MMOL/L (ref 95–110)
CHOLEST SERPL-MCNC: 236 MG/DL (ref 120–199)
CHOLEST/HDLC SERPL: 4 {RATIO} (ref 2–5)
CO2 SERPL-SCNC: 25 MMOL/L (ref 23–29)
CREAT SERPL-MCNC: 0.8 MG/DL (ref 0.5–1.4)
DIFFERENTIAL METHOD BLD: ABNORMAL
EOSINOPHIL # BLD AUTO: 0.3 K/UL (ref 0–0.5)
EOSINOPHIL NFR BLD: 3.8 % (ref 0–8)
ERYTHROCYTE [DISTWIDTH] IN BLOOD BY AUTOMATED COUNT: 13.3 % (ref 11.5–14.5)
EST. GFR  (NO RACE VARIABLE): >60 ML/MIN/1.73 M^2
ESTIMATED AVG GLUCOSE: 108 MG/DL (ref 68–131)
GLUCOSE SERPL-MCNC: 70 MG/DL (ref 70–110)
HBA1C MFR BLD: 5.4 % (ref 4–5.6)
HCT VFR BLD AUTO: 44.6 % (ref 37–48.5)
HDLC SERPL-MCNC: 59 MG/DL (ref 40–75)
HDLC SERPL: 25 % (ref 20–50)
HGB BLD-MCNC: 13.8 G/DL (ref 12–16)
IMM GRANULOCYTES # BLD AUTO: 0.03 K/UL (ref 0–0.04)
IMM GRANULOCYTES NFR BLD AUTO: 0.4 % (ref 0–0.5)
LDLC SERPL CALC-MCNC: 132.2 MG/DL (ref 63–159)
LYMPHOCYTES # BLD AUTO: 2.9 K/UL (ref 1–4.8)
LYMPHOCYTES NFR BLD: 34.1 % (ref 18–48)
MCH RBC QN AUTO: 29.1 PG (ref 27–31)
MCHC RBC AUTO-ENTMCNC: 30.9 G/DL (ref 32–36)
MCV RBC AUTO: 94 FL (ref 82–98)
MONOCYTES # BLD AUTO: 0.8 K/UL (ref 0.3–1)
MONOCYTES NFR BLD: 9.9 % (ref 4–15)
NEUTROPHILS # BLD AUTO: 4.3 K/UL (ref 1.8–7.7)
NEUTROPHILS NFR BLD: 50.7 % (ref 38–73)
NONHDLC SERPL-MCNC: 177 MG/DL
NRBC BLD-RTO: 0 /100 WBC
PLATELET # BLD AUTO: 293 K/UL (ref 150–450)
PMV BLD AUTO: 11 FL (ref 9.2–12.9)
POTASSIUM SERPL-SCNC: 4.5 MMOL/L (ref 3.5–5.1)
PROT SERPL-MCNC: 7.6 G/DL (ref 6–8.4)
RBC # BLD AUTO: 4.74 M/UL (ref 4–5.4)
SODIUM SERPL-SCNC: 136 MMOL/L (ref 136–145)
TRIGL SERPL-MCNC: 224 MG/DL (ref 30–150)
TSH SERPL DL<=0.005 MIU/L-ACNC: 2.29 UIU/ML (ref 0.4–4)
WBC # BLD AUTO: 8.37 K/UL (ref 3.9–12.7)

## 2025-03-14 PROCEDURE — 99396 PREV VISIT EST AGE 40-64: CPT | Mod: S$PBB,,, | Performed by: FAMILY MEDICINE

## 2025-03-14 PROCEDURE — 85025 COMPLETE CBC W/AUTO DIFF WBC: CPT | Performed by: FAMILY MEDICINE

## 2025-03-14 PROCEDURE — 90750 HZV VACC RECOMBINANT IM: CPT | Mod: PBBFAC,PO

## 2025-03-14 PROCEDURE — 90656 IIV3 VACC NO PRSV 0.5 ML IM: CPT | Mod: PBBFAC,PO

## 2025-03-14 PROCEDURE — 80061 LIPID PANEL: CPT | Performed by: FAMILY MEDICINE

## 2025-03-14 PROCEDURE — 36415 COLL VENOUS BLD VENIPUNCTURE: CPT | Mod: PO | Performed by: FAMILY MEDICINE

## 2025-03-14 PROCEDURE — 99999 PR PBB SHADOW E&M-EST. PATIENT-LVL IV: CPT | Mod: PBBFAC,,, | Performed by: FAMILY MEDICINE

## 2025-03-14 PROCEDURE — G0008 ADMIN INFLUENZA VIRUS VAC: HCPCS | Mod: PBBFAC,PO

## 2025-03-14 PROCEDURE — 90472 IMMUNIZATION ADMIN EACH ADD: CPT | Mod: PBBFAC,PO

## 2025-03-14 PROCEDURE — 84443 ASSAY THYROID STIM HORMONE: CPT | Performed by: FAMILY MEDICINE

## 2025-03-14 PROCEDURE — 99999PBSHW PR PBB SHADOW TECHNICAL ONLY FILED TO HB: Mod: PBBFAC,,,

## 2025-03-14 PROCEDURE — 83036 HEMOGLOBIN GLYCOSYLATED A1C: CPT | Performed by: FAMILY MEDICINE

## 2025-03-14 PROCEDURE — 99214 OFFICE O/P EST MOD 30 MIN: CPT | Mod: PBBFAC,PO,25 | Performed by: FAMILY MEDICINE

## 2025-03-14 PROCEDURE — 90677 PCV20 VACCINE IM: CPT | Mod: PBBFAC,PO

## 2025-03-14 PROCEDURE — G0009 ADMIN PNEUMOCOCCAL VACCINE: HCPCS | Mod: PBBFAC,PO

## 2025-03-14 PROCEDURE — 80053 COMPREHEN METABOLIC PANEL: CPT | Performed by: FAMILY MEDICINE

## 2025-03-14 RX ADMIN — PNEUMOCOCCAL 20-VALENT CONJUGATE VACCINE 0.5 ML
2.2; 2.2; 2.2; 2.2; 2.2; 2.2; 2.2; 2.2; 2.2; 2.2; 2.2; 2.2; 2.2; 2.2; 2.2; 2.2; 4.4; 2.2; 2.2; 2.2 INJECTION, SUSPENSION INTRAMUSCULAR at 11:03

## 2025-03-14 RX ADMIN — INFLUENZA VIRUS VACCINE 0.5 ML: 15; 15; 15 SUSPENSION INTRAMUSCULAR at 11:03

## 2025-03-14 RX ADMIN — Medication 0.5 ML: at 11:03

## 2025-03-14 NOTE — PROGRESS NOTES
"Subjective:      No chief complaint on file.       Patient ID: Hillary Cotton is a 54 y.o. female.    HPI  Hillary Cotton is a 54 y.o. female with medical diagnoses as listed in the medical history and problem list that presents for above complaint(s).       Reports no complaints, doing really well. Feels back to normal after hysterectomy with resolution of perimenopausal symptoms. Taking estradiol.     FOOT  Has arthritis in the foot, had a subtalar fusion and lipoma removal on 08/24. Reports it is doing well.     ARTHRITIS  Celebrex has really helped arthritis. Reports hamstring stretching helps as well.     FOOT SWELLING  Notes swelling of the left foot and leg. Worse at the end of the day. Elevating the leg and compression stocking helps some.    No recent illness   Diet: fairy good  Exercise: feeling well enough, wants to increase walking  Sleep: good    Review of Systems   Constitutional: Negative.    HENT: Negative.     Eyes: Negative.    Respiratory: Negative.     Cardiovascular: Negative.    Gastrointestinal: Negative.    Genitourinary: Negative.    Musculoskeletal: Negative.    Skin: Negative.    Neurological: Negative.    Endo/Heme/Allergies: Negative.    Psychiatric/Behavioral: Negative.          The following sections were updated/reviewed and documented in the electronic medical record: Past Medical History, Past Surgical History, Social History, Family History, Allergies and Medications    Objective:     Physical Exam  Vitals:    03/14/25 1050   BP: 120/72   BP Location: Left arm   Patient Position: Sitting   Pulse: 79   SpO2: 98%   Weight: 98.9 kg (218 lb)   Height: 5' 6" (1.676 m)    Body mass index is 35.19 kg/m².  Weight: 98.9 kg (218 lb)   Height: 5' 6" (167.6 cm)   Physical Exam  Vitals reviewed.   Constitutional:       Appearance: Normal appearance.   HENT:      Head: Normocephalic.   Cardiovascular:      Rate and Rhythm: Normal rate and regular rhythm.   Pulmonary:      " Effort: Pulmonary effort is normal.      Breath sounds: Normal breath sounds.   Abdominal:      General: Abdomen is flat.      Palpations: Abdomen is soft.   Skin:     General: Skin is warm and dry.   Neurological:      General: No focal deficit present.      Mental Status: She is alert and oriented to person, place, and time.   Psychiatric:         Mood and Affect: Mood normal.         Behavior: Behavior normal.           Assessment and Plan:       Diagnoses and all orders for this visit:    Needs flu shot  -     influenza (Flulaval, Fluzone, Fluarix) 45 mcg/0.5 mL IM vaccine (> or = 6 mo) 0.5 mL      Annual physical exam  -     Urinalysis; Future  -     Comprehensive Metabolic Panel; Future; Expected date: 03/14/2025  -     Lipid Panel; Future; Expected date: 03/14/2025  -     TSH; Future; Expected date: 03/14/2025  -     CBC Auto Differential; Future; Expected date: 03/14/2025  -     Hemoglobin A1C; Future; Expected date: 03/14/2025    Needs flu shot  -     influenza (Flulaval, Fluzone, Fluarix) 45 mcg/0.5 mL IM vaccine (> or = 6 mo) 0.5 mL    Class 2 obesity with body mass index (BMI) of 35.0 to 35.9 in adult, unspecified obesity type, unspecified whether serious comorbidity present  -     Lipid Panel; Future; Expected date: 03/14/2025  -     TSH; Future; Expected date: 03/14/2025    Need for vaccination against Streptococcus pneumoniae  -     pneumoc 20-edin conj-dip cr(PF) (PREVNAR-20 (PF)) injection Syrg 0.5 mL    Need for shingles vaccine  -     varicella zoster (Shingrix) IM vaccine (>/= 51 yo)       Health Maintenance reviewed, addressed as per orders    The patient expressed understanding and no barriers to adherence were identified.     Gray Lema  [facility] MS-4    I hereby acknowledge that I am relying upon documentation authored by a medical student working under my supervision and further I hereby attest that I have verified the student documentation or findings  by personally performing the physical exam and medical decision making activities of the Evaluation and Management service to be billed.

## 2025-03-17 ENCOUNTER — RESULTS FOLLOW-UP (OUTPATIENT)
Dept: FAMILY MEDICINE | Facility: CLINIC | Age: 55
End: 2025-03-17

## 2025-03-17 NOTE — PROGRESS NOTES
Subjective     Patient ID: Hillary Cotton is a 54 y.o. female.    Chief Complaint: Annual Exam    54 years old female came to the clinic for her physical examination.  Patient with a BMI of 35 currently trying to lose weight.    Review of Systems   Constitutional: Negative.    HENT: Negative.     Eyes: Negative.    Respiratory: Negative.     Gastrointestinal: Negative.    Genitourinary: Negative.    Musculoskeletal: Negative.    Neurological: Negative.    Psychiatric/Behavioral: Negative.            Objective     Physical Exam  Constitutional:       General: She is not in acute distress.     Appearance: She is well-developed. She is not diaphoretic.   HENT:      Head: Normocephalic and atraumatic.      Right Ear: External ear normal.      Left Ear: External ear normal.      Nose: Nose normal.      Mouth/Throat:      Pharynx: No oropharyngeal exudate.   Eyes:      General: No scleral icterus.        Right eye: No discharge.         Left eye: No discharge.      Conjunctiva/sclera: Conjunctivae normal.      Pupils: Pupils are equal, round, and reactive to light.   Neck:      Thyroid: No thyromegaly.      Vascular: No JVD.      Trachea: No tracheal deviation.   Cardiovascular:      Rate and Rhythm: Normal rate and regular rhythm.      Heart sounds: Normal heart sounds. No murmur heard.     No friction rub. No gallop.   Pulmonary:      Effort: Pulmonary effort is normal. No respiratory distress.      Breath sounds: Normal breath sounds. No stridor. No wheezing or rales.   Chest:      Chest wall: No tenderness.   Abdominal:      General: Bowel sounds are normal. There is no distension.      Palpations: Abdomen is soft. There is no mass.      Tenderness: There is no abdominal tenderness. There is no guarding or rebound.   Musculoskeletal:         General: No tenderness. Normal range of motion.      Cervical back: Normal range of motion and neck supple.   Lymphadenopathy:      Cervical: No cervical adenopathy.    Skin:     General: Skin is warm and dry.      Coloration: Skin is not pale.      Findings: No erythema or rash.   Neurological:      Mental Status: She is alert and oriented to person, place, and time.      Cranial Nerves: No cranial nerve deficit.      Motor: No abnormal muscle tone.      Coordination: Coordination normal.      Deep Tendon Reflexes: Reflexes are normal and symmetric.   Psychiatric:         Behavior: Behavior normal.         Thought Content: Thought content normal.         Judgment: Judgment normal.            Assessment and Plan     1. Annual physical exam  -     Urinalysis; Future  -     Comprehensive Metabolic Panel; Future; Expected date: 03/14/2025  -     Lipid Panel; Future; Expected date: 03/14/2025  -     TSH; Future; Expected date: 03/14/2025  -     CBC Auto Differential; Future; Expected date: 03/14/2025  -     Hemoglobin A1C; Future; Expected date: 03/14/2025    2. Needs flu shot  -     influenza (Flulaval, Fluzone, Fluarix) 45 mcg/0.5 mL IM vaccine (> or = 6 mo) 0.5 mL    3. Class 2 obesity with body mass index (BMI) of 35.0 to 35.9 in adult, unspecified obesity type, unspecified whether serious comorbidity present  -     Lipid Panel; Future; Expected date: 03/14/2025  -     TSH; Future; Expected date: 03/14/2025    4. Need for vaccination against Streptococcus pneumoniae  -     pneumoc 20-edin conj-dip cr(PF) (PREVNAR-20 (PF)) injection Syrg 0.5 mL    5. Need for shingles vaccine  -     varicella zoster (Shingrix) IM vaccine (>/= 49 yo)                 Follow up in about 1 year (around 3/14/2026), or if symptoms worsen or fail to improve.

## 2025-03-24 ENCOUNTER — OFFICE VISIT (OUTPATIENT)
Dept: PODIATRY | Facility: CLINIC | Age: 55
End: 2025-03-24
Payer: OTHER GOVERNMENT

## 2025-03-24 VITALS — HEART RATE: 84 BPM | SYSTOLIC BLOOD PRESSURE: 120 MMHG | DIASTOLIC BLOOD PRESSURE: 77 MMHG

## 2025-03-24 DIAGNOSIS — Z98.890 HISTORY OF FOOT SURGERY: Primary | ICD-10-CM

## 2025-03-24 DIAGNOSIS — M76.72 PERONEAL TENDONITIS, LEFT: ICD-10-CM

## 2025-03-24 PROCEDURE — 99213 OFFICE O/P EST LOW 20 MIN: CPT | Mod: S$PBB,,, | Performed by: PODIATRIST

## 2025-03-24 PROCEDURE — 99999 PR PBB SHADOW E&M-EST. PATIENT-LVL III: CPT | Mod: PBBFAC,,, | Performed by: PODIATRIST

## 2025-03-24 PROCEDURE — 99213 OFFICE O/P EST LOW 20 MIN: CPT | Mod: PBBFAC,PN | Performed by: PODIATRIST

## 2025-03-24 NOTE — PROGRESS NOTES
Subjective:      Patient ID: Hillary Cotton is a 54 y.o. female.    Chief Complaint: Ankle Problem (swelling, left) and Ankle Pain (left )    50 y.o female presents to clinic as a referral from Dr. Null with chief complaint of pain and swelling along the lateral aspect of the ankle. Patient points to the lateral aspect of the ankle overlying the sinus tarsi and peroneal tendons. Pain is aggravated with prolonged standing and walking. Symptoms have been present for approximately 1 year. She has been ambulating in tall orthopedic boot for the past 2 months with no improvement. She was previously diagnosed with peroneal tendonitis 1 year ago and completed physical therapy with no improvement. Reports to multiple left ankle sprains in the past.     10/23/2021:  Follow-up from diagnostic injection to left sinus tarsi region.  Related no relief in pain whatsoever.  She complains of mild-to-moderate pain when she is on her feet for extended periods of time with the boot.  She attempts to walk without the boot the pain is moderate to severe.  She points to the lateral hindfoot/ankle region.    11/11/2021:  Scheduled for surgical intervention on 11/17/2021.  Relates she would like to review the procedures scheduled since it was scheduled prior to [place] and capsule secondary to COVID 19.    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  Denies any slips, trips or falls.  Dressing remained clean, dry and intact.  Using a rolling knee scooter and is nonweightbearing to left foot.    12/13/2021:  Approximately 4 weeks postop.  Relates intermittent burning and shooting pain to left foot.  She has generalized aching that will wax and wane.  Pain alleviated by applying ice intermittently.  Denies any slips, trips or falls.  She has remain nonweightbearing to left foot.  She is perform range-of-motion exercises at rest as  discussed.    02/14/2022:  Approximately 3 months postop.  Relates no pain at rest.  She gets some generalized aching a day or 2 after she has been on her feet a considerable amount.  Overall she feels as though her conditioning is progressing especially since she was initially in a boot since April 2021 prior to surgical intervention.  She has been ambulating with a tennis shoe and is doing well overall.  Attending physical therapy as prescribed.    10/06/2022:  Lasting approximately 8 months ago relates that overall she will have swelling and pain to left lower extremity that will wax and wane depending on her activity.  She states that when she is very active wearing certain types of shoe gear that is not a tennis shoe that she will have an increase in swelling and discomfort around the left ankle.  Will resolve with rest.  Also relates that prior to surgery she was getting swelling to left lower extremity.  Also reports some mild residual numbness to the lateral left ankle.    10/24/2022:  Approximately 2.5 weeks since she received injection to lipoma along the anterior lateral left ankle.  Significant overall reduction in size.  She still complains of aching pain to the area which worsens with increased periods of standing and walking.  Notes the pain is much less when she wears tennis shoes.  Pain aggravated by wearing flip-flops.    11/17/2022:  Follow-up from lidocaine injection to the sinus tarsi left foot.  She reported that the pain had stopped at rest following the injection however when she performed a moderate amount of walking to the parking lot she began to get some pain and aching that returned.  Relates that she has been off Celebrex since a procedure last week and was instructed to remain off of Celebrex for 10 days.  She has had a moderate amount of aching throughout her body but also reports some pain along the top of the left foot and lateral left ankle.  She also points to a previous mass that  was injected and gave her a significant amount of relief in the past.    01/13/2023:  Complains of acute onset flare-up in pain that occurred 6 days ago when she was walking.  She is not sure if her ankle gave out but she developed moderate sharp pain with discoloration and swelling to the left hindfoot/ankle area.  She also described pain pointing to the peroneal tendon distribution along the previous surgical scar lateral left hindfoot/ankle.  She is been taking Celebrex for osteoarthritis which also helps with some of the pain.  2 days ago she was getting out of the shower, slipped and stubbed her toes on the left foot and is complaining of moderate aching pain.  Scheduled for arthroscopy of the left subtalar joint next month however is inquiring about rescheduling.    02/07/2023:  Seen today as audio visual virtual visit while at work.  Ambulating with tall Cam boot as needed.  States that her pain is still persistent when she is been standing and walking for extended periods of time.  No pain at rest.  CT scan of the left hindfoot/ankle completed.    03/31/2023: Post arthroscopic debridement of the subtalar joint via sinus tarsi approach left foot and excision/curettage of bone cyst left calcaneus with autologous bone graft from the left tibia implanted into the left calcaneus on 03/24/2023.  She is remain nonweightbearing to the left foot.  Complains of intermittent burning along the front of the left ankle region.  Patient has left the boot on serving as a cast.  Relates she is getting a yeast infection from the antibiotic and that the Percocet is too strong.    04/14/2023:  3 weeks postop.  Reports intermittent burning and itching to the incision sites.  No significant pain reported today.  Has remain nonweightbearing with rolling knee scooter.    05/12/2023:  7 weeks postop.  Complains of increased swelling when she puts her foot down the ground as well as some mild rubor/redness that resolves with  elevation.  She complains of aching that is intermittent to left lower extremity and stiffness.  Also relates that she gets some discomfort at the distal posterior lateral aspect of the left leg that is intermittent.    06/16/2023:  Approximately 11 weeks postop.  She complains of intermittent pain and swelling depending on her activity.  She tried to transition to a tennis shoe last week and felt okay however when she was on her foot for extended period time she had moderate pain.  She received initial PT evaluation on 06/09/2023 and has pending follow-up on 06/19/2023.    In 2023: Nearly 5 months postop.  Relates the pain and swelling has been steadily improving.  She wears a compression sock to left foot which helps significantly.  She is unable to ambulate without a tennis shoe stating that the tennis shoe helps with shock absorption to the foot.  She has been attempting to walk around her neighborhood for exercise.  Unable to wear flats or high heels at this time.      01/25/2024:  Recently had CT scan of the left hindfoot completed secondary to worsening pain.  She has resumed wearing her tall Cam boot and utilizing crutches secondary to pain.  Also states very stressful time due to [place] and parades. Moderate pain with applying pressure on the left heel which is somewhat alleviated by wearing a cushioned shoe.    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  Her pain has improved since she has use the orthopedic boot and crutches.  Accompanied by her .  She expresses there was some confusion as to what I had previously recommended.  She thought I had recommended an ankle fusion.  In the past injections to the subtalar joint give her some limited relief.    05/24/2024:  Follow-up from 2nd opinion for chronic pain to left foot due to recurrent intraosseous lipoma.   She demonstrates today pictures that she is taken when she has been on her feet for extended periods of time noting some bruising and increased swelling to the lateral ankle region/hindfoot.  This usually resolves with rest.  She does not have any significant pain at rest.  She is utilizing a Cam boot which helps reduce a lot of her discomfort.    08/13/2024:  Post arthrodesis of the subtalar joint with tenosynovectomy of the peroneal tendons left foot on 08/09/2024.  Reports that the Percocet is causing her to feel flushed.  Inquiring about returning on tramadol to help with the postop pain.  Denies any slips, trips or falls.  Cast intact.  Nonweightbearing with rolling knee scooter.  Accompanied by her .    08/28/2024:  Nearly 3 weeks postop.  Mild pain reported mostly from cast irritation.  Weight-bearing left lower extremity.  Denies any slips, trips or falls.    09/24/2024:  7 weeks postop.  Reports that she gets some burning sensation along the bottom of the foot that is intermittent and also noted at rest.  She feels stiffness when she moves her foot up and down.  She is able to walk around her home without a shoe noting that it feels okay without any significant discomfort.  The boot causes her discomfort.  Has been applying Neosporin over her incision site lateral left heel.    10/22/2024:  11 weeks postop.  Reports physical therapy is helping significantly.  Notes that when she attempts to transition out of her Cam boot into shoe she gets moderate swelling.  Her compression stockings are too tight.  Relates that she has some residual numbness to the bottom of the left great toe.    12/23/2024:  Greater than 4 months postop.  Relates 80% improvement overall.  Ambulating with tennis shoes.  States that she was on her foot all day putting indoor and outdoor decorations and getting ready for the [place] vacation with minimal discomfort.  She is ambulating with [employer]chers shoes.  Stopped attending  physical therapy last month.  Relates to some intermittent pins and needles sensation along the bottom of the foot that is slowly improving over time.    03/24/2025:  7 months postop.  Relates that her discomfort continues to improve and that she has been increasing her activity level significantly.  She is able to clean her house and do normal activities without any significant pain.  She does get some pain that will reach 4/10 at the most which we will improve with rest.  States that she is trying to gradually wean herself out of a tennis shoe into a more professional shoe.  She has stopped doing the home exercises.  Overall her pain is described more as a achy throbbing pain significantly improved compared to preop value.    Vitals:    03/24/25 1651   BP: 120/77   Pulse: 84   PainSc:   1   PainLoc: Ankle        Past Medical History:   Diagnosis Date    Abnormal Pap smear     Allergy     Asthma     Intraosseous lipoma     left foot    PONV (postoperative nausea and vomiting)     Sinusitis, chronic        Past Surgical History:   Procedure Laterality Date    ARTHROSCOPY OF FOOT Left 3/24/2023    Procedure: ARTHROSCOPY, FOOT;  Surgeon: Elías Aranda DPM;  Location: [facility] OR;  Service: Podiatry;  Laterality: Left;  Right lateral decubitus, 2.9 mm 30 deg angle scope, 2.7 mm shaver, [employer] bone graft harvester(Rayray)brie notified cc  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  rayray notified cc    BONE MARROW ASPIRATION Left 8/9/2024    Procedure: ASPIRATION, BONE MARROW;  Surgeon: Elías Aranda DPM;  Location: [facility] OR;  Service: Podiatry;  Laterality: Left;    COLONOSCOPY N/A 11/11/2022    Procedure: COLONOSCOPY Moviprep;  Surgeon: John Bonner MD;  Location: [facility] ENDO;  Service: Endoscopy;  Laterality: N/A;    EPIDURAL STEROID INJECTION N/A 6/5/2020    Procedure: INJECTION, STEROID, EPIDURAL,C7-T1;  Surgeon: Augusto Hussein MD;  Location: [facility] PAIN MGT;  Service: Pain Management;  Laterality: N/A;     EPIDURAL STEROID INJECTION N/A 2020    Procedure: INJECTION, STEROID, EPIDURAL C7/T1;  Surgeon: Augusto Hussein MD;  Location: [facility] PAIN MGT;  Service: Pain Management;  Laterality: N/A;  INJECTION, STEROID, EPIDURAL C7/T1    FOOT MASS EXCISION Left 2021    Procedure: EXCISION, MASS, FOOT;  Surgeon: Elías Aranda DPM;  Location: [facility] OR;  Service: Podiatry;  Laterality: Left;  mini c-arm, Allograft bone Arthrex  Arthrex notified  CC  Biomet notified 21 AM    FUSION OF SUBTALAR JOINT Left 2024    Procedure: FUSION, SUBTALAR JOINT;  Surgeon: Elías Aranda DPM;  Location: [facility] OR;  Service: Podiatry;  Laterality: Left;  c-arm, Arthrex screws    HYSTEROSCOPIC POLYPECTOMY OF UTERUS N/A 3/26/2024    Procedure: POLYPECTOMY, UTERUS, HYSTEROSCOPIC;  Surgeon: Gris Wadsworth MD;  Location: [facility] OR;  Service: OB/GYN;  Laterality: N/A;  400mL fluid deficit    SALIVARY GLAND SURGERY      TENOSYNOVECTOMY Left 2024    Procedure: TENOSYNOVECTOMY;  Surgeon: Elías Aranda DPM;  Location: [facility] OR;  Service: Podiatry;  Laterality: Left;    TONSILLECTOMY      XI ROBOTIC HYSTERECTOMY, WITH SALPINGO-OOPHORECTOMY Bilateral 2024    Procedure: XI ROBOTIC HYSTERECTOMY,WITH SALPINGO-OOPHORECTOMY;  Surgeon: Gris Wadsworth MD;  Location: [facility] OR;  Service: OB/GYN;  Laterality: Bilateral;       Family History   Problem Relation Name Age of Onset    Diabetes Mother      Hypertension Mother      Asthma Mother      Sinus disease Mother      Allergies Father      Diabetes Maternal Grandmother      Hypertension Maternal Grandmother      Sinus disease Sister      Sinus disease Brother      Sinus disease Sister         Social History     Socioeconomic History    Marital status:    Occupational History     Employer: [employer]   Tobacco Use    Smoking status: Former     Current packs/day: 0.00     Types: Cigarettes     Quit date: 10/1/2015     Years since quittin.4     Smokeless tobacco: Never   Substance and Sexual Activity    Alcohol use: No    Drug use: No    Sexual activity: Yes     Partners: Male       Current Outpatient Medications   Medication Sig Dispense Refill    celecoxib (CELEBREX) 200 MG capsule TAKE 1 CAPSULE BY MOUTH EVERY DAY AS NEEDED FOR PAIN 30 capsule 5    cetirizine (ZYRTEC) 10 MG tablet Take 1 tablet (10 mg total) by mouth once daily. 90 tablet 0    cholecalciferol, vitamin D3, 1,250 mcg (50,000 unit) capsule TAKE 1 CAPSULE BY MOUTH ONE TIME PER WEEK 12 capsule 3    estradioL (ESTRACE) 1 MG tablet TAKE 1 TABLET BY MOUTH EVERY DAY 90 tablet 3    gabapentin (NEURONTIN) 300 MG capsule Take 1 capsule (300 mg total) by mouth every evening. 90 capsule 3    glucosamine-chondroitin 500-400 mg tablet Take 1 tablet by mouth 3 (three) times daily.      ipratropium (ATROVENT) 21 mcg (0.03 %) nasal spray 2 sprays by Each Nostril route 2 (two) times daily as needed. 30 mL 0    multivitamin capsule Take 1 capsule by mouth once daily.      traMADoL (ULTRAM) 50 mg tablet Take 1 tablet (50 mg total) by mouth every 8 (eight) hours as needed for Pain. 30 tablet 0    doxycycline (VIBRA-TABS) 100 MG tablet Take 1 tablet (100 mg total) by mouth 2 (two) times daily. (Patient not taking: Reported on 3/24/2025) 14 tablet 0    Lactobacillus rhamnosus GG (CULTURELLE) 10 billion cell capsule Take 1 capsule by mouth once daily. (Patient not taking: Reported on 3/24/2025)      mupirocin (BACTROBAN) 2 % ointment Apply topically 2 (two) times daily. (Patient not taking: Reported on 3/24/2025) 22 g 0     No current facility-administered medications for this visit.       Review of patient's allergies indicates:   Allergen Reactions    Prednisone Rash     Hx of delayed onset rash on 2 occasion. Tolerates medrol, IM steroids, topical, and intranasal steroids w/o problem           Review of Systems   Constitutional: Negative for chills, decreased appetite, fever and malaise/fatigue.   HENT:   Negative for congestion, ear discharge and sore throat.    Eyes:  Negative for discharge and pain.   Cardiovascular:  Positive for leg swelling. Negative for chest pain and claudication.   Respiratory:  Negative for cough and shortness of breath.    Skin:  Negative for color change, nail changes and rash.   Musculoskeletal:  Positive for stiffness. Negative for arthritis, joint pain, joint swelling and muscle weakness.        Left ankle pain   Gastrointestinal:  Negative for bloating, abdominal pain, diarrhea, nausea and vomiting.   Genitourinary:  Negative for flank pain and hematuria.   Neurological:  Negative for headaches, numbness and weakness.   Psychiatric/Behavioral:  Negative for altered mental status.            Objective:      Physical Exam  Constitutional:       General: She is not in acute distress.     Appearance: She is well-developed. She is not diaphoretic.   Cardiovascular:      Pulses:           Dorsalis pedis pulses are 2+ on the right side and 2+ on the left side.        Posterior tibial pulses are 2+ on the right side and 2+ on the left side.      Comments: Note mild pitting edema to left lower extremity and none noted to the right lower extremity.  Musculoskeletal:         General: Tenderness present.      Comments: No pain on palpation along the tarsal tunnel upon today's exam.  No available subtalar joint range motion and limited left ankle circumduction.  Negative anterior drawer sign left ankle.  No pain stress inversion or eversion of the left ankle.  Mild crepitus at the posterior left fibula at the retromalleolar groove however no subluxation of the peroneal tendons.  There is some localized pain on palpation directly to the posterior aspect of the fibula overlying the peroneal tendons at the superior aspect of the previous surgical incision.  No localized edema, warmth or discoloration.  No palpable fluctuance or crepitance.     No pain on palpation along the lateral aspect of the left  hindfoot or ankle.    Rectus appearance of the left foot.   Feet:      Right foot:      Skin integrity: Dry skin present. No ulcer, blister, erythema or warmth.      Left foot:      Skin integrity: Dry skin present. No ulcer, blister, erythema or warmth.   Lymphadenopathy:      Comments: Negative lymphadenopathy bilateral popliteal fossa and tarsal tunnel.    Skin:     General: Skin is warm and dry.      Findings: No lesion.      Nails: There is no clubbing.      Comments: Normal-appearing scar to the lateral left ankle.   Neurological:      Mental Status: She is alert and oriented to person, place, and time.      Sensory: No sensory deficit.      Motor: No abnormal muscle tone.      Comments: Light touch within normal limits.    Psychiatric:         Behavior: Behavior normal.         Thought Content: Thought content normal.         Judgment: Judgment normal.         Assessment:       Encounter Diagnoses   Name Primary?    History of foot surgery Yes    Peroneal tendonitis, left          Plan:       Hillary was seen today for ankle pain.    Diagnoses and all orders for this visit:    History of foot surgery  -     X-Ray Foot Complete Left; Future    Peroneal tendonitis, left  -     X-Ray Foot Complete Left; Future      I counseled the patient on her conditions, their implications and medical management.    Ordered an updated weight-bearing left foot x-ray to ensure that there was no issues with the hardware securing the subtalar joint fusion site which appeared to be consolidated.    Overall the patient appears to be improving steadily however she does feel as though she has plateaued somewhere around 80% improvement.  We discussed continued home exercises focusing on active eversion in the neutral and plantar flexed position as well as inversion of the foot.  We discussed that due to the subtalar joint fusion her ability to adapt over uneven surfaces and wear high-heeled shoes is significantly limited and could  place her at increased risk for an ankle injury.    RTC within 6 months to re-evaluate or p.r.n. as discussed    Assisted per Anish Lieberman DPM PGY 2    A portion of this note was generated by voice recognition software and may contain spelling and grammar errors.    .

## 2025-03-28 ENCOUNTER — HOSPITAL ENCOUNTER (OUTPATIENT)
Dept: RADIOLOGY | Facility: HOSPITAL | Age: 55
Discharge: HOME | End: 2025-03-28
Attending: PODIATRIST
Payer: OTHER GOVERNMENT

## 2025-03-28 DIAGNOSIS — Z98.890 HISTORY OF FOOT SURGERY: ICD-10-CM

## 2025-03-28 DIAGNOSIS — M76.72 PERONEAL TENDONITIS, LEFT: ICD-10-CM

## 2025-03-28 PROCEDURE — 73630 X-RAY EXAM OF FOOT: CPT | Mod: 26,LT,, | Performed by: STUDENT IN AN ORGANIZED HEALTH CARE EDUCATION/TRAINING PROGRAM

## 2025-03-28 PROCEDURE — 73630 X-RAY EXAM OF FOOT: CPT | Mod: TC,FY,LT

## 2025-04-04 ENCOUNTER — PATIENT MESSAGE (OUTPATIENT)
Dept: PODIATRY | Facility: CLINIC | Age: 55
End: 2025-04-04
Payer: OTHER GOVERNMENT

## 2025-05-19 ENCOUNTER — E-VISIT (OUTPATIENT)
Dept: URGENT CARE | Facility: CLINIC | Age: 55
End: 2025-05-19
Payer: OTHER GOVERNMENT

## 2025-05-19 DIAGNOSIS — H00.012 HORDEOLUM EXTERNUM OF RIGHT LOWER EYELID: Primary | ICD-10-CM

## 2025-05-19 RX ORDER — ERYTHROMYCIN 5 MG/G
OINTMENT OPHTHALMIC 3 TIMES DAILY
Qty: 3.5 G | Refills: 0 | Status: SHIPPED | OUTPATIENT
Start: 2025-05-19

## 2025-05-19 NOTE — PROGRESS NOTES
Patient ID: Hillary Cotton is a 54 y.o. female.    Chief Complaint: General Illness (Entered automatically based on patient selection in [employer].) and Stye    The patient initiated a request through [employer] on 5/19/2025 for evaluation and management with a chief complaint of General Illness (Entered automatically based on patient selection in [employer] [facility].) and Stye     I evaluated the questionnaire submission on 5/19/25.    [facility] Evisit [facility]-Common Problems    5/19/2025  9:57 AM CDT - Filed by Patient   What do you need help with? Other Concern   Do you agree to participate in an E-Visit? Yes   If you have any of the following symptoms, please go to the nearest emergency room or call 911: I acknowledge   Do you have any of the following pregnancy-related conditions? None   What is the main issue you would like addressed today? Eye Stye   Please describe your symptoms. Itchy, bruisy, sore, inflamed   Where is your problem located? Eye   On a scale of 1-10, where 10 is the worst you can imagine, how severe are your symptoms? (range: 1 - 10) 2   Have you had these symptoms before? Yes   How long have you had these symptoms? About a week   What helps with your symptoms? Warm compress, tylenol   What makes your symptoms feel worse? Getting worse each day   Are your symptoms related to a condition you currently have? No   Please describe any probable cause for your symptoms. Eye stye   Provide any additional information you feel is important.    Please attach any relevant images or files    Are you able to take your vital signs? No         Encounter Diagnosis   Name Primary?    Hordeolum externum of right lower eyelid Yes        No orders of the defined types were placed in this encounter.     Medications Ordered This Encounter   Medications    erythromycin (ROMYCIN) ophthalmic ointment     Sig: Place into the right eye 3 (three) times daily.     Dispense:  3.5 g     Refill:  0        Follow up if symptoms  worsen or fail to improve.      E-Visit Time Tracking:    Day 1 Time (in minutes): 5    Total Time (in minutes): 5

## 2025-08-04 RX ORDER — ESTRADIOL 1 MG/1
1 TABLET ORAL DAILY
Qty: 30 TABLET | Refills: 3 | Status: SHIPPED | OUTPATIENT
Start: 2025-08-04 | End: 2026-08-04

## 2025-08-29 DIAGNOSIS — M47.812 CERVICAL SPONDYLOSIS: ICD-10-CM

## 2025-08-29 DIAGNOSIS — M79.18 MYOFASCIAL PAIN: ICD-10-CM

## 2025-08-29 DIAGNOSIS — M54.12 CERVICAL RADICULOPATHY: ICD-10-CM

## 2025-08-29 RX ORDER — CELECOXIB 200 MG/1
CAPSULE ORAL
Qty: 30 CAPSULE | Refills: 5 | Status: SHIPPED | OUTPATIENT
Start: 2025-08-29

## (undated) DEVICE — SUT VICRYL PLUS 0 CT1 36IN

## (undated) DEVICE — BLADE SAGITTA 5/BX

## (undated) DEVICE — SEE L#120831

## (undated) DEVICE — PORT ACCESS 8MM W/120MM LOW

## (undated) DEVICE — SUT VICRYL 0 27 CT-2

## (undated) DEVICE — TOURNIQUET SB QC DP 18X4IN

## (undated) DEVICE — BANDAGE ESMARK ELASTIC ST 6X9

## (undated) DEVICE — SYR 10CC LUER LOCK

## (undated) DEVICE — DRESSING XEROFORM FOIL PK 1X8

## (undated) DEVICE — STOCKINET TUBULAR 1 PLY 6X60IN

## (undated) DEVICE — Device

## (undated) DEVICE — DEVICE MYOSURE LITE TISS REM

## (undated) DEVICE — ELECTRODE REM PLYHSV RETURN 9

## (undated) DEVICE — SYR 3CC LUER LOC

## (undated) DEVICE — SPONGE COTTON TRAY 4X4IN

## (undated) DEVICE — K-WIRE 2.4X170MM
Type: IMPLANTABLE DEVICE | Site: FOOT | Status: NON-FUNCTIONAL
Removed: 2024-08-09

## (undated) DEVICE — SEE MEDLINE ITEM 156953

## (undated) DEVICE — SOL POVIDONE SCRUB IODINE 4 OZ

## (undated) DEVICE — GLOVE BIOGEL ECLIPSE SZ 8

## (undated) DEVICE — TIP RUMI BLUE DISPOSABLE 5/BX

## (undated) DEVICE — NDL 18GA X1 1/2 REG BEVEL

## (undated) DEVICE — TUBING SUC UNIV W/CONN 12FT

## (undated) DEVICE — SPLINT FIBERGLASS PAD 5X20

## (undated) DEVICE — NDL INSUFFLATION VERRES 120MM

## (undated) DEVICE — SYR ONLY LUER LOCK 20CC

## (undated) DEVICE — SUT VICRYL PLUS 4-0 P3 18IN

## (undated) DEVICE — SET BASIN 48X48IN 6000ML RING

## (undated) DEVICE — PACK FLUENT DISPOSABLE

## (undated) DEVICE — KIT PROCESSING BONE MARROW ANG

## (undated) DEVICE — GOWN NONREINF SET-IN SLV XL

## (undated) DEVICE — DEVICE SNAPSECURE FOL CATH

## (undated) DEVICE — SOL IRRI STRL WATER 1000ML

## (undated) DEVICE — SUT PROLENE 4-0 MONO 18IN

## (undated) DEVICE — NDL HYPO REG 25G X 1 1/2

## (undated) DEVICE — SPLINT FIBERGLASS PAD 4X15

## (undated) DEVICE — GLOVE BIOGEL SKINSENSE PI 6.5

## (undated) DEVICE — SEAL UNIVERSAL 5MM-8MM XI

## (undated) DEVICE — INSERT CUSHIONPRONE VIEW LARGE

## (undated) DEVICE — COVER TIP CURVED SCISSORS XI

## (undated) DEVICE — SPONGE DERMACEA GAUZE 4X4

## (undated) DEVICE — KIT PROCEDURE STER INLET CLOSU

## (undated) DEVICE — STRAP ANKLE ARTHSCP DSRCTOR

## (undated) DEVICE — BANDAGE ACE ELASTIC 6"

## (undated) DEVICE — PAD PREP CUFFED NS 24X48IN

## (undated) DEVICE — DRESSING XEROFORM NONADH 1X8IN

## (undated) DEVICE — NDL HYPO STD REG BVL 18GX1.5IN

## (undated) DEVICE — 6.5 PROFILE DRILL BIT

## (undated) DEVICE — APPLICATOR CHLORAPREP ORN 26ML

## (undated) DEVICE — COVER OVERHEAD SURG LT BLUE

## (undated) DEVICE — BANDAGE DERMACEA STRETCH 4X1IN

## (undated) DEVICE — SUT VICRYL+ 27 UR-6 VIOL

## (undated) DEVICE — GLOVE BIOGEL PI MICRO INDIC 8

## (undated) DEVICE — GAUZE SPONGE 4X4 12PLY

## (undated) DEVICE — TOURNIQUET SB QC DP 34X4IN

## (undated) DEVICE — BNDG COFLEX FOAM LF2 ST 4X5YD

## (undated) DEVICE — SUT ETHILON 3-0 PS2 18 BLK

## (undated) DEVICE — SYS SEE SHARP SCP ANTIFG LNG

## (undated) DEVICE — BLADE SURG #15 CARBON STEEL

## (undated) DEVICE — SOL POVIDONE PREP IODINE 4 OZ

## (undated) DEVICE — SEAL LENS SCOPE MYOSURE

## (undated) DEVICE — KIT ARTHREX ANGEL PRP

## (undated) DEVICE — OBTURATOR BLADELESS 8MM XI CLR

## (undated) DEVICE — IRRIGATOR ENDOSCOPY DISP.

## (undated) DEVICE — PORT AIRSEAL 12/120MM LPI

## (undated) DEVICE — DRESSING XEROFORM 1X8IN

## (undated) DEVICE — SUT ABS CLIP LAPRA-TY CTD

## (undated) DEVICE — BLADE ULTRACUT 3.5MM

## (undated) DEVICE — BANDAGE MATRIX HK LOOP 4IN 5YD

## (undated) DEVICE — BANDAGE ESMARK ELASTIC ST 4X9

## (undated) DEVICE — BIT DRILL MTP 2 MM

## (undated) DEVICE — TRAY DO THE ROBOT

## (undated) DEVICE — PAD CAST SPECIALIST STRL 4

## (undated) DEVICE — JELLY SURGILUBE 5GR

## (undated) DEVICE — SPONGE GAUZE 4X4 12 PLY STRL

## (undated) DEVICE — SOL IRR SOD CHL .9% POUR

## (undated) DEVICE — DRAPE SURGICAL STERI IRRG PCH

## (undated) DEVICE — SOL NACL IRR 3000ML

## (undated) DEVICE — SEE MEDLINE ITEM 157216

## (undated) DEVICE — SUT MCRYL PLUS 4-0 PS2 27IN

## (undated) DEVICE — PADDING CAST 4IN DELTA ROLL

## (undated) DEVICE — KIT ENDO CARPEL TUNNAL SINGLE

## (undated) DEVICE — SOL ELECTROLUBE ANTI-STIC

## (undated) DEVICE — KIT WING PAD POSITIONING

## (undated) DEVICE — BANDAGE ROLL COTTN 4.5INX4.1YD

## (undated) DEVICE — DRESSING GAUZE XEROFORM 5X9

## (undated) DEVICE — KIT ANTIFOG

## (undated) DEVICE — DRESSING LEUKOPLAST FLEX 1X3IN

## (undated) DEVICE — PAD PREP 50/CA

## (undated) DEVICE — SOL STRL WATER INJ 1000ML BG

## (undated) DEVICE — OCCLUDER COLPO-PNEUMO STERILE

## (undated) DEVICE — DRESSING AQUACEL SACRAL 9 X 9

## (undated) DEVICE — BLADE SAG MIC FINE 9.5X25.5MM

## (undated) DEVICE — SET TRI-LUMEN FILTERED TUBE

## (undated) DEVICE — DRAPE COLUMN DAVINCI XI

## (undated) DEVICE — BNDG COFLEX FOAM LF2 ST 6X5YD

## (undated) DEVICE — DRAPE ARM DAVINCI XI

## (undated) DEVICE — SOL NORMAL USPCA 0.9%